# Patient Record
Sex: FEMALE | Race: AMERICAN INDIAN OR ALASKA NATIVE | Employment: OTHER | URBAN - METROPOLITAN AREA
[De-identification: names, ages, dates, MRNs, and addresses within clinical notes are randomized per-mention and may not be internally consistent; named-entity substitution may affect disease eponyms.]

---

## 2020-01-06 ENCOUNTER — INITIAL CONSULT (OUTPATIENT)
Dept: CARDIOLOGY CLINIC | Age: 85
End: 2020-01-06
Payer: MEDICARE

## 2020-01-06 VITALS
HEART RATE: 46 BPM | BODY MASS INDEX: 24.55 KG/M2 | WEIGHT: 162 LBS | DIASTOLIC BLOOD PRESSURE: 60 MMHG | SYSTOLIC BLOOD PRESSURE: 116 MMHG | HEIGHT: 68 IN

## 2020-01-06 PROCEDURE — G8484 FLU IMMUNIZE NO ADMIN: HCPCS | Performed by: INTERNAL MEDICINE

## 2020-01-06 PROCEDURE — 99203 OFFICE O/P NEW LOW 30 MIN: CPT | Performed by: INTERNAL MEDICINE

## 2020-01-06 PROCEDURE — 93000 ELECTROCARDIOGRAM COMPLETE: CPT | Performed by: INTERNAL MEDICINE

## 2020-01-06 PROCEDURE — 1090F PRES/ABSN URINE INCON ASSESS: CPT | Performed by: INTERNAL MEDICINE

## 2020-01-06 PROCEDURE — G8420 CALC BMI NORM PARAMETERS: HCPCS | Performed by: INTERNAL MEDICINE

## 2020-01-06 PROCEDURE — G8427 DOCREV CUR MEDS BY ELIG CLIN: HCPCS | Performed by: INTERNAL MEDICINE

## 2020-01-06 RX ORDER — PREGABALIN 150 MG/1
150 CAPSULE ORAL 2 TIMES DAILY
COMMUNITY
End: 2021-01-18 | Stop reason: SDUPTHER

## 2020-01-06 RX ORDER — OMEPRAZOLE 20 MG/1
20 CAPSULE, DELAYED RELEASE ORAL DAILY
COMMUNITY
End: 2021-04-06 | Stop reason: SDUPTHER

## 2020-01-06 RX ORDER — BENAZEPRIL HYDROCHLORIDE 40 MG/1
40 TABLET, FILM COATED ORAL DAILY
COMMUNITY
End: 2020-11-19 | Stop reason: SDUPTHER

## 2020-01-06 RX ORDER — MELOXICAM 7.5 MG/1
7.5 TABLET ORAL DAILY
COMMUNITY
End: 2021-03-31 | Stop reason: SDUPTHER

## 2020-01-06 RX ORDER — MONTELUKAST SODIUM 10 MG/1
10 TABLET ORAL NIGHTLY
COMMUNITY
End: 2020-07-24

## 2020-01-06 SDOH — HEALTH STABILITY: MENTAL HEALTH: HOW OFTEN DO YOU HAVE A DRINK CONTAINING ALCOHOL?: NEVER

## 2020-01-06 NOTE — PROGRESS NOTES
CARDIOLOGY CONSULT NOTE   Reason for consultation:  Abnormal ekg    Referring physician:  Dr. Nicholas Mensah    Primary care physician: Bell Cooper MD      Dear Dr. Nicholas Mensah  Thanks for the consult. History of present illness:[place] is a 80 y. o.year old who  presents with abnormal EKG from PMD office, she denied any chest pain or shortness of breath, no dizziness. Blood pressure, cholesterol, blood glucose and weight are well controlled. Past medical history:    has a past medical history of H/O echocardiogram.  Past surgical history:   has no past surgical history on file. Social History:   reports that she has never smoked. She has never used smokeless tobacco. She reports that she does not drink alcohol. Family history:   no family history of CAD, STROKE of DM    Allergies   Allergen Reactions    Penicillins        No current facility-administered medications for this visit. Current Outpatient Medications   Medication Sig Dispense Refill    omeprazole (PRILOSEC) 20 MG delayed release capsule Take 20 mg by mouth daily      meloxicam (MOBIC) 7.5 MG tablet Take 7.5 mg by mouth daily      benazepril (LOTENSIN) 40 MG tablet Take 40 mg by mouth daily      montelukast (SINGULAIR) 10 MG tablet Take 10 mg by mouth nightly      pregabalin (LYRICA) 150 MG capsule Take 150 mg by mouth 2 times daily.  amLODIPine (NORVASC) 10 MG tablet Take 10 mg by mouth daily      atorvastatin (LIPITOR) 20 MG tablet Take 20 mg by mouth daily      triamterene-hydrochlorothiazide (MAXZIDE-25) 37.5-25 MG per tablet Take 1 tablet by mouth daily      B Complex Vitamins (VITAMIN B COMPLEX PO) Take by mouth       No current facility-administered medications for this visit.       Review of Systems:   · Constitutional: No Fever or Weight Loss   · Eyes: No Decreased Vision  · ENT: + Headaches, Hearing Loss or Vertigo  · Cardiovascular: No chest pain, dyspnea on exertion, palpitations or loss of consciousness  · Respiratory: No motion in all major joints. No tenderness to palpation or major deformities noted. Back- No tenderness. Integument:  Warm, Dry, No erythema, No rash. Skin: no rash, no ulcers  Lymphatic:  No lymphadenopathy noted. Neurologic:  Alert & oriented x 3, Normal motor function, Normal sensory function, No focal deficits noted. Psychiatric:  Affect normal, Judgment normal, Mood normal.   Lab Review   No results for input(s): WBC, HGB, HCT, PLT in the last 72 hours. No results for input(s): NA, K, CL, CO2, PHOS, BUN, CREATININE in the last 72 hours. Invalid input(s): CA  No results for input(s): AST, ALT, ALB, BILIDIR, BILITOT, ALKPHOS in the last 72 hours. No results for input(s): TROPONINI in the last 72 hours. No results found for: BNP  No results found for: INR, PROTIME      EKG:pending    Chest Xray:    ECHO:pending  Labs, echo, meds reviewed  Assessment: 80 y. o.year old with PMH of  has a past medical history of H/O echocardiogram.      Recommendations:    1. Abnormal Ekg: not available, will get ekg today and will decide for further management  2. Spinal sx: stable, on wheel chair  3. Murmur: echo ordered  4. Headche: from sinus congestion, recommend to use OTC  All labs, medications and tests reviewed, continue all other medications of all above medical condition listed as is.          Bettina Mello MD, 1/6/2020 12:06 PM

## 2020-01-06 NOTE — LETTER
CLINICAL STAFF DOCUMENTATION    [place]  2/20/1933  [id number]    Have you had any Chest Pain - No    Have you had any Shortness of Breath - No      Have you had any dizziness - No      Have you had any palpitations - No      Do you have any edema - Yes, pt had spinal surgery    Check Venous \"LEG PROBLEM Questionnaire\"    Do you have a surgery or procedure scheduled in the near future - No      Ask patient if they want to sign up for MyChart if they are not already signed up    Check to see if we have an E-MAIL on file for the patient    Check medication list thoroughly!!!  BE SURE TO ASK PATIENT IF THEY NEED MEDICATION REFILLS

## 2020-02-04 ENCOUNTER — PROCEDURE VISIT (OUTPATIENT)
Dept: CARDIOLOGY CLINIC | Age: 85
End: 2020-02-04
Payer: MEDICARE

## 2020-02-04 LAB
LV EF: 58 %
LVEF MODALITY: NORMAL

## 2020-02-04 PROCEDURE — 93306 TTE W/DOPPLER COMPLETE: CPT | Performed by: INTERNAL MEDICINE

## 2020-02-05 ENCOUNTER — TELEPHONE (OUTPATIENT)
Dept: CARDIOLOGY CLINIC | Age: 85
End: 2020-02-05

## 2020-07-24 ENCOUNTER — VIRTUAL VISIT (OUTPATIENT)
Dept: CARDIOLOGY CLINIC | Age: 85
End: 2020-07-24
Payer: MEDICARE

## 2020-07-24 PROCEDURE — 99212 OFFICE O/P EST SF 10 MIN: CPT | Performed by: INTERNAL MEDICINE

## 2020-07-24 RX ORDER — OMEGA-3/DHA/EPA/FISH OIL 35-113.5MG
1000 TABLET,CHEWABLE ORAL DAILY
COMMUNITY
Start: 2020-06-06 | End: 2020-11-03

## 2020-07-24 RX ORDER — LORATADINE 10 MG/1
10 TABLET ORAL DAILY
COMMUNITY
End: 2021-01-18 | Stop reason: SDUPTHER

## 2020-07-24 NOTE — PROGRESS NOTES
Starla Pickard is a 80 y.o. female evaluated via telephone on 7/24/2020. Consent:  She and/or health care decision maker is aware that that she may receive a bill for this telephone service, depending on her insurance coverage, and has provided verbal consent to proceed: Yes      Documentation:  I communicated with the patient and/or health care decision maker about  Details of this discussion including any medical advice provided:       I affirm this is a Patient Initiated Episode with a Patient who has not had a related appointment within my department in the past 7 days or scheduled within the next 24 hours. Patient identification was verified at the start of the visit: Yes    Total Time: minutes: 5-10 minutes    Note: not billable if this call serves to triage the patient into an appointment for the relevant concern      [address] [place] [address], MD    TELEHEALTH EVALUATION -- Audio/Visual (During OACES-08 public health emergency)      Chief complaints: patient is here for management of mild AS, HTN, dyslipidemia    Subjective: patient feels better, no chest pain, no shortness of breath, no dizziness, no palpitations    [facility] is a 80 y. o.year old who  has a past medical history of GERD (gastroesophageal reflux disease), H/O echocardiogram, H/O echocardiogram, Hyperlipidemia, Hypertension, and Neuropathy.  and presents for management of mild AS, HTN, dyslipidemiawhich are well controlled      Current Outpatient Medications   Medication Sig Dispense Refill    [facility]S VITAMIN B12 1000 MCG tablet Take 1,000 mcg by mouth daily      loratadine (CLARITIN) 10 MG tablet Take 10 mg by mouth daily      omeprazole (PRILOSEC) 20 MG delayed release capsule Take 20 mg by mouth daily      meloxicam (MOBIC) 7.5 MG tablet Take 7.5 mg by mouth daily      benazepril (LOTENSIN) 40 MG tablet Take 40 mg by mouth daily      pregabalin (LYRICA) 150 MG capsule Take 150 mg by mouth 2

## 2020-10-13 ENCOUNTER — OFFICE VISIT (OUTPATIENT)
Dept: INTERNAL MEDICINE CLINIC | Age: 85
End: 2020-10-13
Payer: MEDICARE

## 2020-10-13 VITALS
DIASTOLIC BLOOD PRESSURE: 62 MMHG | TEMPERATURE: 97.2 F | WEIGHT: 164.2 LBS | HEIGHT: 69 IN | OXYGEN SATURATION: 97 % | HEART RATE: 53 BPM | BODY MASS INDEX: 24.32 KG/M2 | SYSTOLIC BLOOD PRESSURE: 128 MMHG

## 2020-10-13 PROBLEM — I10 BENIGN ESSENTIAL HYPERTENSION: Status: ACTIVE | Noted: 2020-10-13

## 2020-10-13 PROBLEM — J30.9 ALLERGIC RHINITIS: Status: ACTIVE | Noted: 2020-10-13

## 2020-10-13 PROBLEM — G62.9 NEUROPATHY: Status: ACTIVE | Noted: 2020-10-13

## 2020-10-13 PROBLEM — M54.9 DORSALGIA: Status: ACTIVE | Noted: 2020-10-13

## 2020-10-13 PROBLEM — K21.9 GASTROESOPHAGEAL REFLUX DISEASE WITHOUT ESOPHAGITIS: Status: ACTIVE | Noted: 2020-10-13

## 2020-10-13 PROBLEM — E78.2 MIXED HYPERLIPIDEMIA: Status: ACTIVE | Noted: 2020-10-13

## 2020-10-13 LAB
A/G RATIO: 1.8 (ref 1.1–2.2)
ALBUMIN SERPL-MCNC: 4.2 G/DL (ref 3.4–5)
ALP BLD-CCNC: 137 U/L (ref 40–129)
ALT SERPL-CCNC: 10 U/L (ref 10–40)
ANION GAP SERPL CALCULATED.3IONS-SCNC: 15 MMOL/L (ref 3–16)
AST SERPL-CCNC: 16 U/L (ref 15–37)
BASOPHILS ABSOLUTE: 0 K/UL (ref 0–0.2)
BASOPHILS RELATIVE PERCENT: 0.5 %
BILIRUB SERPL-MCNC: 0.5 MG/DL (ref 0–1)
BUN BLDV-MCNC: 14 MG/DL (ref 7–20)
CALCIUM SERPL-MCNC: 9.4 MG/DL (ref 8.3–10.6)
CHLORIDE BLD-SCNC: 107 MMOL/L (ref 99–110)
CHOLESTEROL, FASTING: 131 MG/DL (ref 0–199)
CO2: 23 MMOL/L (ref 21–32)
CREAT SERPL-MCNC: 1 MG/DL (ref 0.6–1.2)
EOSINOPHILS ABSOLUTE: 0.1 K/UL (ref 0–0.6)
EOSINOPHILS RELATIVE PERCENT: 2.9 %
GFR AFRICAN AMERICAN: >60
GFR NON-AFRICAN AMERICAN: 52
GLOBULIN: 2.4 G/DL
GLUCOSE BLD-MCNC: 87 MG/DL (ref 70–99)
HCT VFR BLD CALC: 35 % (ref 36–48)
HDLC SERPL-MCNC: 47 MG/DL (ref 40–60)
HEMOGLOBIN: 11.3 G/DL (ref 12–16)
LDL CHOLESTEROL CALCULATED: 51 MG/DL
LYMPHOCYTES ABSOLUTE: 1.6 K/UL (ref 1–5.1)
LYMPHOCYTES RELATIVE PERCENT: 32.6 %
MCH RBC QN AUTO: 28.3 PG (ref 26–34)
MCHC RBC AUTO-ENTMCNC: 32.3 G/DL (ref 31–36)
MCV RBC AUTO: 87.7 FL (ref 80–100)
MONOCYTES ABSOLUTE: 0.4 K/UL (ref 0–1.3)
MONOCYTES RELATIVE PERCENT: 7.9 %
NEUTROPHILS ABSOLUTE: 2.8 K/UL (ref 1.7–7.7)
NEUTROPHILS RELATIVE PERCENT: 56.1 %
PDW BLD-RTO: 13.9 % (ref 12.4–15.4)
PLATELET # BLD: 198 K/UL (ref 135–450)
PMV BLD AUTO: 12.1 FL (ref 5–10.5)
POTASSIUM SERPL-SCNC: 4.4 MMOL/L (ref 3.5–5.1)
RBC # BLD: 3.99 M/UL (ref 4–5.2)
SODIUM BLD-SCNC: 145 MMOL/L (ref 136–145)
TOTAL PROTEIN: 6.6 G/DL (ref 6.4–8.2)
TRIGLYCERIDE, FASTING: 164 MG/DL (ref 0–150)
VLDLC SERPL CALC-MCNC: 33 MG/DL
WBC # BLD: 5 K/UL (ref 4–11)

## 2020-10-13 PROCEDURE — 36415 COLL VENOUS BLD VENIPUNCTURE: CPT | Performed by: INTERNAL MEDICINE

## 2020-10-13 PROCEDURE — G8484 FLU IMMUNIZE NO ADMIN: HCPCS | Performed by: INTERNAL MEDICINE

## 2020-10-13 PROCEDURE — G8420 CALC BMI NORM PARAMETERS: HCPCS | Performed by: INTERNAL MEDICINE

## 2020-10-13 PROCEDURE — G0008 ADMIN INFLUENZA VIRUS VAC: HCPCS | Performed by: INTERNAL MEDICINE

## 2020-10-13 PROCEDURE — G8427 DOCREV CUR MEDS BY ELIG CLIN: HCPCS | Performed by: INTERNAL MEDICINE

## 2020-10-13 PROCEDURE — 90694 VACC AIIV4 NO PRSRV 0.5ML IM: CPT | Performed by: INTERNAL MEDICINE

## 2020-10-13 PROCEDURE — 1123F ACP DISCUSS/DSCN MKR DOCD: CPT | Performed by: INTERNAL MEDICINE

## 2020-10-13 PROCEDURE — 1090F PRES/ABSN URINE INCON ASSESS: CPT | Performed by: INTERNAL MEDICINE

## 2020-10-13 PROCEDURE — 1036F TOBACCO NON-USER: CPT | Performed by: INTERNAL MEDICINE

## 2020-10-13 PROCEDURE — 4040F PNEUMOC VAC/ADMIN/RCVD: CPT | Performed by: INTERNAL MEDICINE

## 2020-10-13 PROCEDURE — 99214 OFFICE O/P EST MOD 30 MIN: CPT | Performed by: INTERNAL MEDICINE

## 2020-10-13 ASSESSMENT — PATIENT HEALTH QUESTIONNAIRE - PHQ9
2. FEELING DOWN, DEPRESSED OR HOPELESS: 0
SUM OF ALL RESPONSES TO PHQ9 QUESTIONS 1 & 2: 0
SUM OF ALL RESPONSES TO PHQ QUESTIONS 1-9: 0
1. LITTLE INTEREST OR PLEASURE IN DOING THINGS: 0
SUM OF ALL RESPONSES TO PHQ QUESTIONS 1-9: 0

## 2020-10-13 NOTE — PROGRESS NOTES
Have you ever had a reaction to a flu vaccine? NO  Are you able to eat eggs without adverse effects? YES  Do you have any current illness? NO  Have you ever had Guillian Humboldt Syndrome? NO    Flu vaccine given per order. Please see immunization tab.

## 2020-10-13 NOTE — PROGRESS NOTES
Name: Anh Whalen  [id number]  Age: 80 y.o. YOB: 1933  Sex: female    CHIEF COMPLAINT:    Chief Complaint   Patient presents with    Hypertension    Hyperlipidemia       HISTORY OF PRESENT ILLNESS:     This is a pleasant  80 y.o. female  is seen today for management of chronic medical problems and medications refills. Doing OK . Denies CP or SOB. No fever , sore throat or cough or congestion. Allergies are better. Denies any abdominal pain. Appetite OK. Bowels moving [id number] No urinary symptoms. C/O pain in lower back pain in legs. Pain radiates from her back. No other complaints. Previous records reviewed . Past Medical History:    Patient Active Problem List   Diagnosis    Benign essential hypertension    Mixed hyperlipidemia    Gastroesophageal reflux disease without esophagitis    Dorsalgia    Neuropathy    Allergic rhinitis        Past Surgical History:        Procedure Laterality Date    APPENDECTOMY      COLON SURGERY      removed polops    HYSTERECTOMY, TOTAL ABDOMINAL      SPINE SURGERY  2013    scrape calcium off spine, pressing on spine/nerves       Social History:   Social History     Tobacco Use    Smoking status: Never Smoker    Smokeless tobacco: Never Used   Substance Use Topics    Alcohol Use     Frequency: Never       Family History:       Problem Relation Age of Onset    Hypertension Mother     Hypertension Father        Allergies:  Penicillins    Current Medications :      Prior to Admission medications    Medication Sig Start Date End Date Taking?  Authorizing Provider   [facility]S VITAMIN B12 1000 MCG tablet Take 1,000 mcg by mouth daily 6/6/20  Yes Historical Provider, MD   loratadine (CLARITIN) 10 MG tablet Take 10 mg by mouth daily   Yes Historical Provider, MD   omeprazole (PRILOSEC) 20 MG delayed release capsule Take 20 mg by mouth daily   Yes Historical Provider, MD   meloxicam (MOBIC) 7.5 MG tablet Take 7.5 mg by mouth daily   Yes Historical Provider, MD   benazepril (LOTENSIN) 40 MG tablet Take 40 mg by mouth daily   Yes Historical Provider, MD   pregabalin (LYRICA) 150 MG capsule Take 150 mg by mouth 2 times daily. Yes Historical Provider, MD   amLODIPine (NORVASC) 10 MG tablet Take 10 mg by mouth daily   Yes Historical Provider, MD   atorvastatin (LIPITOR) 20 MG tablet Take 20 mg by mouth daily   Yes Historical Provider, MD   triamterene-hydrochlorothiazide (MAXZIDE) 75-50 MG per tablet Take 1 tablet by mouth daily    Yes Historical Provider, MD       LAB DATA: Reviewed. REVIEW OF SYSTEMS:   see HPI/ Comprehensive review of systems negative except for the ones mentioned in HPI. PHYSICAL EXAMINATION:   /62   Pulse 53   Temp 97.2 °F (36.2 °C)   Ht 5' 9\" (1.753 m)   Wt 164 lb 3.2 oz (74.5 kg)   SpO2 97%   BMI 24.25 kg/m²      GENERAL APPEARANCE:    Alert, oriented x 3, well developed, cooperative, not in any distress, appears stated age. HEAD:   Normocephalic, atraumatic   EYES:   PERRLA, EOMI, lids normal, conjuctivea clear, sclera anicteric. NECK:    Supple, symmetrical,  trachea midline, no thyromegaly, no JVD, no lymphadenopathy. LUNGS:    Clear to auscultation bilaterally, respirations unlabored, accessory muscles are not used. HEART:     Regular rate and rhythm, S1 and S2 normal, no murmur, rub or gallop. PMI in MCL. ABDOMEN:    Soft, non-tender, bowel sounds are normoactive, no masses, no hepatospleenomegaly. EXTREMITY:   no bipedal edema  NEURO:  Alert, oriented to person, place and time. Grossly intact. Musculoskeletal:         Tenderness lower back and in her legs. PSYCH:  Mood euthymic, insight and judgement good. ASSESSMENT/PLAN:    Benign essential hypertension. Continue current medications, denies side effect with medicationss. Low salt diet and exercise advised.   Patient on Norvasc, Lotensin and Maxide.  -     CBC Auto Differential;   -     Comprehensive Metabolic Panel;    Mixed hyperlipidemia. Patient is taking cholesterol medications regularly. Denies any side effects. Diet and exercise advised. Patient on Lipitor  -     Lipid, Fasting    Gastroesophageal reflux disease without esophagitis. Continue omeprazole. Dorsalgia. On Tylenol, Mobic and Lyrica     Neuropathy. Continue Lyrica. Allergic rhinitis, unspecified seasonality, unspecified trigger/\. On Zyrtec as needed    Need for influenza vaccination  -     INFLUENZA, QUADV, ADJUVANTED, 65 YRS =, IM, PF, PREFILL SYR, 0.5ML (FLUAD)    Flu shot was given. I have recommended that the patient follow CDC guidelines for prevention of COVID-19 infection. Care discussed with patient. Questions answered and patient verbalizes understanding and agrees with plan. Medications reviewed and reconciled. Continue current medications. Appropriate prescriptions are ordered. Risks and benefits of meds are discussed. After visit summary provided. Advised to call for any problems, questions, or concerns. If symptoms worsen or don't improve as expected, to call us or go to ER. Follow up as directed, sooner if needed. Return in about 3 months (around 1/13/2021). This dictation was performed with a verbal recognition program and it was checked for errors. It is possible that there are still dictated errors within this office note. Any errors should be brought immediately to my attention for correction. All efforts were made to ensure that this office note is accurate.      Marilin Tam MD

## 2020-11-19 RX ORDER — AMLODIPINE BESYLATE 10 MG/1
10 TABLET ORAL DAILY
Qty: 90 TABLET | Refills: 0 | Status: SHIPPED | OUTPATIENT
Start: 2020-11-19 | End: 2021-01-18 | Stop reason: SDUPTHER

## 2020-11-19 RX ORDER — BENAZEPRIL HYDROCHLORIDE 40 MG/1
40 TABLET, FILM COATED ORAL DAILY
Qty: 90 TABLET | Refills: 0 | Status: SHIPPED | OUTPATIENT
Start: 2020-11-19 | End: 2021-01-18 | Stop reason: SDUPTHER

## 2020-11-19 RX ORDER — ATORVASTATIN CALCIUM 20 MG/1
20 TABLET, FILM COATED ORAL DAILY
Qty: 90 TABLET | Refills: 0 | Status: SHIPPED | OUTPATIENT
Start: 2020-11-19 | End: 2021-01-18 | Stop reason: SDUPTHER

## 2021-01-18 ENCOUNTER — OFFICE VISIT (OUTPATIENT)
Dept: INTERNAL MEDICINE CLINIC | Age: 86
End: 2021-01-18
Payer: MEDICARE

## 2021-01-18 VITALS
BODY MASS INDEX: 24.63 KG/M2 | SYSTOLIC BLOOD PRESSURE: 150 MMHG | DIASTOLIC BLOOD PRESSURE: 60 MMHG | WEIGHT: 166.8 LBS | OXYGEN SATURATION: 93 % | HEART RATE: 64 BPM | TEMPERATURE: 96.9 F

## 2021-01-18 DIAGNOSIS — J30.9 ALLERGIC RHINITIS, UNSPECIFIED SEASONALITY, UNSPECIFIED TRIGGER: ICD-10-CM

## 2021-01-18 DIAGNOSIS — M54.9 DORSALGIA: ICD-10-CM

## 2021-01-18 DIAGNOSIS — G62.9 NEUROPATHY: ICD-10-CM

## 2021-01-18 DIAGNOSIS — D64.9 ANEMIA, UNSPECIFIED TYPE: ICD-10-CM

## 2021-01-18 DIAGNOSIS — I10 ESSENTIAL HYPERTENSION: Primary | ICD-10-CM

## 2021-01-18 DIAGNOSIS — E78.2 MIXED HYPERLIPIDEMIA: ICD-10-CM

## 2021-01-18 PROCEDURE — 99214 OFFICE O/P EST MOD 30 MIN: CPT | Performed by: INTERNAL MEDICINE

## 2021-01-18 PROCEDURE — G8427 DOCREV CUR MEDS BY ELIG CLIN: HCPCS | Performed by: INTERNAL MEDICINE

## 2021-01-18 PROCEDURE — 4040F PNEUMOC VAC/ADMIN/RCVD: CPT | Performed by: INTERNAL MEDICINE

## 2021-01-18 PROCEDURE — G8420 CALC BMI NORM PARAMETERS: HCPCS | Performed by: INTERNAL MEDICINE

## 2021-01-18 PROCEDURE — 1090F PRES/ABSN URINE INCON ASSESS: CPT | Performed by: INTERNAL MEDICINE

## 2021-01-18 PROCEDURE — 1123F ACP DISCUSS/DSCN MKR DOCD: CPT | Performed by: INTERNAL MEDICINE

## 2021-01-18 PROCEDURE — G8484 FLU IMMUNIZE NO ADMIN: HCPCS | Performed by: INTERNAL MEDICINE

## 2021-01-18 PROCEDURE — 1036F TOBACCO NON-USER: CPT | Performed by: INTERNAL MEDICINE

## 2021-01-18 RX ORDER — AMLODIPINE BESYLATE 10 MG/1
10 TABLET ORAL DAILY
Qty: 90 TABLET | Refills: 1 | Status: SHIPPED | OUTPATIENT
Start: 2021-01-18 | End: 2021-05-19 | Stop reason: SDUPTHER

## 2021-01-18 RX ORDER — ATORVASTATIN CALCIUM 20 MG/1
20 TABLET, FILM COATED ORAL DAILY
Qty: 90 TABLET | Refills: 1 | Status: SHIPPED | OUTPATIENT
Start: 2021-01-18 | End: 2021-05-19 | Stop reason: SDUPTHER

## 2021-01-18 RX ORDER — LORATADINE 10 MG/1
10 TABLET ORAL DAILY
Qty: 90 TABLET | Refills: 1 | Status: SHIPPED | OUTPATIENT
Start: 2021-01-18 | End: 2021-03-30 | Stop reason: SDUPTHER

## 2021-01-18 RX ORDER — PREGABALIN 150 MG/1
150 CAPSULE ORAL 2 TIMES DAILY
Qty: 60 CAPSULE | Refills: 3 | Status: SHIPPED | OUTPATIENT
Start: 2021-01-18 | End: 2021-05-19 | Stop reason: SDUPTHER

## 2021-01-18 RX ORDER — BENAZEPRIL HYDROCHLORIDE 40 MG/1
40 TABLET, FILM COATED ORAL DAILY
Qty: 90 TABLET | Refills: 1 | Status: SHIPPED | OUTPATIENT
Start: 2021-01-18 | End: 2021-03-30 | Stop reason: SDUPTHER

## 2021-01-18 SDOH — ECONOMIC STABILITY: TRANSPORTATION INSECURITY
IN THE PAST 12 MONTHS, HAS THE LACK OF TRANSPORTATION KEPT YOU FROM MEDICAL APPOINTMENTS OR FROM GETTING MEDICATIONS?: NOT ASKED

## 2021-01-18 SDOH — ECONOMIC STABILITY: TRANSPORTATION INSECURITY
IN THE PAST 12 MONTHS, HAS LACK OF TRANSPORTATION KEPT YOU FROM MEETINGS, WORK, OR FROM GETTING THINGS NEEDED FOR DAILY LIVING?: NOT ASKED

## 2021-01-18 ASSESSMENT — PATIENT HEALTH QUESTIONNAIRE - PHQ9
SUM OF ALL RESPONSES TO PHQ QUESTIONS 1-9: 0
SUM OF ALL RESPONSES TO PHQ QUESTIONS 1-9: 0
1. LITTLE INTEREST OR PLEASURE IN DOING THINGS: 0
2. FEELING DOWN, DEPRESSED OR HOPELESS: 0
SUM OF ALL RESPONSES TO PHQ9 QUESTIONS 1 & 2: 0

## 2021-01-18 NOTE — PROGRESS NOTES
Name: Genie Flores  [id number]  Age: 80 y.o. YOB: 1933  Sex: female    CHIEF COMPLAINT:    Chief Complaint   Patient presents with    Hypertension    Other     other chronic conditions       HISTORY OF PRESENT ILLNESS:     This is a pleasant  80 y.o. female  is seen today for management of chronic medical problems and medications refills. Previous records reviewed . C/O balance problems. . uses wheel chair for ADLs and also uses cane to walk short distances. Does-not want any walker or PT/OT at this time. Has no falls. Doing OK otherwise. .  Denies CP or SOB. No fever , sore throat or cough or congestion. Denies any abdominal pain. Appetite OK. Bowels moving [id number] No urinary symptoms. Has mild chronic low back pain and pain in her legs. Lyrica helps. PDMP reviewed , no signs of drug abuse. Hearing is ok. Vision Ok with glasses. Denies  any significant skin lesions. Denies any significant depression or anxiety. No other complaints. Labs discussed and explained to her from last visit.         Past Medical History:    Patient Active Problem List   Diagnosis    Essential hypertension    Mixed hyperlipidemia    Gastroesophageal reflux disease without esophagitis    Dorsalgia    Neuropathy    Allergic rhinitis    Anemia        Past Surgical History:        Procedure Laterality Date    APPENDECTOMY      COLON SURGERY      removed polops    HYSTERECTOMY, TOTAL ABDOMINAL      SPINE SURGERY  2013    scrape calcium off spine, pressing on spine/nerves       Social History:   Social History     Tobacco Use    Smoking status: Never Smoker    Smokeless tobacco: Never Used   Substance Use Topics    Alcohol Use     Frequency: Never       Family History:       Problem Relation Age of Onset    Hypertension Mother     Hypertension Father        Allergies:  Penicillins    Current Medications :      Prior to Admission medications Medication Sig Start Date End Date Taking? Authorizing Provider   amLODIPine (NORVASC) 10 MG tablet Take 1 tablet by mouth daily 1/18/21  Yes Ezra Macias MD   atorvastatin (LIPITOR) 20 MG tablet Take 1 tablet by mouth daily 1/18/21  Yes Ezra Macias MD   benazepril (LOTENSIN) 40 MG tablet Take 1 tablet by mouth daily 1/18/21  Yes Ezra Macias MD   loratadine (CLARITIN) 10 MG tablet Take 1 tablet by mouth daily 1/18/21  Yes Ezra Macias MD   pregabalin (LYRICA) 150 MG capsule Take 1 capsule by mouth 2 times daily for 30 days. 1/18/21 2/17/21 Yes Ezra Macias MD   cyanocobalamin 1000 MCG tablet Take 1 tablet by mouth daily 11/3/20  Yes Ezra Macias MD   omeprazole (PRILOSEC) 20 MG delayed release capsule Take 20 mg by mouth daily   Yes Historical Provider, MD   meloxicam (MOBIC) 7.5 MG tablet Take 7.5 mg by mouth daily   Yes Historical Provider, MD   triamterene-hydrochlorothiazide (MAXZIDE) 75-50 MG per tablet Take 1 tablet by mouth daily    Yes Historical Provider, MD       LAB DATA: Reviewed. REVIEW OF SYSTEMS:   see HPI/ Comprehensive review of systems negative except for the ones mentioned in HPI. PHYSICAL EXAMINATION:   BP (!) 150/60   Pulse 64   Temp 96.9 °F (36.1 °C)   Wt 166 lb 12.8 oz (75.7 kg)   SpO2 93%   BMI 24.63 kg/m²      GENERAL APPEARANCE:    Alert, oriented x 3, well developed, cooperative, not in any distress, appears stated age. HEAD:   Normocephalic, atraumatic   EYES:   PERRLA, EOMI, lids normal, conjuctivea clear, sclera anicteric. NECK:    Supple, symmetrical,  trachea midline, no thyromegaly, no JVD, no lymphadenopathy. LUNGS:    Clear to auscultation bilaterally, respirations unlabored, accessory muscles are not used. HEART:     Regular rate and rhythm, S1 and S2 normal, no murmur, rub or gallop. PMI in MCL. ABDOMEN:    Soft, non-tender, bowel sounds are normoactive, no masses, no hepatospleenomegaly.   EXTREMITY:   no bipedal edema NEURO:  Alert, oriented to person, place and time. Grossly intact. Musculoskeletal:         No kyphosis or scoliosis, mild tenderness in her lower back and in her legs   skin:                            Warm and dry. No rash or obvious suspicious lesions. PSYCH:  Mood euthymic, insight and judgement good. ASSESSMENT/PLAN:    Essential hypertension. Continue current medications, denies side effect with medicationss. Low salt diet and exercise advised. -     amLODIPine (NORVASC) 10 MG tablet; Take 1 tablet by mouth daily  -     benazepril (LOTENSIN) 40 MG tablet; Take 1 tablet by mouth daily    Mixed hyperlipidemia. Patient is taking cholesterol medications regularly. Denies any side effects. Diet and exercise advised. -     atorvastatin (LIPITOR) 20 MG tablet; Take 1 tablet by mouth daily    Allergic rhinitis, unspecified seasonality, unspecified trigger  -     loratadine (CLARITIN) 10 MG tablet; Take 1 tablet by mouth daily    Dorsalgia. Advised to take Mobic and Tylenol as needed  -     pregabalin (LYRICA) 150 MG capsule; Take 1 capsule by mouth 2 times daily for 30 days. Neuropathy  -     pregabalin (LYRICA) 150 MG capsule; Take 1 capsule by mouth 2 times daily for 30 days. Anemia, unspecified type. Monitor for now. I have recommended that the patient follow CDC guidelines for prevention of COVID-19 infection. I also discussed Coronavirus precaution including wearing face mask, handwashing practice, wiping items touched in public such as gas pumps, door handles, shopping carts, etc. Also Self monitoring for infection - fever, chills, cough, SOB. Should he/she develop symptoms he/she should call office or go to ER for further instructions. Care discussed with patient. Questions answered and patient verbalizes understanding and agrees with plan. Medications reviewed and reconciled. Continue current medications. Appropriate prescriptions are ordered. Risks and benefits of meds are discussed. After visit summary provided. Advised to call for any problems, questions, or concerns. If symptoms worsen or don't improve as expected, to call us or go to ER. Follow up as directed, sooner if needed. Return in about 3 months (around 4/18/2021). This dictation was performed with a verbal recognition program and it was checked for errors. It is possible that there are still dictated errors within this office note. Any errors should be brought immediately to my attention for correction. All efforts were made to ensure that this office note is accurate.      Danilo Matta MD

## 2021-03-30 DIAGNOSIS — I10 ESSENTIAL HYPERTENSION: ICD-10-CM

## 2021-03-30 DIAGNOSIS — J30.9 ALLERGIC RHINITIS, UNSPECIFIED SEASONALITY, UNSPECIFIED TRIGGER: ICD-10-CM

## 2021-03-30 RX ORDER — BENAZEPRIL HYDROCHLORIDE 40 MG/1
40 TABLET, FILM COATED ORAL DAILY
Qty: 90 TABLET | Refills: 1 | Status: SHIPPED | OUTPATIENT
Start: 2021-03-30 | End: 2021-05-19 | Stop reason: SDUPTHER

## 2021-03-30 RX ORDER — LORATADINE 10 MG/1
10 TABLET ORAL DAILY
Qty: 90 TABLET | Refills: 1 | Status: SHIPPED | OUTPATIENT
Start: 2021-03-30 | End: 2021-05-19 | Stop reason: SDUPTHER

## 2021-03-31 RX ORDER — MELOXICAM 7.5 MG/1
7.5 TABLET ORAL DAILY
Qty: 90 TABLET | Refills: 1 | Status: SHIPPED | OUTPATIENT
Start: 2021-03-31 | End: 2021-05-19 | Stop reason: SDUPTHER

## 2021-04-06 RX ORDER — OMEPRAZOLE 20 MG/1
20 CAPSULE, DELAYED RELEASE ORAL DAILY
Qty: 90 CAPSULE | Refills: 1 | Status: SHIPPED | OUTPATIENT
Start: 2021-04-06 | End: 2021-05-19 | Stop reason: SDUPTHER

## 2021-05-13 RX ORDER — TRIAMTERENE AND HYDROCHLOROTHIAZIDE 75; 50 MG/1; MG/1
1 TABLET ORAL DAILY
Qty: 90 TABLET | Refills: 1 | Status: SHIPPED | OUTPATIENT
Start: 2021-05-13 | End: 2021-05-19 | Stop reason: SDUPTHER

## 2021-05-19 ENCOUNTER — OFFICE VISIT (OUTPATIENT)
Dept: INTERNAL MEDICINE CLINIC | Age: 86
End: 2021-05-19
Payer: MEDICARE

## 2021-05-19 VITALS
BODY MASS INDEX: 25.33 KG/M2 | WEIGHT: 171 LBS | SYSTOLIC BLOOD PRESSURE: 130 MMHG | DIASTOLIC BLOOD PRESSURE: 70 MMHG | TEMPERATURE: 97.3 F | HEART RATE: 79 BPM | OXYGEN SATURATION: 95 % | HEIGHT: 69 IN

## 2021-05-19 DIAGNOSIS — D64.9 ANEMIA, UNSPECIFIED TYPE: ICD-10-CM

## 2021-05-19 DIAGNOSIS — I10 ESSENTIAL HYPERTENSION: Primary | ICD-10-CM

## 2021-05-19 DIAGNOSIS — G62.9 NEUROPATHY: ICD-10-CM

## 2021-05-19 DIAGNOSIS — M54.9 DORSALGIA: ICD-10-CM

## 2021-05-19 DIAGNOSIS — J30.9 ALLERGIC RHINITIS, UNSPECIFIED SEASONALITY, UNSPECIFIED TRIGGER: ICD-10-CM

## 2021-05-19 DIAGNOSIS — E78.2 MIXED HYPERLIPIDEMIA: ICD-10-CM

## 2021-05-19 PROCEDURE — G8417 CALC BMI ABV UP PARAM F/U: HCPCS | Performed by: INTERNAL MEDICINE

## 2021-05-19 PROCEDURE — 99214 OFFICE O/P EST MOD 30 MIN: CPT | Performed by: INTERNAL MEDICINE

## 2021-05-19 PROCEDURE — 1036F TOBACCO NON-USER: CPT | Performed by: INTERNAL MEDICINE

## 2021-05-19 PROCEDURE — 4040F PNEUMOC VAC/ADMIN/RCVD: CPT | Performed by: INTERNAL MEDICINE

## 2021-05-19 PROCEDURE — 1123F ACP DISCUSS/DSCN MKR DOCD: CPT | Performed by: INTERNAL MEDICINE

## 2021-05-19 PROCEDURE — G8427 DOCREV CUR MEDS BY ELIG CLIN: HCPCS | Performed by: INTERNAL MEDICINE

## 2021-05-19 PROCEDURE — 1090F PRES/ABSN URINE INCON ASSESS: CPT | Performed by: INTERNAL MEDICINE

## 2021-05-19 RX ORDER — OMEPRAZOLE 20 MG/1
20 CAPSULE, DELAYED RELEASE ORAL DAILY
Qty: 90 CAPSULE | Refills: 1 | Status: SHIPPED | OUTPATIENT
Start: 2021-05-19 | End: 2021-11-18 | Stop reason: SDUPTHER

## 2021-05-19 RX ORDER — MELOXICAM 7.5 MG/1
7.5 TABLET ORAL DAILY
Qty: 90 TABLET | Refills: 1 | Status: SHIPPED | OUTPATIENT
Start: 2021-05-19 | End: 2021-08-19 | Stop reason: ALTCHOICE

## 2021-05-19 RX ORDER — PREGABALIN 150 MG/1
150 CAPSULE ORAL 2 TIMES DAILY
Qty: 60 CAPSULE | Refills: 2 | Status: SHIPPED | OUTPATIENT
Start: 2021-05-19 | End: 2021-09-28

## 2021-05-19 RX ORDER — LORATADINE 10 MG/1
10 TABLET ORAL DAILY
Qty: 90 TABLET | Refills: 1 | Status: SHIPPED | OUTPATIENT
Start: 2021-05-19 | End: 2021-10-27

## 2021-05-19 RX ORDER — BENAZEPRIL HYDROCHLORIDE 40 MG/1
40 TABLET, FILM COATED ORAL DAILY
Qty: 90 TABLET | Refills: 1 | Status: SHIPPED | OUTPATIENT
Start: 2021-05-19 | End: 2021-11-11

## 2021-05-19 RX ORDER — TRIAMTERENE AND HYDROCHLOROTHIAZIDE 75; 50 MG/1; MG/1
1 TABLET ORAL DAILY
Qty: 90 TABLET | Refills: 1 | Status: SHIPPED | OUTPATIENT
Start: 2021-05-19 | End: 2021-11-18 | Stop reason: SDUPTHER

## 2021-05-19 RX ORDER — AMLODIPINE BESYLATE 10 MG/1
10 TABLET ORAL DAILY
Qty: 90 TABLET | Refills: 1 | Status: SHIPPED | OUTPATIENT
Start: 2021-05-19 | End: 2021-08-23

## 2021-05-19 RX ORDER — ATORVASTATIN CALCIUM 20 MG/1
20 TABLET, FILM COATED ORAL DAILY
Qty: 90 TABLET | Refills: 1 | Status: SHIPPED | OUTPATIENT
Start: 2021-05-19 | End: 2021-08-23

## 2021-05-19 NOTE — PROGRESS NOTES
Name: Rikki Nelson  [id number]  Age: 80 y.o. YOB: 1933  Sex: female    CHIEF COMPLAINT:    Chief Complaint   Patient presents with    Hypertension       HISTORY OF PRESENT ILLNESS:     This is a pleasant  80 y.o. female  is seen today for management of chronic medical problems and medications refills. Previous records reviewed . She complains of problem with her balance. But refuses physical therapy and also refuses to use walker or cane. Doing OK otherwise. Denies CP or SOB. No fever , sore throat or cough or congestion. Denies any abdominal pain. Appetite OK. Bowels moving [id number] No urinary symptoms. Complains of mild chronic low back pain and pain in her legs but medications help. She wants Lyrica refilled. PDMP reviewed, no signs of drug abuse. Hearing is ok. Vision Ok with glasses. Denies  any significant skin lesions. Denies any significant depression or anxiety. No other complaints. Did not get a Covid vaccine yet. Will get it soon. Past Medical History:    Patient Active Problem List   Diagnosis    Essential hypertension    Mixed hyperlipidemia    Gastroesophageal reflux disease without esophagitis    Dorsalgia    Neuropathy    Allergic rhinitis    Anemia        Past Surgical History:        Procedure Laterality Date    APPENDECTOMY      COLON SURGERY      removed polops    HYSTERECTOMY, TOTAL ABDOMINAL      SPINE SURGERY  2013    scrape calcium off spine, pressing on spine/nerves       Social History:   Social History     Tobacco Use    Smoking status: Never Smoker    Smokeless tobacco: Never Used   Substance Use Topics    Alcohol use: Not on file       Family History:       Problem Relation Age of Onset    Hypertension Mother     Hypertension Father        Allergies:  Penicillins    Current Medications :      Prior to Admission medications    Medication Sig Start Date End Date Taking?  Authorizing Provider   amLODIPine (NORVASC) 10 MG tablet Take 1 tablet by mouth daily 5/19/21  Yes Angela Montenegro MD   atorvastatin (LIPITOR) 20 MG tablet Take 1 tablet by mouth daily 5/19/21  Yes Angela Montenegro MD   benazepril (LOTENSIN) 40 MG tablet Take 1 tablet by mouth daily 5/19/21  Yes Angela Montenegro MD   loratadine (CLARITIN) 10 MG tablet Take 1 tablet by mouth daily 5/19/21  Yes Angela Montenegro MD   meloxicam (MOBIC) 7.5 MG tablet Take 1 tablet by mouth daily 5/19/21  Yes Angela Montenegro MD   omeprazole (PRILOSEC) 20 MG delayed release capsule Take 1 capsule by mouth daily 5/19/21  Yes Angela Montenegro MD   triamterene-hydroCHLOROthiazide (MAXZIDE) 75-50 MG per tablet Take 1 tablet by mouth daily 5/19/21  Yes Angela Montenegro MD   pregabalin (LYRICA) 150 MG capsule Take 1 capsule by mouth 2 times daily for 30 days. 5/19/21 6/18/21 Yes Angela Montenegro MD   cyanocobalamin 1000 MCG tablet Take 1 tablet by mouth daily 3/30/21  Yes Angela Montenegro MD       LAB DATA: Reviewed. REVIEW OF SYSTEMS:   see HPI/ Comprehensive review of systems negative except for the ones mentioned in HPI. PHYSICAL EXAMINATION:   /70   Pulse 79   Temp 97.3 °F (36.3 °C)   Ht 5' 9\" (1.753 m)   Wt 171 lb (77.6 kg)   SpO2 95%   BMI 25.25 kg/m²      GENERAL APPEARANCE:    Alert, oriented x 3, well developed, cooperative, not in any distress, appears stated age. HEAD:   Normocephalic, atraumatic   EYES:   PERRLA, EOMI, lids normal, conjuctivea clear, sclera anicteric. NECK:    Supple, symmetrical,  trachea midline, no thyromegaly, no JVD, no lymphadenopathy. LUNGS:    Clear to auscultation bilaterally, respirations unlabored, accessory muscles are not used. HEART:     Regular rate and rhythm, S1 and S2 normal, no murmur, rub or gallop. PMI in MCL. ABDOMEN:    Soft, non-tender, bowel sounds are normoactive, no masses, no hepatospleenomegaly. EXTREMITY:   no bipedal edema  NEURO:  Alert, oriented to person, place and time. Grossly intact.   Musculoskeletal:         No kyphosis or scoliosis, instructions. Care discussed with patient. Questions answered and patient verbalizes understanding and agrees with plan. Medications reviewed and reconciled. Continue current medications. Appropriate prescriptions are ordered. Risks and benefits of meds are discussed. After visit summary provided. Advised to call for any problems, questions, or concerns. If symptoms worsen or don't improve as expected, to call us or go to ER. Follow up as directed, sooner if needed. Return in about 3 months (around 8/19/2021). This dictation was performed with a verbal recognition program and it was checked for errors. It is possible that there are still dictated errors within this office note. Any errors should be brought immediately to my attention for correction. All efforts were made to ensure that this office note is accurate.      Mayank Oakes MD MD

## 2021-08-18 ENCOUNTER — OFFICE VISIT (OUTPATIENT)
Dept: INTERNAL MEDICINE CLINIC | Age: 86
End: 2021-08-18
Payer: MEDICARE

## 2021-08-18 VITALS
HEART RATE: 62 BPM | TEMPERATURE: 97.9 F | SYSTOLIC BLOOD PRESSURE: 120 MMHG | DIASTOLIC BLOOD PRESSURE: 64 MMHG | BODY MASS INDEX: 25.18 KG/M2 | OXYGEN SATURATION: 99 % | HEIGHT: 69 IN | WEIGHT: 170 LBS

## 2021-08-18 DIAGNOSIS — E78.2 MIXED HYPERLIPIDEMIA: Primary | ICD-10-CM

## 2021-08-18 DIAGNOSIS — D64.9 ANEMIA, UNSPECIFIED TYPE: ICD-10-CM

## 2021-08-18 DIAGNOSIS — K21.9 GASTROESOPHAGEAL REFLUX DISEASE WITHOUT ESOPHAGITIS: ICD-10-CM

## 2021-08-18 DIAGNOSIS — I10 ESSENTIAL HYPERTENSION: ICD-10-CM

## 2021-08-18 DIAGNOSIS — J30.9 ALLERGIC RHINITIS, UNSPECIFIED SEASONALITY, UNSPECIFIED TRIGGER: ICD-10-CM

## 2021-08-18 DIAGNOSIS — M54.9 DORSALGIA: ICD-10-CM

## 2021-08-18 DIAGNOSIS — G62.9 NEUROPATHY: ICD-10-CM

## 2021-08-18 LAB
A/G RATIO: 1.6 (ref 1.1–2.2)
ALBUMIN SERPL-MCNC: 3.8 G/DL (ref 3.4–5)
ALP BLD-CCNC: 125 U/L (ref 40–129)
ALT SERPL-CCNC: 11 U/L (ref 10–40)
ANION GAP SERPL CALCULATED.3IONS-SCNC: 13 MMOL/L (ref 3–16)
AST SERPL-CCNC: 20 U/L (ref 15–37)
BASOPHILS ABSOLUTE: 0 K/UL (ref 0–0.2)
BASOPHILS RELATIVE PERCENT: 0.5 %
BILIRUB SERPL-MCNC: 0.3 MG/DL (ref 0–1)
BUN BLDV-MCNC: 15 MG/DL (ref 7–20)
CALCIUM SERPL-MCNC: 9.1 MG/DL (ref 8.3–10.6)
CHLORIDE BLD-SCNC: 103 MMOL/L (ref 99–110)
CO2: 24 MMOL/L (ref 21–32)
CREAT SERPL-MCNC: 1.4 MG/DL (ref 0.6–1.2)
EOSINOPHILS ABSOLUTE: 0.3 K/UL (ref 0–0.6)
EOSINOPHILS RELATIVE PERCENT: 7.3 %
GFR AFRICAN AMERICAN: 43
GFR NON-AFRICAN AMERICAN: 35
GLOBULIN: 2.4 G/DL
GLUCOSE BLD-MCNC: 105 MG/DL (ref 70–99)
HCT VFR BLD CALC: 31.6 % (ref 36–48)
HEMOGLOBIN: 10.3 G/DL (ref 12–16)
LYMPHOCYTES ABSOLUTE: 1.8 K/UL (ref 1–5.1)
LYMPHOCYTES RELATIVE PERCENT: 39.6 %
MCH RBC QN AUTO: 28.2 PG (ref 26–34)
MCHC RBC AUTO-ENTMCNC: 32.5 G/DL (ref 31–36)
MCV RBC AUTO: 86.7 FL (ref 80–100)
MONOCYTES ABSOLUTE: 0.6 K/UL (ref 0–1.3)
MONOCYTES RELATIVE PERCENT: 12.9 %
NEUTROPHILS ABSOLUTE: 1.8 K/UL (ref 1.7–7.7)
NEUTROPHILS RELATIVE PERCENT: 39.7 %
PDW BLD-RTO: 13.9 % (ref 12.4–15.4)
PLATELET # BLD: 175 K/UL (ref 135–450)
PLATELET SLIDE REVIEW: ADEQUATE
PMV BLD AUTO: 12.1 FL (ref 5–10.5)
POTASSIUM SERPL-SCNC: 4.1 MMOL/L (ref 3.5–5.1)
RBC # BLD: 3.65 M/UL (ref 4–5.2)
SLIDE REVIEW: ABNORMAL
SODIUM BLD-SCNC: 140 MMOL/L (ref 136–145)
TOTAL PROTEIN: 6.2 G/DL (ref 6.4–8.2)
WBC # BLD: 4.5 K/UL (ref 4–11)

## 2021-08-18 PROCEDURE — 36415 COLL VENOUS BLD VENIPUNCTURE: CPT | Performed by: INTERNAL MEDICINE

## 2021-08-18 PROCEDURE — 1090F PRES/ABSN URINE INCON ASSESS: CPT | Performed by: INTERNAL MEDICINE

## 2021-08-18 PROCEDURE — 99214 OFFICE O/P EST MOD 30 MIN: CPT | Performed by: INTERNAL MEDICINE

## 2021-08-18 PROCEDURE — 4040F PNEUMOC VAC/ADMIN/RCVD: CPT | Performed by: INTERNAL MEDICINE

## 2021-08-18 PROCEDURE — 1123F ACP DISCUSS/DSCN MKR DOCD: CPT | Performed by: INTERNAL MEDICINE

## 2021-08-18 PROCEDURE — G8427 DOCREV CUR MEDS BY ELIG CLIN: HCPCS | Performed by: INTERNAL MEDICINE

## 2021-08-18 PROCEDURE — 1036F TOBACCO NON-USER: CPT | Performed by: INTERNAL MEDICINE

## 2021-08-18 PROCEDURE — G8417 CALC BMI ABV UP PARAM F/U: HCPCS | Performed by: INTERNAL MEDICINE

## 2021-08-18 NOTE — PROGRESS NOTES
Authorizing Provider   amLODIPine (NORVASC) 10 MG tablet Take 1 tablet by mouth daily 5/19/21  Yes Kevin Rodrigues MD   atorvastatin (LIPITOR) 20 MG tablet Take 1 tablet by mouth daily 5/19/21  Yes Kevin Rodrigues MD   benazepril (LOTENSIN) 40 MG tablet Take 1 tablet by mouth daily 5/19/21  Yes Kevin Rodrigues MD   loratadine (CLARITIN) 10 MG tablet Take 1 tablet by mouth daily 5/19/21  Yes Kevin Rodrigues MD   meloxicam (MOBIC) 7.5 MG tablet Take 1 tablet by mouth daily 5/19/21  Yes Kevin Rodrigues MD   omeprazole (PRILOSEC) 20 MG delayed release capsule Take 1 capsule by mouth daily 5/19/21  Yes Kevin Rodrigues MD   triamterene-hydroCHLOROthiazide [facility]) 75-50 MG per tablet Take 1 tablet by mouth daily 5/19/21  Yes Kevin Rodrigues MD   cyanocobalamin 1000 MCG tablet Take 1 tablet by mouth daily 3/30/21  Yes Kevin Rodrigues MD   pregabalin (LYRICA) 150 MG capsule Take 1 capsule by mouth 2 times daily for 30 days. 5/19/21 6/18/21  Kevin Rodrigues MD       LAB DATA: Reviewed. REVIEW OF SYSTEMS:   see HPI/ Comprehensive review of systems negative except for the ones mentioned in HPI. PHYSICAL EXAMINATION:   /64 (Site: Left Upper Arm, Position: Sitting, Cuff Size: Medium Adult)   Pulse 62   Temp 97.9 °F (36.6 °C)   Ht 5' 9\" (1.753 m)   Wt 170 lb (77.1 kg)   SpO2 99%   BMI 25.10 kg/m²      GENERAL APPEARANCE:    Alert, oriented x 3, well developed, cooperative, not in any distress, appears stated age. HEAD:   Normocephalic, atraumatic   EYES:   PERRLA, EOMI, lids normal, conjuctivea clear, sclera anicteric. NECK:    Supple, symmetrical,  trachea midline, no thyromegaly, no JVD, no lymphadenopathy. LUNGS:    Clear to auscultation bilaterally, respirations unlabored, accessory muscles are not used. HEART:     Regular rate and rhythm, S1 and S2 normal, no murmur, rub or gallop. PMI in MCL. ABDOMEN:    Soft, non-tender, bowel sounds are normoactive, no masses, no hepatospleenomegaly.   EXTREMITY:   no bipedal edema  NEURO:  Alert, oriented to person, place and time. Grossly intact. Musculoskeletal:         No kyphosis or scoliosis, mild tenderness in her lower back and in her knees. Skin:                            Warm and dry. No rash or obvious suspicious lesions. PSYCH:  Mood euthymic, insight and judgement good. ASSESSMENT/PLAN:    1. Mixed hyperlipidemia  Patient is taking cholesterol medications regularly. Denies any side effects. Diet and exercise advised. Continue Lipitor  - Lipid Panel; Future    2. Essential hypertension  Continue current medications, denies side effect with medicationss. Low salt diet and exercise advised. Continue Norvasc and Lotensin  - CBC Auto Differential  - Comprehensive Metabolic Panel    3. Gastroesophageal reflux disease without esophagitis  Continue Prilosec    4. Neuropathy  Continue Lyrica. 5. Dorsalgia  Continue Lyrica and Tylenol and Mobic    6. Allergic rhinitis, unspecified seasonality, unspecified trigger  On Claritin as needed    7. Anemia, unspecified type  Mild anemia. Will recheck CBC.  - CBC Auto Differential    Advised to follow COVID-19 precautions    Care discussed with patient. Questions answered and patient verbalizes understanding and agrees with plan. Medications reviewed and reconciled. Continue current medications. Appropriate prescriptions are ordered. Risks and benefits of meds are discussed. After visit summary provided. Advised to call for any problems, questions, or concerns. If symptoms worsen or don't improve as expected, to call us or go to ER. Follow up as directed, sooner if needed. Return in about 3 months (around 11/18/2021). This dictation was performed with a verbal recognition program and it was checked for errors. It is possible that there are still dictated errors within this office note. Any errors should be brought immediately to my attention for correction.  All efforts were made to ensure that this office note is accurate.      Ricky Arredondo MD MD

## 2021-08-19 DIAGNOSIS — D64.9 ANEMIA, UNSPECIFIED TYPE: Primary | ICD-10-CM

## 2021-08-19 RX ORDER — FERROUS SULFATE 325(65) MG
325 TABLET ORAL
Qty: 90 TABLET | Refills: 1 | Status: SHIPPED | OUTPATIENT
Start: 2021-08-19 | End: 2021-09-08 | Stop reason: SDUPTHER

## 2021-08-22 DIAGNOSIS — E78.2 MIXED HYPERLIPIDEMIA: ICD-10-CM

## 2021-08-22 DIAGNOSIS — I10 ESSENTIAL HYPERTENSION: ICD-10-CM

## 2021-08-23 RX ORDER — ATORVASTATIN CALCIUM 20 MG/1
TABLET, FILM COATED ORAL
Qty: 90 TABLET | Refills: 1 | Status: SHIPPED | OUTPATIENT
Start: 2021-08-23 | End: 2021-11-18 | Stop reason: SDUPTHER

## 2021-08-23 RX ORDER — AMLODIPINE BESYLATE 10 MG/1
TABLET ORAL
Qty: 90 TABLET | Refills: 1 | Status: SHIPPED | OUTPATIENT
Start: 2021-08-23 | End: 2021-11-18 | Stop reason: SDUPTHER

## 2021-09-08 ENCOUNTER — TELEPHONE (OUTPATIENT)
Dept: INTERNAL MEDICINE CLINIC | Age: 86
End: 2021-09-08

## 2021-09-08 DIAGNOSIS — D64.9 ANEMIA, UNSPECIFIED TYPE: ICD-10-CM

## 2021-09-08 RX ORDER — FERROUS SULFATE 325(65) MG
325 TABLET ORAL
Qty: 90 TABLET | Refills: 1 | Status: SHIPPED | OUTPATIENT
Start: 2021-09-08 | End: 2022-06-13

## 2021-09-08 NOTE — TELEPHONE ENCOUNTER
Tried to call patient. No answer on the home number and no VM setup on mobile number. Medication was sent on 08/19/21 to [facility]. I will pend a new request for the medication to be sent to [facility] to Dr. Nicole Haywood.

## 2021-09-09 ENCOUNTER — TELEPHONE (OUTPATIENT)
Dept: INTERNAL MEDICINE CLINIC | Age: 86
End: 2021-09-09

## 2021-09-28 DIAGNOSIS — G62.9 NEUROPATHY: ICD-10-CM

## 2021-09-28 DIAGNOSIS — M54.9 DORSALGIA: ICD-10-CM

## 2021-09-28 RX ORDER — PREGABALIN 150 MG/1
150 CAPSULE ORAL 2 TIMES DAILY
Qty: 180 CAPSULE | Refills: 0 | Status: SHIPPED | OUTPATIENT
Start: 2021-09-28 | End: 2021-11-18 | Stop reason: SDUPTHER

## 2021-10-27 DIAGNOSIS — J30.9 ALLERGIC RHINITIS, UNSPECIFIED SEASONALITY, UNSPECIFIED TRIGGER: ICD-10-CM

## 2021-10-27 RX ORDER — LORATADINE 10 MG/1
TABLET ORAL
Qty: 90 TABLET | Refills: 1 | Status: SHIPPED | OUTPATIENT
Start: 2021-10-27 | End: 2022-03-17

## 2021-11-10 DIAGNOSIS — I10 ESSENTIAL HYPERTENSION: ICD-10-CM

## 2021-11-11 RX ORDER — BENAZEPRIL HYDROCHLORIDE 40 MG/1
TABLET, FILM COATED ORAL
Qty: 90 TABLET | Refills: 1 | Status: SHIPPED | OUTPATIENT
Start: 2021-11-11 | End: 2022-04-12

## 2021-11-18 ENCOUNTER — OFFICE VISIT (OUTPATIENT)
Dept: INTERNAL MEDICINE CLINIC | Age: 86
End: 2021-11-18
Payer: MEDICARE

## 2021-11-18 VITALS
SYSTOLIC BLOOD PRESSURE: 118 MMHG | BODY MASS INDEX: 24.17 KG/M2 | TEMPERATURE: 98 F | RESPIRATION RATE: 20 BRPM | WEIGHT: 163.2 LBS | OXYGEN SATURATION: 98 % | HEART RATE: 57 BPM | DIASTOLIC BLOOD PRESSURE: 66 MMHG | HEIGHT: 69 IN

## 2021-11-18 DIAGNOSIS — Z23 NEED FOR PROPHYLACTIC VACCINATION AGAINST STREPTOCOCCUS PNEUMONIAE (PNEUMOCOCCUS): ICD-10-CM

## 2021-11-18 DIAGNOSIS — K21.9 GASTROESOPHAGEAL REFLUX DISEASE WITHOUT ESOPHAGITIS: Primary | ICD-10-CM

## 2021-11-18 DIAGNOSIS — M54.9 DORSALGIA: ICD-10-CM

## 2021-11-18 DIAGNOSIS — Z23 NEED FOR PROPHYLACTIC VACCINATION AGAINST DIPHTHERIA-TETANUS-PERTUSSIS (DTP): ICD-10-CM

## 2021-11-18 DIAGNOSIS — Z00.00 ROUTINE GENERAL MEDICAL EXAMINATION AT A HEALTH CARE FACILITY: ICD-10-CM

## 2021-11-18 DIAGNOSIS — G62.9 NEUROPATHY: ICD-10-CM

## 2021-11-18 DIAGNOSIS — E78.2 MIXED HYPERLIPIDEMIA: ICD-10-CM

## 2021-11-18 DIAGNOSIS — I10 ESSENTIAL HYPERTENSION: ICD-10-CM

## 2021-11-18 DIAGNOSIS — Z23 NEED FOR INFLUENZA VACCINATION: ICD-10-CM

## 2021-11-18 DIAGNOSIS — Z23 NEED FOR PROPHYLACTIC VACCINATION AND INOCULATION AGAINST VARICELLA: ICD-10-CM

## 2021-11-18 PROCEDURE — G0439 PPPS, SUBSEQ VISIT: HCPCS | Performed by: INTERNAL MEDICINE

## 2021-11-18 PROCEDURE — G0008 ADMIN INFLUENZA VIRUS VAC: HCPCS | Performed by: INTERNAL MEDICINE

## 2021-11-18 PROCEDURE — 1123F ACP DISCUSS/DSCN MKR DOCD: CPT | Performed by: INTERNAL MEDICINE

## 2021-11-18 PROCEDURE — 4040F PNEUMOC VAC/ADMIN/RCVD: CPT | Performed by: INTERNAL MEDICINE

## 2021-11-18 PROCEDURE — G8484 FLU IMMUNIZE NO ADMIN: HCPCS | Performed by: INTERNAL MEDICINE

## 2021-11-18 PROCEDURE — 90694 VACC AIIV4 NO PRSRV 0.5ML IM: CPT | Performed by: INTERNAL MEDICINE

## 2021-11-18 PROCEDURE — 90732 PPSV23 VACC 2 YRS+ SUBQ/IM: CPT | Performed by: INTERNAL MEDICINE

## 2021-11-18 PROCEDURE — G0009 ADMIN PNEUMOCOCCAL VACCINE: HCPCS | Performed by: INTERNAL MEDICINE

## 2021-11-18 RX ORDER — PREGABALIN 150 MG/1
150 CAPSULE ORAL 2 TIMES DAILY
Qty: 180 CAPSULE | Refills: 0 | Status: SHIPPED | OUTPATIENT
Start: 2021-11-18 | End: 2022-03-01

## 2021-11-18 RX ORDER — TRIAMTERENE AND HYDROCHLOROTHIAZIDE 75; 50 MG/1; MG/1
1 TABLET ORAL DAILY
Qty: 90 TABLET | Refills: 1 | Status: SHIPPED | OUTPATIENT
Start: 2021-11-18 | End: 2022-09-13

## 2021-11-18 RX ORDER — AMLODIPINE BESYLATE 10 MG/1
TABLET ORAL
Qty: 90 TABLET | Refills: 1 | Status: SHIPPED | OUTPATIENT
Start: 2021-11-18 | End: 2022-05-17

## 2021-11-18 RX ORDER — ATORVASTATIN CALCIUM 20 MG/1
TABLET, FILM COATED ORAL
Qty: 90 TABLET | Refills: 1 | Status: SHIPPED | OUTPATIENT
Start: 2021-11-18 | End: 2022-05-17

## 2021-11-18 RX ORDER — OMEPRAZOLE 20 MG/1
20 CAPSULE, DELAYED RELEASE ORAL DAILY
Qty: 90 CAPSULE | Refills: 1 | Status: SHIPPED | OUTPATIENT
Start: 2021-11-18 | End: 2022-04-07

## 2021-11-18 ASSESSMENT — PATIENT HEALTH QUESTIONNAIRE - PHQ9
SUM OF ALL RESPONSES TO PHQ QUESTIONS 1-9: 0
1. LITTLE INTEREST OR PLEASURE IN DOING THINGS: 0
SUM OF ALL RESPONSES TO PHQ9 QUESTIONS 1 & 2: 0
SUM OF ALL RESPONSES TO PHQ QUESTIONS 1-9: 0
2. FEELING DOWN, DEPRESSED OR HOPELESS: 0
SUM OF ALL RESPONSES TO PHQ QUESTIONS 1-9: 0

## 2021-11-18 ASSESSMENT — LIFESTYLE VARIABLES: HOW OFTEN DO YOU HAVE A DRINK CONTAINING ALCOHOL: 0

## 2021-11-18 NOTE — PROGRESS NOTES
Medicare Annual Wellness Visit  Name: Reinaldo Thompson Date: 2021   MRN: [id number] Sex: Female   Age: 80 y.o. Ethnicity: Non- / Non    : 1933 Race: Caitlin Mascorro / Mendy Corporal is here for [facility] [facility] (Pt presents for [employer] Visit.)    Screenings for behavioral, psychosocial and functional/safety risks, and cognitive dysfunction are all negative except as indicated below. These results, as well as other patient data from the [address] form, are documented in Flowsheets linked to this Encounter. Allergies   Allergen Reactions    Penicillins        Prior to Visit Medications    Medication Sig Taking? Authorizing Provider   benazepril (LOTENSIN) 40 MG tablet TAKE 1 TABLET EVERY DAY Yes Marzella Castleman, MD   loratadine (CLARITIN) 10 MG tablet TAKE 1 TABLET EVERY DAY Yes Marzella Castleman, MD   pregabalin (LYRICA) 150 MG capsule Take 1 capsule by mouth 2 times daily for 90 days. Yes Marzella Castleman, MD   ferrous sulfate (IRON 325) 325 (65 Fe) MG tablet Take 1 tablet by mouth daily (with breakfast) Yes Marzella Castleman, MD   atorvastatin (LIPITOR) 20 MG tablet take 1 tablet by mouth once daily Yes Marzella Castleman, MD   amLODIPine (NORVASC) 10 MG tablet take 1 tablet by mouth once daily Yes Marzella Castleman, MD   omeprazole (PRILOSEC) 20 MG delayed release capsule Take 1 capsule by mouth daily Yes Marzella Castleman, MD   triamterene-hydroCHLOROthiazide (MAXZIDE) 75-50 MG per tablet Take 1 tablet by mouth daily Yes Marzella Castleman, MD   cyanocobalamin 1000 MCG tablet Take 1 tablet by mouth daily Yes Marzella Castleman, MD       Past Medical History:   Diagnosis Date    GERD (gastroesophageal reflux disease)     H/O echocardiogram 2017    EF 55% Left atrial enlargement. Normal LV systolic. Minimal aoritc stenosis.  H/O echocardiogram 2020    EF 55-60%, Grade I DD, Mild AS. Mildly dilated left atrium.     Hyperlipidemia     Hypertension     Neuropathy        Past Surgical History:   Procedure Laterality Date    APPENDECTOMY      COLON SURGERY      removed polops    HYSTERECTOMY, TOTAL ABDOMINAL      SPINE SURGERY  2013    scrape calcium off spine, pressing on spine/nerves       Family History   Problem Relation Age of Onset    Hypertension Mother     Hypertension Father        CareTeam (Including outside providers/suppliers regularly involved in providing care):   Patient Care Team:  Percy Boateng MD as PCP - General  Percy Boateng MD as PCP - [facility] Empaneled Provider    Wt Readings from Last 3 Encounters:   11/18/21 163 lb 3.2 oz (74 kg)   08/18/21 170 lb (77.1 kg)   05/19/21 171 lb (77.6 kg)     Vitals:    11/18/21 1411   BP: 118/66   Pulse: 57   Resp: 20   Temp: 98 °F (36.7 °C)   SpO2: 98%   Weight: 163 lb 3.2 oz (74 kg)   Height: 5' 9\" (1.753 m)     Body mass index is 24.1 kg/m². Based upon direct observation of the patient, evaluation of cognition reveals remote memory intact, recent memory impaired. Patient does not not want any treatment for it at this time. Patient's complete Health Risk Assessment and screening values have been reviewed and are found in Flowsheets. The following problems were reviewed today and where indicated follow up appointments were made and/or referrals ordered. Positive Risk Factor Screenings with Interventions:     Fall Risk:  Timed Up and Go Test > 12 seconds? (Complete if either Fall Risk answers are Yes): no  2 or more falls in past year?: (!) yes  Fall with injury in past year?: no  Fall Risk Interventions:    · Home safety tips provided        General Health and ACP:  General  In general, how would you say your health is?: Very Good  In the past 7 days, have you experienced any of the following?  New or Increased Pain, New or Increased Fatigue, Loneliness, Social Isolation, Stress or Anger?: None of These  Do you get the social and emotional support that you need?: Yes  Do you have a Living Will?: (!) No  Advance Directives     Power of  Living Will ACP-Advance Directive ACP-Power of     Not on File Not on File Not on File Not on File      General Health Risk Interventions:  · No Living Will: Advance Care Planning addressed with patient today    Health Habits/Nutrition:  Health Habits/Nutrition  Do you exercise for at least 20 minutes 2-3 times per week?: (!) No  Have you lost any weight without trying in the past 3 months?: No  Do you eat only one meal per day?: No  Have you seen the dentist within the past year?: N/A - wear dentures  Body mass index: 24.1  Health Habits/Nutrition Interventions:  · Inadequate physical activity:  patient agrees to exercise for at least 150 minutes/week    Hearing/Vision:  No exam data present  Hearing/Vision  Do you or your family notice any trouble with your hearing that hasn't been managed with hearing aids?: (!) Yes  Do you have difficulty driving, watching TV, or doing any of your daily activities because of your eyesight?: No  Have you had an eye exam within the past year?: (!) No  Hearing/Vision Interventions:  · Hearing concerns:  patient declines any further evaluation/treatment for hearing issues. · Advised to see eye doctor soon . Safety:  Safety  Do you have working smoke detectors?: Yes  Have all throw rugs been removed or fastened?: (!) No  Do you have non-slip mats or surfaces in all bathtubs/showers?: Yes  Do all of your stairways have a railing or banister?: (!) No  Are your doorways, halls and stairs free of clutter?: Yes  Do you always fasten your seatbelt when you are in a car?: (!) No  Safety Interventions:  · Home safety tips provided   · Advised to use seat belt on. ADL:  ADLs  In the past 7 days, did you need help from others to perform any of the following everyday activities?  Eating, dressing, grooming, bathing, toileting, or walking/balance?: None  In the past 7 days, did you need help from others to take care of any of

## 2021-11-18 NOTE — PATIENT INSTRUCTIONS
We are committed to providing you the best care possible. If you receive a survey after visiting one of our offices, please take time to share your experience concerning your physician office visit. These surveys are confidential and no health information about you is shared. We are eager to improve for you and we are counting on your feedback to help make that happen. Advance Directives: Care Instructions  Overview  An advance directive is a legal way to state your wishes at the end of your life. It tells your family and your doctor what to do if you can't say what you want. There are two main types of advance directives. You can change them any time your wishes change. Living will. This form tells your family and your doctor your wishes about life support and other treatment. The form is also called a declaration. Medical power of . This form lets you name a person to make treatment decisions for you when you can't speak for yourself. This person is called a health care agent (health care proxy, health care surrogate). The form is also called a durable power of  for health care. If you do not have an advance directive, decisions about your medical care may be made by a family member, or by a doctor or a  who doesn't know you. It may help to think of an advance directive as a gift to the people who care for you. If you have one, they won't have to make tough decisions by themselves. Follow-up care is a key part of your treatment and safety. Be sure to make and go to all appointments, and call your doctor if you are having problems. It's also a good idea to know your test results and keep a list of the medicines you take. What should you include in an advance directive? Many states have a unique advance directive form. (It may ask you to address specific issues.) Or you might use a universal form that's approved by many states.   If your form doesn't tell you what to address, it may be hard to know what to include in your advance directive. Use the questions below to help you get started. · Who do you want to make decisions about your medical care if you are not able to? · What life-support measures do you want if you have a serious illness that gets worse over time or can't be cured? · What are you most afraid of that might happen? (Maybe you're afraid of having pain, losing your independence, or being kept alive by machines.)  · Where would you prefer to die? (Your home? A hospital? A nursing home?)  · Do you want to donate your organs when you die? · Do you want certain Scientologist practices performed before you die? When should you call for help? Be sure to contact your doctor if you have any questions. Where can you learn more? Go to https://[email or web address] org and sign in to your [employer] account. Enter R264 in the [employer] box to learn more about \"Advance Directives: Care Instructions. \"     If you do not have an account, please click on the \"Sign Up Now\" link. Current as of: March 17, 2021               Content Version: 13.0  © 5461-5112 Healthwise, Incorporated. Care instructions adapted under license by [facility] ([facility]). If you have questions about a medical condition or this instruction, always ask your healthcare professional. [place] [address] any warranty or liability for your use of this information. Learning About Living Cyrus Bill  What is a living will? A living will, also called a declaration, is a legal form. It tells your family and your doctor your wishes when you can't speak for yourself. It's used by the health professionals who will treat you as you near the end of your life or if you get seriously hurt or ill. If you put your wishes in writing, your loved ones and others will know what kind of care you want. They won't need to guess. This can ease your mind and be helpful to others.  And you can change or cancel your living will at any time. A living will is not the same as an estate or property will. An estate will explains what you want to happen with your money and property after you die. How do you use it? A living will is used to describe the kinds of treatment or life support you want as you near the end of your life or if you get seriously hurt or ill. Keep these facts in mind about living osorio. · Your living will is used only if you can't speak or make decisions for yourself. Most often, one or more doctors must certify that you can't speak or decide for yourself before your living will takes effect. · If you get better and can speak for yourself again, you can accept or refuse any treatment. It doesn't matter what you said in your living will. · Some states may limit your right to refuse treatment in certain cases. For example, you may need to clearly state in your living will that you don't want artificial hydration and nutrition, such as being fed through a tube. Is a living will a legal document? A living will is a legal document. Each state has its own laws about living osorio. And a living will may be called something else in your state. Here are some things to know about living osorio. · You don't need an  to complete a living will. But legal advice can be helpful if your state's laws are unclear. It can also help if your health history is complicated or your family can't agree on what should be in your living will. · You can change your living will at any time. Some people find that their wishes about end-of-life care change as their health changes. If you make big changes to your living will, complete a new form. · If you move to another state, make sure that your living will is legal in the state where you now live. In most cases, doctors will respect your wishes even if you have a form from a different state.   · You might use a universal form that has been using a digital copy, be sure your doctor, family members, and health care agent know how to find and access it. Where can you learn more? Go to https://chpepiceweb.[email or web address]. org and sign in to your [employer] account. Enter O538 in the [facility] box to learn more about \"Learning About Living [place]. \"     If you do not have an account, please click on the \"Sign Up Now\" link. Current as of: March 17, 2021               Content Version: 13.0  © 2930-0040 Healthwise, [employer]. Care instructions adapted under license by [facility] ([facility]). If you have questions about a medical condition or this instruction, always ask your healthcare professional. [address] any warranty or liability for your use of this information. Personalized Preventive Plan for [place] - 11/18/2021  Medicare offers a range of preventive health benefits. Some of the tests and screenings are paid in full while other may be subject to a deductible, co-insurance, and/or copay. Some of these benefits include a comprehensive review of your medical history including lifestyle, illnesses that may run in your family, and various assessments and screenings as appropriate. After reviewing your medical record and screening and assessments performed today your provider may have ordered immunizations, labs, imaging, and/or referrals for you. A list of these orders (if applicable) as well as your Preventive Care list are included within your After Visit Summary for your review. Other Preventive Recommendations:    · A preventive eye exam performed by an eye specialist is recommended every 1-2 years to screen for glaucoma; cataracts, macular degeneration, and other eye disorders. · A preventive dental visit is recommended every 6 months. · Try to get at least 150 minutes of exercise per week or 10,000 steps per day on a pedometer .   · Order or download the FREE \"Exercise & Physical Activity:

## 2021-11-18 NOTE — PROGRESS NOTES
Vaccine Information Sheet, \"Influenza - Inactivated\"  given to [place], or parent/legal guardian of  [place] and verbalized understanding. Patient responses:    Have you ever had a reaction to a flu vaccine? No  Are you able to eat eggs without adverse effects? Yes  Do you have any current illness? No  Have you ever had [place] Syndrome? No    Flu vaccine given per order. Please see immunization tab.

## 2022-01-17 ENCOUNTER — APPOINTMENT (OUTPATIENT)
Dept: CT IMAGING | Age: 87
DRG: 067 | End: 2022-01-17
Payer: MEDICARE

## 2022-01-17 ENCOUNTER — HOSPITAL ENCOUNTER (INPATIENT)
Age: 87
LOS: 4 days | Discharge: HOME | DRG: 067 | End: 2022-01-24
Attending: EMERGENCY MEDICINE | Admitting: STUDENT IN AN ORGANIZED HEALTH CARE EDUCATION/TRAINING PROGRAM
Payer: MEDICARE

## 2022-01-17 ENCOUNTER — APPOINTMENT (OUTPATIENT)
Dept: GENERAL RADIOLOGY | Age: 87
DRG: 067 | End: 2022-01-17
Payer: MEDICARE

## 2022-01-17 DIAGNOSIS — R41.82 ALTERED MENTAL STATUS, UNSPECIFIED ALTERED MENTAL STATUS TYPE: Primary | ICD-10-CM

## 2022-01-17 DIAGNOSIS — R77.8 ELEVATED TROPONIN: ICD-10-CM

## 2022-01-17 PROBLEM — G45.9 TIA (TRANSIENT ISCHEMIC ATTACK): Status: ACTIVE | Noted: 2022-01-17

## 2022-01-17 LAB
ALBUMIN SERPL-MCNC: 3.6 GM/DL (ref 3.4–5)
ALP BLD-CCNC: 107 IU/L (ref 40–129)
ALT SERPL-CCNC: 10 U/L (ref 10–40)
ANION GAP SERPL CALCULATED.3IONS-SCNC: 12 MMOL/L (ref 4–16)
AST SERPL-CCNC: 16 IU/L (ref 15–37)
BACTERIA: NEGATIVE /HPF
BASE EXCESS MIXED: 5 (ref 0–2.3)
BASOPHILS ABSOLUTE: 0 K/CU MM
BASOPHILS RELATIVE PERCENT: 0.3 % (ref 0–1)
BILIRUB SERPL-MCNC: 0.6 MG/DL (ref 0–1)
BILIRUBIN URINE: NEGATIVE MG/DL
BLOOD, URINE: NEGATIVE
BUN BLDV-MCNC: 12 MG/DL (ref 6–23)
CALCIUM SERPL-MCNC: 8.9 MG/DL (ref 8.3–10.6)
CHLORIDE BLD-SCNC: 105 MMOL/L (ref 99–110)
CLARITY: CLEAR
CO2: 25 MMOL/L (ref 21–32)
COLOR: YELLOW
COMMENT: ABNORMAL
CREAT SERPL-MCNC: 0.7 MG/DL (ref 0.6–1.1)
DIFFERENTIAL TYPE: ABNORMAL
EOSINOPHILS ABSOLUTE: 0 K/CU MM
EOSINOPHILS RELATIVE PERCENT: 0 % (ref 0–3)
GFR AFRICAN AMERICAN: >60 ML/MIN/1.73M2
GFR NON-AFRICAN AMERICAN: >60 ML/MIN/1.73M2
GLUCOSE BLD-MCNC: 115 MG/DL (ref 70–99)
GLUCOSE, URINE: NEGATIVE MG/DL
HCO3 VENOUS: 29.1 MMOL/L (ref 19–25)
HCT VFR BLD CALC: 38.3 % (ref 37–47)
HEMOGLOBIN: 12.2 GM/DL (ref 12.5–16)
IMMATURE NEUTROPHIL %: 0.5 % (ref 0–0.43)
KETONES, URINE: ABNORMAL MG/DL
LEUKOCYTE ESTERASE, URINE: NEGATIVE
LYMPHOCYTES ABSOLUTE: 1.1 K/CU MM
LYMPHOCYTES RELATIVE PERCENT: 18.7 % (ref 24–44)
MCH RBC QN AUTO: 29 PG (ref 27–31)
MCHC RBC AUTO-ENTMCNC: 31.9 % (ref 32–36)
MCV RBC AUTO: 91 FL (ref 78–100)
MONOCYTES ABSOLUTE: 0.2 K/CU MM
MONOCYTES RELATIVE PERCENT: 4 % (ref 0–4)
NITRITE URINE, QUANTITATIVE: NEGATIVE
NUCLEATED RBC %: 0 %
O2 SAT, VEN: 71.8 % (ref 50–70)
PCO2, VEN: 40 MMHG (ref 38–52)
PDW BLD-RTO: 12.5 % (ref 11.7–14.9)
PH VENOUS: 7.47 (ref 7.32–7.42)
PH, URINE: 7 (ref 5–8)
PLATELET # BLD: 193 K/CU MM (ref 140–440)
PMV BLD AUTO: 12.5 FL (ref 7.5–11.1)
PO2, VEN: 36 MMHG (ref 28–48)
POTASSIUM SERPL-SCNC: 3.9 MMOL/L (ref 3.5–5.1)
PROTEIN UA: 100 MG/DL
RBC # BLD: 4.21 M/CU MM (ref 4.2–5.4)
RBC URINE: 1 /HPF (ref 0–6)
SEGMENTED NEUTROPHILS ABSOLUTE COUNT: 4.5 K/CU MM
SEGMENTED NEUTROPHILS RELATIVE PERCENT: 76.5 % (ref 36–66)
SODIUM BLD-SCNC: 142 MMOL/L (ref 135–145)
SPECIFIC GRAVITY UA: 1.01 (ref 1–1.03)
SQUAMOUS EPITHELIAL: <1 /HPF
TOTAL IMMATURE NEUTOROPHIL: 0.03 K/CU MM
TOTAL NUCLEATED RBC: 0 K/CU MM
TOTAL PROTEIN: 6.2 GM/DL (ref 6.4–8.2)
TRICHOMONAS: ABNORMAL /HPF
TROPONIN T: 0.03 NG/ML
UROBILINOGEN, URINE: 2 MG/DL (ref 0.2–1)
WBC # BLD: 5.9 K/CU MM (ref 4–10.5)
WBC UA: 1 /HPF (ref 0–5)

## 2022-01-17 PROCEDURE — 80053 COMPREHEN METABOLIC PANEL: CPT

## 2022-01-17 PROCEDURE — 99285 EMERGENCY DEPT VISIT HI MDM: CPT

## 2022-01-17 PROCEDURE — 87040 BLOOD CULTURE FOR BACTERIA: CPT

## 2022-01-17 PROCEDURE — 87150 DNA/RNA AMPLIFIED PROBE: CPT

## 2022-01-17 PROCEDURE — 85025 COMPLETE CBC W/AUTO DIFF WBC: CPT

## 2022-01-17 PROCEDURE — 81001 URINALYSIS AUTO W/SCOPE: CPT

## 2022-01-17 PROCEDURE — 71045 X-RAY EXAM CHEST 1 VIEW: CPT

## 2022-01-17 PROCEDURE — 87086 URINE CULTURE/COLONY COUNT: CPT

## 2022-01-17 PROCEDURE — 6370000000 HC RX 637 (ALT 250 FOR IP): Performed by: EMERGENCY MEDICINE

## 2022-01-17 PROCEDURE — 82805 BLOOD GASES W/O2 SATURATION: CPT

## 2022-01-17 PROCEDURE — 87186 SC STD MICRODIL/AGAR DIL: CPT

## 2022-01-17 PROCEDURE — 84484 ASSAY OF TROPONIN QUANT: CPT

## 2022-01-17 PROCEDURE — 70450 CT HEAD/BRAIN W/O DYE: CPT

## 2022-01-17 PROCEDURE — 93005 ELECTROCARDIOGRAM TRACING: CPT | Performed by: EMERGENCY MEDICINE

## 2022-01-17 RX ORDER — ACETAMINOPHEN 325 MG/1
650 TABLET ORAL ONCE
Status: COMPLETED | OUTPATIENT
Start: 2022-01-17 | End: 2022-01-17

## 2022-01-17 RX ADMIN — ACETAMINOPHEN 650 MG: 325 TABLET ORAL at 22:23

## 2022-01-18 ENCOUNTER — APPOINTMENT (OUTPATIENT)
Dept: CT IMAGING | Age: 87
DRG: 067 | End: 2022-01-18
Payer: MEDICARE

## 2022-01-18 ENCOUNTER — APPOINTMENT (OUTPATIENT)
Dept: MRI IMAGING | Age: 87
DRG: 067 | End: 2022-01-18
Payer: MEDICARE

## 2022-01-18 LAB
AMMONIA: 24 UMOL/L (ref 11–51)
ANION GAP SERPL CALCULATED.3IONS-SCNC: 10 MMOL/L (ref 4–16)
BASE EXCESS MIXED: 4 (ref 0–2.3)
BUN BLDV-MCNC: 14 MG/DL (ref 6–23)
CALCIUM SERPL-MCNC: 8.5 MG/DL (ref 8.3–10.6)
CHLORIDE BLD-SCNC: 105 MMOL/L (ref 99–110)
CHOLESTEROL: 144 MG/DL
CO2: 25 MMOL/L (ref 21–32)
COMMENT: ABNORMAL
CREAT SERPL-MCNC: 0.7 MG/DL (ref 0.6–1.1)
CULTURE: NORMAL
EKG ATRIAL RATE: 80 BPM
EKG DIAGNOSIS: NORMAL
EKG P AXIS: 59 DEGREES
EKG P-R INTERVAL: 164 MS
EKG Q-T INTERVAL: 372 MS
EKG QRS DURATION: 92 MS
EKG QTC CALCULATION (BAZETT): 429 MS
EKG R AXIS: 45 DEGREES
EKG T AXIS: 25 DEGREES
EKG VENTRICULAR RATE: 80 BPM
ESTIMATED AVERAGE GLUCOSE: 88 MG/DL
FOLATE: 12.8 NG/ML (ref 3.1–17.5)
GFR AFRICAN AMERICAN: >60 ML/MIN/1.73M2
GFR NON-AFRICAN AMERICAN: >60 ML/MIN/1.73M2
GLUCOSE BLD-MCNC: 98 MG/DL (ref 70–99)
HBA1C MFR BLD: 4.7 % (ref 4.2–6.3)
HCO3 VENOUS: 27.6 MMOL/L (ref 19–25)
HCT VFR BLD CALC: 32.3 % (ref 37–47)
HDLC SERPL-MCNC: 51 MG/DL
HEMOGLOBIN: 10.5 GM/DL (ref 12.5–16)
LDL CHOLESTEROL DIRECT: 76 MG/DL
LV EF: 58 %
LVEF MODALITY: NORMAL
Lab: NORMAL
MAGNESIUM: 1.7 MG/DL (ref 1.8–2.4)
MCH RBC QN AUTO: 29.5 PG (ref 27–31)
MCHC RBC AUTO-ENTMCNC: 32.5 % (ref 32–36)
MCV RBC AUTO: 90.7 FL (ref 78–100)
O2 SAT, VEN: 95 % (ref 50–70)
PCO2, VEN: 37 MMHG (ref 38–52)
PDW BLD-RTO: 12.5 % (ref 11.7–14.9)
PH VENOUS: 7.48 (ref 7.32–7.42)
PLATELET # BLD: 180 K/CU MM (ref 140–440)
PMV BLD AUTO: 13 FL (ref 7.5–11.1)
PO2, VEN: 141 MMHG (ref 28–48)
POTASSIUM SERPL-SCNC: 3.4 MMOL/L (ref 3.5–5.1)
RBC # BLD: 3.56 M/CU MM (ref 4.2–5.4)
SODIUM BLD-SCNC: 140 MMOL/L (ref 135–145)
SPECIMEN: NORMAL
T4 FREE: 0.83 NG/DL (ref 0.9–1.8)
TRIGL SERPL-MCNC: 102 MG/DL
TROPONIN T: 0.06 NG/ML
TROPONIN T: 0.06 NG/ML
TSH HIGH SENSITIVITY: 1.01 UIU/ML (ref 0.27–4.2)
VITAMIN B-12: 321.8 PG/ML (ref 211–911)
WBC # BLD: 6 K/CU MM (ref 4–10.5)

## 2022-01-18 PROCEDURE — 82805 BLOOD GASES W/O2 SATURATION: CPT

## 2022-01-18 PROCEDURE — 85027 COMPLETE CBC AUTOMATED: CPT

## 2022-01-18 PROCEDURE — G0378 HOSPITAL OBSERVATION PER HR: HCPCS

## 2022-01-18 PROCEDURE — 70551 MRI BRAIN STEM W/O DYE: CPT

## 2022-01-18 PROCEDURE — 6360000002 HC RX W HCPCS: Performed by: STUDENT IN AN ORGANIZED HEALTH CARE EDUCATION/TRAINING PROGRAM

## 2022-01-18 PROCEDURE — 96365 THER/PROPH/DIAG IV INF INIT: CPT

## 2022-01-18 PROCEDURE — 93010 ELECTROCARDIOGRAM REPORT: CPT | Performed by: INTERNAL MEDICINE

## 2022-01-18 PROCEDURE — 96372 THER/PROPH/DIAG INJ SC/IM: CPT

## 2022-01-18 PROCEDURE — 82140 ASSAY OF AMMONIA: CPT

## 2022-01-18 PROCEDURE — 83036 HEMOGLOBIN GLYCOSYLATED A1C: CPT

## 2022-01-18 PROCEDURE — 92610 EVALUATE SWALLOWING FUNCTION: CPT

## 2022-01-18 PROCEDURE — 93306 TTE W/DOPPLER COMPLETE: CPT

## 2022-01-18 PROCEDURE — 6360000002 HC RX W HCPCS: Performed by: FAMILY MEDICINE

## 2022-01-18 PROCEDURE — 97166 OT EVAL MOD COMPLEX 45 MIN: CPT

## 2022-01-18 PROCEDURE — 2580000003 HC RX 258: Performed by: STUDENT IN AN ORGANIZED HEALTH CARE EDUCATION/TRAINING PROGRAM

## 2022-01-18 PROCEDURE — 84484 ASSAY OF TROPONIN QUANT: CPT

## 2022-01-18 PROCEDURE — 97530 THERAPEUTIC ACTIVITIES: CPT

## 2022-01-18 PROCEDURE — 6370000000 HC RX 637 (ALT 250 FOR IP): Performed by: FAMILY MEDICINE

## 2022-01-18 PROCEDURE — 80048 BASIC METABOLIC PNL TOTAL CA: CPT

## 2022-01-18 PROCEDURE — 6360000004 HC RX CONTRAST MEDICATION: Performed by: STUDENT IN AN ORGANIZED HEALTH CARE EDUCATION/TRAINING PROGRAM

## 2022-01-18 PROCEDURE — 84439 ASSAY OF FREE THYROXINE: CPT

## 2022-01-18 PROCEDURE — 70496 CT ANGIOGRAPHY HEAD: CPT

## 2022-01-18 PROCEDURE — 82746 ASSAY OF FOLIC ACID SERUM: CPT

## 2022-01-18 PROCEDURE — 83735 ASSAY OF MAGNESIUM: CPT

## 2022-01-18 PROCEDURE — 84443 ASSAY THYROID STIM HORMONE: CPT

## 2022-01-18 PROCEDURE — 80061 LIPID PANEL: CPT

## 2022-01-18 PROCEDURE — 82607 VITAMIN B-12: CPT

## 2022-01-18 PROCEDURE — 83721 ASSAY OF BLOOD LIPOPROTEIN: CPT

## 2022-01-18 PROCEDURE — 6370000000 HC RX 637 (ALT 250 FOR IP): Performed by: STUDENT IN AN ORGANIZED HEALTH CARE EDUCATION/TRAINING PROGRAM

## 2022-01-18 PROCEDURE — 99223 1ST HOSP IP/OBS HIGH 75: CPT | Performed by: PSYCHIATRY & NEUROLOGY

## 2022-01-18 RX ORDER — LABETALOL HYDROCHLORIDE 5 MG/ML
10 INJECTION, SOLUTION INTRAVENOUS EVERY 10 MIN PRN
Status: DISCONTINUED | OUTPATIENT
Start: 2022-01-18 | End: 2022-01-23

## 2022-01-18 RX ORDER — ATORVASTATIN CALCIUM 40 MG/1
40 TABLET, FILM COATED ORAL DAILY
Status: DISCONTINUED | OUTPATIENT
Start: 2022-01-18 | End: 2022-01-24 | Stop reason: HOSPADM

## 2022-01-18 RX ORDER — PREGABALIN 75 MG/1
150 CAPSULE ORAL 2 TIMES DAILY
Status: DISCONTINUED | OUTPATIENT
Start: 2022-01-18 | End: 2022-01-24 | Stop reason: HOSPADM

## 2022-01-18 RX ORDER — ASPIRIN 300 MG/1
300 SUPPOSITORY RECTAL DAILY
Status: DISCONTINUED | OUTPATIENT
Start: 2022-01-18 | End: 2022-01-24 | Stop reason: HOSPADM

## 2022-01-18 RX ORDER — ONDANSETRON 4 MG/1
4 TABLET, ORALLY DISINTEGRATING ORAL EVERY 8 HOURS PRN
Status: DISCONTINUED | OUTPATIENT
Start: 2022-01-18 | End: 2022-01-24 | Stop reason: HOSPADM

## 2022-01-18 RX ORDER — MAGNESIUM SULFATE IN WATER 40 MG/ML
2000 INJECTION, SOLUTION INTRAVENOUS ONCE
Status: COMPLETED | OUTPATIENT
Start: 2022-01-18 | End: 2022-01-18

## 2022-01-18 RX ORDER — PANTOPRAZOLE SODIUM 40 MG/1
40 TABLET, DELAYED RELEASE ORAL
Status: DISCONTINUED | OUTPATIENT
Start: 2022-01-18 | End: 2022-01-24 | Stop reason: HOSPADM

## 2022-01-18 RX ORDER — POLYETHYLENE GLYCOL 3350 17 G/17G
17 POWDER, FOR SOLUTION ORAL DAILY PRN
Status: DISCONTINUED | OUTPATIENT
Start: 2022-01-18 | End: 2022-01-24 | Stop reason: HOSPADM

## 2022-01-18 RX ORDER — TRIAMTERENE AND HYDROCHLOROTHIAZIDE 37.5; 25 MG/1; MG/1
2 TABLET ORAL DAILY
Status: DISCONTINUED | OUTPATIENT
Start: 2022-01-18 | End: 2022-01-24 | Stop reason: HOSPADM

## 2022-01-18 RX ORDER — ONDANSETRON 2 MG/ML
4 INJECTION INTRAMUSCULAR; INTRAVENOUS EVERY 6 HOURS PRN
Status: DISCONTINUED | OUTPATIENT
Start: 2022-01-18 | End: 2022-01-24 | Stop reason: HOSPADM

## 2022-01-18 RX ORDER — SODIUM CHLORIDE 0.9 % (FLUSH) 0.9 %
5-40 SYRINGE (ML) INJECTION 2 TIMES DAILY
Status: DISCONTINUED | OUTPATIENT
Start: 2022-01-18 | End: 2022-01-24 | Stop reason: HOSPADM

## 2022-01-18 RX ORDER — LANOLIN ALCOHOL/MO/W.PET/CERES
1000 CREAM (GRAM) TOPICAL DAILY
Status: DISCONTINUED | OUTPATIENT
Start: 2022-01-18 | End: 2022-01-24 | Stop reason: HOSPADM

## 2022-01-18 RX ORDER — POTASSIUM CHLORIDE 20 MEQ/1
40 TABLET, EXTENDED RELEASE ORAL ONCE
Status: COMPLETED | OUTPATIENT
Start: 2022-01-18 | End: 2022-01-18

## 2022-01-18 RX ORDER — LISINOPRIL 40 MG/1
40 TABLET ORAL DAILY
Status: DISCONTINUED | OUTPATIENT
Start: 2022-01-18 | End: 2022-01-24 | Stop reason: HOSPADM

## 2022-01-18 RX ORDER — FERROUS SULFATE 325(65) MG
325 TABLET ORAL
Status: DISCONTINUED | OUTPATIENT
Start: 2022-01-18 | End: 2022-01-24 | Stop reason: HOSPADM

## 2022-01-18 RX ORDER — ASPIRIN 81 MG/1
81 TABLET ORAL DAILY
Status: DISCONTINUED | OUTPATIENT
Start: 2022-01-18 | End: 2022-01-24 | Stop reason: HOSPADM

## 2022-01-18 RX ORDER — CETIRIZINE HYDROCHLORIDE 10 MG/1
10 TABLET ORAL DAILY
Status: DISCONTINUED | OUTPATIENT
Start: 2022-01-18 | End: 2022-01-24 | Stop reason: HOSPADM

## 2022-01-18 RX ORDER — AMLODIPINE BESYLATE 10 MG/1
10 TABLET ORAL DAILY
Status: DISCONTINUED | OUTPATIENT
Start: 2022-01-18 | End: 2022-01-24 | Stop reason: HOSPADM

## 2022-01-18 RX ADMIN — SODIUM CHLORIDE, PRESERVATIVE FREE 10 ML: 5 INJECTION INTRAVENOUS at 11:34

## 2022-01-18 RX ADMIN — MAGNESIUM SULFATE 2000 MG: 2 INJECTION INTRAVENOUS at 11:32

## 2022-01-18 RX ADMIN — PREGABALIN 150 MG: 75 CAPSULE ORAL at 21:37

## 2022-01-18 RX ADMIN — PREGABALIN 150 MG: 75 CAPSULE ORAL at 11:10

## 2022-01-18 RX ADMIN — FERROUS SULFATE TAB 325 MG (65 MG ELEMENTAL FE) 325 MG: 325 (65 FE) TAB at 11:12

## 2022-01-18 RX ADMIN — CETIRIZINE HYDROCHLORIDE 10 MG: 10 TABLET, FILM COATED ORAL at 11:12

## 2022-01-18 RX ADMIN — LISINOPRIL 40 MG: 40 TABLET ORAL at 11:10

## 2022-01-18 RX ADMIN — AMLODIPINE BESYLATE 10 MG: 10 TABLET ORAL at 11:12

## 2022-01-18 RX ADMIN — ASPIRIN 81 MG: 81 TABLET, COATED ORAL at 11:10

## 2022-01-18 RX ADMIN — POTASSIUM CHLORIDE 40 MEQ: 20 TABLET, EXTENDED RELEASE ORAL at 11:11

## 2022-01-18 RX ADMIN — ENOXAPARIN SODIUM 40 MG: 100 INJECTION SUBCUTANEOUS at 11:12

## 2022-01-18 RX ADMIN — PANTOPRAZOLE SODIUM 40 MG: 40 TABLET, DELAYED RELEASE ORAL at 07:06

## 2022-01-18 RX ADMIN — ATORVASTATIN CALCIUM 40 MG: 40 TABLET, FILM COATED ORAL at 11:11

## 2022-01-18 RX ADMIN — PREGABALIN 150 MG: 75 CAPSULE ORAL at 00:36

## 2022-01-18 RX ADMIN — PANTOPRAZOLE SODIUM 40 MG: 40 TABLET, DELAYED RELEASE ORAL at 11:10

## 2022-01-18 RX ADMIN — CYANOCOBALAMIN TAB 1000 MCG 1000 MCG: 1000 TAB at 11:12

## 2022-01-18 RX ADMIN — IOPAMIDOL 75 ML: 755 INJECTION, SOLUTION INTRAVENOUS at 03:50

## 2022-01-18 NOTE — CONSULTS
[address], 2/20/1933, H02/H-02, 1/18/2022    Discharge Recommendation: Home with [place] OT services, Initial 24 hour SUP/assist, PRN assist.       History:  Ak Chin:  The primary encounter diagnosis was Altered mental status, unspecified altered mental status type. A diagnosis of Elevated troponin was also pertinent to this visit. Subjective:  Patient states: Agreeable to therapy. Pain: Pt denied pain this date (0/10). Communication with other providers: PT, RN, LSW  Restrictions: General Precautions, Fall Risk    Home Setup/Prior level of function:  Social/Functional History  Lives With: Family (, great grand daughter, great great grandson)  Type of Home: House  Home Layout: One level  Home Access: Stairs to enter without rails  Entrance Stairs - Number of Steps: 1 step  Bathroom Shower/Tub: Tub/Shower unit,Shower chair with back  Home Equipment: Rolling walker (Pt reports she typically ambulates within the home without AD. Pt reports she utilizes RW to the bathroom and within the community.)  ADL Assistance: Independent (Pt reports she sponge bathes in seated next to the tub because she has been unable to complete tub transfers.)  Homemaking Assistance: [facility] Responsibilities: Yes  Ambulation Assistance: Independent  Transfer Assistance: Independent  Active : Yes  Mode of Transportation: Car    Examination:  · Observation: Supine in bed upon arrival  · [place]: [facility] [place]  · Hearing: [facility] [place]  · Vitals: Stable vitals throughout session    Body Systems and functions:  · ROM: WFL   · Strength: WFL  · Sensation: WFL  · Tone: Normal  · Coordination: WFL  · Perception: WNL    Activities of Daily Living (ADLs):  · Feeding: Jennifer  setup and increased time.    · Grooming: SBA - CGA in standing with setup, increased time, VC.   · UB bathing: SUP recommend pt complete in seated with setup, increased time, VC.   · LB bathing: CGA recommend pt complete in seated with setup, increased time, VC.   · UB dressing: SUP recommend pt complete in seated with setup, increased time, VC.   · LB dressing: CGA in seated with setup, increased time, VC. · Toileting: CGA during commode transfers and while balancing in standing to complete aida care and LB clothing management. Cognitive and Psychosocial Functioning:  · Overall cognitive status: Pt was oriented to person, place, and time, however pt required increased VC for time and continued to state birth date. · Affect: Normal      Balance:   · Sitting: SUP (pt sat EOB demo good dynamic sitting balance). · Standing:   · CGA - Loulou (pt stood with unilateral HHA. Demo fair dynamic standing balance). · CGA - SBA (pt stood with RW and balance improved). Functional Mobility:   · Bed Mobility: SUP (pt performed supine to seated bed mobility with increased time and VC throughout for sequencing). · Transfers:  ·  Loulou (pt performed STS from EOB with unilateral HHA. Required VC throughout for sequencing and increased time). · CGA (STS from EOB with RW, required increased time, VC throughout for sequencing, and assist to stabilize RW). · Ambulation: CGA (pt ambulated approx 300ft with RW. Demo fair pace throughout and forward flexed posture). AM-PAC 6 click short form for inpatient daily activity:   How much help from another person does the patient currently need. .. Unable  Dep A Lot  Max A A Lot   Mod A A Little  Min A A Little   CGA  SBA None   Mod I  Indep  Sup   1. Putting on and taking off regular lower body clothing? [] 1    [] 2   [] 2   [] 3   [x] 3   [] 4      2. Bathing (including washing, rinsing, drying)? [] 1   [] 2   [] 2 [] 3 [x] 3 [] 4   3. Toileting, which includes using toilet, bedpan, or urinal? [] 1    [] 2   [] 2   [] 3   [x] 3   [] 4     4. Putting on and taking off regular upper body clothing? [] 1   [] 2   [] 2   [] 3   [] 3    [x] 4      5.  Taking care of personal grooming such as brushing teeth? [] 1   [] 2    [] 2 [] 3    [x] 3   [] 4      6. Eating meals? [] 1   [] 2   [] 2   [] 3   [] 3   [x] 4      Raw Score:  20     [24=0% impaired(CH), 23=1-19%(CI), 20-22=20-39%(CJ), 15-19=40-59%(CK), 10-14=60-79%(CL), 7-9=80-99%(CM), 6=100%(CN)]    Treatment:  Therapeutic Activity Training:   Therapeutic activity training was instructed today. Cues were given for safety, sequence, UE/LE placement, awareness, and balance. Activities performed today included bed mobility training, sup-sit, sit-stand, ambulation. Safety Measures: Gait belt used, Left in bed, Alarm in place    Assessment:  Assessment  Performance deficits / Impairments: Decreased functional mobility ,Decreased strength,Decreased balance,Decreased ADL status,Decreased high-level IADLs,Decreased posture  Treatment Diagnosis: TIA  Prognosis: Good  Decision Making: Medium Complexity  REQUIRES OT FOLLOW UP: No  Discharge Recommendations: Home with Home health OT    Pt is an 80year old F admitted for TIA. Pt reports that prior to admission she was Jennifer in ADLs, IND in IADLs, and intermittently Jennifer for functional mobility. This date, pt demo decreased balance, strength, and overall functional mobility impacting ADL status. Pt is currently functioning below baseline and would benefit from skilled OT services through [facility] to assist in home setup up adaptations to increase IND. Pt reports within the home she utilizes \"furniture walking\" to navigate her home and must complete sponge bathing next to the tub d/t decreased ability to complete tub transfers. Recommend TTB to increase IND in bathing. Recommend use of RW to increase safety while ambulating. Time:   Time in: 725  Time out: 745  Timed treatment minutes: 10  Total time: 20      Electronically signed by:       KOBY Robledo/L, [address] [place] [id number]

## 2022-01-18 NOTE — PROGRESS NOTES
Speech Language Pathology  Facility/Department: [address] [place] [address] EMERGENCY DEPARTMENT   CLINICAL BEDSIDE SWALLOW EVALUATION    NAME: South Michele  : 1933  MRN: [id number]    IMPRESSIONS: South Michele was referred for a bedside swallow evaluation following admission to [facility] with concern for TIA/CVA (confusion, reported aphasia), acute metabolic encephalopathy. Per radiologist, head CT reveals no acute abnormality. MRI pending. Medical hx includes HTN, HLD, GERD. No known history of dysphagia prior to admission. Pt seen for evaluation seated upright in bed, alert, cooperative. Oral mechanism examination WFL/no asymmetry. Pt presented with PO trials of thin liquids via cup/straw, puree, and regular solids. Oral stage WFL with intact mastication, AP transit, oral clearance. Pharyngeal stage WFL with intact swallow initiation/laryngeal elevation. No s/s aspiration. Pt is a poor historian but indicates that she prefers to eat softer foods d/t edentulism. She was noted to have some difficulty responding to direct questions. Speech clear, language fluent. Recommend initiation of easy to chew diet/thin liquids. May advance to regular diet at pt request. No further acute SLP needs identified. May consider cognitive evaluation prior to discharge if mentation is not back to baseline. ADMISSION DATE: 2022  ADMITTING DIAGNOSIS: has Essential hypertension; Mixed hyperlipidemia; Gastroesophageal reflux disease without esophagitis; Dorsalgia; Neuropathy; Allergic rhinitis;  Anemia; and TIA (transient ischemic attack) on their problem list.  ONSET DATE: this admission    Recent Chest Xray/CT of Chest: see chart    Date of Eval: 2022  Evaluating Therapist: [place]ina Rico, SLP    Current Diet level:  Current Diet : NPO  Current Liquid Diet : NPO      Primary Complaint  Patient Complaint: does not state    Pain:  0/denies    Reason for Referral  Aguilar TARANGO Tyrese was referred for a bedside swallow evaluation to assess the efficiency of her swallow function, identify signs and symptoms of aspiration and make recommendations regarding safe dietary consistencies, effective compensatory strategies, and safe eating environment. Impression  Dysphagia Diagnosis: Swallow function appears grossly intact  Dysphagia Outcome Severity Scale: Level 6: Within functional limits/Modified independence     Treatment Plan  Requires SLP Intervention: No  Duration/Frequency of Treatment: N/A  D/C Recommendations: To be determined       Recommended Diet and Intervention  Diet Solids Recommendation: Easy to Chew  Liquid Consistency Recommendation: Thin  Recommended Form of Meds: PO          Compensatory Swallowing Strategies  Compensatory Swallowing Strategies: Upright as possible for all oral intake    Treatment/Goals  Short-term Goals  Timeframe for Short-term Goals: N/A    General  Chart Reviewed: Yes  Behavior/Cognition: Alert; Cooperative  Respiratory Status: Room air  O2 Device: None (Room air)  Communication Observation:  (cognitive communication deficits)  Follows Directions: Simple  Dentition: Edentulous; Dentures top (reports she has lower dentures at home)  Patient Positioning: Upright in bed  Baseline Vocal Quality: Normal  Prior Dysphagia History: none known prior to admission  Consistencies Administered: Reg solid; Dysphagia Pureed (Dysphagia I); Thin - cup; Thin - straw           Vision/Hearing  Hearing  Hearing: Within functional limits    Oral Motor Deficits  Oral/Motor  Oral Motor:  Within functional limits    Oral Phase Dysfunction  Oral Phase  Oral Phase: WFL     Indicators of Pharyngeal Phase Dysfunction   Pharyngeal Phase  Pharyngeal Phase: WFL    Prognosis  Prognosis  Barriers/Prognosis Comment: N/A  Individuals consulted  Consulted and agree with results and recommendations: Patient;RN    Education  Patient Education: results, recommendations  Patient Education Response: Demonstrated understanding  Safety Devices in place: Yes  Type of devices:  All fall risk precautions in place       Therapy Time  SLP Individual Minutes  Time In: 0900  Time Out: 0915  Minutes: [place], [place] CCC-SLP  1/18/2022 9:33 AM

## 2022-01-18 NOTE — ED NOTES
Hospital med perfect served for troponin was . 030 now it is  . [address] [facility], Albina Jacob  01/18/22 9947

## 2022-01-18 NOTE — ED NOTES
Finished drawing blood from pts IV when she says \"look at me I can talk. \"  With big smile on her face. Pt speaking in complete sentences.       Rafaela Smoker  01/18/22 0152

## 2022-01-18 NOTE — PROGRESS NOTES
Physical Therapy  Per the physical therapy triage process, order will be discontinued. Per OT, pt is ambulating 300' with RW CGA. Pt seems to be functioning at baseline. Encourage pt to continue to utilize RW in the home and community for safety. Please re-consult if functional mobility declines.

## 2022-01-18 NOTE — CONSULTS
Neurology Service Consult Note  [facility]  Patient Name: Irena Woodruff  : 1933        Subjective:   Reason for consult: Aphasia and word searching  Patient seen and examined. Chart reviewed in detail. 80 y.o. -female with PMH GERD, HTN, HLD, and neuropathy presenting to [facility] aphasia initially, which was improving upon presentation, however was noted searching for her words. On exam patient is alert and oriented, found staggering her numbers of her checkbook in the hallway bed. She states that yesterday, patient felt dizzy and described it as a room spinning sensation, and accompanied with the inability to express herself. Patient reported that she was unable to talk and then eventually was able to speak but was unable to gather her words. The symptoms lasted approximately 24 hours and were intermittent patient recalls. On exam today, she is alert and oriented able to express herself completely, and denies dizziness. She further denies numbness/ tingling, vision/speech changes,or unilateral weakness. Patient denies any history of stroke. Patient does not have a facial droop, and exam is nonfocal/nonlateralizing. Prior work-up includes but not limited to CBC, CMP, CT of head, CTA head and neck, and MRI brain. Patient did present as mildly hypertensive early this morning, and heart rate noticeably trending down to the 40s. Past Medical History:   Diagnosis Date    GERD (gastroesophageal reflux disease)     H/O echocardiogram 2017    EF 55% Left atrial enlargement. Normal LV systolic. Minimal aoritc stenosis.  H/O echocardiogram 2020    EF 55-60%, Grade I DD, Mild AS. Mildly dilated left atrium.     Hyperlipidemia     Hypertension     Neuropathy     :   Past Surgical History:   Procedure Laterality Date    APPENDECTOMY      COLON SURGERY      removed polops    HYSTERECTOMY, TOTAL ABDOMINAL      Feeling of Stress : Not on file   Social Connections:     Frequency of Communication with Friends and Family: Not on file    Frequency of Social Gatherings with Friends and Family: Not on file    Attends [employer] Services: Not on file    Active Member of Clubs or Organizations: Not on file    Attends Club or Organization Meetings: Not on file    Marital Status: Not on file   Intimate Partner Violence:     Fear of Current or Ex-Partner: Not on file    Emotionally Abused: Not on file    Physically Abused: Not on file    Sexually Abused: Not on file   Housing Stability:     Unable to Pay for Housing in the Last Year: Not on file    Number of [place] in the Last Year: Not on file    Unstable Housing in the Last Year: Not on file      Family History   Problem Relation Age of Onset    Hypertension Mother     Hypertension Father          ROS (10 systems)  In addition to that documented in the HPI above, the additional ROS was obtained:  Constitutional: Denies fevers or chills  Eyes: Denies vision changes  ENMT: Denies sore throat  CV: Denies chest pain  Resp: Denies SOB  GI: Denies vomiting or diarrhea  : Denies painful urination  MSK: Denies recent trauma  Skin: Denies new rashes  Neuro: Denies new numbness or tingling or weakness  Endocrine: Denies unexpected weight loss  Heme: Denies bleeding disorders    Physical Exam:       Vitals:    01/18/22 0030 01/18/22 0500 01/18/22 0630 01/18/22 0809   BP: (!) 164/72 (!) 158/50 (!) 147/47 (!) 168/57   Pulse: 77 69 (!) 44 (!) 47   Resp: 17 17 15 14   Temp:    98 °F (36.7 °C)   TempSrc:    Oral   SpO2: 99% 98% 96% 99%   Weight:       Height:           Wt Readings from Last 3 Encounters:   01/17/22 163 lb (73.9 kg)   11/18/21 163 lb 3.2 oz (74 kg)   08/18/21 170 lb (77.1 kg)     Temp Readings from Last 3 Encounters:   01/18/22 98 °F (36.7 °C) (Oral)   11/18/21 98 °F (36.7 °C)   08/18/21 97.9 °F (36.6 °C)     BP Readings from Last 3 Encounters:   01/18/22 (!) 168/57   11/18/21 118/66   08/18/21 120/64     Pulse Readings from Last 3 Encounters:   01/18/22 (!) 47   11/18/21 57   08/18/21 62        Gen: A&O x 4, NAD, cooperative  HEENT: NC/AT, EOMI, PERRL, mmm, neck supple, no meningeal signs; Heart: Regular  Lungs: Respirations Unlabored  Ext: no edema, no calf tenderness b/l  Psych: normal mood and affect  Skin: no rashes or lesions    NEUROLOGIC EXAM:    Mental Status: A&O to self, location, month and year, NAD, speech clear, language fluent, repetition and naming intact, follows commands appropriately    Cranial Nerve Exam:   CN II-XII: PERRL, VFF, no nystagmus, no gaze paresis, sensation V1-V3 intact b/l, muscles of facial expression symmetric; hard of hearing to conversational tone, palate elevates symmetrically, shoulder elevation symmetric and tongue protrudes midline with movement side to side. Motor Exam:       Strength 4/5 UE's/LE's b/l  Tone and bulk normal   No pronator drift    Deep Tendon Reflexes: 2/4 biceps, triceps, brachioradialis, patellar, and achilles b/l; flexor plantar responses b/l    Sensation: Intact light touch/ UE's/LE's b/l    Coordination/Cerebellum:       Tremors--none      Rapidly alternating movements: Mild dysdiadochokinesia b/l                Finger-to-Nose: Mild dysmetria to right upper extremity    Gait and stance:      Gait: deferred      LABS:        CBC:   Recent Labs     01/18/22  0500   WBC 6.0   RBC 3.56*   HGB 10.5*   HCT 32.3*      MCV 90.7     BMP:    Recent Labs     01/18/22  0500      K 3.4*      CO2 25   BUN 14   CREATININE 0.7   GLUCOSE 98   CALCIUM 8.5       IMAGING:    CTH  Impression   No acute intracranial abnormality. Mild cerebral atrophy. CTA head/neck     Impression   Severe atherosclerotic calcified plaque at the level of the right carotid   bulb and extending into the origin of the right cervical ICA with severe   high-grade stenosis greater than 80%.            MRI brain ordered    Above imaging reviewed in detail. ASSESSMENT/PLAN:   80 y.o. -female with PMH GERD, HTN, HLD, and neuropathy presenting to [facility] aphasia initially, which was improving upon presentation, however was noted searching for her words. On exam patient is alert and oriented,and denies, numbness/ tingling, vision/speech changes, dizziness, or unilateral weakness. Dizziness and dysarthria possibly due to TIA vs acute intracranial ischemia secondary to hypokalemia, hypomagnesia.  --CTH as above  --CTA head/neck  --MRI Brain ordered    Medications;  - Aspirin, statin for secondary stroke prevention    -PT/OT per their recommendations    Further recommendations pending neurodiagnostics    Patient discussed with attending physician Dr. Glenard Aschoff, in which is in agreement with plan of care. Thank you for allowing us to participate in the care of your patient. If there are any questions regarding evaluation please feel free to contact us. Moe Marinelli, ARABELLA - CNP, 1/18/2022    (Please note that portions of this note were completed with a voice recognition program.  Efforts were made to edit the dictations but occasionally words are mistranscribed.)    Attending Note:  I have rounded on this patient with Dora Onyedumekwu- AGACNP on 1/18/21. I have reviewed the chart and we have discussed this case in detail. The patient was seen and examined by myself. Pertinent labs and imaging have been personally reviewed. Our findings and impressions were discussed with the patient. I concur with the NP's assessment and plan. Spoke with patient about her carotid stenosis will consult vascular surgery.      Mirta Mcdonough,

## 2022-01-18 NOTE — ED TRIAGE NOTES
Patient presents to ED by EMS with complaint of ongoing altered mental status and aphasia. Last known well was last night sometime. Patient oriented to birthdate.

## 2022-01-18 NOTE — ED NOTES
Pts daughter called and given an update. Pt spoke with daughter for a couple minutes on phone.       Lokesh Emanuel  01/18/22 0007

## 2022-01-18 NOTE — ED NOTES
Dr. Marcelina Baker made aware of pts elevated b/p and rubbing forehead.       Shelbi Araiza  01/17/22 2119

## 2022-01-18 NOTE — ED NOTES
Medication History  [facility]    Patient Name: Ciera Jacinto 2/20/1933     Medication history has been completed by: Tana Quinones CPhT    Source(s) of information: medications supplied by patient, insurance claims     Primary Care Physician: Daniel Mckenzie MD     Pharmacy: [address] [place] [address]    Allergies as of 01/17/2022 - Fully Reviewed 01/17/2022   Allergen Reaction Noted    Penicillins  01/06/2020        Prior to Admission medications    Medication Sig Start Date End Date Taking? Authorizing Provider   amLODIPine (NORVASC) 10 MG tablet take 1 tablet by mouth once daily 11/18/21  Yes Sonido Troy MD   atorvastatin (LIPITOR) 20 MG tablet take 1 tablet by mouth once daily 11/18/21  Yes Sonido Troy MD   omeprazole (PRILOSEC) 20 MG delayed release capsule Take 1 capsule by mouth daily 11/18/21  Yes Sonido Troy MD   triamterene-hydroCHLOROthiazide [facility]) 75-50 MG per tablet Take 1 tablet by mouth daily 11/18/21  Yes Sonido Troy MD   cyanocobalamin 1000 MCG tablet Take 1 tablet by mouth daily 11/18/21  Yes Sonido Troy MD   pregabalin (LYRICA) 150 MG capsule Take 1 capsule by mouth 2 times daily for 90 days. 11/18/21 2/16/22 Yes Sonido Troy MD   benazepril (LOTENSIN) 40 MG tablet TAKE 1 TABLET EVERY DAY 11/11/21  Yes Sonido Troy MD   loratadine (CLARITIN) 10 MG tablet TAKE 1 TABLET EVERY DAY 10/27/21  Yes Sonido Troy MD   ferrous sulfate (IRON 325) 325 (65 Fe) MG tablet Take 1 tablet by mouth daily (with breakfast) 9/8/21   Sonido Troy MD     Comments:  Medications supplied by patient from home.     To my knowledge the above medication history is accurate as of 1/18/2022 12:12 PM.   Tana Quinones CPhT   1/18/2022 12:12 PM

## 2022-01-18 NOTE — ED PROVIDER NOTES
EMERGENCY DEPARTMENT ENCOUNTER    Patient: Monik Martinez  MRN: [id number]  : 1933  Date of Evaluation: 2022  ED Provider:  Oni Weinstein MD    CHIEF COMPLAINT  Chief Complaint   Patient presents with    Aphasia     last known well of last night       HPI  Monik Martinez is a 80 y.o. female via paramedics with apparent confusion and difficulty with talking. Patient was found by family members in this condition. Last known normal was last night sometime. Here in the emergency department, the patient is not able to tell me her name or where she is. When I  Denies any other associated symptoms or complaints or concerns. REVIEW OF SYSTEMS    Constitutional: negative for fever, chills  Neurological: negative for HA, focal weakness, loss of sensation  Ophthalmic: negative for vision change  ENT: negative for congestion, rhinorrhea  Cardiovascular: negative for chest pain  Respiratory: negative for SOB, cough  GI: negative for abdominal pain, nausea, vomiting, diarrhea, constipation  : negative for dysuria, hematuria  Musculoskeletal: negative for myalgias, decreased ROM, joint swelling  Dermatological: negative for rash, wounds  Heme: Negative for bleeding, bruising      PAST MEDICAL HISTORY  Past Medical History:   Diagnosis Date    GERD (gastroesophageal reflux disease)     H/O echocardiogram 2017    EF 55% Left atrial enlargement. Normal LV systolic. Minimal aoritc stenosis.  H/O echocardiogram 2020    EF 55-60%, Grade I DD, Mild AS. Mildly dilated left atrium.     Hyperlipidemia     Hypertension     Neuropathy        CURRENT MEDICATIONS  [unfilled]    ALLERGIES  Allergies   Allergen Reactions    Penicillins        SURGICAL HISTORY  Past Surgical History:   Procedure Laterality Date    APPENDECTOMY      COLON SURGERY      removed polops    HYSTERECTOMY, TOTAL ABDOMINAL      SPINE SURGERY      scrape calcium off spine, pressing on spine/nerves FAMILY HISTORY  Family History   Problem Relation Age of Onset    Hypertension Mother     Hypertension Father        SOCIAL HISTORY  Social History     Socioeconomic History    Marital status:      Spouse name: None    Number of children: None    Years of education: None    Highest education level: None   Occupational History    None   Tobacco Use    Smoking status: Never Smoker    Smokeless tobacco: Never Used   Substance and Sexual Activity    Alcohol use: None    Drug use: None    Sexual activity: None   Other Topics Concern    None   Social History Narrative    None     Social Determinants of Health     Financial Resource Strain: Medium Risk    Difficulty of Paying Living Expenses: Somewhat hard   Food Insecurity: No Food Insecurity    Worried About Running Out of Food in the Last Year: Never true    Gayle of Food in the Last Year: Never true   Transportation Needs:     Lack of Transportation (Medical): Not on file    Lack of Transportation (Non-Medical):  Not on file   Physical Activity:     Days of Exercise per Week: Not on file    Minutes of Exercise per Session: Not on file   Stress:     Feeling of Stress : Not on file   Social Connections:     Frequency of Communication with Friends and Family: Not on file    Frequency of Social Gatherings with Friends and Family: Not on file    Attends [employer] Services: Not on file    Active Member of [address] [place] [address] or Organizations: Not on file    Attends Club or Organization Meetings: Not on file    Marital Status: Not on file   Intimate Partner Violence:     Fear of Current or Ex-Partner: Not on file    Emotionally Abused: Not on file    Physically Abused: Not on file    Sexually Abused: Not on file   Housing Stability:     Unable to Pay for Housing in the Last Year: Not on file    Number of [place] in the Last Year: Not on file    Unstable Housing in the Last Year: Not on file         **Past medical, family and social histories, and nursing notes reviewed and verified by me**      PHYSICAL EXAM  VITAL SIGNS:   ED Triage Vitals   Enc Vitals Group      BP 01/17/22 1908 138/89      Pulse 01/17/22 1908 88      Resp 01/17/22 1908 17      Temp --       Temp src --       SpO2 01/17/22 1908 98 %      Weight 01/17/22 1857 163 lb (73.9 kg)      Height 01/17/22 1857 5' 9\" (1.753 m)      Head Circumference --       Peak Flow --       Pain Score --       Pain Loc --       Pain Edu? --       Excl. in [address] during ED course were reviewed and are as charted. Constitutional: Minimal distress, Non-toxic appearance  Eyes: Conjunctiva normal, No discharge, PERRL, EOMI  HENT: Normocephalic, Atraumatic, bilateral external ears normal, posterior oropharynx is nonerythematous and without exudate, uvula is midline,  oropharynx moist  Neck: Supple, no stridor, no grossly visible or palpable masses  Cardiovascular: Regular rate and rhythm, No murmurs, No rubs, No gallops  Pulmonary/Chest: Normal breath sounds, No respiratory distress or accessory muscle use, No wheezing, crackles or rhonchi. Abdomen: Soft, nondistended and nonrigid, No tenderness or peritoneal signs, No masses, normal bowel sounds  Back: No midline point tenderness, No paraspinous muscle tenderness.  No CVA tenderness  Extremities: No gross deformities, no edema, no tenderness  Neurologic: Cranial nerves II through XII grossly intact as tested, normal motor function, Normal sensory function, No focal deficits  Skin: Warm, Dry, No erythema, No rash, No cyanosis, No mottling  Lymphatic: No lymphadenopathy in the following location(s): cervical  Psychiatric: Alert and oriented x3, Affect normal          EKG Interpretation    Interpreted by emergency department physician    Rhythm: normal sinus   Rate: normal  Axis: normal  Ectopy: none  Conduction: normal  ST Segments: normal  T Waves: normal  Q Waves: none    Clinical Impression: Sinus rhythm        RADIOLOGY/PROCEDURES/LABS/MEDICATIONS ADMINISTERED:    I have reviewed and interpreted all of the currently available lab results from this visit (if applicable):  Results for orders placed or performed during the hospital encounter of 01/17/22   CBC Auto Differential   Result Value Ref Range    WBC 5.9 4.0 - 10.5 K/CU MM    RBC 4.21 4.2 - 5.4 M/CU MM    Hemoglobin 12.2 (L) 12.5 - 16.0 GM/DL    Hematocrit 38.3 37 - 47 %    MCV 91.0 78 - 100 FL    MCH 29.0 27 - 31 PG    MCHC 31.9 (L) 32.0 - 36.0 %    RDW 12.5 11.7 - 14.9 %    Platelets 536 289 - 097 K/CU MM    MPV 12.5 (H) 7.5 - 11.1 FL    Differential Type AUTOMATED DIFFERENTIAL     Segs Relative 76.5 (H) 36 - 66 %    Lymphocytes % 18.7 (L) 24 - 44 %    Monocytes % 4.0 0 - 4 %    Eosinophils % 0.0 0 - 3 %    Basophils % 0.3 0 - 1 %    Segs Absolute 4.5 K/CU MM    Lymphocytes Absolute 1.1 K/CU MM    Monocytes Absolute 0.2 K/CU MM    Eosinophils Absolute 0.0 K/CU MM    Basophils Absolute 0.0 K/CU MM    Nucleated RBC % 0.0 %    Total Nucleated RBC 0.0 K/CU MM    Total Immature Neutrophil 0.03 K/CU MM    Immature Neutrophil % 0.5 (H) 0 - 0.43 %   Comprehensive Metabolic Panel w/ Reflex to MG   Result Value Ref Range    Sodium 142 135 - 145 MMOL/L    Potassium 3.9 3.5 - 5.1 MMOL/L    Chloride 105 99 - 110 mMol/L    CO2 25 21 - 32 MMOL/L    BUN 12 6 - 23 MG/DL    CREATININE 0.7 0.6 - 1.1 MG/DL    Glucose 115 (H) 70 - 99 MG/DL    Calcium 8.9 8.3 - 10.6 MG/DL    Albumin 3.6 3.4 - 5.0 GM/DL    Total Protein 6.2 (L) 6.4 - 8.2 GM/DL    Total Bilirubin 0.6 0.0 - 1.0 MG/DL    ALT 10 10 - 40 U/L    AST 16 15 - 37 IU/L    Alkaline Phosphatase 107 40 - 129 IU/L    GFR Non-African American >60 >60 mL/min/1.73m2    GFR African American >60 >60 mL/min/1.73m2    Anion Gap 12 4 - 16   Troponin   Result Value Ref Range    Troponin T 0.030 (H) <0.01 NG/ML   Urinalysis, reflex to microscopic   Result Value Ref Range    Color, UA YELLOW YELLOW    Clarity, UA CLEAR CLEAR Glucose, Urine NEGATIVE NEGATIVE MG/DL    Bilirubin Urine NEGATIVE NEGATIVE MG/DL    Ketones, Urine SMALL (A) NEGATIVE MG/DL    Specific Gravity, UA 1.013 1.001 - 1.035    Blood, Urine NEGATIVE NEGATIVE    pH, Urine 7.0 5.0 - 8.0    Protein,  (A) NEGATIVE MG/DL    Urobilinogen, Urine 2.0 (H) 0.2 - 1.0 MG/DL    Nitrite Urine, Quantitative NEGATIVE NEGATIVE    Leukocyte Esterase, Urine NEGATIVE NEGATIVE    RBC, UA 1 0 - 6 /HPF    WBC, UA 1 0 - 5 /HPF    Bacteria, UA NEGATIVE NEGATIVE /HPF    Squam Epithel, UA <1 /HPF    Trichomonas, UA NONE SEEN NONE SEEN /HPF   Blood Gas, Venous   Result Value Ref Range    pH, [place] 7.47 (H) 7.32 - 7.42    pCO2, [place] 40 38 - 52 mmHG    pO2, [place] 36 28 - 48 mmHG    Base Exc, Mixed 5 (H) 0 - 2.3    HCO3, Venous 29.1 (H) 19 - 25 MMOL/L    O2 Sat, [place] 71.8 (H) 50 - 70 %    Comment VBG    EKG 12 Lead   Result Value Ref Range    Ventricular Rate 80 BPM    Atrial Rate 80 BPM    P-R Interval 164 ms    QRS Duration 92 ms    Q-T Interval 372 ms    QTc Calculation (Bazett) 429 ms    P Axis 59 degrees    R Axis 45 degrees    T Axis 25 degrees    Diagnosis       Normal sinus rhythm  Nonspecific ST abnormality  Abnormal ECG  No previous ECGs available            ABNORMAL LABS:  Labs Reviewed   CBC WITH AUTO DIFFERENTIAL - Abnormal; Notable for the following components:       Result Value    Hemoglobin 12.2 (*)     MCHC 31.9 (*)     MPV 12.5 (*)     Segs Relative 76.5 (*)     Lymphocytes % 18.7 (*)     Immature Neutrophil % 0.5 (*)     All other components within normal limits   COMPREHENSIVE METABOLIC PANEL W/ REFLEX TO MG FOR LOW K - Abnormal; Notable for the following components:    Glucose 115 (*)     Total Protein 6.2 (*)     All other components within normal limits   TROPONIN - Abnormal; Notable for the following components:    Troponin T 0.030 (*)     All other components within normal limits   URINALYSIS - Abnormal; Notable for the following components:    Ketones, Urine SMALL (*) Protein,  (*)     Urobilinogen, Urine 2.0 (*)     All other components within normal limits   BLOOD GAS, VENOUS - Abnormal; Notable for the following components:    pH, [place] 7.47 (*)     Base Exc, Mixed 5 (*)     HCO3, Venous 29.1 (*)     O2 Sat, [place] 71.8 (*)     All other components within normal limits   CULTURE, URINE   CULTURE, BLOOD 1   CULTURE, BLOOD 2   CBC   HEMOGLOBIN A1C   LIPID PANEL   TROPONIN   TROPONIN   BASIC METABOLIC PANEL W/ REFLEX TO MG FOR LOW K   VITAMIN B12 & FOLATE   TSH WITHOUT REFLEX   T4, FREE   AMMONIA   BLOOD GAS, VENOUS   POCT LACTIC ACID (LACTATE)   POCT LACTIC ACID (LACTATE)         IMAGING STUDIES ORDERED:  CT HEAD WO CONTRAST  XR CHEST PORTABLE  MRI BRAIN WO CONTRAST  CTA HEAD NECK W CONTRAST  CHECK TEMPERATURE  TELEMETRY MONITORING  VITAL SIGNS  ADVANCE DIET AS TOLERATED (NURSING COMMUNICATION)  NIHSS  TOBACCO CESSATION EDUCATION  NURSING SWALLOW ASSESSMENT  PROVIDE PATIENT EDUCATION MATERIALS  TELEMETRY MONITORING  VITAL SIGNS - NOTIFY MD  ELEVATE HOB  IP CONSULT TO HOSPITALIST  IP CONSULT TO NEUROLOGY  PATIENT STATUS (FROM ED OR OR/PROCEDURAL)  DIET NPO  OT EVAL AND TREAT  PT EVAL AND TREAT  REASON FOR NO MECHANICAL VTE PROPHYLAXIS  REASON FOR NOT SELECTING ANTILIPEMIC  FULL CODE  FALL PRECAUTIONS  UP WITH ASSISTANCE    I have personally viewed the imaging studies. The radiologist interpretation is:   CT Head WO Contrast   Preliminary Result   No acute intracranial abnormality. Mild cerebral atrophy. XR CHEST PORTABLE   Final Result   1. No acute cardiopulmonary process identified.          MRI brain without contrast    (Results Pending)   CTA HEAD NECK W CONTRAST    (Results Pending)         MEDICATIONS ADMINISTERED:  Medications   ferrous sulfate (IRON 325) tablet 325 mg (has no administration in time range)   cetirizine (ZYRTEC) tablet 10 mg (has no administration in time range)   lisinopril (PRINIVIL;ZESTRIL) tablet 40 mg (has no administration in time range) amLODIPine (NORVASC) tablet 10 mg (has no administration in time range)   pantoprazole (PROTONIX) tablet 40 mg (has no administration in time range)   triamterene-hydroCHLOROthiazide (MAXZIDE-25) 37.5-25 MG per tablet 2 tablet (has no administration in time range)   vitamin B-12 (CYANOCOBALAMIN) tablet 1,000 mcg (has no administration in time range)   pregabalin (LYRICA) capsule 150 mg (150 mg Oral Given 1/18/22 0036)   atorvastatin (LIPITOR) tablet 40 mg (has no administration in time range)   ondansetron (ZOFRAN-ODT) disintegrating tablet 4 mg (has no administration in time range)     Or   ondansetron (ZOFRAN) injection 4 mg (has no administration in time range)   polyethylene glycol (GLYCOLAX) packet 17 g (has no administration in time range)   enoxaparin (LOVENOX) injection 40 mg (has no administration in time range)   aspirin EC tablet 81 mg (has no administration in time range)     Or   aspirin suppository 300 mg (has no administration in time range)   labetalol (NORMODYNE;TRANDATE) injection 10 mg (has no administration in time range)   acetaminophen (TYLENOL) tablet 650 mg (650 mg Oral Given 1/17/22 2223)         COURSE & MEDICAL DECISION MAKING  Last vitals: BP (!) 164/72   Pulse 77   Temp 98 °F (36.7 °C) (Oral)   Resp 17   Ht 5' 9\" (1.753 m)   Wt 163 lb (73.9 kg)   SpO2 99%   BMI 24.07 kg/m²     80year-old female altered mental status. Unclear etiology. No clinical evidence for any significant electrolyte or metabolic abnormalities or acute infectious process. No focal neurological deficits on exam otherwise. Vital signs reassuring stable. She is noted to have some mild elevation in troponin. Unclear cause for this. No overt ischemic changes on EKG. Additional workup and treatment in the ED as documented above. Pt will be admitted for further evaluation and treatment. I discussed the case with the hospitalist, who agreed to admit the patient.  Plan discussed with pt and/or family who expressed understanding and agreement with plan. Admitted in stable condition. Clinical Impression:  1. Altered mental status, unspecified altered mental status type    2. Elevated troponin        Disposition referral (if applicable):  No follow-up provider specified. Disposition medications (if applicable):  New Prescriptions    No medications on file       ED Provider Disposition Time  DISPOSITION            Electronically signed by: Kedar Mcpherson M.D., 1/18/2022 1:46 AM      This dictation was created with voice recognition software. While attempts have been made to review the dictation as it is transcribed, on occasion the spoken word can be misinterpreted by the technology leading to omissions or inappropriate words, phrases or sentences.         Jevon Talavera MD  01/18/22 5424

## 2022-01-18 NOTE — PROGRESS NOTES
Hospitalist Progress Note      Name:  [place] Michele /Age/Sex: 1933  (80 y.o. female)   MRN & CSN:  [id number] & [id number] Admission Date/Time: 2022  6:54 PM   Location:  [place] [address] PCP: Arpita Young MD         Hospital Day: 2    Assessment and Plan:   [place]hele is a 80 y.o.  female  who presents with TIA (transient ischemic attack)    TIA vs CVA  Ct head () WNL   CTA head/neck () severe atherosclerotic calcified plaque at the level of the right cervical ICA with severe high grade stenosis greater than 80%   MR Brain () no acute abnormality, no acute infarct. Minimal global parenchymal volume loss with mild chronic microvascular ischemic changes. Echo () pending   Consult Pt/Ot & speech~rec's appreciated   Consult Neurology~rec's appreciated     2. Metabolic encephalopathy~resolved       1. Ct as above       2. MRI as above       3. Neurology following        4. Labs WNL     3. Elevated Troponin        1. Troponin () 0.030, 0.063 & 0.060        2. Consult cardiology~rec's appreciated     4. Essential Hypertension       1. Continue norvasc and lisinopril        2. Monitor     5. Hyperlipidemia        1. Lipid WNL        2. Continue statin therapy     6. GERD       1. Continue protonix     Diet ADULT DIET; Easy to Chew   DVT Prophylaxis [x] Lovenox, []  Heparin, [] SCDs, [] Ambulation   GI Prophylaxis [] PPI,  [] H2 Blocker,  [] Carafate,  [x] Diet/Tube Feeds   Code Status Full Code   Disposition Patient requires continued admission due to ongoing work up          History of Present Illness:     Chief Complaint: TIA (transient ischemic attack)  [place] is a 80 y.o.  female  who presents with complaints of dizziness and not feeling well. Pt states that this has happened to her previously and her grand daughter called ems due to her having dizzy spells and going back to bed.  Pt states that she doesn't remember her grand daughter calling EMS to come get her. Pt denies any weakness at this time or dizziness. Pt denies any nausea or vomiting. Pt denies any other concerns. Ten point ROS reviewed negative, unless as noted above    Objective: Intake/Output Summary (Last 24 hours) at 1/18/2022 1447  Last data filed at 1/18/2022 1134  Gross per 24 hour   Intake 10 ml   Output --   Net 10 ml      Vitals:   Vitals:    01/18/22 0809   BP: (!) 168/57   Pulse: (!) 47   Resp: 14   Temp: 98 °F (36.7 °C)   SpO2: 99%     Physical Exam:   GEN Awake female, sitting upright in bed in no apparent distress. Appears given age. EYES Pupils are equally round. No scleral erythema, discharge, or conjunctivitis. HENT Mucous membranes are moist. Oral pharynx without exudates, no evidence of thrush. NECK Supple, no apparent thyromegaly or masses. RESP Clear to auscultation, no wheezes, rales or rhonchi. Symmetric chest movement while on room air. CARDIO/VASC S1/S2 auscultated. Regular rate without appreciable murmurs, rubs, or gallops. No JVD or carotid bruits. Peripheral pulses equal bilaterally and palpable. No peripheral edema. GI Abdomen is soft without significant tenderness, masses, or guarding. Bowel sounds are normoactive. Rectal exam deferred.  No costovertebral angle tenderness. Swift catheter is not present. HEME/LYMPH No palpable cervical lymphadenopathy and no hepatosplenomegaly. No petechiae or ecchymoses. MSK No gross joint deformities. SKIN Normal coloration, warm, dry. NEURO Cranial nerves appear grossly intact, normal speech, no lateralizing weakness. PSYCH Awake, alert, oriented x 4. Affect appropriate.     Medications:   Medications:    ferrous sulfate  325 mg Oral Daily with breakfast    cetirizine  10 mg Oral Daily    lisinopril  40 mg Oral Daily    amLODIPine  10 mg Oral Daily    pantoprazole  40 mg Oral QAM AC    triamterene-hydroCHLOROthiazide  2 tablet Oral Daily    cyanocobalamin  1,000 mcg Oral Daily    pregabalin 150 mg Oral BID    atorvastatin  40 mg Oral Daily    enoxaparin  40 mg SubCUTAneous Daily    aspirin  81 mg Oral Daily    Or    aspirin  300 mg Rectal Daily    sodium chloride flush  5-40 mL IntraVENous BID      Infusions:   PRN Meds: ondansetron, 4 mg, Q8H PRN   Or  ondansetron, 4 mg, Q6H PRN  polyethylene glycol, 17 g, Daily PRN  labetalol, 10 mg, Q10 Min PRN          Patient is still admitted because ongoing neuro work up. The anticipated discharge is in greater than 24 hours.      Electronically signed by ARABELLA Espinoza CNP on 1/18/2022 at 2:47 PM

## 2022-01-18 NOTE — ED NOTES
Pt to bathroom via wheelchair, urine sample collected. Pts speech getting better. Pt able to express needs.       Flakita Eduardo  01/17/22 4064

## 2022-01-18 NOTE — PROGRESS NOTES
BRIEF NEUROLOGY NOTE                   Patient seen and examined. Chart reviewed in detail. Full consult note to follow.      Felisha L[facility] NP    Neurology Services

## 2022-01-18 NOTE — ED NOTES
Pt asking to go to bed and sleep. Explained to pt that she will be in the [place] in current bed until morning, Warm blankets given for comfort.       Lokesh Emanuel  01/17/22 3917

## 2022-01-18 NOTE — H&P
History and Physical      Name:  South Michele /Age/Sex: 1933  (80 y.o. female)   MRN & CSN:  [id number] & [id number] Admission Date/Time: 2022  6:54 PM   Location:  [place] [address] PCP: Emily Fernandes MD       Hospital Day: 2    Assessment and Plan:   South Michele is a 80 y.o.  female  who presents with TIA (transient ischemic attack)    TIA versus CVA with NIHSS of 5 on admission  -Admit to observation services with telemetry  -CT head without contrast: No acute intracranial abnormality  -MRI brain and CTA head and neck with contrast in a.m.  -Increase home statin and start aspirin  -2D echo  -Check lipid panel and hemoglobin A1c  -Neuro checks q4 hours, bedside swallow evaluation, SLP consult, PT/OT consult, and fall precautions  -Consulted neurology, appreciate recommendations    Acute metabolic encephalopathy, secondary to above?  -No history of dementia noted in chart  -CT head w/out contrast as above  -MRI of brain as above to r/o anatomic abnormality  -Can consider consider EEG to r/o seizures/post-ictal state  -VBG without elevated CO2, without leukocytosis, no significant electrolyte derangement, and UA without UTI  -We will finish the metabolic work-up with ammonia level, TSH, free T4, B12 and folate    Elevated troponin  -Initial troponin 0.030, cycle troponins x2  -Without chest pain  -Aspirin and statin as above  -If patient significant elevation in troponins or develops chest pain would consider consult to cardiology    Other chronic medical conditions:   Continue all home meds except stated above or contraindicated.   HTN  HLD  GERD  Vitamin B12 deficiency  Neuropathy  ALEM  Seasonal allergies    Diet Diet NPO   DVT Prophylaxis [x] Lovenox, []  Heparin, [] SCDs, [] Ambulation   GI Prophylaxis [x] PPI,  [] H2 Blocker,  [] Carafate,  [] Diet/Tube Feeds   Code Status Full Code   Disposition Patient requires continued admission due to TIA (transient ischemic attack)   MDM [] Low, ear normal.      Nose: Nose normal. No rhinorrhea. Mouth/Throat:      Mouth: Mucous membranes are moist.      Tongue: Tongue does not deviate from midline. Pharynx: Uvula midline. Eyes:      General: No scleral icterus. Extraocular Movements: Extraocular movements intact. Conjunctiva/sclera: Conjunctivae normal.      Pupils: Pupils are equal, round, and reactive to light. Cardiovascular:      Rate and Rhythm: Normal rate and regular rhythm. Pulses: Normal pulses. Heart sounds: Normal heart sounds. No murmur heard. No gallop. Pulmonary:      Effort: Pulmonary effort is normal. No tachypnea or respiratory distress. She is not intubated. Breath sounds: Normal breath sounds. No wheezing, rhonchi or rales. Abdominal:      General: Abdomen is flat. Bowel sounds are normal. There is no distension. Palpations: Abdomen is soft. Tenderness: There is no abdominal tenderness. There is no guarding or rebound. Musculoskeletal:         General: Normal range of motion. Cervical back: Neck supple. Right lower leg: No edema. Left lower leg: No edema. Skin:     General: Skin is warm and dry. Capillary Refill: Capillary refill takes less than 2 seconds. Neurological:      Mental Status: She is disoriented and confused. Cranial Nerves: Cranial nerve deficit present. No dysarthria or facial asymmetry. Sensory: No sensory deficit. Motor: No weakness, tremor, seizure activity or pronator drift. Comments: Patient with almost intermittent paraphasic word salad speech, which according to ED provider is improving from her aphasia on arrival.  Remainder of neurologic exam is nonfocal or lateralizing. Psychiatric:         Attention and Perception: She is attentive. Mood and Affect: Mood is not anxious. Speech: She is communicative. Speech is not slurred. Behavior: Behavior is cooperative.          Cognition and Memory: Active Member of Clubs or Organizations: Not on file    Attends Club or Organization Meetings: Not on file    Marital Status: Not on file   Intimate Partner Violence:     Fear of Current or Ex-Partner: Not on file    Emotionally Abused: Not on file    Physically Abused: Not on file    Sexually Abused: Not on file   Housing Stability:     Unable to Pay for Housing in the Last Year: Not on file    Number of Places Lived in the Last Year: Not on file    Unstable Housing in the Last Year: Not on file       Data:   CBC with Differential:    Lab Results   Component Value Date    WBC 5.9 01/17/2022    RBC 4.21 01/17/2022    HGB 12.2 01/17/2022    HCT 38.3 01/17/2022     01/17/2022    MCV 91.0 01/17/2022    MCH 29.0 01/17/2022    MCHC 31.9 01/17/2022    RDW 12.5 01/17/2022    SEGSPCT 76.5 01/17/2022    LYMPHOPCT 18.7 01/17/2022    MONOPCT 4.0 01/17/2022    EOSPCT 3.4 01/27/2012    BASOPCT 0.3 01/17/2022    MONOSABS 0.2 01/17/2022    LYMPHSABS 1.1 01/17/2022    EOSABS 0.0 01/17/2022    BASOSABS 0.0 01/17/2022    DIFFTYPE AUTOMATED DIFFERENTIAL 01/17/2022       CMP:     Lab Results   Component Value Date     01/17/2022    K 3.9 01/17/2022     01/17/2022    CO2 25 01/17/2022    BUN 12 01/17/2022    CREATININE 0.7 01/17/2022    GFRAA >60 01/17/2022    AGRATIO 1.6 08/18/2021    LABGLOM >60 01/17/2022    GLUCOSE 115 01/17/2022    PROT 6.2 01/17/2022    PROT 6.7 02/23/2013    LABALBU 3.6 01/17/2022    CALCIUM 8.9 01/17/2022    BILITOT 0.6 01/17/2022    ALKPHOS 107 01/17/2022    AST 16 01/17/2022    ALT 10 01/17/2022       Troponin:  Lab Results   Component Value Date    TROPONINT 0.030 01/17/2022       U/A:    Lab Results   Component Value Date    COLORU YELLOW 01/17/2022    PROTEINU 100 01/17/2022    WBCUA 1 01/17/2022    RBCUA 1 01/17/2022    TRICHOMONAS NONE SEEN 01/17/2022    BACTERIA NEGATIVE 01/17/2022    CLARITYU CLEAR 01/17/2022    SPECGRAV 1.013 01/17/2022    LEUKOCYTESUR NEGATIVE 01/17/2022 UROBILINOGEN 2.0 01/17/2022    BILIRUBINUR NEGATIVE 01/17/2022    BLOODU NEGATIVE 01/17/2022     Radiology results:  CT Head WO Contrast   Preliminary Result   No acute intracranial abnormality. Mild cerebral atrophy. XR CHEST PORTABLE   Final Result   1. No acute cardiopulmonary process identified. MRI brain without contrast    (Results Pending)       Medications:   Home Medications:   Prior to Admission medications    Medication Sig Start Date End Date Taking? Authorizing Provider   amLODIPine (NORVASC) 10 MG tablet take 1 tablet by mouth once daily 11/18/21   Montana Fan MD   atorvastatin (LIPITOR) 20 MG tablet take 1 tablet by mouth once daily 11/18/21   Montana Fan MD   omeprazole (PRILOSEC) 20 MG delayed release capsule Take 1 capsule by mouth daily 11/18/21   Montana Fan MD   triamterene-hydroCHLOROthiazide [facility]) 75-50 MG per tablet Take 1 tablet by mouth daily 11/18/21   Montana Fan MD   cyanocobalamin 1000 MCG tablet Take 1 tablet by mouth daily 11/18/21   Montana Fan MD   pregabalin (LYRICA) 150 MG capsule Take 1 capsule by mouth 2 times daily for 90 days. 11/18/21 2/16/22  Montana Fan MD   benazepril (LOTENSIN) 40 MG tablet TAKE 1 TABLET EVERY DAY 11/11/21   Montana Fan MD   loratadine (CLARITIN) 10 MG tablet TAKE 1 TABLET EVERY DAY 10/27/21   Montana Fan MD   ferrous sulfate (IRON 325) 325 (65 Fe) MG tablet Take 1 tablet by mouth daily (with breakfast) 9/8/21   Montana Fan MD     Medications:    Infusions:   PRN Meds:     Electronically signed by Bindu Deshpande DO on 1/18/2022 at 1:28 AM      This dictation was created with voice recognition software. While attempts have been made to review the dictation as it is transcribed, on occasion the spoken word can be misinterpreted by the technology leading to omissions or inappropriate words, phrases or sentences.

## 2022-01-18 NOTE — ED NOTES
Assisted pt to bathroom via wheelchair then back to bed. Breakfast order placed.       Viktoriya Alvarez  01/18/22 [id number]

## 2022-01-19 LAB
ALBUMIN SERPL-MCNC: 3.5 GM/DL (ref 3.4–5)
ALP BLD-CCNC: 99 IU/L (ref 40–129)
ALT SERPL-CCNC: 8 U/L (ref 10–40)
ANION GAP SERPL CALCULATED.3IONS-SCNC: 12 MMOL/L (ref 4–16)
AST SERPL-CCNC: 15 IU/L (ref 15–37)
BASOPHILS ABSOLUTE: 0 K/CU MM
BASOPHILS RELATIVE PERCENT: 0.5 % (ref 0–1)
BILIRUB SERPL-MCNC: 0.5 MG/DL (ref 0–1)
BUN BLDV-MCNC: 15 MG/DL (ref 6–23)
CALCIUM SERPL-MCNC: 8.8 MG/DL (ref 8.3–10.6)
CHLORIDE BLD-SCNC: 104 MMOL/L (ref 99–110)
CO2: 24 MMOL/L (ref 21–32)
CREAT SERPL-MCNC: 0.9 MG/DL (ref 0.6–1.1)
DIFFERENTIAL TYPE: ABNORMAL
EOSINOPHILS ABSOLUTE: 0.3 K/CU MM
EOSINOPHILS RELATIVE PERCENT: 5.6 % (ref 0–3)
GFR AFRICAN AMERICAN: >60 ML/MIN/1.73M2
GFR NON-AFRICAN AMERICAN: 59 ML/MIN/1.73M2
GLUCOSE BLD-MCNC: 122 MG/DL (ref 70–99)
HCT VFR BLD CALC: 36.8 % (ref 37–47)
HEMOGLOBIN: 11.7 GM/DL (ref 12.5–16)
IMMATURE NEUTROPHIL %: 0.4 % (ref 0–0.43)
LYMPHOCYTES ABSOLUTE: 2.3 K/CU MM
LYMPHOCYTES RELATIVE PERCENT: 40.8 % (ref 24–44)
MAGNESIUM: 2 MG/DL (ref 1.8–2.4)
MCH RBC QN AUTO: 29.3 PG (ref 27–31)
MCHC RBC AUTO-ENTMCNC: 31.8 % (ref 32–36)
MCV RBC AUTO: 92 FL (ref 78–100)
MONOCYTES ABSOLUTE: 0.4 K/CU MM
MONOCYTES RELATIVE PERCENT: 7.4 % (ref 0–4)
NUCLEATED RBC %: 0 %
PDW BLD-RTO: 12.6 % (ref 11.7–14.9)
PHOSPHORUS: 2.4 MG/DL (ref 2.5–4.9)
PLATELET # BLD: 217 K/CU MM (ref 140–440)
PMV BLD AUTO: 12.7 FL (ref 7.5–11.1)
POTASSIUM SERPL-SCNC: 3.5 MMOL/L (ref 3.5–5.1)
RBC # BLD: 4 M/CU MM (ref 4.2–5.4)
SEGMENTED NEUTROPHILS ABSOLUTE COUNT: 2.5 K/CU MM
SEGMENTED NEUTROPHILS RELATIVE PERCENT: 45.3 % (ref 36–66)
SODIUM BLD-SCNC: 140 MMOL/L (ref 135–145)
TOTAL IMMATURE NEUTOROPHIL: 0.02 K/CU MM
TOTAL NUCLEATED RBC: 0 K/CU MM
TOTAL PROTEIN: 5.8 GM/DL (ref 6.4–8.2)
WBC # BLD: 5.5 K/CU MM (ref 4–10.5)

## 2022-01-19 PROCEDURE — 6370000000 HC RX 637 (ALT 250 FOR IP): Performed by: STUDENT IN AN ORGANIZED HEALTH CARE EDUCATION/TRAINING PROGRAM

## 2022-01-19 PROCEDURE — 99223 1ST HOSP IP/OBS HIGH 75: CPT | Performed by: THORACIC SURGERY (CARDIOTHORACIC VASCULAR SURGERY)

## 2022-01-19 PROCEDURE — 87040 BLOOD CULTURE FOR BACTERIA: CPT

## 2022-01-19 PROCEDURE — 96365 THER/PROPH/DIAG IV INF INIT: CPT

## 2022-01-19 PROCEDURE — G0378 HOSPITAL OBSERVATION PER HR: HCPCS

## 2022-01-19 PROCEDURE — 84100 ASSAY OF PHOSPHORUS: CPT

## 2022-01-19 PROCEDURE — 6360000002 HC RX W HCPCS: Performed by: STUDENT IN AN ORGANIZED HEALTH CARE EDUCATION/TRAINING PROGRAM

## 2022-01-19 PROCEDURE — 2580000003 HC RX 258: Performed by: STUDENT IN AN ORGANIZED HEALTH CARE EDUCATION/TRAINING PROGRAM

## 2022-01-19 PROCEDURE — 6360000002 HC RX W HCPCS: Performed by: FAMILY MEDICINE

## 2022-01-19 PROCEDURE — 80053 COMPREHEN METABOLIC PANEL: CPT

## 2022-01-19 PROCEDURE — 83735 ASSAY OF MAGNESIUM: CPT

## 2022-01-19 PROCEDURE — 99232 SBSQ HOSP IP/OBS MODERATE 35: CPT

## 2022-01-19 PROCEDURE — 6370000000 HC RX 637 (ALT 250 FOR IP): Performed by: FAMILY MEDICINE

## 2022-01-19 PROCEDURE — 2580000003 HC RX 258: Performed by: FAMILY MEDICINE

## 2022-01-19 PROCEDURE — 85025 COMPLETE CBC W/AUTO DIFF WBC: CPT

## 2022-01-19 PROCEDURE — 96372 THER/PROPH/DIAG INJ SC/IM: CPT

## 2022-01-19 RX ADMIN — AMLODIPINE BESYLATE 10 MG: 10 TABLET ORAL at 09:43

## 2022-01-19 RX ADMIN — ASPIRIN 81 MG: 81 TABLET, COATED ORAL at 09:43

## 2022-01-19 RX ADMIN — ATORVASTATIN CALCIUM 40 MG: 40 TABLET, FILM COATED ORAL at 09:42

## 2022-01-19 RX ADMIN — DIBASIC SODIUM PHOSPHATE, MONOBASIC POTASSIUM PHOSPHATE AND MONOBASIC SODIUM PHOSPHATE 2 TABLET: 852; 155; 130 TABLET ORAL at 14:50

## 2022-01-19 RX ADMIN — CYANOCOBALAMIN TAB 1000 MCG 1000 MCG: 1000 TAB at 09:43

## 2022-01-19 RX ADMIN — PREGABALIN 150 MG: 75 CAPSULE ORAL at 23:44

## 2022-01-19 RX ADMIN — LISINOPRIL 40 MG: 40 TABLET ORAL at 09:42

## 2022-01-19 RX ADMIN — FERROUS SULFATE TAB 325 MG (65 MG ELEMENTAL FE) 325 MG: 325 (65 FE) TAB at 09:42

## 2022-01-19 RX ADMIN — VANCOMYCIN HYDROCHLORIDE 1000 MG: 1 INJECTION, POWDER, LYOPHILIZED, FOR SOLUTION INTRAVENOUS at 16:57

## 2022-01-19 RX ADMIN — SODIUM CHLORIDE, PRESERVATIVE FREE 10 ML: 5 INJECTION INTRAVENOUS at 23:44

## 2022-01-19 RX ADMIN — ENOXAPARIN SODIUM 40 MG: 100 INJECTION SUBCUTANEOUS at 09:41

## 2022-01-19 RX ADMIN — TRIAMTERENE AND HYDROCHLOROTHIAZIDE 2 TABLET: 37.5; 25 TABLET ORAL at 09:43

## 2022-01-19 RX ADMIN — SODIUM CHLORIDE, PRESERVATIVE FREE 10 ML: 5 INJECTION INTRAVENOUS at 09:49

## 2022-01-19 RX ADMIN — CETIRIZINE HYDROCHLORIDE 10 MG: 10 TABLET, FILM COATED ORAL at 09:43

## 2022-01-19 RX ADMIN — PANTOPRAZOLE SODIUM 40 MG: 40 TABLET, DELAYED RELEASE ORAL at 06:09

## 2022-01-19 RX ADMIN — PREGABALIN 150 MG: 75 CAPSULE ORAL at 09:42

## 2022-01-19 ASSESSMENT — PAIN SCALES - GENERAL: PAINLEVEL_OUTOF10: 0

## 2022-01-19 NOTE — PROGRESS NOTES
[address] [facility]  consulted by Esmer Dacosta CNP for monitoring and adjustment. Indication for treatment: Bloodstream infection  Goal trough: 15 mcg/mL      Pertinent Laboratory Values:   Temp Readings from Last 3 Encounters:   01/19/22 98.1 °F (36.7 °C) (Oral)   11/18/21 98 °F (36.7 °C)   08/18/21 97.9 °F (36.6 °C)     Recent Labs     01/17/22 2010 01/18/22  0500 01/19/22  1140   WBC 5.9 6.0 5.5     Recent Labs     01/17/22 2010 01/18/22  0500 01/19/22  1140   BUN 12 14 15   CREATININE 0.7 0.7 0.9     Estimated Creatinine Clearance: 45 mL/min (based on SCr of 0.9 mg/dL). No intake or output data in the 24 hours ending 01/19/22 1605    Pertinent Cultures:  Date    Source    Results  1/19               Blood    Staph CoN (3/4)             Vancomycin level:   TROUGH:  No results for input(s): VANCOTROUGH in the last 72 hours. RANDOM:  No results for input(s): VANCORANDOM in the last 72 hours. Assessment:  WBC and temperature: WNL  SCr, BUN, and urine output: WNL, no UOP data  Day(s) of therapy: # 1  Vancomycin concentration: to be collected    Plan:  Started on Vancomycin 1000mg IVPB every 24 hours  Predicted AUC = 417  Predicted trough = 12.7  Random level in 2 days  Pharmacy will continue to monitor patient and adjust therapy as indicated    VANCOMYCIN CONCENTRATION SCHEDULED FOR 1/21 @ 0600    Thank you for the consult.   Rashad Lowery [facility]  1/19/2022 4:05 PM

## 2022-01-19 NOTE — PROGRESS NOTES
Neurology Service Progress Note  [facility]  Patient Name: Heath Salter  : 1933        Subjective:   Reason for consult: Aphasia and word searching  Patient seen and examined. Chart reviewed in detail. Patient remains in ED swanson bed. Exam is unchanged. Plan of care reiterated to patient, verbalized understanding. Patient has been seen by vascular team regarding right ICA stenosis. Past Medical History:   Diagnosis Date    GERD (gastroesophageal reflux disease)     H/O echocardiogram 2017    EF 55% Left atrial enlargement. Normal LV systolic. Minimal aoritc stenosis.  H/O echocardiogram 2020    EF 55-60%, Grade I DD, Mild AS. Mildly dilated left atrium.     Hyperlipidemia     Hypertension     Neuropathy     :   Past Surgical History:   Procedure Laterality Date    APPENDECTOMY      COLON SURGERY      removed polops    HYSTERECTOMY, TOTAL ABDOMINAL      SPINE SURGERY      scrape calcium off spine, pressing on spine/nerves     Medications:  Scheduled Meds:   ferrous sulfate  325 mg Oral Daily with breakfast    cetirizine  10 mg Oral Daily    lisinopril  40 mg Oral Daily    amLODIPine  10 mg Oral Daily    pantoprazole  40 mg Oral QAM AC    triamterene-hydroCHLOROthiazide  2 tablet Oral Daily    cyanocobalamin  1,000 mcg Oral Daily    pregabalin  150 mg Oral BID    atorvastatin  40 mg Oral Daily    enoxaparin  40 mg SubCUTAneous Daily    aspirin  81 mg Oral Daily    Or    aspirin  300 mg Rectal Daily    sodium chloride flush  5-40 mL IntraVENous BID     Continuous Infusions:  PRN Meds:.ondansetron **OR** ondansetron, polyethylene glycol, labetalol    Allergies   Allergen Reactions    Penicillins      Social History     Socioeconomic History    Marital status:      Spouse name: Not on file    Number of children: Not on file    Years of education: Not on file    Highest education level: Not on file   Occupational History  Not on file   Tobacco Use    Smoking status: Never Smoker    Smokeless tobacco: Never Used   Substance and Sexual Activity    Alcohol use: Not on file    Drug use: Not on file    Sexual activity: Not on file   Other Topics Concern    Not on file   Social History Narrative    Not on file     Social Determinants of Health     Financial Resource Strain:     Difficulty of Paying Living Expenses: Not on file   Food Insecurity:     Worried About Running Out of Food in the Last Year: Not on file    Gayle of Food in the Last Year: Not on file   Transportation Needs:     Lack of Transportation (Medical): Not on file    Lack of Transportation (Non-Medical):  Not on file   Physical Activity:     Days of Exercise per Week: Not on file    Minutes of Exercise per Session: Not on file   Stress:     Feeling of Stress : Not on file   Social Connections:     Frequency of Communication with Friends and Family: Not on file    Frequency of Social Gatherings with Friends and Family: Not on file    Attends Religion Services: Not on file    Active Member of [address] [place] [address] or Organizations: Not on file    Attends Club or Organization Meetings: Not on file    Marital Status: Not on file   Intimate Partner Violence:     Fear of Current or Ex-Partner: Not on file    Emotionally Abused: Not on file    Physically Abused: Not on file    Sexually Abused: Not on file   Housing Stability:     Unable to Pay for Housing in the Last Year: Not on file    Number of [place] in the Last Year: Not on file    Unstable Housing in the Last Year: Not on file      Family History   Problem Relation Age of Onset    Hypertension Mother     Hypertension Father            Physical Exam:       Vitals:    01/19/22 0855 01/19/22 0937 01/19/22 1000 01/19/22 1050   BP: (!) 140/72 (!) 153/71  (!) 143/56   Pulse: 86 (!) 47 (!) 48 56   Resp: 18 17 22   Temp: 98.7 °F (37.1 °C) 98 °F (36.7 °C)  98 °F (36.7 °C)   TempSrc: Oral Oral  Oral   SpO2: 94% 100%  100%   Weight:       Height:           Wt Readings from Last 3 Encounters:   01/17/22 163 lb (73.9 kg)   11/18/21 163 lb 3.2 oz (74 kg)   08/18/21 170 lb (77.1 kg)     Temp Readings from Last 3 Encounters:   01/19/22 98 °F (36.7 °C) (Oral)   11/18/21 98 °F (36.7 °C)   08/18/21 97.9 °F (36.6 °C)     BP Readings from Last 3 Encounters:   01/19/22 (!) 143/56   11/18/21 118/66   08/18/21 120/64     Pulse Readings from Last 3 Encounters:   01/19/22 56   11/18/21 57   08/18/21 62        Gen: A&O x 4, NAD, cooperative  HEENT: NC/AT, EOMI, PERRL, mmm, neck supple, no meningeal signs; Heart: Regular  Lungs: Respirations Unlabored  Ext: no edema, no calf tenderness b/l  Psych: normal mood and affect  Skin: no rashes or lesions    NEUROLOGIC EXAM:    Mental Status: A&O to self, location, month and year, NAD, speech clear, language fluent, repetition and naming intact, follows commands appropriately    Cranial Nerve Exam:   CN II-XII: PERRL, VFF, no nystagmus, no gaze paresis, sensation V1-V3 intact b/l, muscles of facial expression symmetric; hard of hearing to conversational tone, palate elevates symmetrically, shoulder elevation symmetric and tongue protrudes midline with movement side to side.     Motor Exam:       Strength 4/5 UE's/LE's b/l  Tone and bulk normal   No pronator drift    Deep Tendon Reflexes: 2/4 biceps, triceps, brachioradialis, patellar, and achilles b/l; flexor plantar responses b/l    Sensation: Intact light touch/ UE's/LE's b/l    Coordination/Cerebellum:       Tremors--none      Rapidly alternating movements: Mild dysdiadochokinesia b/l                Finger-to-Nose: Mild dysmetria to right upper extremity    Gait and stance:      Gait: deferred      LABS:        CBC:   Recent Labs     01/18/22  0500   WBC 6.0   RBC 3.56*   HGB 10.5*   HCT 32.3*      MCV 90.7     BMP:    Recent Labs     01/18/22  0500      K 3.4*      CO2 25   BUN 14   CREATININE 0.7   GLUCOSE 98   CALCIUM 8. 5       IMAGING:    CTH  Impression   No acute intracranial abnormality. Mild cerebral atrophy. CTA head/neck     Impression   Severe atherosclerotic calcified plaque at the level of the right carotid   bulb and extending into the origin of the right cervical ICA with severe   high-grade stenosis greater than 80%. MRI brain   MRI brain without contrast   Performed: 01/18/22 1039  Final          Impression: 1. No acute intracranial abnormality. No acute infarct. 2. Minimal global parenchymal volume loss with mild chronic microvascular ischemic changes. Above imaging reviewed in detail. ASSESSMENT/PLAN:   80 y.o. -female with PMH GERD, HTN, HLD, and neuropathy presenting to [facility] aphasia initially, which was improving upon presentation, however was noted searching for her words. On exam patient is alert and oriented,and denies, numbness/ tingling, vision/speech changes, dizziness, or unilateral weakness. Exam is unchanged. Dizziness and dysarthria possibly due to TIA vs acute intracranial ischemia secondary to hypokalemia, hypomagnesia likely superimposed by #2    2. Right cervical ICA severe stenosis  --Vascular following, refer to their plan     --Medications;  - Aspirin, statin for secondary stroke prevention    -PT/OT per their recommendations    Neurodiagnostics  --CTH as above  --CTA head/neck  --MRI Brain as above    Follow-up with neurology as outpatient. Nothing further from neurology standpoint, we will sign off. If you have any further questions please do not hesitate to contact us. Thank you for your consultation        Patient discussed with attending physician Dr. Zen Carbone, in which is in agreement with plan of care. Thank you for allowing us to participate in the care of your patient. If there are any questions regarding evaluation please feel free to contact us.      ARABELLA Felix - CNP, 1/19/2022    (Please note that portions of this note were completed with a voice recognition program.  Efforts were made to edit the dictations but occasionally words are mistranscribed.)

## 2022-01-19 NOTE — PROGRESS NOTES
Hospitalist Progress Note      Name:  South Michele /Age/Sex: 1933  (80 y.o. female)   MRN & CSN:  [id number] & [id number] Admission Date/Time: 2022  6:54 PM   Location:  [place] [address] PCP: Angela Tracey MD         Hospital Day: 3    Assessment and Plan:   [place] Michele is a 80 y.o.  female  who presents with TIA (transient ischemic attack)    TIA vs CVA  Ct head () WNL   CTA head/neck () severe atherosclerotic calcified plaque at the level of the right cervical ICA with severe high grade stenosis greater than 80%   MR Brain () no acute abnormality, no acute infarct. Minimal global parenchymal volume loss with mild chronic microvascular ischemic changes. Echo () pending   Consult Pt/Ot & speech~rec's appreciated   Consult Neurology~rec's appreciated     2. Metabolic encephalopathy~resolved       1. Ct as above       2. MRI as above       3. Neurology following        4. Labs WNL~ no elevated WBC        5. Blood culture 1 of 4 positive~ repeat cultures       6. Urine CX neg       3. Elevated Troponin        1. Troponin () 0.030, 0.063 & 0.060        2. Consult cardiology~rec's appreciated     4. Essential Hypertension       1. Continue norvasc and lisinopril        2. Monitor     5. Hyperlipidemia        1. Lipid WNL        2. Continue statin therapy     6. GERD       1. Continue protonix     Diet ADULT DIET; Easy to Chew   DVT Prophylaxis [x] Lovenox, []  Heparin, [] SCDs, [] Ambulation   GI Prophylaxis [] PPI,  [] H2 Blocker,  [] Carafate,  [x] Diet/Tube Feeds   Code Status Full Code   Disposition Patient requires continued admission due to ongoing work up          History of Present Illness:     Chief Complaint: TIA (transient ischemic attack)  [place] Michele is a 80 y.o.  female  who presents with complaints of dizziness and not feeling well.  Pt states that this has happened to her previously and her grand daughter called ems due to her having dizzy spells and going back to bed. Pt states that she doesn't remember her grand daughter calling EMS to come get her. Pt denies any weakness at this time or dizziness. Pt denies any nausea or vomiting. Pt denies any other concerns. Ten point ROS reviewed negative, unless as noted above    Objective:     No intake or output data in the 24 hours ending 01/19/22 1346   Vitals:   Vitals:    01/19/22 1244   BP: (!) 124/49   Pulse: 67   Resp: 20   Temp:    SpO2: 98%     Physical Exam:   GEN Awake female, sitting upright in bed in no apparent distress. Appears given age. EYES Pupils are equally round. No scleral erythema, discharge, or conjunctivitis. HENT Mucous membranes are moist. Oral pharynx without exudates, no evidence of thrush. NECK Supple, no apparent thyromegaly or masses. RESP Clear to auscultation, no wheezes, rales or rhonchi. Symmetric chest movement while on room air. CARDIO/VASC S1/S2 auscultated. Regular rate without appreciable murmurs, rubs, or gallops. No JVD or carotid bruits. Peripheral pulses equal bilaterally and palpable. No peripheral edema. GI Abdomen is soft without significant tenderness, masses, or guarding. Bowel sounds are normoactive. Rectal exam deferred.  No costovertebral angle tenderness. Swift catheter is not present. HEME/LYMPH No palpable cervical lymphadenopathy and no hepatosplenomegaly. No petechiae or ecchymoses. MSK No gross joint deformities. SKIN Normal coloration, warm, dry. NEURO Cranial nerves appear grossly intact, normal speech, no lateralizing weakness. PSYCH Awake, alert, oriented x 4. Affect appropriate.     Medications:   Medications:    ferrous sulfate  325 mg Oral Daily with breakfast    cetirizine  10 mg Oral Daily    lisinopril  40 mg Oral Daily    amLODIPine  10 mg Oral Daily    pantoprazole  40 mg Oral QAM AC    triamterene-hydroCHLOROthiazide  2 tablet Oral Daily    cyanocobalamin  1,000 mcg Oral Daily    pregabalin  150 mg Oral BID  atorvastatin  40 mg Oral Daily    enoxaparin  40 mg SubCUTAneous Daily    aspirin  81 mg Oral Daily    Or    aspirin  300 mg Rectal Daily    sodium chloride flush  5-40 mL IntraVENous BID      Infusions:   PRN Meds: ondansetron, 4 mg, Q8H PRN   Or  ondansetron, 4 mg, Q6H PRN  polyethylene glycol, 17 g, Daily PRN  labetalol, 10 mg, Q10 Min PRN          Patient is still admitted because ongoing neuro work up. The anticipated discharge is in greater than 24 hours.      Electronically signed by ARABELLA Machuca CNP on 1/19/2022 at 1:46 PM

## 2022-01-19 NOTE — CONSULTS
Department of Cardiovascular & Thoracic Surgery   Consult Note    Reason for Consult:  ISHAN stenosis    Requesting Physician: Dr. Tre Castellanos    Date of Consult: 1/19/22      History Obtained From:  patient     HISTORY OF PRESENT ILLNESS:    The patient is a 80 y.o. female who presents with dizziness and headache which was localized to the back of her neck. She also had confusion and difficulty speaking. Her symptoms began 2 days ago while at home. He granddaughter who lives with her was concerned and brought her to the ED. She states she is feeling back to normal now. She did not have any focal weakness or vision changes. She denies previous hx of stroke. She is a nonsmoker. She does have HTN. Past Medical History:        Diagnosis Date    GERD (gastroesophageal reflux disease)     H/O echocardiogram 04/06/2017    EF 55% Left atrial enlargement. Normal LV systolic. Minimal aoritc stenosis.  H/O echocardiogram 02/04/2020    EF 55-60%, Grade I DD, Mild AS. Mildly dilated left atrium.     Hyperlipidemia     Hypertension     Neuropathy      Past Surgical History:        Procedure Laterality Date    APPENDECTOMY      COLON SURGERY      removed polops    HYSTERECTOMY, TOTAL ABDOMINAL      SPINE SURGERY  2013    scrape calcium off spine, pressing on spine/nerves     Current Medications:   Current Facility-Administered Medications: phosphorus (K PHOS NEUTRAL) tablet 2 tablet, 500 mg, Oral, Once  ferrous sulfate (IRON 325) tablet 325 mg, 325 mg, Oral, Daily with breakfast  cetirizine (ZYRTEC) tablet 10 mg, 10 mg, Oral, Daily  lisinopril (PRINIVIL;ZESTRIL) tablet 40 mg, 40 mg, Oral, Daily  amLODIPine (NORVASC) tablet 10 mg, 10 mg, Oral, Daily  pantoprazole (PROTONIX) tablet 40 mg, 40 mg, Oral, QAM AC  triamterene-hydroCHLOROthiazide (MAXZIDE-25) 37.5-25 MG per tablet 2 tablet, 2 tablet, Oral, Daily  vitamin B-12 (CYANOCOBALAMIN) tablet 1,000 mcg, 1,000 mcg, Oral, Daily  pregabalin (LYRICA) capsule 150 mg, 150 mg, Oral, BID  atorvastatin (LIPITOR) tablet 40 mg, 40 mg, Oral, Daily  ondansetron (ZOFRAN-ODT) disintegrating tablet 4 mg, 4 mg, Oral, Q8H PRN **OR** ondansetron (ZOFRAN) injection 4 mg, 4 mg, IntraVENous, Q6H PRN  polyethylene glycol (GLYCOLAX) packet 17 g, 17 g, Oral, Daily PRN  enoxaparin (LOVENOX) injection 40 mg, 40 mg, SubCUTAneous, Daily  aspirin EC tablet 81 mg, 81 mg, Oral, Daily **OR** aspirin suppository 300 mg, 300 mg, Rectal, Daily  labetalol (NORMODYNE;TRANDATE) injection 10 mg, 10 mg, IntraVENous, Q10 Min PRN  sodium chloride flush 0.9 % injection 5-40 mL, 5-40 mL, IntraVENous, BID  Allergies: Allergies   Allergen Reactions    Penicillins        Social History:   Social History     Socioeconomic History    Marital status:      Spouse name: Not on file    Number of children: Not on file    Years of education: Not on file    Highest education level: Not on file   Occupational History    Not on file   Tobacco Use    Smoking status: Never Smoker    Smokeless tobacco: Never Used   Substance and Sexual Activity    Alcohol use: Not on file    Drug use: Not on file    Sexual activity: Not on file   Other Topics Concern    Not on file   Social History Narrative    Not on file     Social Determinants of Health     Financial Resource Strain:     Difficulty of Paying Living Expenses: Not on file   Food Insecurity:     Worried About Running Out of Food in the Last Year: Not on file    Gayle of Food in the Last Year: Not on file   Transportation Needs:     Lack of Transportation (Medical): Not on file    Lack of Transportation (Non-Medical):  Not on file   Physical Activity:     Days of Exercise per Week: Not on file    Minutes of Exercise per Session: Not on file   Stress:     Feeling of Stress : Not on file   Social Connections:     Frequency of Communication with Friends and Family: Not on file    Frequency of Social Gatherings with Friends and Family: Not on file    Attends [employer] Services: Not on file    Active Member of Clubs or Organizations: Not on file    Attends Club or Organization Meetings: Not on file    Marital Status: Not on file   Intimate Partner Violence:     Fear of Current or Ex-Partner: Not on file    Emotionally Abused: Not on file    Physically Abused: Not on file    Sexually Abused: Not on file   Housing Stability:     Unable to Pay for Housing in the Last Year: Not on file    Number of [place] in the Last Year: Not on file    Unstable Housing in the Last Year: Not on file       Family History:        Problem Relation Age of Onset    Hypertension Mother     Hypertension Father        REVIEW OF SYSTEMS:  Constitutional: - fatigue, - fever, - chills, - night sweats  Eyes: - vision loss  Cardiovascular: -  chest pain, - palpitations, - leg swelling, - leg pain   Respiratory: - cough, - shortness of breath, - wheezing   GI: - nausea, - vomiting, - abdominal pain, - constipation, - diarrhea   : - dysuria   MSK: - joint pain, - muscle pain  Integument: - rash, - skin color change   Heme: - easy bruising or bleeding  Neurologic: + headache, - weakness, + dizziness, - paresthesias       EXAM:  Constitutional: Blood pressure (!) 150/95, pulse 60, temperature 98.1 °F (36.7 °C), temperature source Oral, resp. rate 20, height 5' 9\" (1.753 m), weight 163 lb (73.9 kg), SpO2 99 %. No apparent distress, appears stated age and cooperative. Neurologic: follows commands, no focal weakness noted   Lungs: Good respiratory effort.  Clear to auscultation,   CV: Regular rate/ rhythm , no peripheral edema, feet warm and well perfused  GI: Soft, non-tender in all four quadrants, non-distended, + bowel sounds, liver and spleen no palpable masses  : bladder nondistended   MSK: no obvious deformity   Skin: warm, pink and dry       DATA:  CTA  Impression   Severe atherosclerotic calcified plaque at the level of the right carotid   bulb and extending into the origin of the right cervical ICA with severe   high-grade stenosis greater than 80%. MRI  Impression   1. No acute intracranial abnormality.  No acute infarct. 2. Minimal global parenchymal volume loss with mild chronic microvascular   ischemic changes. IMPRESSION  Patient Active Problem List   Diagnosis    Essential hypertension    Mixed hyperlipidemia    Gastroesophageal reflux disease without esophagitis    Dorsalgia    Neuropathy    Allergic rhinitis    Anemia    TIA (transient ischemic attack)       Severe ISHAN stenosis      RECOMMENDATIONS:  MRI negative for acute infarct. She will need R carotid endarterectomy soon. Recommended OP follow up with Dr. Placido Julian to schedule surgery. Pt seen with Dr. Placido Julian.      Shani Downs PA-C

## 2022-01-20 LAB
CULTURE: ABNORMAL
Lab: ABNORMAL
Lab: ABNORMAL
SPECIMEN: ABNORMAL
SPECIMEN: ABNORMAL

## 2022-01-20 PROCEDURE — 1200000000 HC SEMI PRIVATE

## 2022-01-20 PROCEDURE — 6370000000 HC RX 637 (ALT 250 FOR IP): Performed by: STUDENT IN AN ORGANIZED HEALTH CARE EDUCATION/TRAINING PROGRAM

## 2022-01-20 PROCEDURE — 96372 THER/PROPH/DIAG INJ SC/IM: CPT

## 2022-01-20 PROCEDURE — 6360000002 HC RX W HCPCS: Performed by: FAMILY MEDICINE

## 2022-01-20 PROCEDURE — 2580000003 HC RX 258: Performed by: STUDENT IN AN ORGANIZED HEALTH CARE EDUCATION/TRAINING PROGRAM

## 2022-01-20 PROCEDURE — 76937 US GUIDE VASCULAR ACCESS: CPT

## 2022-01-20 PROCEDURE — 6370000000 HC RX 637 (ALT 250 FOR IP): Performed by: NURSE PRACTITIONER

## 2022-01-20 PROCEDURE — 6360000002 HC RX W HCPCS: Performed by: STUDENT IN AN ORGANIZED HEALTH CARE EDUCATION/TRAINING PROGRAM

## 2022-01-20 PROCEDURE — 2580000003 HC RX 258: Performed by: FAMILY MEDICINE

## 2022-01-20 RX ORDER — ACETAMINOPHEN 325 MG/1
650 TABLET ORAL EVERY 6 HOURS PRN
Status: DISCONTINUED | OUTPATIENT
Start: 2022-01-20 | End: 2022-01-24 | Stop reason: HOSPADM

## 2022-01-20 RX ADMIN — TRIAMTERENE AND HYDROCHLOROTHIAZIDE 2 TABLET: 37.5; 25 TABLET ORAL at 10:09

## 2022-01-20 RX ADMIN — ASPIRIN 81 MG: 81 TABLET, COATED ORAL at 10:09

## 2022-01-20 RX ADMIN — ATORVASTATIN CALCIUM 40 MG: 40 TABLET, FILM COATED ORAL at 10:09

## 2022-01-20 RX ADMIN — AMLODIPINE BESYLATE 10 MG: 10 TABLET ORAL at 10:09

## 2022-01-20 RX ADMIN — SODIUM CHLORIDE, PRESERVATIVE FREE 10 ML: 5 INJECTION INTRAVENOUS at 20:55

## 2022-01-20 RX ADMIN — PREGABALIN 150 MG: 75 CAPSULE ORAL at 10:09

## 2022-01-20 RX ADMIN — PREGABALIN 150 MG: 75 CAPSULE ORAL at 20:55

## 2022-01-20 RX ADMIN — CETIRIZINE HYDROCHLORIDE 10 MG: 10 TABLET, FILM COATED ORAL at 10:09

## 2022-01-20 RX ADMIN — ACETAMINOPHEN 650 MG: 325 TABLET ORAL at 21:44

## 2022-01-20 RX ADMIN — ENOXAPARIN SODIUM 40 MG: 100 INJECTION SUBCUTANEOUS at 10:09

## 2022-01-20 RX ADMIN — SODIUM CHLORIDE, PRESERVATIVE FREE 10 ML: 5 INJECTION INTRAVENOUS at 10:08

## 2022-01-20 RX ADMIN — PANTOPRAZOLE SODIUM 40 MG: 40 TABLET, DELAYED RELEASE ORAL at 13:04

## 2022-01-20 RX ADMIN — CYANOCOBALAMIN TAB 1000 MCG 1000 MCG: 1000 TAB at 10:09

## 2022-01-20 RX ADMIN — VANCOMYCIN HYDROCHLORIDE 1000 MG: 1 INJECTION, POWDER, LYOPHILIZED, FOR SOLUTION INTRAVENOUS at 17:19

## 2022-01-20 RX ADMIN — LISINOPRIL 40 MG: 40 TABLET ORAL at 10:09

## 2022-01-20 RX ADMIN — FERROUS SULFATE TAB 325 MG (65 MG ELEMENTAL FE) 325 MG: 325 (65 FE) TAB at 13:04

## 2022-01-20 ASSESSMENT — PAIN SCALES - GENERAL
PAINLEVEL_OUTOF10: 3
PAINLEVEL_OUTOF10: 0

## 2022-01-20 NOTE — PROGRESS NOTES
Hospitalist Progress Note      Name:  South Michele /Age/Sex: 1933  (80 y.o. female)   MRN & CSN:  [id number] & [id number] Admission Date/Time: 2022  6:54 PM   Location:  [address] [place] [address] PCP: Odilon Larson MD         Hospital Day: 4    Assessment and Plan:   South Michele is a 80 y.o.  female  who presents with TIA (transient ischemic attack)    TIA vs CVA  Ct head () WNL   CTA head/neck () severe atherosclerotic calcified plaque at the level of the right cervical ICA with severe high grade stenosis greater than 80%   Cardiothoracic surgery consulted~rec's for outpatient right endarterectomy   MR Brain () no acute abnormality, no acute infarct. Minimal global parenchymal volume loss with mild chronic microvascular ischemic changes. Echo () shows an EF of 55-60% with mild aortic stenosis, mitral annular calcification, mild to moderate tricuspid regurgitation and a negative bubble study. Consult Pt/Ot & speech~rec's for [address] [facility]~signed off ~follow outpatient     2. Metabolic encephalopathy~resolved        Bacteremia~staphylococcus hominis        1. Ct as above       2. MRI as above       3. Neurology~follow up outpatient~signed off         4. Labs WNL~ no elevated WBC        5. Blood culture pos 2 of 2 sets       6. Urine CX neg         7. Vancomycin started on ~pharmacy to dose      3. Elevated Troponin        1. Troponin () 0.030, 0.063 & 0.060        2. Consult cardiology~rec's appreciated     4. Essential Hypertension       1. Continue norvasc and lisinopril        2. Monitor     5. Hyperlipidemia        1. Lipid WNL        2. Continue statin therapy     6. GERD       1.  Continue protonix     Diet ADULT DIET; Easy to Chew   DVT Prophylaxis [x] Lovenox, []  Heparin, [] SCDs, [] Ambulation   GI Prophylaxis [] PPI,  [] H2 Blocker,  [] Carafate,  [x] Diet/Tube Feeds   Code Status Full Code   Disposition Patient requires continued admission due to ongoing work up          History of Present Illness:     Chief Complaint: TIA (transient ischemic attack)  Kallie Odonnell is a 80 y.o.  female  who presents with complaints of dizziness and not feeling well. Pt states that this has happened to her previously and her grand daughter called ems due to her having dizzy spells and going back to bed. Pt states that she doesn't remember her grand daughter calling EMS to come get her. Pt denies any weakness at this time or dizziness. Pt denies any nausea or vomiting. Pt denies any other concerns. Pt denies any concerns today and asks to have the endarterectomy completed while admitted. Pt educated that she has an infection in her blood that needs to be cleared up prior to having the surgery done. Pt verbalized an understanding and denies any needs for today. Ten point ROS reviewed negative, unless as noted above    Objective:     No intake or output data in the 24 hours ending 01/20/22 1205   Vitals:   Vitals:    01/20/22 1146   BP: (!) 143/46   Pulse: 57   Resp: 15   Temp: 97.5 °F (36.4 °C)   SpO2:      Physical Exam:   GEN Awake female, sitting upright in bed in no apparent distress. Appears given age. EYES Pupils are equally round. No scleral erythema, discharge, or conjunctivitis. HENT Mucous membranes are moist. Oral pharynx without exudates, no evidence of thrush. NECK Supple, no apparent thyromegaly or masses. RESP Clear to auscultation, no wheezes, rales or rhonchi. Symmetric chest movement while on room air. CARDIO/VASC S1/S2 auscultated. Regular rate without appreciable murmurs, rubs, or gallops. No JVD or carotid bruits. Peripheral pulses equal bilaterally and palpable. No peripheral edema. GI Abdomen is soft without significant tenderness, masses, or guarding. Bowel sounds are normoactive. Rectal exam deferred.  No costovertebral angle tenderness. Swift catheter is not present.   HEME/LYMPH No palpable cervical lymphadenopathy and no hepatosplenomegaly. No petechiae or ecchymoses. MSK No gross joint deformities. SKIN Normal coloration, warm, dry. NEURO Cranial nerves appear grossly intact, normal speech, no lateralizing weakness. PSYCH Awake, alert, oriented x 4. Affect appropriate. Medications:   Medications:    vancomycin  1,000 mg IntraVENous Q24H    ferrous sulfate  325 mg Oral Daily with breakfast    cetirizine  10 mg Oral Daily    lisinopril  40 mg Oral Daily    amLODIPine  10 mg Oral Daily    pantoprazole  40 mg Oral QAM AC    triamterene-hydroCHLOROthiazide  2 tablet Oral Daily    cyanocobalamin  1,000 mcg Oral Daily    pregabalin  150 mg Oral BID    atorvastatin  40 mg Oral Daily    enoxaparin  40 mg SubCUTAneous Daily    aspirin  81 mg Oral Daily    Or    aspirin  300 mg Rectal Daily    sodium chloride flush  5-40 mL IntraVENous BID      Infusions:   PRN Meds: ondansetron, 4 mg, Q8H PRN   Or  ondansetron, 4 mg, Q6H PRN  polyethylene glycol, 17 g, Daily PRN  labetalol, 10 mg, Q10 Min PRN          Patient is still admitted because ongoing neuro work up. The anticipated discharge is in greater than 24 hours.      Electronically signed by ARABELLA Rolle CNP on 1/20/2022 at 12:05 PM

## 2022-01-20 NOTE — PROGRESS NOTES
4 Eyes Skin Assessment     NAME:  Carol Xiong  YOB: 1933  MEDICAL RECORD NUMBER:  [id number]    The patient is being assess for  Admission    I agree that 2 RN's have performed a thorough Head to Toe Skin Assessment on the patient. ALL assessment sites listed below have been assessed. Areas assessed by both nurses:    Head, Face, Ears, Shoulders, Back, Chest, Arms, Elbows, Hands, Sacrum. Buttock, Coccyx, Ischium and Legs. Feet and Heels        Does the Patient have a Wound?  No noted wound(s)       Daron Prevention initiated:  No   Wound Care Orders initiated:  No    Pressure Injury (Stage 3,4, Unstageable, DTI, NWPT, and Complex wounds) if present place consult order under [de-identified] NA    New and Established Ostomies if present place consult order under : NA      Nurse 1 eSignature: Electronically signed by Bridger Burns RN on 1/20/22 at 1:53 AM EST    **SHARE this note so that the co-signing nurse is able to place an eSignature**    Nurse 2 eSignature: Electronically signed by Rama Anguiano RN on 1/20/22 at 1:58 AM EST

## 2022-01-21 PROBLEM — R00.1 BRADYCARDIA: Status: ACTIVE | Noted: 2022-01-21

## 2022-01-21 LAB
ALBUMIN SERPL-MCNC: 3.2 GM/DL (ref 3.4–5)
ALP BLD-CCNC: 82 IU/L (ref 40–128)
ALT SERPL-CCNC: 8 U/L (ref 10–40)
ANION GAP SERPL CALCULATED.3IONS-SCNC: 9 MMOL/L (ref 4–16)
AST SERPL-CCNC: 13 IU/L (ref 15–37)
BASOPHILS ABSOLUTE: 0 K/CU MM
BASOPHILS RELATIVE PERCENT: 0.5 % (ref 0–1)
BILIRUB SERPL-MCNC: 0.3 MG/DL (ref 0–1)
BUN BLDV-MCNC: 11 MG/DL (ref 6–23)
CALCIUM SERPL-MCNC: 8.5 MG/DL (ref 8.3–10.6)
CHLORIDE BLD-SCNC: 104 MMOL/L (ref 99–110)
CO2: 25 MMOL/L (ref 21–32)
CREAT SERPL-MCNC: 0.9 MG/DL (ref 0.6–1.1)
DIFFERENTIAL TYPE: ABNORMAL
DOSE AMOUNT: NORMAL
DOSE TIME: NORMAL
EOSINOPHILS ABSOLUTE: 0.4 K/CU MM
EOSINOPHILS RELATIVE PERCENT: 6.4 % (ref 0–3)
GFR AFRICAN AMERICAN: >60 ML/MIN/1.73M2
GFR NON-AFRICAN AMERICAN: 59 ML/MIN/1.73M2
GLUCOSE BLD-MCNC: 72 MG/DL (ref 70–99)
HCT VFR BLD CALC: 32.1 % (ref 37–47)
HEMOGLOBIN: 10.2 GM/DL (ref 12.5–16)
IMMATURE NEUTROPHIL %: 0.3 % (ref 0–0.43)
LYMPHOCYTES ABSOLUTE: 1.9 K/CU MM
LYMPHOCYTES RELATIVE PERCENT: 32.8 % (ref 24–44)
MAGNESIUM: 1.5 MG/DL (ref 1.8–2.4)
MCH RBC QN AUTO: 29.6 PG (ref 27–31)
MCHC RBC AUTO-ENTMCNC: 31.8 % (ref 32–36)
MCV RBC AUTO: 93 FL (ref 78–100)
MONOCYTES ABSOLUTE: 0.5 K/CU MM
MONOCYTES RELATIVE PERCENT: 9.1 % (ref 0–4)
NUCLEATED RBC %: 0 %
PDW BLD-RTO: 12.6 % (ref 11.7–14.9)
PHOSPHORUS: 3 MG/DL (ref 2.5–4.9)
PLATELET # BLD: 181 K/CU MM (ref 140–440)
PMV BLD AUTO: 12.7 FL (ref 7.5–11.1)
POTASSIUM SERPL-SCNC: 4.1 MMOL/L (ref 3.5–5.1)
RBC # BLD: 3.45 M/CU MM (ref 4.2–5.4)
SEGMENTED NEUTROPHILS ABSOLUTE COUNT: 3 K/CU MM
SEGMENTED NEUTROPHILS RELATIVE PERCENT: 50.9 % (ref 36–66)
SODIUM BLD-SCNC: 138 MMOL/L (ref 135–145)
TOTAL IMMATURE NEUTOROPHIL: 0.02 K/CU MM
TOTAL NUCLEATED RBC: 0 K/CU MM
TOTAL PROTEIN: 5.1 GM/DL (ref 6.4–8.2)
VANCOMYCIN RANDOM: 10.8 UG/ML
WBC # BLD: 5.8 K/CU MM (ref 4–10.5)

## 2022-01-21 PROCEDURE — 2580000003 HC RX 258: Performed by: FAMILY MEDICINE

## 2022-01-21 PROCEDURE — 1200000000 HC SEMI PRIVATE

## 2022-01-21 PROCEDURE — 94761 N-INVAS EAR/PLS OXIMETRY MLT: CPT

## 2022-01-21 PROCEDURE — 2580000003 HC RX 258: Performed by: STUDENT IN AN ORGANIZED HEALTH CARE EDUCATION/TRAINING PROGRAM

## 2022-01-21 PROCEDURE — 83735 ASSAY OF MAGNESIUM: CPT

## 2022-01-21 PROCEDURE — 80053 COMPREHEN METABOLIC PANEL: CPT

## 2022-01-21 PROCEDURE — 36415 COLL VENOUS BLD VENIPUNCTURE: CPT

## 2022-01-21 PROCEDURE — 6360000002 HC RX W HCPCS: Performed by: FAMILY MEDICINE

## 2022-01-21 PROCEDURE — 99223 1ST HOSP IP/OBS HIGH 75: CPT | Performed by: INTERNAL MEDICINE

## 2022-01-21 PROCEDURE — 99222 1ST HOSP IP/OBS MODERATE 55: CPT | Performed by: INTERNAL MEDICINE

## 2022-01-21 PROCEDURE — 6370000000 HC RX 637 (ALT 250 FOR IP): Performed by: STUDENT IN AN ORGANIZED HEALTH CARE EDUCATION/TRAINING PROGRAM

## 2022-01-21 PROCEDURE — APPSS60 APP SPLIT SHARED TIME 46-60 MINUTES: Performed by: NURSE PRACTITIONER

## 2022-01-21 PROCEDURE — 6360000002 HC RX W HCPCS: Performed by: STUDENT IN AN ORGANIZED HEALTH CARE EDUCATION/TRAINING PROGRAM

## 2022-01-21 PROCEDURE — 84100 ASSAY OF PHOSPHORUS: CPT

## 2022-01-21 PROCEDURE — 87040 BLOOD CULTURE FOR BACTERIA: CPT

## 2022-01-21 PROCEDURE — 80202 ASSAY OF VANCOMYCIN: CPT

## 2022-01-21 PROCEDURE — 85025 COMPLETE CBC W/AUTO DIFF WBC: CPT

## 2022-01-21 RX ADMIN — PREGABALIN 150 MG: 75 CAPSULE ORAL at 23:16

## 2022-01-21 RX ADMIN — PREGABALIN 150 MG: 75 CAPSULE ORAL at 09:35

## 2022-01-21 RX ADMIN — VANCOMYCIN HYDROCHLORIDE 1000 MG: 1 INJECTION, POWDER, LYOPHILIZED, FOR SOLUTION INTRAVENOUS at 18:53

## 2022-01-21 RX ADMIN — ASPIRIN 81 MG: 81 TABLET, COATED ORAL at 09:36

## 2022-01-21 RX ADMIN — PANTOPRAZOLE SODIUM 40 MG: 40 TABLET, DELAYED RELEASE ORAL at 09:36

## 2022-01-21 RX ADMIN — FERROUS SULFATE TAB 325 MG (65 MG ELEMENTAL FE) 325 MG: 325 (65 FE) TAB at 09:37

## 2022-01-21 RX ADMIN — ENOXAPARIN SODIUM 40 MG: 100 INJECTION SUBCUTANEOUS at 09:36

## 2022-01-21 RX ADMIN — AMLODIPINE BESYLATE 10 MG: 10 TABLET ORAL at 09:37

## 2022-01-21 RX ADMIN — CETIRIZINE HYDROCHLORIDE 10 MG: 10 TABLET, FILM COATED ORAL at 09:36

## 2022-01-21 RX ADMIN — ATORVASTATIN CALCIUM 40 MG: 40 TABLET, FILM COATED ORAL at 09:36

## 2022-01-21 RX ADMIN — SODIUM CHLORIDE, PRESERVATIVE FREE 10 ML: 5 INJECTION INTRAVENOUS at 23:16

## 2022-01-21 RX ADMIN — CYANOCOBALAMIN TAB 1000 MCG 1000 MCG: 1000 TAB at 09:36

## 2022-01-21 ASSESSMENT — ENCOUNTER SYMPTOMS
CHEST TIGHTNESS: 0
VOMITING: 0
ABDOMINAL PAIN: 0
DIARRHEA: 0
SHORTNESS OF BREATH: 0

## 2022-01-21 ASSESSMENT — PAIN SCALES - GENERAL
PAINLEVEL_OUTOF10: 0

## 2022-01-21 NOTE — PROGRESS NOTES
Hospitalist Progress Note      Name:  South Michele /Age/Sex: 1933  (80 y.o. female)   MRN & CSN:  [id number] & [id number] Admission Date/Time: 2022  6:54 PM   Location:  [facility] PCP: Mayela Maharaj MD         Hospital Day: 5    Assessment and Plan:   South Michele is a 80 y.o.  female  who presents with TIA (transient ischemic attack)    TIA vs CVA  Ct head () WNL   CTA head/neck () severe atherosclerotic calcified plaque at the level of the right cervical ICA with severe high grade stenosis greater than 80%   Cardiothoracic surgery consulted~rec's for outpatient right endarterectomy   MR Brain () no acute abnormality, no acute infarct. Minimal global parenchymal volume loss with mild chronic microvascular ischemic changes. Echo () shows an EF of 55-60% with mild aortic stenosis, mitral annular calcification, mild to moderate tricuspid regurgitation and a negative bubble study. Consult Pt/Ot & speech~rec's for [address] [facility]~signed off ~follow outpatient     2. Metabolic encephalopathy~resolved        Bacteremia~staphylococcus hominis        1. Ct as above       2. MRI as above       3. Neurology~follow up outpatient~signed off         4. Labs WNL~ no elevated WBC        5. Blood culture pos 2 of 2 sets       6. Urine CX neg         7. Vancomycin started on ~pharmacy to dose       8. Consult ID~rec's appreciated       3. Elevated Troponin        Bradycardia         1. Troponin () 0.030, 0.063 & 0.060        2. Has been bradycardic~43-70~usually in the 40's and 50's         2. Consult cardiology~rec's appreciated     4. Essential Hypertension       1. Continue norvasc and lisinopril        2. Monitor     5. Hyperlipidemia        1. Lipid WNL        2. Continue statin therapy     6. GERD       1.  Continue protonix     Diet ADULT DIET; Easy to Chew   DVT Prophylaxis [x] Lovenox, []  Heparin, [] SCDs, [] Ambulation   GI Prophylaxis [] PPI,  [] H2 Blocker,  [] Carafate,  [x] Diet/Tube Feeds   Code Status Full Code   Disposition Patient requires continued admission due to ongoing work up          History of Present Illness:     Chief Complaint: TIA (transient ischemic attack)  Haja Arguello is a 80 y.o.  female  who presents with complaints of dizziness and not feeling well. Pt states that this has happened to her previously and her grand daughter called ems due to her having dizzy spells and going back to bed. Pt states that she doesn't remember her grand daughter calling EMS to come get her. Pt denies any weakness at this time or dizziness. Pt denies any nausea or vomiting. Pt denies any other concerns. Pt denies any concerns today and asks to have the endarterectomy completed while admitted. Pt educated that she has an infection in her blood that needs to be cleared up prior to having the surgery done. Pt verbalized an understanding and denies any needs for today. Ten point ROS reviewed negative, unless as noted above    Objective: Intake/Output Summary (Last 24 hours) at 1/21/2022 1015  Last data filed at 1/20/2022 2055  Gross per 24 hour   Intake 10 ml   Output --   Net 10 ml      Vitals:   Vitals:    01/21/22 0930   BP:    Pulse: (!) 43   Resp:    Temp: 97.6 °F (36.4 °C)   SpO2: 98%     Physical Exam:   GEN Awake female, sitting upright in bed in no apparent distress. Appears given age. EYES Pupils are equally round. No scleral erythema, discharge, or conjunctivitis. HENT Mucous membranes are moist. Oral pharynx without exudates, no evidence of thrush. NECK Supple, no apparent thyromegaly or masses. RESP Clear to auscultation, no wheezes, rales or rhonchi. Symmetric chest movement while on room air. CARDIO/VASC S1/S2 auscultated. Regular rate without appreciable murmurs, rubs, or gallops. No JVD or carotid bruits. Peripheral pulses equal bilaterally and palpable. No peripheral edema.   GI Abdomen is soft without significant tenderness, masses, or guarding. Bowel sounds are normoactive. Rectal exam deferred.  No costovertebral angle tenderness. Swift catheter is not present. HEME/LYMPH No palpable cervical lymphadenopathy and no hepatosplenomegaly. No petechiae or ecchymoses. MSK No gross joint deformities. SKIN Normal coloration, warm, dry. NEURO Cranial nerves appear grossly intact, normal speech, no lateralizing weakness. PSYCH Awake, alert, oriented x 4. Affect appropriate. Medications:   Medications:    vancomycin  1,000 mg IntraVENous Q24H    ferrous sulfate  325 mg Oral Daily with breakfast    cetirizine  10 mg Oral Daily    lisinopril  40 mg Oral Daily    amLODIPine  10 mg Oral Daily    pantoprazole  40 mg Oral QAM AC    triamterene-hydroCHLOROthiazide  2 tablet Oral Daily    cyanocobalamin  1,000 mcg Oral Daily    pregabalin  150 mg Oral BID    atorvastatin  40 mg Oral Daily    enoxaparin  40 mg SubCUTAneous Daily    aspirin  81 mg Oral Daily    Or    aspirin  300 mg Rectal Daily    sodium chloride flush  5-40 mL IntraVENous BID      Infusions:   PRN Meds: acetaminophen, 650 mg, Q6H PRN  ondansetron, 4 mg, Q8H PRN   Or  ondansetron, 4 mg, Q6H PRN  polyethylene glycol, 17 g, Daily PRN  labetalol, 10 mg, Q10 Min PRN          Patient is still admitted because ongoing neuro work up. The anticipated discharge is in greater than 24 hours.      Electronically signed by ARABELLA Salcedo CNP on 1/21/2022 at 10:15 AM

## 2022-01-21 NOTE — CONSULTS
Consult completed. Nexiva 22g 1.75 inch catheter inserted via ultrasound in patient's RFA. Brisk blood return noted and catheter flushes with ease. Patient tolerated well. Consult IV/PICC team if patient's needs change.

## 2022-01-21 NOTE — CONSULTS
Infectious Disease Consult Note  2022   Patient Name: Brennon Barnhart : 1933   Impression  Staphylococcus hominis in Blood Cultures, Possible Contamination:  Afebrile, no leukocytosis  -Blood cultures  Staphylococcus hominis   -Blood cultures pending    Acute Encephalopathy: Resolved    HTN    Allergy to PCN    Bradycardia    GERD    Multi-morbidity: per PMHx:  GERD, HLD, HTN, neuropathy, allergy to PCN, hyster, appey    Plan:  Continue IV vancomycin per pharmacy dosing  Await repeat blood cultures from     Thank you for allowing me to consult in the care of this patient.  ------------------------  REASON FOR CONSULT: Infective syndrome     Requested by: ARABELLA Ballard    HPI:Patient is a 80 y.o.  female who was admitted 2022 for further evaluation and management of sudden onset of aphasia and confusion. She presented to the ED with severe encephalopathy then was able to answer questions but remained in a state of confusion. The confusion has resolved, she was able to discuss her history, but did not have much memory of this present illness. She has no implants, joint replacements, or pacemaker placements. Infectious diseases service was consulted to evaluate the pt, and recommend further investigative and therapeutic measures. ROS: Other systems reviewed Including eyes, ENT, respiratory, cardiovascular, GI, , dermatologic, neurologic, psych, hem/lymphatic, musculoskeletal and endocrine were negative other than what is mentioned above.      Patient Active Problem List    Diagnosis Date Noted    Altered mental status     TIA (transient ischemic attack) 2022    Anemia 2021    Essential hypertension 10/13/2020    Mixed hyperlipidemia 10/13/2020    Gastroesophageal reflux disease without esophagitis 10/13/2020    Dorsalgia 10/13/2020    Neuropathy 10/13/2020    Allergic rhinitis 10/13/2020     Past Medical History:   Diagnosis Date    GERD (gastroesophageal reflux disease)     H/O echocardiogram 04/06/2017    EF 55% Left atrial enlargement. Normal LV systolic. Minimal aoritc stenosis.  H/O echocardiogram 02/04/2020    EF 55-60%, Grade I DD, Mild AS. Mildly dilated left atrium.  Hyperlipidemia     Hypertension     Neuropathy       Past Surgical History:   Procedure Laterality Date    APPENDECTOMY      COLON SURGERY      removed polops    HYSTERECTOMY, TOTAL ABDOMINAL      SPINE SURGERY  2013    scrape calcium off spine, pressing on spine/nerves      Family History   Problem Relation Age of Onset    Hypertension Mother     Hypertension Father       Infectious disease related family history - not contibutory. SOCIAL HISTORY  Social History     Tobacco Use    Smoking status: Never Smoker    Smokeless tobacco: Never Used   Substance Use Topics    Alcohol use: Not on file      Born:  Lived  Occupation:  No recent travel of significance. No recent unusual exposures. NO pets     ALLERGIES  Allergies   Allergen Reactions    Penicillins       MEDICATIONS  Reviewed and are per the chart/EMR. Antibiotics:  Present:  Vancomycin 1/19-    Past:     -------------------------------------------------------------------------------------------------------------------    Vital Signs:  Vitals:    01/21/22 0930   BP:    Pulse: (!) 43   Resp:    Temp: 97.6 °F (36.4 °C)   SpO2: 98%         Exam:    VS: noted; wt 158 lb (71.7 kg) Height 5'9\"  Gen: alert and oriented X3, no distress  Skin: no stigmata of endocarditis  HEMT: AT/NC Oropharynx pink, moist, and without lesions or exudates; dentition in good state of repair  Eyes: PERRLA, EOMI, conjunctiva pink, sclera anicteric. Neck: Supple. Trachea midline. No LAD. Chest: no distress and CTA. Good air movement. Room air. Heart: Sinus Bradycardia and no MRG. Abd: soft, non-distended, no tenderness, no hepatomegaly. Normoactive bowel sounds.   Ext: no clubbing, cyanosis, or edema  Neuro: Mental status intact. CN 2-12 intact and no focal sensory or motor deficits     Diagnostic Studies: reviewed  1/17/22 XR Chest Portable:  Impression   1. No acute cardiopulmonary process identified. 1/17/22 CT Head WO Contrast:  Impression   No acute intracranial abnormality.       Mild cerebral atrophy. 1/18/22 CTA Head Neck W Contrast:  Impression   Severe atherosclerotic calcified plaque at the level of the right carotid   bulb and extending into the origin of the right cervical ICA with severe   high-grade stenosis greater than 80%. 1/18/22 MRI Brain WO Contrast:  Impression   1. No acute intracranial abnormality.  No acute infarct. 2. Minimal global parenchymal volume loss with mild chronic microvascular   ischemic changes. I have examined this patient and available medical records on this date and have made the above observations, conclusions and recommendations. Electronically signed by: Electronically signed by ARABELLA Barnett CNP on 1/21/2022 at 11:44 AM

## 2022-01-21 NOTE — CONSULTS
[address], [address] [facility]  Phone: [phone]    Fax [phone]                  Gina Riggs MD, Haydee Brennan MD, Marya Horne MD, MD Ezequiel Carrizales MD Kathyrn Eke, MD Hilarie Brackett, MD Iris Clan, APRN      Reymundo Roach, APRN  Flora Sherman, ARABELLA Mendoza, ARABELLA Montez PA-C    CARDIOLOGY CONSULT NOTE     Reason for consultation: Bradycardia    Referring physician:  Bella Victoria DO     Primary care physician: Tierra Willingham MD      Thank you for the consult. Chief Complaints :  Chief Complaint   Patient presents with    Aphasia     last known well of last night        History of present illness:[place] is a 80 y. o.year old female with a past medical history of hypertension and hyperlipidemia. Admitted to hospital for concerns of TIA/stroke. Patient is currently feeling well overall. Cardiology was referred because of bradycardia. Patient has been noted to have a heart rate in the 40s to 50s during her entire hospitalization. Patient does not know what her heart rate has usually been while at home. She does endorse feeling fatigued, lightheaded, lower extremity edema and shortness of breath for some time now. She is laying comfortably in bed, but does have dyspnea on exertion. She is currently not taking any medications that would lower her heart rate. Past medical history:    has a past medical history of GERD (gastroesophageal reflux disease), H/O echocardiogram, H/O echocardiogram, Hyperlipidemia, Hypertension, and Neuropathy. Past surgical history:   has a past surgical history that includes Spine surgery (2013); Colon surgery; Hysterectomy, total abdominal; and Appendectomy. Social History:   reports that she has never smoked.  She has never used smokeless tobacco.  Family history:   no family history of CAD, STROKE of DM at early age    Allergies Allergen Reactions    Penicillins        acetaminophen (TYLENOL) tablet 650 mg, Q6H PRN  vancomycin 1000 mg IVPB in 250 mL NS addavial, Q24H  ferrous sulfate (IRON 325) tablet 325 mg, Daily with breakfast  cetirizine (ZYRTEC) tablet 10 mg, Daily  lisinopril (PRINIVIL;ZESTRIL) tablet 40 mg, Daily  amLODIPine (NORVASC) tablet 10 mg, Daily  pantoprazole (PROTONIX) tablet 40 mg, QAM AC  triamterene-hydroCHLOROthiazide (MAXZIDE-25) 37.5-25 MG per tablet 2 tablet, Daily  vitamin B-12 (CYANOCOBALAMIN) tablet 1,000 mcg, Daily  pregabalin (LYRICA) capsule 150 mg, BID  atorvastatin (LIPITOR) tablet 40 mg, Daily  ondansetron (ZOFRAN-ODT) disintegrating tablet 4 mg, Q8H PRN   Or  ondansetron (ZOFRAN) injection 4 mg, Q6H PRN  polyethylene glycol (GLYCOLAX) packet 17 g, Daily PRN  enoxaparin (LOVENOX) injection 40 mg, Daily  aspirin EC tablet 81 mg, Daily   Or  aspirin suppository 300 mg, Daily  labetalol (NORMODYNE;TRANDATE) injection 10 mg, Q10 Min PRN  sodium chloride flush 0.9 % injection 5-40 mL, BID      Current Facility-Administered Medications   Medication Dose Route Frequency Provider Last Rate Last Admin    acetaminophen (TYLENOL) tablet 650 mg  650 mg Oral Q6H PRN Radha Walter, APRN - NP   650 mg at 01/20/22 2144    vancomycin 1000 mg IVPB in 250 mL NS addavial  1,000 mg IntraVENous Q24H France Alberts, APRN - CNP   Paused at 01/20/22 1847    ferrous sulfate (IRON 325) tablet 325 mg  325 mg Oral Daily with breakfast [employer], DO   325 mg at 01/21/22 5996    cetirizine (ZYRTEC) tablet 10 mg  10 mg Oral Daily [employer], DO   10 mg at 01/21/22 0936    lisinopril (PRINIVIL;ZESTRIL) tablet 40 mg  40 mg Oral Daily [employer], DO   40 mg at 01/20/22 1009    amLODIPine (NORVASC) tablet 10 mg  10 mg Oral Daily [employer], DO   10 mg at 01/21/22 3758    pantoprazole (PROTONIX) tablet 40 mg  40 mg Oral QAM REGINALDO Patel,    40 mg at 01/21/22 0936    triamterene-hydroCHLOROthiazide (MAXZIDE-25) 37.5-25 MG per tablet 2 tablet  2 tablet Oral Daily Corita Fiddler, DO   2 tablet at 01/20/22 1009    vitamin B-12 (CYANOCOBALAMIN) tablet 1,000 mcg  1,000 mcg Oral Daily Corita Fiddler, DO   1,000 mcg at 01/21/22 0936    pregabalin (LYRICA) capsule 150 mg  150 mg Oral BID Corita Fiddler, DO   150 mg at 01/21/22 0935    atorvastatin (LIPITOR) tablet 40 mg  40 mg Oral Daily Corita Fiddler, DO   40 mg at 01/21/22 0936    ondansetron (ZOFRAN-ODT) disintegrating tablet 4 mg  4 mg Oral Q8H PRN Corita Fiddler, DO        Or    ondansetron (ZOFRAN) injection 4 mg  4 mg IntraVENous Q6H PRN Corita Fiddler, DO        polyethylene glycol (GLYCOLAX) packet 17 g  17 g Oral Daily PRN Corita Fiddler, DO        enoxaparin (LOVENOX) injection 40 mg  40 mg SubCUTAneous Daily Corita Fiddler, DO   40 mg at 01/21/22 0936    aspirin EC tablet 81 mg  81 mg Oral Daily Corita Fiddler, DO   81 mg at 01/21/22 3013    Or    aspirin suppository 300 mg  300 mg Rectal Daily Corita Fiddler, DO        labetalol (NORMODYNE;TRANDATE) injection 10 mg  10 mg IntraVENous Q10 Min PRN Corita Fiddler, DO        sodium chloride flush 0.9 % injection 5-40 mL  5-40 mL IntraVENous BID Corita Fiddler, DO   10 mL at 01/20/22 2055       Review of Systems   Constitutional: Negative for diaphoresis, fatigue and fever. Eyes: Negative for visual disturbance. Respiratory: Negative for chest tightness and shortness of breath. Cardiovascular: Negative for chest pain, palpitations and leg swelling. Gastrointestinal: Negative for abdominal pain, diarrhea and vomiting. Endocrine: Negative for cold intolerance and heat intolerance. Musculoskeletal: Negative for arthralgias. Skin: Negative for pallor and rash. Neurological: Negative for dizziness, syncope, light-headedness and headaches. Psychiatric/Behavioral: Negative for dysphoric mood. The patient is not nervous/anxious.             Physical Examination:    Vitals:    01/20/22 2000 01/21/22 0407 01/21/22 0930 01/21/22 1158   BP: (!) 134/50 (!) 156/55  (!) 136/44   Pulse: 69 54 (!) 43 (!) 49   Resp: 16 18  15   Temp: 97.7 °F (36.5 °C) 98.1 °F (36.7 °C) 97.6 °F (36.4 °C)    TempSrc: Oral Oral Oral    SpO2: 98% 97% 98%    Weight:       Height:           General Appearance:  No distress, conversant  Constitutional:  Well developed, Well nourished, No acute distress, Non-toxic appearance. HENT:  Normocephalic, Atraumatic, Bilateral external ears normal, Oropharynx moist  Neck- trachea is midline    Eyes:  Pupils equal and round, Conjunctiva normal, No discharge. Respiratory:  Normal breathing sounds, No respiratory distress, No wheezing, No chest tenderness, no use of accessory muscles  Cardiovascular: (PMI): Sinus arrhythmia with bradycardia, S1 and S2 audible, no murmur appreciated, JVD not noted,  apex non displaced, no lifts, no thrills,   Abdomen /GI: Soft, No tenderness, No masses, No gross visceromegaly    Musculoskeletal: No edema, no tenderness, no deformities. Integument: Likely stasis dermatitis noted on bilateral lower extremities. Well hydrated. Capillary refill <2 seconds. Skin turgor brisk  Lymphatic:  No lymphadenopathy noted   Neurologic:  Alert & oriented x 3, can move all four appendages separately  Psych: Mood pleasant, Judgement intact, Decision making intact    Medical decision making and Data review:    Lab Review   Recent Labs     01/21/22  1034   WBC 5.8   HGB 10.2*   HCT 32.1*         Recent Labs     01/21/22  1034      K 4.1      CO2 25   PHOS 3.0   BUN 11   CREATININE 0.9     Recent Labs     01/21/22  1034   AST 13*   ALT 8*   BILITOT 0.3   ALKPHOS 82     No results for input(s): TROPONINT in the last 72 hours. No results for input(s): PROBNP in the last 72 hours. No results found for: INR, PROTIME    EKG: Reviewed by me    ECHO: 1/18/2022   Left ventricular systolic function is normal.   Ejection fraction is visually estimated at 55-60%.    Mild aortic stenosis is present; Mean PG 9 mmHg. Mitral annular calcification is present. Mild to moderate tricuspid regurgitation; RVSP: 39 mmHg. No evidence of any pericardial effusion. Bubble study was negative. Chest Xray: On 1/17/2022  No acute abnormalities    CT Head WO Contrast    Result Date: 1/19/2022  EXAMINATION: CT OF THE HEAD WITHOUT CONTRAST  1/17/2022 7:59 pm TECHNIQUE: CT of the head was performed without the administration of intravenous contrast. Dose modulation, iterative reconstruction, and/or weight based adjustment of the mA/kV was utilized to reduce the radiation dose to as low as reasonably achievable. COMPARISON: None. HISTORY: ORDERING SYSTEM PROVIDED HISTORY: altered mental status TECHNOLOGIST PROVIDED HISTORY: Reason for exam:->altered mental status Has a \"code stroke\" or \"stroke alert\" been called? ->No Decision Support Exception - unselect if not a suspected or confirmed emergency medical condition->Emergency Medical Condition (MA) Reason for Exam: altered mental status Initial evaluation FINDINGS: BRAIN/VENTRICLES:  The ventricles and cisternal spaces are prominent consistent with cerebral atrophy. There is atherosclerotic calcification of the cavernous carotids. No areas of abnormal attenuation are identified in the brain parenchyma. There is no midline shift or mass effect. ORBITS: The visualized portion of the orbits demonstrate no acute abnormality. SINUSES:  The visualized paranasal sinuses and mastoid air cells are for the most part clear. SOFT TISSUES/SKULL:  No acute abnormality of the visualized skull or soft tissues. No acute intracranial abnormality. Mild cerebral atrophy. CTA HEAD NECK W CONTRAST    Result Date: 1/18/2022  EXAMINATION: CTA OF THE HEAD AND NECK WITH CONTRAST 1/18/2022 3:29 am: TECHNIQUE: CTA of the head and neck was performed with the administration of intravenous contrast. Multiplanar reformatted images are provided for review.   MIP images are provided for review. Stenosis of the internal carotid arteries measured using NASCET criteria. Dose modulation, iterative reconstruction, and/or weight based adjustment of the mA/kV was utilized to reduce the radiation dose to as low as reasonably achievable. COMPARISON: None. HISTORY: ORDERING SYSTEM PROVIDED HISTORY: tia vs cva TECHNOLOGIST PROVIDED HISTORY: Reason for exam:->tia vs cva Reason for Exam: tia vs cva FINDINGS: CTA NECK: AORTIC ARCH/ARCH VESSELS: No dissection or arterial injury. No significant stenosis of the brachiocephalic or subclavian arteries. CAROTID ARTERIES: Severe atherosclerotic calcified plaque is noted at the level of the right carotid bulb and extending into the origin of the right cervical ICA with severe high-grade stenosis greater than 80%. The left carotid artery demonstrates no dissection, arterial injury, or hemodynamically significant stenosis by NASCET criteria. VERTEBRAL ARTERIES: No dissection, arterial injury, or significant stenosis. SOFT TISSUES: The lung apices are clear. No cervical or superior mediastinal lymphadenopathy. The larynx and pharynx are unremarkable. No acute abnormality of the salivary glands. 8 mm hypodense nodule in the right lobe of the thyroid gland, no further follow-up recommended. BONES: No acute osseous abnormality. CTA HEAD: ANTERIOR CIRCULATION: No significant stenosis of the intracranial internal carotid, anterior cerebral, or middle cerebral arteries. No aneurysm. POSTERIOR CIRCULATION: No significant stenosis of the vertebral, basilar, or posterior cerebral arteries. No aneurysm. OTHER: No dural venous sinus thrombosis on this non-dedicated study. BRAIN: No mass effect or midline shift. No extra-axial fluid collection. The gray-white differentiation is maintained.      Severe atherosclerotic calcified plaque at the level of the right carotid bulb and extending into the origin of the right cervical ICA with severe high-grade stenosis greater than 80%. MRI brain without contrast    Result Date: 1/18/2022  EXAMINATION: MRI OF THE BRAIN WITHOUT CONTRAST  1/18/2022 10:24 am TECHNIQUE: Multiplanar multisequence MRI of the brain was performed without the administration of intravenous contrast. COMPARISON: None. HISTORY: ORDERING SYSTEM PROVIDED HISTORY: tia TECHNOLOGIST PROVIDED HISTORY: Reason for exam:->tia Reason for Exam: aphasia tia Initial evaluation. FINDINGS: INTRACRANIAL STRUCTURES/VENTRICLES: There is no acute infarct. No mass effect or midline shift. No evidence of an acute intracranial hemorrhage. A few punctate foci of susceptibility are seen within the left occipital lobe, which may represent sequelae a prior microhemorrhage versus tiny cavernomas. Scattered foci of T2 FLAIR hyperintensity are seen in the periventricular and subcortical white matter, which are nonspecific, but may represent chronic microvascular ischemic change. There is minimal global parenchymal volume loss. Otherwise, the ventricles and sulci are normal in size and configuration. The sellar/suprasellar regions appear unremarkable. The normal signal voids within the major intracranial vessels appear maintained. ORBITS: The visualized portion of the orbits demonstrate no acute abnormality. SINUSES: The visualized paranasal sinuses and mastoid air cells demonstrate no acute abnormality. BONES/SOFT TISSUES: The bone marrow signal intensity appears normal. The soft tissues demonstrate no acute abnormality. 1. No acute intracranial abnormality. No acute infarct. 2. Minimal global parenchymal volume loss with mild chronic microvascular ischemic changes. All labs, medications and tests reviewed by myself including data  from outside source , patient and available family . Continue all other medications of all above medical condition listed as is.      Impression:  Principal Problem:    TIA (transient ischemic attack)  Resolved Problems:    * No resolved hospital problems. *      Assessment: 80 y. o.year old with   1. Bradycardia  2. Elevated troponin  3. TIA versus CVA  4. Hypertension  5. Hyperlipidemia    Plan and Recommendations:    1. Bradycardia  -Patient's heart rate has consistently been between 40s and 50s. -Patient does endorse occasional lightheadedness and dyspnea on exertion, unsure if related to bradycardia or deconditioning.  -Noted occasional junctional beats on telemetry.  -Not currently taking any medications to lower her heart rate  -Recommend exercise stress test  2. Elevated troponin   -Initial troponin of 0.03. Subsequent troponin of 0.063 followed by troponin of 0.060   -Patient is not endorsing any feelings of chest pain   -EKG reviewed. No signs of acute ischemic changes  3. TIA versus CVA   -Bubble study negative on echo 1/18/2022   -CTA head/neck noted severe atherosclerotic changes of right cervical ICA with stenosis greater than 80%   -MRI brain did not reveal acute ischemic changes.   -Cardiothoracic surgery will perform outpatient right endarterectomy  4. Hypertension  -stable. Last BP: 136/44  -Continue Norvasc and lisinopril  -Hold parameters SBP<90  5. Hyperlipidemia   -Continue statin      Thank you  much for consult and giving us the opportunity in contributing in the care of this patient. Please feel free to call me for any questions.        Jomar Penaloza PA-C, 1/21/2022 1:01 PM

## 2022-01-21 NOTE — ADT AUTH CERT
Utilization Reviews         Neurology [address] Day 4 (1/20/2022) by Loree Calixto RN       Review Status Review Entered   Completed 1/21/2022 15:07      Criteria Review      Care Day: 4 Care Date: 1/20/2022 Level of Care: Inpatient Floor    Guideline Day 3    Level Of Care    (X) * Activity level acceptable    ( ) * Complete discharge planning    Clinical Status    ( ) * No infection, or status acceptable    1/21/2022 3:07 PM EST by Kal Gibbons      blood cx x2 +    (X) * Isolation not needed, or status acceptable    (X) * Respiratory status acceptable    (X) * Pain and nausea absent or adequately managed    (X) * Ventilatory status acceptable    (X) * Neurologic problems absent or stabilized    (X) * Muscle or nerve damage absent or stable    ( ) * General Discharge Criteria met    Interventions    (X) * Intake acceptable    ( ) * No inpatient interventions needed    1/21/2022 3:07 PM EST by Matthias Ortiz iv    * Milestone   Additional Notes   DATE:    1/20      Pertinent Updates:   Blood cx x2 +         Vitals:    97.6 (36.4) 17 54 131/48 - Sitting - - 97 None (Room air)          Abnl/Pertinent Labs/Radiology/Diagnostic Studies:   Culture, Blood 2 [[id number]] (Abnormal) Collected: 01/17/22 2010      Specimen: Blood Updated: 01/20/22 0718     Specimen BLOOD     Special Requests 3 OF 4 CX POS     Culture Final Report     STAPHYLOCOCCUS HOMINIS POSITIVE for Refer to culture [id number] for sensitivity results Isolated two of two sets     Narrative:                 Physical Exam:   NEURO           Cranial nerves appear grossly intact, normal speech, no lateralizing weakness. MD Consults/Assessments & Plans:   IM note   Assessment and Plan:   [place] is a 80 y.o. [facility] TIA (transient ischemic attack)       1. TIA vs CVA   1. Ct head (01/17) WNL    2.  CTA head/neck (01/17) severe atherosclerotic calcified plaque at the level of the right cervical ICA with severe high grade stenosis greater than 80%    3. Cardiothoracic surgery consulted~rec's for outpatient right endarterectomy    4. MR Brain (01/18) no acute abnormality, no acute infarct. Minimal global parenchymal volume loss with mild chronic microvascular ischemic changes. 5. Echo (01/18) shows an EF of 55-60% with mild aortic stenosis, mitral annular calcification, mild to moderate tricuspid regurgitation and a negative bubble study. 6. Consult Pt/Ot & speech~rec's for HHOT     7. Consult Neurology~signed off 01/19~follow outpatient        2.   Metabolic encephalopathy~resolved         Bacteremia~staphylococcus hominis         1. Ct as above        2. MRI as above        3. Neurology~follow up outpatient~signed off 01/19         4. Labs WNL~ no elevated WBC         5. Blood culture pos 2 of 2 sets        6. Urine CX neg          7. Vancomycin started on 01/19~pharmacy to dose         3.   Elevated Troponin         1. Troponin (01/17) 0.030, 0.063 & 0.060         2. Consult cardiology~rec's appreciated        4.   Essential Hypertension        1. Continue norvasc and lisinopril         2. Monitor        5.  Hyperlipidemia         1. Lipid WNL         2. Continue statin therapy        6.  GERD        1.  Continue protonix             Medications:   Scheduled Meds:vancomycin, 1,000 mg, IntraVENous, Q24H   ferrous sulfate, 325 mg, Oral, Daily with breakfast   cetirizine, 10 mg, Oral, Daily   lisinopril, 40 mg, Oral, Daily   amLODIPine, 10 mg, Oral, Daily   pantoprazole, 40 mg, Oral, QAM AC   triamterene-hydroCHLOROthiazide, 2 tablet, Oral, Daily   cyanocobalamin, 1,000 mcg, Oral, Daily   pregabalin, 150 mg, Oral, BID   atorvastatin, 40 mg, Oral, Daily   enoxaparin, 40 mg, SubCUTAneous, Daily   aspirin, 81 mg, Oral, Daily    Or   aspirin, 300 mg, Rectal, Daily   sodium chloride flush, 5-40 mL, IntraVENous, BID      PRN Meds given: tylenol 650mg po x1         Orders: tele, up with assist, easy to chew diet      Neurology [address] Day 3 (1/19/2022) by Rodolfo Campos RN       Review Status Review Entered   Completed 1/21/2022 14:56      Criteria Review      Care Day: 3 Care Date: 1/19/2022 Level of Care: Inpatient Floor    Guideline Day 3    Level Of Care    (X) * Activity level acceptable    ( ) * Complete discharge planning    Clinical Status    ( ) * No infection, or status acceptable    1/21/2022 2:56 PM EST by Valorie Pelaez      blood cx + STAPHYLOCOCCUS HOMINIS POSITIVE for Refer to culture [id number] for sensitivity results Isolated two of two sets Abnormal    (X) * Isolation not needed, or status acceptable    (X) * Respiratory status acceptable    (X) * Pain and nausea absent or adequately managed    (X) * Ventilatory status acceptable    (X) * Neurologic problems absent or stabilized    (X) * Muscle or nerve damage absent or stable    ( ) * General Discharge Criteria met    Interventions    (X) * Intake acceptable    ( ) * No inpatient interventions needed    * Milestone   Additional Notes   DATE:    1/19      Vitals:   99 (37.2) 18 46 146/38 - Sitting - - 99       Abnl/Pertinent Labs/Radiology/Diagnostic Studies:   Rbc 4.00, hgb 11.7, hct 36.8, glucose 122, total protein 5.8, phos 2.4         Physical Exam:   NEUROLOGIC EXAM:       Mental Status: A&O to self, location, month and year, NAD, speech clear, language fluent, repetition and naming intact, follows commands appropriately       Cranial Nerve Exam:    CN II-XII: PERRL, VFF, no nystagmus, no gaze paresis, sensation V1-V3 intact b/l, muscles of facial expression symmetric; hard of hearing to conversational tone, palate elevates symmetrically, shoulder elevation symmetric and tongue protrudes midline with movement side to side.       Motor Exam:        Strength 4/5 UE's/LE's b/l   Tone and bulk normal    No pronator drift       Deep Tendon Reflexes: 2/4 biceps, triceps, brachioradialis, patellar, and achilles b/l; flexor plantar responses b/l       Sensation: Intact light touch/ UE's/LE's b/l       Coordination/Cerebellum:        Tremors--none       Rapidly alternating movements: Mild dysdiadochokinesia b/l                 Finger-to-Nose: Mild dysmetria to right upper extremity       Gait and stance:       Gait: deferred         MD Consults/Assessments & Plans:   Neuro note   ASSESSMENT/PLAN:   80 y.o. -female with PMH GERD, HTN, HLD, and neuropathy presenting to [facility] aphasia initially, which was improving upon presentation, however was noted searching for her words.  On exam patient is alert and oriented,and denies, numbness/ tingling, vision/speech changes, dizziness, or unilateral weakness.  Exam is unchanged.       1. Dizziness and dysarthria possibly due to TIA vs acute intracranial ischemia secondary to hypokalemia, hypomagnesia likely superimposed by #2       2.  Right cervical ICA severe stenosis   --Vascular following, refer to their plan        --Medications;   - Aspirin, statin for secondary stroke prevention       -PT/OT per their recommendations       Neurodiagnostics   --CTH as above   --CTA head/neck   --MRI Brain as above      IM note   Assessment and Plan:   [place] is a 80 y.o. Deana No presents with TIA (transient ischemic attack)       1. TIA vs CVA   1. Ct head (01/17) WNL    2. CTA head/neck (01/17) severe atherosclerotic calcified plaque at the level of the right cervical ICA with severe high grade stenosis greater than 80%    3. MR Brain (01/18) no acute abnormality, no acute infarct. Minimal global parenchymal volume loss with mild chronic microvascular ischemic changes. 4. Echo (01/18) pending    5. Consult Pt/Ot & speech~rec's appreciated    6. Consult Neurology~rec's appreciated        2.   Metabolic encephalopathy~resolved        1. Ct as above        2. MRI as above        3. Neurology following         4. Labs WNL~ no elevated WBC         5.  Blood culture 1 of 4 positive~ repeat cultures        6. Urine CX neg          3.   Elevated Troponin         1. Troponin (01/17) 0.030, 0.063 & 0.060         2. Consult cardiology~rec's appreciated        4.   Essential Hypertension        1. Continue norvasc and lisinopril         2. Monitor        5.  Hyperlipidemia         1. Lipid WNL         2. Continue statin therapy        6.  GERD        1. Continue protonix        CT note   IMPRESSION   Patient Active Problem List   Diagnosis   · Essential hypertension   · Mixed hyperlipidemia   · Gastroesophageal reflux disease without esophagitis   · Dorsalgia   · Neuropathy   · Allergic rhinitis   · Anemia   · TIA (transient ischemic attack)           Severe ISHAN stenosis           RECOMMENDATIONS:   MRI negative for acute infarct.        She will need R carotid endarterectomy soon.  Recommended OP follow up with Dr. Mayda Azar to schedule surgery.           Medications:   Scheduled Meds:vancomycin, 1,000 mg, IntraVENous, Q24H   ferrous sulfate, 325 mg, Oral, Daily with breakfast   cetirizine, 10 mg, Oral, Daily   lisinopril, 40 mg, Oral, Daily   amLODIPine, 10 mg, Oral, Daily   pantoprazole, 40 mg, Oral, QAM AC   triamterene-hydroCHLOROthiazide, 2 tablet, Oral, Daily   cyanocobalamin, 1,000 mcg, Oral, Daily   pregabalin, 150 mg, Oral, BID   atorvastatin, 40 mg, Oral, Daily   enoxaparin, 40 mg, SubCUTAneous, Daily   aspirin, 81 mg, Oral, Daily    Or   aspirin, 300 mg, Rectal, Daily   sodium chloride flush, 5-40 mL, IntraVENous, BID   K phos 500mg po x1         Orders: tele, up with assist, easy to chew diet

## 2022-01-21 NOTE — CONSULTS
[facility] [place]  [address],Third [address]  Phone: [phone]    Fax [phone]                   Lily Andino MD, Dipesh Thompson MD, [address] Shahram Garcia MD, MD Alexander Bacon MD Brooke Budds, MD Edison Morales, MD Dawne Muff, APRN      Malena Goodpasture, ARABELLA Sewell Puls, APRN    Christiano Side, APRN  DANY SawantC     CARDIOLOGY CONSULT NOTE      Reason for consultation: Bradycardia     Referring physician:  Bindu Deshpande DO      Primary care physician: Brandon Davison MD        Thank you for the consult.     Chief Complaints :  Chief Complaint   Patient presents with    Aphasia       last known well of last night         History of present illness:[place] is a 80 y. o.year old female with a past medical history of hypertension and hyperlipidemia. Admitted to hospital for concerns of TIA/stroke. Patient is currently feeling well overall. Cardiology was referred because of bradycardia. Patient has been noted to have a heart rate in the 40s to 50s during her entire hospitalization. Patient does not know what her heart rate has usually been while at home. She does endorse feeling fatigued, lightheaded, lower extremity edema and shortness of breath for some time now. She is laying comfortably in bed, but does have dyspnea on exertion. She is currently not taking any medications that would lower her heart rate.        Past medical history:    has a past medical history of GERD (gastroesophageal reflux disease), H/O echocardiogram, H/O echocardiogram, Hyperlipidemia, Hypertension, and Neuropathy. Past surgical history:   has a past surgical history that includes Spine surgery (2013); Colon surgery; Hysterectomy, total abdominal; and Appendectomy. Social History:   reports that she has never smoked.  She has never used smokeless tobacco.  Family history:   no family history of CAD, STROKE of DM at early age    Allergies   Allergen Reactions    Penicillins             Current Meds Link used for Sign Out Report   acetaminophen (TYLENOL) tablet 650 mg, Q6H PRN  vancomycin 1000 mg IVPB in 250 mL NS addavial, Q24H  ferrous sulfate (IRON 325) tablet 325 mg, Daily with breakfast  cetirizine (ZYRTEC) tablet 10 mg, Daily  lisinopril (PRINIVIL;ZESTRIL) tablet 40 mg, Daily  amLODIPine (NORVASC) tablet 10 mg, Daily  pantoprazole (PROTONIX) tablet 40 mg, QAM AC  triamterene-hydroCHLOROthiazide (MAXZIDE-25) 37.5-25 MG per tablet 2 tablet, Daily  vitamin B-12 (CYANOCOBALAMIN) tablet 1,000 mcg, Daily  pregabalin (LYRICA) capsule 150 mg, BID  atorvastatin (LIPITOR) tablet 40 mg, Daily  ondansetron (ZOFRAN-ODT) disintegrating tablet 4 mg, Q8H PRN   Or  ondansetron (ZOFRAN) injection 4 mg, Q6H PRN  polyethylene glycol (GLYCOLAX) packet 17 g, Daily PRN  enoxaparin (LOVENOX) injection 40 mg, Daily  aspirin EC tablet 81 mg, Daily   Or  aspirin suppository 300 mg, Daily  labetalol (NORMODYNE;TRANDATE) injection 10 mg, Q10 Min PRN  sodium chloride flush 0.9 % injection 5-40 mL, BID         Current Facility-Administered Medications             Current Facility-Administered Medications   Medication Dose Route Frequency Provider Last Rate Last Admin    acetaminophen (TYLENOL) tablet 650 mg  650 mg Oral Q6H PRN Nani Alvarez APRN - NP   650 mg at 01/20/22 2144    vancomycin 1000 mg IVPB in 250 mL NS addavial  1,000 mg IntraVENous Q24H ARABELLA Baxter - CNP   Paused at 01/20/22 1847    ferrous sulfate (IRON 325) tablet 325 mg  325 mg Oral Daily with breakfast [facility], DO   325 mg at 01/21/22 8486    cetirizine (ZYRTEC) tablet 10 mg  10 mg Oral Daily [facility], DO   10 mg at 01/21/22 0936    lisinopril (PRINIVIL;ZESTRIL) tablet 40 mg  40 mg Oral Daily [facility], DO   40 mg at 01/20/22 1009    amLODIPine (NORVASC) tablet 10 mg  10 mg Oral Daily [facility], DO   10 mg at 01/21/22 9056    pantoprazole (PROTONIX) tablet 40 mg  40 mg Oral QAM AC Jourdan Davis Gain, DO   40 mg at 01/21/22 0936    triamterene-hydroCHLOROthiazide (MAXZIDE-25) 37.5-25 MG per tablet 2 tablet  2 tablet Oral Daily Jennifer Broach, DO   2 tablet at 01/20/22 1009    vitamin B-12 (CYANOCOBALAMIN) tablet 1,000 mcg  1,000 mcg Oral Daily Jennifer Broach, DO   1,000 mcg at 01/21/22 0936    pregabalin (LYRICA) capsule 150 mg  150 mg Oral BID Jennifer Broach, DO   150 mg at 01/21/22 0935    atorvastatin (LIPITOR) tablet 40 mg  40 mg Oral Daily Jennifer Broach, DO   40 mg at 01/21/22 0936    ondansetron (ZOFRAN-ODT) disintegrating tablet 4 mg  4 mg Oral Q8H PRN Jennifer Broach, DO         Or    ondansetron (ZOFRAN) injection 4 mg  4 mg IntraVENous Q6H PRN Jennifer Broach, DO        polyethylene glycol (GLYCOLAX) packet 17 g  17 g Oral Daily PRN Jennifer Broach, DO        enoxaparin (LOVENOX) injection 40 mg  40 mg SubCUTAneous Daily Jennifer Broach, DO   40 mg at 01/21/22 0936    aspirin EC tablet 81 mg  81 mg Oral Daily Jennifer Broach, DO   81 mg at 01/21/22 0971     Or    aspirin suppository 300 mg  300 mg Rectal Daily Jennifer Broach, DO        labetalol (NORMODYNE;TRANDATE) injection 10 mg  10 mg IntraVENous Q10 Min PRN Jennifer Broach, DO        sodium chloride flush 0.9 % injection 5-40 mL  5-40 mL IntraVENous BID Jennifer Broach, DO   10 mL at 01/20/22 2055            Review of Systems   Constitutional: Negative for diaphoresis, fatigue and fever. Eyes: Negative for visual disturbance. Respiratory: Negative for chest tightness and shortness of breath. Cardiovascular: Negative for chest pain, palpitations and leg swelling. Gastrointestinal: Negative for abdominal pain, diarrhea and vomiting. Endocrine: Negative for cold intolerance and heat intolerance. Musculoskeletal: Negative for arthralgias. Skin: Negative for pallor and rash. Neurological: Negative for dizziness, syncope, light-headedness and headaches.    Psychiatric/Behavioral: Negative for dysphoric mood. The patient is not nervous/anxious.             Physical Examination:    Vitals          Vitals:     01/20/22 2000 01/21/22 0407 01/21/22 0930 01/21/22 1158   BP: (!) 134/50 (!) 156/55   (!) 136/44   Pulse: 69 54 (!) 43 (!) 49   Resp: 16 18   15   Temp: 97.7 °F (36.5 °C) 98.1 °F (36.7 °C) 97.6 °F (36.4 °C)     TempSrc: Oral Oral Oral     SpO2: 98% 97% 98%     Weight:           Height:                    General Appearance:  No distress, conversant  Constitutional:  Well developed, Well nourished, No acute distress, Non-toxic appearance. HENT:  Normocephalic, Atraumatic, Bilateral external ears normal, Oropharynx moist  Neck- trachea is midline    Eyes:  Pupils equal and round, Conjunctiva normal, No discharge. Respiratory:  Normal breathing sounds, No respiratory distress, No wheezing, No chest tenderness, no use of accessory muscles  Cardiovascular: (PMI): Sinus arrhythmia with bradycardia, S1 and S2 audible, no murmur appreciated, JVD not noted,  apex non displaced, no lifts, no thrills,   Abdomen /GI: Soft, No tenderness, No masses, No gross visceromegaly    Musculoskeletal: No edema, no tenderness, no deformities. Integument: Likely stasis dermatitis noted on bilateral lower extremities. Well hydrated. Capillary refill <2 seconds. Skin turgor brisk  Lymphatic:  No lymphadenopathy noted   Neurologic:  Alert & oriented x 3, can move all four appendages separately  Psych: Mood pleasant, Judgement intact, Decision making intact     Medical decision making and Data review:     Lab Review       Recent Labs     01/21/22  1034   WBC 5.8   HGB 10.2*   HCT 32.1*             Recent Labs     01/21/22  1034      K 4.1      CO2 25   PHOS 3.0   BUN 11   CREATININE 0.9          Recent Labs     01/21/22  1034   AST 13*   ALT 8*   BILITOT 0.3   ALKPHOS 82      No results for input(s): TROPONINT in the last 72 hours. No results for input(s): PROBNP in the last 72 hours.   No results found for: INR, PROTIME     EKG: Reviewed by me     ECHO: 1/18/2022   Left ventricular systolic function is normal.   Ejection fraction is visually estimated at 55-60%.  Mild aortic stenosis is present; Mean PG 9 mmHg.   Mitral annular calcification is present.   Mild to moderate tricuspid regurgitation; RVSP: 39 mmHg.   No evidence of any pericardial effusion.   Bubble study was negative.     Chest Xray: On 1/17/2022  No acute abnormalities     CT Head WO Contrast     Result Date: 1/19/2022  EXAMINATION: CT OF THE HEAD WITHOUT CONTRAST  1/17/2022 7:59 pm TECHNIQUE: CT of the head was performed without the administration of intravenous contrast. Dose modulation, iterative reconstruction, and/or weight based adjustment of the mA/kV was utilized to reduce the radiation dose to as low as reasonably achievable. COMPARISON: None. HISTORY: ORDERING SYSTEM PROVIDED HISTORY: altered mental status TECHNOLOGIST PROVIDED HISTORY: Reason for exam:->altered mental status Has a \"code stroke\" or \"stroke alert\" been called? ->No Decision Support Exception - unselect if not a suspected or confirmed emergency medical condition->Emergency Medical Condition (MA) Reason for Exam: altered mental status Initial evaluation FINDINGS: BRAIN/VENTRICLES:  The ventricles and cisternal spaces are prominent consistent with cerebral atrophy. There is atherosclerotic calcification of the cavernous carotids. No areas of abnormal attenuation are identified in the brain parenchyma. There is no midline shift or mass effect. ORBITS: The visualized portion of the orbits demonstrate no acute abnormality. SINUSES:  The visualized paranasal sinuses and mastoid air cells are for the most part clear. SOFT TISSUES/SKULL:  No acute abnormality of the visualized skull or soft tissues.      No acute intracranial abnormality.  Mild cerebral atrophy.      CTA HEAD NECK W CONTRAST     Result Date: 1/18/2022  EXAMINATION: CTA OF THE HEAD AND NECK WITH CONTRAST 1/18/2022 3:29 am: TECHNIQUE: CTA of the head and neck was performed with the administration of intravenous contrast. Multiplanar reformatted images are provided for review. MIP images are provided for review. Stenosis of the internal carotid arteries measured using NASCET criteria. Dose modulation, iterative reconstruction, and/or weight based adjustment of the mA/kV was utilized to reduce the radiation dose to as low as reasonably achievable. COMPARISON: None. HISTORY: ORDERING SYSTEM PROVIDED HISTORY: tia vs cva TECHNOLOGIST PROVIDED HISTORY: Reason for exam:->tia vs cva Reason for Exam: tia vs cva FINDINGS: CTA NECK: AORTIC ARCH/ARCH VESSELS: No dissection or arterial injury. No significant stenosis of the brachiocephalic or subclavian arteries. CAROTID ARTERIES: Severe atherosclerotic calcified plaque is noted at the level of the right carotid bulb and extending into the origin of the right cervical ICA with severe high-grade stenosis greater than 80%. The left carotid artery demonstrates no dissection, arterial injury, or hemodynamically significant stenosis by NASCET criteria. VERTEBRAL ARTERIES: No dissection, arterial injury, or significant stenosis. SOFT TISSUES: The lung apices are clear. No cervical or superior mediastinal lymphadenopathy. The larynx and pharynx are unremarkable. No acute abnormality of the salivary glands. 8 mm hypodense nodule in the right lobe of the thyroid gland, no further follow-up recommended. BONES: No acute osseous abnormality. CTA HEAD: ANTERIOR CIRCULATION: No significant stenosis of the intracranial internal carotid, anterior cerebral, or middle cerebral arteries. No aneurysm. POSTERIOR CIRCULATION: No significant stenosis of the vertebral, basilar, or posterior cerebral arteries. No aneurysm. OTHER: No dural venous sinus thrombosis on this non-dedicated study. BRAIN: No mass effect or midline shift. No extra-axial fluid collection.  The gray-white differentiation is maintained.      Severe atherosclerotic calcified plaque at the level of the right carotid bulb and extending into the origin of the right cervical ICA with severe high-grade stenosis greater than 80%.      MRI brain without contrast     Result Date: 1/18/2022  EXAMINATION: MRI OF THE BRAIN WITHOUT CONTRAST  1/18/2022 10:24 am TECHNIQUE: Multiplanar multisequence MRI of the brain was performed without the administration of intravenous contrast. COMPARISON: None. HISTORY: ORDERING SYSTEM PROVIDED HISTORY: tia TECHNOLOGIST PROVIDED HISTORY: Reason for exam:->tia Reason for Exam: aphasia tia Initial evaluation. FINDINGS: INTRACRANIAL STRUCTURES/VENTRICLES: There is no acute infarct. No mass effect or midline shift. No evidence of an acute intracranial hemorrhage. A few punctate foci of susceptibility are seen within the left occipital lobe, which may represent sequelae a prior microhemorrhage versus tiny cavernomas. Scattered foci of T2 FLAIR hyperintensity are seen in the periventricular and subcortical white matter, which are nonspecific, but may represent chronic microvascular ischemic change. There is minimal global parenchymal volume loss. Otherwise, the ventricles and sulci are normal in size and configuration. The sellar/suprasellar regions appear unremarkable. The normal signal voids within the major intracranial vessels appear maintained. ORBITS: The visualized portion of the orbits demonstrate no acute abnormality. SINUSES: The visualized paranasal sinuses and mastoid air cells demonstrate no acute abnormality. BONES/SOFT TISSUES: The bone marrow signal intensity appears normal. The soft tissues demonstrate no acute abnormality.      1. No acute intracranial abnormality. No acute infarct.  2. Minimal global parenchymal volume loss with mild chronic microvascular ischemic changes.         All labs, medications and tests reviewed by myself including data  from outside source , patient and available family . Continue all other medications of all above medical condition listed as is.      Impression:  Principal Problem:    TIA (transient ischemic attack)  Resolved Problems:    * No resolved hospital problems. *       Assessment and Plan:    1. Bradycardia: has junctional beats, but BP is stable, Over HR lowering meds      No pauses noted, Will have  EP see if needed  ETT next week to check for chrono incompetence    2. Elev trop: not positive    3. ? TIA : Rt CCA has 80% stenosis, CEA    4. HTN  Better on Norvasc and lisinopril  CPM    5.  Hyperlipidimea  CPM with statin

## 2022-01-21 NOTE — PROGRESS NOTES
[address] [facility]  consulted by Liv Colon CNP for monitoring and adjustment. Indication for treatment: Bloodstream infection  Goal trough: 15 mcg/mL      Pertinent Laboratory Values:   Temp Readings from Last 3 Encounters:   01/21/22 97.6 °F (36.4 °C) (Oral)   11/18/21 98 °F (36.7 °C)   08/18/21 97.9 °F (36.6 °C)     Recent Labs     01/19/22  1140 01/21/22  1034   WBC 5.5 5.8     Recent Labs     01/19/22  1140 01/21/22  1034   BUN 15 11   CREATININE 0.9 0.9     Estimated Creatinine Clearance: 45 mL/min (based on SCr of 0.9 mg/dL). Intake/Output Summary (Last 24 hours) at 1/21/2022 1319  Last data filed at 1/20/2022 2055  Gross per 24 hour   Intake 10 ml   Output --   Net 10 ml       Pertinent Cultures:  Date    Source    Results  1/19               Blood    Staph CoN (3/4)             Vancomycin level:   TROUGH:  No results for input(s): VANCOTROUGH in the last 72 hours. RANDOM:    Recent Labs     01/21/22  0717   VANCORANDOM 10.8       Assessment:  · SCr, BUN, and urine output:  SCR steady @0.9, slightly above baseline  · Day(s) of therapy: # 3  · Vancomycin concentration:   · 1/21 - 10.8, 14 hour level on 1000mg q24h,     Plan:  · Renal trends stable  · Continue vancomycin 1000mg ivpb q24h for a predicted therapeutic AUC at steady state. · Recheck the vanco level in 3 days, unless renal status changes (SCR ordered for tomorrow)  · Pharmacy will continue to monitor patient and adjust therapy as indicated    Vianey 3 1/24 @ 06:00    Thank you for the consult. Disha Portillo, [facility]  1/21/2022 1:19 PM

## 2022-01-22 LAB
CREAT SERPL-MCNC: 0.8 MG/DL (ref 0.6–1.1)
GFR AFRICAN AMERICAN: >60 ML/MIN/1.73M2
GFR NON-AFRICAN AMERICAN: >60 ML/MIN/1.73M2

## 2022-01-22 PROCEDURE — 36415 COLL VENOUS BLD VENIPUNCTURE: CPT

## 2022-01-22 PROCEDURE — 94761 N-INVAS EAR/PLS OXIMETRY MLT: CPT

## 2022-01-22 PROCEDURE — APPSS60 APP SPLIT SHARED TIME 46-60 MINUTES: Performed by: NURSE PRACTITIONER

## 2022-01-22 PROCEDURE — 93005 ELECTROCARDIOGRAM TRACING: CPT | Performed by: NURSE PRACTITIONER

## 2022-01-22 PROCEDURE — 6360000002 HC RX W HCPCS: Performed by: FAMILY MEDICINE

## 2022-01-22 PROCEDURE — 99233 SBSQ HOSP IP/OBS HIGH 50: CPT | Performed by: INTERNAL MEDICINE

## 2022-01-22 PROCEDURE — 1200000000 HC SEMI PRIVATE

## 2022-01-22 PROCEDURE — 82565 ASSAY OF CREATININE: CPT

## 2022-01-22 PROCEDURE — 6360000002 HC RX W HCPCS: Performed by: INTERNAL MEDICINE

## 2022-01-22 PROCEDURE — 6360000002 HC RX W HCPCS: Performed by: STUDENT IN AN ORGANIZED HEALTH CARE EDUCATION/TRAINING PROGRAM

## 2022-01-22 PROCEDURE — 6370000000 HC RX 637 (ALT 250 FOR IP): Performed by: STUDENT IN AN ORGANIZED HEALTH CARE EDUCATION/TRAINING PROGRAM

## 2022-01-22 PROCEDURE — 6370000000 HC RX 637 (ALT 250 FOR IP): Performed by: NURSE PRACTITIONER

## 2022-01-22 PROCEDURE — 2580000003 HC RX 258: Performed by: STUDENT IN AN ORGANIZED HEALTH CARE EDUCATION/TRAINING PROGRAM

## 2022-01-22 RX ORDER — CEFAZOLIN SODIUM 2 G/50ML
2000 SOLUTION INTRAVENOUS EVERY 8 HOURS
Status: DISCONTINUED | OUTPATIENT
Start: 2022-01-22 | End: 2022-01-22 | Stop reason: CLARIF

## 2022-01-22 RX ORDER — MAGNESIUM SULFATE IN WATER 40 MG/ML
2000 INJECTION, SOLUTION INTRAVENOUS ONCE
Status: COMPLETED | OUTPATIENT
Start: 2022-01-22 | End: 2022-01-22

## 2022-01-22 RX ORDER — CEFAZOLIN SODIUM 2 G/100ML
2000 INJECTION, SOLUTION INTRAVENOUS EVERY 8 HOURS
Status: DISCONTINUED | OUTPATIENT
Start: 2022-01-22 | End: 2022-01-24

## 2022-01-22 RX ADMIN — MAGNESIUM SULFATE HEPTAHYDRATE 2000 MG: 2 INJECTION, SOLUTION INTRAVENOUS at 09:34

## 2022-01-22 RX ADMIN — CEFAZOLIN SODIUM 2000 MG: 2 INJECTION, SOLUTION INTRAVENOUS at 21:03

## 2022-01-22 RX ADMIN — ACETAMINOPHEN 650 MG: 325 TABLET ORAL at 23:13

## 2022-01-22 RX ADMIN — LISINOPRIL 40 MG: 40 TABLET ORAL at 09:21

## 2022-01-22 RX ADMIN — ASPIRIN 81 MG: 81 TABLET, COATED ORAL at 09:20

## 2022-01-22 RX ADMIN — ENOXAPARIN SODIUM 40 MG: 100 INJECTION SUBCUTANEOUS at 09:20

## 2022-01-22 RX ADMIN — PANTOPRAZOLE SODIUM 40 MG: 40 TABLET, DELAYED RELEASE ORAL at 09:21

## 2022-01-22 RX ADMIN — CEFAZOLIN SODIUM 2000 MG: 2 INJECTION, SOLUTION INTRAVENOUS at 14:40

## 2022-01-22 RX ADMIN — PREGABALIN 150 MG: 75 CAPSULE ORAL at 09:21

## 2022-01-22 RX ADMIN — TRIAMTERENE AND HYDROCHLOROTHIAZIDE 2 TABLET: 37.5; 25 TABLET ORAL at 09:20

## 2022-01-22 RX ADMIN — AMLODIPINE BESYLATE 10 MG: 10 TABLET ORAL at 09:20

## 2022-01-22 RX ADMIN — SODIUM CHLORIDE, PRESERVATIVE FREE 10 ML: 5 INJECTION INTRAVENOUS at 21:04

## 2022-01-22 RX ADMIN — SODIUM CHLORIDE, PRESERVATIVE FREE 10 ML: 5 INJECTION INTRAVENOUS at 09:24

## 2022-01-22 RX ADMIN — FERROUS SULFATE TAB 325 MG (65 MG ELEMENTAL FE) 325 MG: 325 (65 FE) TAB at 09:20

## 2022-01-22 RX ADMIN — ATORVASTATIN CALCIUM 40 MG: 40 TABLET, FILM COATED ORAL at 09:20

## 2022-01-22 RX ADMIN — CETIRIZINE HYDROCHLORIDE 10 MG: 10 TABLET, FILM COATED ORAL at 09:20

## 2022-01-22 RX ADMIN — CYANOCOBALAMIN TAB 1000 MCG 1000 MCG: 1000 TAB at 09:20

## 2022-01-22 ASSESSMENT — PAIN SCALES - GENERAL
PAINLEVEL_OUTOF10: 0
PAINLEVEL_OUTOF10: 5

## 2022-01-22 ASSESSMENT — PAIN SCALES - WONG BAKER: WONGBAKER_NUMERICALRESPONSE: 0

## 2022-01-22 ASSESSMENT — ENCOUNTER SYMPTOMS: SHORTNESS OF BREATH: 0

## 2022-01-22 NOTE — PROGRESS NOTES
Hospitalist Progress Note      Name:  South Michele /Age/Sex: 1933  (80 y.o. female)   MRN & CSN:  [id number] & [id number] Admission Date/Time: 2022  6:54 PM   Location:  [facility] PCP: Janae Wheeler MD         Hospital Day: 6    Assessment and Plan:   South Michele is a 80 y.o.  female  who presents with TIA (transient ischemic attack)    TIA vs CVA  Ct head () WNL   CTA head/neck () severe atherosclerotic calcified plaque at the level of the right cervical ICA with severe high grade stenosis greater than 80%   Cardiothoracic surgery consulted~rec's for outpatient right endarterectomy   MR Brain () no acute abnormality, no acute infarct. Minimal global parenchymal volume loss with mild chronic microvascular ischemic changes. Echo () shows an EF of 55-60% with mild aortic stenosis, mitral annular calcification, mild to moderate tricuspid regurgitation and a negative bubble study. Consult Pt/Ot & speech~rec's for [address] [facility]~signed off ~follow outpatient     2. Metabolic encephalopathy~resolved        Bacteremia~staphylococcus hominis        1. Ct as above       2. MRI as above       3. Neurology~follow up outpatient~signed off         4. Labs WNL~ no elevated WBC        5. Blood culture pos 2 of 2 sets       6. Urine CX neg         7. Vancomycin started on ~pharmacy to dose       8. Consult ID~continue vancomycin and await repeat culture results       3. Elevated Troponin        Bradycardia         1. Troponin () 0.030, 0.063 & 0.060        2. Has been bradycardic~43-70~usually in the 40's and 50's         2. Consult cardiology~stress test ordered, has junctional beats      4. Essential Hypertension       1. Continue norvasc and lisinopril        2. Monitor     5. Hyperlipidemia        1. Lipid WNL        2. Continue statin therapy     6. GERD       1.  Continue protonix     Diet ADULT DIET; Easy to Chew   DVT Prophylaxis [x] Lovenox, []  Heparin, [] SCDs, [] Ambulation   GI Prophylaxis [] PPI,  [] H2 Blocker,  [] Carafate,  [x] Diet/Tube Feeds   Code Status Full Code   Disposition Patient requires continued admission due to ongoing work up          History of Present Illness:     Chief Complaint: TIA (transient ischemic attack)  Haja Arguello is a 80 y.o.  female  who presents with complaints of dizziness and not feeling well. Pt states that this has happened to her previously and her grand daughter called ems due to her having dizzy spells and going back to bed. Pt states that she doesn't remember her grand daughter calling EMS to come get her. Pt denies any weakness at this time or dizziness. Pt denies any nausea or vomiting. Pt denies any other concerns. Pt denies any concerns today and asks to have the endarterectomy completed while admitted. Pt educated that she has an infection in her blood that needs to be cleared up prior to having the surgery done. Pt verbalized an understanding and denies any needs for today. Ten point ROS reviewed negative, unless as noted above    Objective: Intake/Output Summary (Last 24 hours) at 1/22/2022 1038  Last data filed at 1/22/2022 0425  Gross per 24 hour   Intake 120 ml   Output --   Net 120 ml      Vitals:   Vitals:    01/22/22 0920   BP: 120/85   Pulse:    Resp:    Temp:    SpO2:      Physical Exam:   GEN Awake female, sitting upright in bed in no apparent distress. Appears given age. EYES Pupils are equally round. No scleral erythema, discharge, or conjunctivitis. HENT Mucous membranes are moist. Oral pharynx without exudates, no evidence of thrush. NECK Supple, no apparent thyromegaly or masses. RESP Clear to auscultation, no wheezes, rales or rhonchi. Symmetric chest movement while on room air. CARDIO/VASC S1/S2 auscultated. Regular rate without appreciable murmurs, rubs, or gallops. No JVD or carotid bruits.  Peripheral pulses equal bilaterally and palpable. No peripheral edema. GI Abdomen is soft without significant tenderness, masses, or guarding. Bowel sounds are normoactive. Rectal exam deferred.  No costovertebral angle tenderness. Swift catheter is not present. HEME/LYMPH No palpable cervical lymphadenopathy and no hepatosplenomegaly. No petechiae or ecchymoses. MSK No gross joint deformities. SKIN Normal coloration, warm, dry. NEURO Cranial nerves appear grossly intact, normal speech, no lateralizing weakness. PSYCH Awake, alert, oriented x 4. Affect appropriate. Medications:   Medications:    magnesium sulfate  2,000 mg IntraVENous Once    vancomycin  1,000 mg IntraVENous Q24H    ferrous sulfate  325 mg Oral Daily with breakfast    cetirizine  10 mg Oral Daily    lisinopril  40 mg Oral Daily    amLODIPine  10 mg Oral Daily    pantoprazole  40 mg Oral QAM AC    triamterene-hydroCHLOROthiazide  2 tablet Oral Daily    cyanocobalamin  1,000 mcg Oral Daily    pregabalin  150 mg Oral BID    atorvastatin  40 mg Oral Daily    enoxaparin  40 mg SubCUTAneous Daily    aspirin  81 mg Oral Daily    Or    aspirin  300 mg Rectal Daily    sodium chloride flush  5-40 mL IntraVENous BID      Infusions:   PRN Meds: acetaminophen, 650 mg, Q6H PRN  ondansetron, 4 mg, Q8H PRN   Or  ondansetron, 4 mg, Q6H PRN  polyethylene glycol, 17 g, Daily PRN  labetalol, 10 mg, Q10 Min PRN          Patient is still admitted because ongoing neuro work up. The anticipated discharge is in greater than 24 hours.      Electronically signed by ARABELLA Hill CNP on 1/22/2022 at 10:38 AM

## 2022-01-22 NOTE — PROGRESS NOTES
[address] [facility]  consulted by Calli Sierra CNP for monitoring and adjustment. Indication for treatment: Bloodstream infection  Goal trough: 15 mcg/mL      Pertinent Laboratory Values:   Temp Readings from Last 3 Encounters:   01/22/22 97.8 °F (36.6 °C) (Oral)   11/18/21 98 °F (36.7 °C)   08/18/21 97.9 °F (36.6 °C)     Recent Labs     01/21/22  1034   WBC 5.8     Recent Labs     01/21/22  1034 01/22/22  0724   BUN 11  --    CREATININE 0.9 0.8     Estimated Creatinine Clearance: 51 mL/min (based on SCr of 0.8 mg/dL). Intake/Output Summary (Last 24 hours) at 1/22/2022 1550  Last data filed at 1/22/2022 0425  Gross per 24 hour   Intake 120 ml   Output --   Net 120 ml       Pertinent Cultures:  Date    Source    Results  1/19   Blood    Staph CoN (3/4)             Vancomycin level:   TROUGH:  No results for input(s): VANCOTROUGH in the last 72 hours. RANDOM:    Recent Labs     01/21/22  0717   VANCORANDOM 10.8       Assessment:  · SCr, BUN, and urine output:  SCR @ 0.8, slightly above baseline  · Day(s) of therapy: # 4  · Vancomycin concentration:   · 1/21 - 10.8, 14 hour level on 1000mg q24h,     Plan:  · Renal trends stable  · Continue vancomycin 1000mg ivpb q24h for a predicted therapeutic AUC at steady state. · Repeat Vancomycin level in 2 days  · Pharmacy will continue to monitor patient and adjust therapy as indicated    Vianey 3 1/24 @ 06:00    Thank you for the consult.   LEIGHANN Reyes [facility]  1/22/2022 3:50 PM

## 2022-01-22 NOTE — PROGRESS NOTES
Cardiology Progress Note     Today's Plan: treadmill stress test on Monday. Admit Date:  1/17/2022    Consult reason/ Seen today for: Bradycardia    Subjective and  Overnight Events: Patient reports no dizziness at rest mild on ambulation. Assessment / Plan / Recommendation:     1. Bradycardia with junctional beats? Get EKG. Rate 40-50's. Please avoid rate lowing medications. Treadmill stress test next week to assess for chronotropic incompetence. 2. Elevated Troponin :not positive. Most probably demand ischemia related leak. No further work up at this time. 3. TIA vs CVA: no acute abnormalities on MRI. 4. Carotid stenosis: ISHAN 80 % stenosis. CTS recommends OP CEA. 5. HTN:controlled, continue Norvasc 10 mg and lisinopril 40 mg , can titrate accordingly   6. Dyslipidemia: continue statins  7. DVT prophylaxis if not contraindicated. History of Presenting Illness:    Chief complain on admission : 80 y. o.year old who is admitted for  Chief Complaint   Patient presents with    Aphasia     last known well of last night        Past medical history:    has a past medical history of GERD (gastroesophageal reflux disease), H/O echocardiogram, H/O echocardiogram, Hyperlipidemia, Hypertension, and Neuropathy. Past surgical history:   has a past surgical history that includes Spine surgery (2013); Colon surgery; Hysterectomy, total abdominal; and Appendectomy. Social History:   reports that she has never smoked. She has never used smokeless tobacco.  Family history:  family history includes Hypertension in her father and mother. Allergies   Allergen Reactions    Penicillins        Review of Systems:  Review of Systems   Respiratory: Negative for shortness of breath. Cardiovascular: Negative for chest pain, palpitations and leg swelling. Musculoskeletal: Negative. Skin: Negative.     Neurological: Negative for dizziness and weakness. All other systems reviewed and are negative. /85   Pulse (!) 40   Temp 98 °F (36.7 °C) (Oral)   Resp 16   Ht 5' 9\" (1.753 m)   Wt 158 lb (71.7 kg)   SpO2 96%   BMI 23.33 kg/m²       Intake/Output Summary (Last 24 hours) at 1/22/2022 1003  Last data filed at 1/22/2022 0425  Gross per 24 hour   Intake 120 ml   Output --   Net 120 ml       Physical Exam:  Physical Exam  Constitutional:       Appearance: She is well-developed. Cardiovascular:      Rate and Rhythm: Regular rhythm. Bradycardia present. Pulses: Intact distal pulses. Dorsalis pedis pulses are 2+ on the right side and 2+ on the left side. Posterior tibial pulses are 2+ on the right side and 2+ on the left side. Heart sounds: Normal heart sounds, S1 normal and S2 normal.   Pulmonary:      Effort: Pulmonary effort is normal.      Breath sounds: Normal breath sounds. Musculoskeletal:         General: Normal range of motion. Skin:     General: Skin is warm and dry. Neurological:      Mental Status: She is alert and oriented to person, place, and time.           Medications:    magnesium sulfate  2,000 mg IntraVENous Once    vancomycin  1,000 mg IntraVENous Q24H    ferrous sulfate  325 mg Oral Daily with breakfast    cetirizine  10 mg Oral Daily    lisinopril  40 mg Oral Daily    amLODIPine  10 mg Oral Daily    pantoprazole  40 mg Oral QAM AC    triamterene-hydroCHLOROthiazide  2 tablet Oral Daily    cyanocobalamin  1,000 mcg Oral Daily    pregabalin  150 mg Oral BID    atorvastatin  40 mg Oral Daily    enoxaparin  40 mg SubCUTAneous Daily    aspirin  81 mg Oral Daily    Or    aspirin  300 mg Rectal Daily    sodium chloride flush  5-40 mL IntraVENous BID       acetaminophen, ondansetron **OR** ondansetron, polyethylene glycol, labetalol    Lab Data:  CBC:   Recent Labs     01/19/22  1140 01/21/22  1034   WBC 5.5 5.8   HGB 11.7* 10.2*   HCT 36.8* 32.1*   MCV 92.0 93.0  181     BMP:   Recent Labs     01/19/22  1140 01/21/22  1034 01/22/22  0724    138  --    K 3.5 4.1  --     104  --    CO2 24 25  --    PHOS 2.4* 3.0  --    BUN 15 11  --    CREATININE 0.9 0.9 0.8     PT/INR: No results for input(s): PROTIME, INR in the last 72 hours. BNP:  No results for input(s): PROBNP in the last 72 hours. TROPONIN: No results for input(s): TROPONINT in the last 72 hours. Impression:  Principal Problem:    TIA (transient ischemic attack)  Active Problems:    Bradycardia  Resolved Problems:    * No resolved hospital problems. *       All labs, medications and tests reviewed by myself, continue all other medications of all above medical condition listed as is except for changes mentioned above. Thank you   Please call with questions. Electronically signed by Isidra Remy.  ARABELLA Fontana - CNP on 1/22/2022 at 10:03 AM

## 2022-01-23 PROBLEM — R78.81 COAGULASE NEGATIVE STAPHYLOCOCCUS BACTEREMIA: Status: ACTIVE | Noted: 2022-01-23

## 2022-01-23 PROBLEM — B95.7 COAGULASE NEGATIVE STAPHYLOCOCCUS BACTEREMIA: Status: ACTIVE | Noted: 2022-01-23

## 2022-01-23 LAB
ALBUMIN SERPL-MCNC: 3.1 GM/DL (ref 3.4–5)
ALP BLD-CCNC: 83 IU/L (ref 40–129)
ALT SERPL-CCNC: 13 U/L (ref 10–40)
ANION GAP SERPL CALCULATED.3IONS-SCNC: 6 MMOL/L (ref 4–16)
AST SERPL-CCNC: 19 IU/L (ref 15–37)
BASOPHILS ABSOLUTE: 0 K/CU MM
BASOPHILS RELATIVE PERCENT: 0.7 % (ref 0–1)
BILIRUB SERPL-MCNC: 0.4 MG/DL (ref 0–1)
BUN BLDV-MCNC: 12 MG/DL (ref 6–23)
CALCIUM SERPL-MCNC: 8.7 MG/DL (ref 8.3–10.6)
CHLORIDE BLD-SCNC: 101 MMOL/L (ref 99–110)
CO2: 27 MMOL/L (ref 21–32)
CREAT SERPL-MCNC: 1 MG/DL (ref 0.6–1.1)
DIFFERENTIAL TYPE: ABNORMAL
EOSINOPHILS ABSOLUTE: 0.2 K/CU MM
EOSINOPHILS RELATIVE PERCENT: 4 % (ref 0–3)
GFR AFRICAN AMERICAN: >60 ML/MIN/1.73M2
GFR NON-AFRICAN AMERICAN: 52 ML/MIN/1.73M2
GLUCOSE BLD-MCNC: 86 MG/DL (ref 70–99)
HCT VFR BLD CALC: 33.4 % (ref 37–47)
HEMOGLOBIN: 10.7 GM/DL (ref 12.5–16)
IMMATURE NEUTROPHIL %: 1 % (ref 0–0.43)
LYMPHOCYTES ABSOLUTE: 0.7 K/CU MM
LYMPHOCYTES RELATIVE PERCENT: 15.9 % (ref 24–44)
MAGNESIUM: 1.9 MG/DL (ref 1.8–2.4)
MCH RBC QN AUTO: 29.5 PG (ref 27–31)
MCHC RBC AUTO-ENTMCNC: 32 % (ref 32–36)
MCV RBC AUTO: 92 FL (ref 78–100)
MONOCYTES ABSOLUTE: 0.5 K/CU MM
MONOCYTES RELATIVE PERCENT: 11.9 % (ref 0–4)
NUCLEATED RBC %: 0 %
PDW BLD-RTO: 12.9 % (ref 11.7–14.9)
PHOSPHORUS: 3.2 MG/DL (ref 2.5–4.9)
PLATELET # BLD: 172 K/CU MM (ref 140–440)
PMV BLD AUTO: 13 FL (ref 7.5–11.1)
POTASSIUM SERPL-SCNC: 4.2 MMOL/L (ref 3.5–5.1)
RBC # BLD: 3.63 M/CU MM (ref 4.2–5.4)
SEGMENTED NEUTROPHILS ABSOLUTE COUNT: 2.8 K/CU MM
SEGMENTED NEUTROPHILS RELATIVE PERCENT: 66.5 % (ref 36–66)
SODIUM BLD-SCNC: 134 MMOL/L (ref 135–145)
TOTAL IMMATURE NEUTOROPHIL: 0.04 K/CU MM
TOTAL NUCLEATED RBC: 0 K/CU MM
TOTAL PROTEIN: 5.1 GM/DL (ref 6.4–8.2)
WBC # BLD: 4.2 K/CU MM (ref 4–10.5)

## 2022-01-23 PROCEDURE — 6370000000 HC RX 637 (ALT 250 FOR IP): Performed by: STUDENT IN AN ORGANIZED HEALTH CARE EDUCATION/TRAINING PROGRAM

## 2022-01-23 PROCEDURE — 1200000000 HC SEMI PRIVATE

## 2022-01-23 PROCEDURE — 6360000002 HC RX W HCPCS: Performed by: STUDENT IN AN ORGANIZED HEALTH CARE EDUCATION/TRAINING PROGRAM

## 2022-01-23 PROCEDURE — 83735 ASSAY OF MAGNESIUM: CPT

## 2022-01-23 PROCEDURE — 84145 PROCALCITONIN (PCT): CPT

## 2022-01-23 PROCEDURE — 80053 COMPREHEN METABOLIC PANEL: CPT

## 2022-01-23 PROCEDURE — 2580000003 HC RX 258: Performed by: STUDENT IN AN ORGANIZED HEALTH CARE EDUCATION/TRAINING PROGRAM

## 2022-01-23 PROCEDURE — 36415 COLL VENOUS BLD VENIPUNCTURE: CPT

## 2022-01-23 PROCEDURE — 94761 N-INVAS EAR/PLS OXIMETRY MLT: CPT

## 2022-01-23 PROCEDURE — 99233 SBSQ HOSP IP/OBS HIGH 50: CPT | Performed by: INTERNAL MEDICINE

## 2022-01-23 PROCEDURE — 86141 C-REACTIVE PROTEIN HS: CPT

## 2022-01-23 PROCEDURE — 84100 ASSAY OF PHOSPHORUS: CPT

## 2022-01-23 PROCEDURE — 6360000002 HC RX W HCPCS: Performed by: INTERNAL MEDICINE

## 2022-01-23 PROCEDURE — 85025 COMPLETE CBC W/AUTO DIFF WBC: CPT

## 2022-01-23 RX ADMIN — SODIUM CHLORIDE, PRESERVATIVE FREE 10 ML: 5 INJECTION INTRAVENOUS at 21:45

## 2022-01-23 RX ADMIN — SODIUM CHLORIDE, PRESERVATIVE FREE 10 ML: 5 INJECTION INTRAVENOUS at 10:36

## 2022-01-23 RX ADMIN — CYANOCOBALAMIN TAB 1000 MCG 1000 MCG: 1000 TAB at 10:34

## 2022-01-23 RX ADMIN — CEFAZOLIN SODIUM 2000 MG: 2 INJECTION, SOLUTION INTRAVENOUS at 05:28

## 2022-01-23 RX ADMIN — CEFAZOLIN SODIUM 2000 MG: 2 INJECTION, SOLUTION INTRAVENOUS at 15:46

## 2022-01-23 RX ADMIN — TRIAMTERENE AND HYDROCHLOROTHIAZIDE 2 TABLET: 37.5; 25 TABLET ORAL at 10:35

## 2022-01-23 RX ADMIN — PREGABALIN 150 MG: 75 CAPSULE ORAL at 10:34

## 2022-01-23 RX ADMIN — LISINOPRIL 40 MG: 40 TABLET ORAL at 10:35

## 2022-01-23 RX ADMIN — ATORVASTATIN CALCIUM 40 MG: 40 TABLET, FILM COATED ORAL at 10:35

## 2022-01-23 RX ADMIN — AMLODIPINE BESYLATE 10 MG: 10 TABLET ORAL at 10:35

## 2022-01-23 RX ADMIN — PANTOPRAZOLE SODIUM 40 MG: 40 TABLET, DELAYED RELEASE ORAL at 05:28

## 2022-01-23 RX ADMIN — ASPIRIN 81 MG: 81 TABLET, COATED ORAL at 10:35

## 2022-01-23 RX ADMIN — ENOXAPARIN SODIUM 40 MG: 100 INJECTION SUBCUTANEOUS at 10:33

## 2022-01-23 RX ADMIN — CETIRIZINE HYDROCHLORIDE 10 MG: 10 TABLET, FILM COATED ORAL at 10:35

## 2022-01-23 RX ADMIN — FERROUS SULFATE TAB 325 MG (65 MG ELEMENTAL FE) 325 MG: 325 (65 FE) TAB at 10:35

## 2022-01-23 ASSESSMENT — PAIN DESCRIPTION - ORIENTATION: ORIENTATION: RIGHT

## 2022-01-23 ASSESSMENT — PAIN - FUNCTIONAL ASSESSMENT: PAIN_FUNCTIONAL_ASSESSMENT: ACTIVITIES ARE NOT PREVENTED

## 2022-01-23 ASSESSMENT — PAIN SCALES - GENERAL
PAINLEVEL_OUTOF10: 0
PAINLEVEL_OUTOF10: 3
PAINLEVEL_OUTOF10: 0

## 2022-01-23 ASSESSMENT — PAIN DESCRIPTION - LOCATION: LOCATION: HEAD

## 2022-01-23 ASSESSMENT — PAIN DESCRIPTION - ONSET: ONSET: UNABLE TO TELL

## 2022-01-23 ASSESSMENT — PAIN DESCRIPTION - PAIN TYPE: TYPE: ACUTE PAIN

## 2022-01-23 ASSESSMENT — PAIN DESCRIPTION - FREQUENCY: FREQUENCY: INTERMITTENT

## 2022-01-23 ASSESSMENT — PAIN DESCRIPTION - PROGRESSION: CLINICAL_PROGRESSION: NOT CHANGED

## 2022-01-23 NOTE — PROGRESS NOTES
Hospitalist Progress Note      Name:  South Michele /Age/Sex: 1933  (80 y.o. female)   MRN & CSN:  [id number] & [id number] Admission Date/Time: 2022  6:54 PM   Location:  [facility] PCP: Domi Singh MD         Hospital Day: 7    Assessment and Plan:   South Michele is a 80 y.o.  female  who presents with TIA (transient ischemic attack)    TIA vs CVA  Ct head () WNL   CTA head/neck () severe atherosclerotic calcified plaque at the level of the right cervical ICA with severe high grade stenosis greater than 80%   Cardiothoracic surgery consulted~rec's for outpatient right endarterectomy   MR Brain () no acute abnormality, no acute infarct. Minimal global parenchymal volume loss with mild chronic microvascular ischemic changes. Echo () shows an EF of 55-60% with mild aortic stenosis, mitral annular calcification, mild to moderate tricuspid regurgitation and a negative bubble study. Consult Pt/Ot & speech~rec's for [address] [facility]~signed off ~follow outpatient     2. Metabolic encephalopathy~resolved        Bacteremia~staphylococcus hominis        1. Ct as above       2. MRI as above       3. Neurology~follow up outpatient~signed off         4. Labs WNL~ no elevated WBC        5. Blood culture pos 2 of 2 sets       6. Urine CX neg         7. Vancomycin started on ~pharmacy to dose       8. Consult ID~continue vancomycin and await repeat culture results       3. Elevated Troponin        Bradycardia         1. Troponin () 0.030, 0.063 & 0.060        2. Has been bradycardic~43-70~usually in the 40's and 50's         2. Consult cardiology~stress test ordered, has junctional beats      4. Essential Hypertension       1. Continue norvasc and lisinopril        2. Monitor     5. Hyperlipidemia        1. Lipid WNL        2. Continue statin therapy     6. GERD       1.  Continue protonix     Diet ADULT DIET; Easy to Chew   DVT Prophylaxis [x] Lovenox, []  Heparin, [] SCDs, [] Ambulation   GI Prophylaxis [] PPI,  [] H2 Blocker,  [] Carafate,  [x] Diet/Tube Feeds   Code Status Full Code   Disposition Patient requires continued admission due to ongoing work up          History of Present Illness:     Chief Complaint: TIA (transient ischemic attack)  Severa Hash is a 80 y.o.  female  who presents with complaints of dizziness and not feeling well. Pt states that this has happened to her previously and her grand daughter called ems due to her having dizzy spells and going back to bed. Pt states that she doesn't remember her grand daughter calling EMS to come get her. Pt denies any weakness at this time or dizziness. Pt denies any nausea or vomiting. Pt denies any other concerns. Pt states that she is feeling better today and has been getting up moving around. Pt denies any needs today. Ten point ROS reviewed negative, unless as noted above    Objective: Intake/Output Summary (Last 24 hours) at 1/23/2022 1123  Last data filed at 1/22/2022 2104  Gross per 24 hour   Intake 10 ml   Output --   Net 10 ml      Vitals:   Vitals:    01/23/22 1055   BP: (!) 166/40   Pulse: (!) 49   Resp: 20   Temp: 98 °F (36.7 °C)   SpO2: 100%     Physical Exam:   GEN Awake female, sitting upright in bed in no apparent distress. Appears given age. EYES Pupils are equally round. No scleral erythema, discharge, or conjunctivitis. HENT Mucous membranes are moist. Oral pharynx without exudates, no evidence of thrush. NECK Supple, no apparent thyromegaly or masses. RESP Clear to auscultation, no wheezes, rales or rhonchi. Symmetric chest movement while on room air. CARDIO/VASC S1/S2 auscultated. Regular rate without appreciable murmurs, rubs, or gallops. No JVD or carotid bruits. Peripheral pulses equal bilaterally and palpable. No peripheral edema. GI Abdomen is soft without significant tenderness, masses, or guarding.  Bowel sounds are normoactive. Rectal exam deferred.  No costovertebral angle tenderness. Swift catheter is not present. HEME/LYMPH No palpable cervical lymphadenopathy and no hepatosplenomegaly. No petechiae or ecchymoses. MSK No gross joint deformities. SKIN Normal coloration, warm, dry. NEURO Cranial nerves appear grossly intact, normal speech, no lateralizing weakness. PSYCH Awake, alert, oriented x 4. Affect appropriate. Medications:   Medications:    ceFAZolin  2,000 mg IntraVENous Q8H    ferrous sulfate  325 mg Oral Daily with breakfast    cetirizine  10 mg Oral Daily    lisinopril  40 mg Oral Daily    amLODIPine  10 mg Oral Daily    pantoprazole  40 mg Oral QAM AC    triamterene-hydroCHLOROthiazide  2 tablet Oral Daily    cyanocobalamin  1,000 mcg Oral Daily    pregabalin  150 mg Oral BID    atorvastatin  40 mg Oral Daily    enoxaparin  40 mg SubCUTAneous Daily    aspirin  81 mg Oral Daily    Or    aspirin  300 mg Rectal Daily    sodium chloride flush  5-40 mL IntraVENous BID      Infusions:   PRN Meds: acetaminophen, 650 mg, Q6H PRN  ondansetron, 4 mg, Q8H PRN   Or  ondansetron, 4 mg, Q6H PRN  polyethylene glycol, 17 g, Daily PRN  labetalol, 10 mg, Q10 Min PRN          Patient is still admitted because ongoing neuro work up. The anticipated discharge is in greater than 24 hours.      Electronically signed by ARABELLA Conde CNP on 1/23/2022 at 11:23 AM

## 2022-01-23 NOTE — PROGRESS NOTES
Cardiology Progress Note     Admit Date:  1/17/2022    Consult reason/ Seen today for :   bradycardia  dizziness    Subjective and  Overnight Events : She has been in sinus bradycardia with heart rate in 40s occasionally goes into junctional rhythm  Not on any rate lowering medication  Denies any chest pain or dizzy spells    Chief complain on admission : 80 y. o.year old who is admitted for  Chief Complaint   Patient presents with    Aphasia     last known well of last night      Assessment / Plan:  Bradycardia: Intermittent junctional rhythm she says she walks with a walker and is able to walk on a treadmill but I am not sure if it is safe for her to walk on a treadmill stress test, ideally would like to see heart rate response to exercise  Continues to have dizziness but doubt it is due to carotid disease,No evidence of orthostasis at least when checked in the hospital blood pressure is stable on standing  Elevated Troponin : We will get Cardiolite  Avoid beta-blockers plan outpatient 30-day monitor and EPS evaluation  Carotid stenosis follow-up. TEM CT surgeryPWETREAD  HTN: stable, continue To titrate up medication as needed  DVT Prophylaxis if no contraindication    Past medical history:    has a past medical history of GERD (gastroesophageal reflux disease), H/O echocardiogram, H/O echocardiogram, Hyperlipidemia, Hypertension, and Neuropathy. Past surgical history:   has a past surgical history that includes Spine surgery (2013); Colon surgery; Hysterectomy, total abdominal; and Appendectomy. Social History:   reports that she has never smoked. She has never used smokeless tobacco.  Family history:  family history includes Hypertension in her father and mother.     Allergies   Allergen Reactions    Penicillins        Review of Systems:    All 14 systems were reviewed and are negative  Except for the positive findings  which as documented     BP (!) 108/45   Pulse (!) 48   Temp 98.4 °F (36.9 °C) (Oral)   Resp 19   Ht 5' 9\" (1.753 m)   Wt 158 lb (71.7 kg)   SpO2 96%   BMI 23.33 kg/m²       Intake/Output Summary (Last 24 hours) at 1/23/2022 1625  Last data filed at 1/22/2022 2104  Gross per 24 hour   Intake 10 ml   Output --   Net 10 ml     Physical Exam:  Constitutional:  Well developed, Well nourished, No acute distress, Non-toxic appearance. HENT:  Normocephalic, Atraumatic, Bilateral external ears normal, Oropharynx moist, No oral exudates, Nose normal. Neck- Normal range of motion, No tenderness, Supple, No stridor. Eyes:  PERRL, EOMI, Conjunctiva normal, No discharge. Respiratory:  Normal breath sounds, No respiratory distress, No wheezing, No chest tenderness. Cardiovascular:  Normal heart rate, Normal rhythm, No murmurs, No rubs, No gallops, JVP not elevated  Abdomen/GI:  Bowel sounds normal, Soft, No tenderness, No masses, No pulsatile masses. Musculoskeletal:  Intact distal pulses, No edema, No tenderness, No cyanosis, No clubbing. Good range of motion in all major joints. No tenderness to palpation or major deformities noted. Back- No tenderness. Integument:  Warm, Dry, No erythema, No rash. Lymphatic:  No lymphadenopathy noted. Neurologic:  Alert & oriented x 3, Normal motor function, Normal sensory function, No focal deficits noted.    Psychiatric:  Affect  and  Mood :no change    Medications:    ceFAZolin  2,000 mg IntraVENous Q8H    ferrous sulfate  325 mg Oral Daily with breakfast    cetirizine  10 mg Oral Daily    lisinopril  40 mg Oral Daily    amLODIPine  10 mg Oral Daily    pantoprazole  40 mg Oral QAM AC    triamterene-hydroCHLOROthiazide  2 tablet Oral Daily    cyanocobalamin  1,000 mcg Oral Daily    pregabalin  150 mg Oral BID    atorvastatin  40 mg Oral Daily    enoxaparin  40 mg SubCUTAneous Daily    aspirin  81 mg Oral Daily    Or    aspirin  300 mg Rectal Daily    sodium chloride flush  5-40 mL IntraVENous BID       acetaminophen, ondansetron **OR** ondansetron, polyethylene glycol, labetalol    Lab Data:  CBC:   Recent Labs     01/21/22  1034 01/23/22  0948   WBC 5.8 4.2   HGB 10.2* 10.7*   HCT 32.1* 33.4*   MCV 93.0 92.0    172     BMP:   Recent Labs     01/21/22  1034 01/22/22  0724 01/23/22  0948     --  134*   K 4.1  --  4.2     --  101   CO2 25  --  27   PHOS 3.0  --  3.2   BUN 11  --  12   CREATININE 0.9 0.8 1.0     PT/INR: No results for input(s): PROTIME, INR in the last 72 hours. BNP:  No results for input(s): PROBNP in the last 72 hours. TROPONIN: No results for input(s): TROPONINT in the last 72 hours. ECHO :   echocardiogram    Assessment:  80 y. o.year old who is admitted for  Chief Complaint   Patient presents with    Aphasia     last known well of last night    , active issues as noted below:  Impression:  Principal Problem:    TIA (transient ischemic attack)  Active Problems:    Bradycardia    Coagulase negative Staphylococcus bacteremia  Resolved Problems:    * No resolved hospital problems. *            All labs, medications and tests reviewed by myself , continue all other medications of all above medical condition listed as is except for changes mentioned above. Thank you very much for consult , please call with questions.     Misty Grande MD, MD 1/23/2022 4:25 PM

## 2022-01-24 ENCOUNTER — APPOINTMENT (OUTPATIENT)
Dept: NUCLEAR MEDICINE | Age: 87
DRG: 067 | End: 2022-01-24
Payer: MEDICARE

## 2022-01-24 VITALS
WEIGHT: 158 LBS | TEMPERATURE: 97.5 F | BODY MASS INDEX: 23.4 KG/M2 | DIASTOLIC BLOOD PRESSURE: 44 MMHG | HEIGHT: 69 IN | HEART RATE: 52 BPM | RESPIRATION RATE: 20 BRPM | SYSTOLIC BLOOD PRESSURE: 136 MMHG | OXYGEN SATURATION: 97 %

## 2022-01-24 LAB
CULTURE: NORMAL
HIGH SENSITIVE C-REACTIVE PROTEIN: 3.3 MG/L
HIGH SENSITIVE C-REACTIVE PROTEIN: 7.7 MG/L
LV EF: 74 %
LVEF MODALITY: NORMAL
Lab: NORMAL
PROCALCITONIN: 0.06
SPECIMEN: NORMAL

## 2022-01-24 PROCEDURE — 86141 C-REACTIVE PROTEIN HS: CPT

## 2022-01-24 PROCEDURE — 78452 HT MUSCLE IMAGE SPECT MULT: CPT

## 2022-01-24 PROCEDURE — 6360000002 HC RX W HCPCS: Performed by: INTERNAL MEDICINE

## 2022-01-24 PROCEDURE — A9500 TC99M SESTAMIBI: HCPCS | Performed by: INTERNAL MEDICINE

## 2022-01-24 PROCEDURE — 99233 SBSQ HOSP IP/OBS HIGH 50: CPT | Performed by: INTERNAL MEDICINE

## 2022-01-24 PROCEDURE — 93017 CV STRESS TEST TRACING ONLY: CPT

## 2022-01-24 PROCEDURE — 36415 COLL VENOUS BLD VENIPUNCTURE: CPT

## 2022-01-24 PROCEDURE — 6370000000 HC RX 637 (ALT 250 FOR IP): Performed by: STUDENT IN AN ORGANIZED HEALTH CARE EDUCATION/TRAINING PROGRAM

## 2022-01-24 PROCEDURE — 99232 SBSQ HOSP IP/OBS MODERATE 35: CPT | Performed by: NURSE PRACTITIONER

## 2022-01-24 PROCEDURE — 6360000002 HC RX W HCPCS: Performed by: STUDENT IN AN ORGANIZED HEALTH CARE EDUCATION/TRAINING PROGRAM

## 2022-01-24 PROCEDURE — 3430000000 HC RX DIAGNOSTIC RADIOPHARMACEUTICAL: Performed by: INTERNAL MEDICINE

## 2022-01-24 PROCEDURE — APPSS60 APP SPLIT SHARED TIME 46-60 MINUTES: Performed by: NURSE PRACTITIONER

## 2022-01-24 PROCEDURE — 94761 N-INVAS EAR/PLS OXIMETRY MLT: CPT

## 2022-01-24 RX ORDER — ASPIRIN 81 MG/1
81 TABLET ORAL DAILY
Qty: 30 TABLET | Refills: 3 | Status: SHIPPED | OUTPATIENT
Start: 2022-01-25

## 2022-01-24 RX ADMIN — ENOXAPARIN SODIUM 40 MG: 100 INJECTION SUBCUTANEOUS at 11:03

## 2022-01-24 RX ADMIN — FERROUS SULFATE TAB 325 MG (65 MG ELEMENTAL FE) 325 MG: 325 (65 FE) TAB at 11:03

## 2022-01-24 RX ADMIN — CYANOCOBALAMIN TAB 1000 MCG 1000 MCG: 1000 TAB at 11:03

## 2022-01-24 RX ADMIN — ASPIRIN 81 MG: 81 TABLET, COATED ORAL at 11:03

## 2022-01-24 RX ADMIN — CETIRIZINE HYDROCHLORIDE 10 MG: 10 TABLET, FILM COATED ORAL at 11:03

## 2022-01-24 RX ADMIN — KIT FOR THE PREPARATION OF TECHNETIUM TC99M SESTAMIBI 10 MILLICURIE: 1 INJECTION, POWDER, LYOPHILIZED, FOR SOLUTION PARENTERAL at 10:42

## 2022-01-24 RX ADMIN — PREGABALIN 150 MG: 75 CAPSULE ORAL at 11:03

## 2022-01-24 RX ADMIN — KIT FOR THE PREPARATION OF TECHNETIUM TC99M SESTAMIBI 30 MILLICURIE: 1 INJECTION, POWDER, LYOPHILIZED, FOR SOLUTION PARENTERAL at 10:42

## 2022-01-24 RX ADMIN — CEFAZOLIN SODIUM 2000 MG: 2 INJECTION, SOLUTION INTRAVENOUS at 05:45

## 2022-01-24 RX ADMIN — REGADENOSON 0.4 MG: 0.08 INJECTION, SOLUTION INTRAVENOUS at 08:55

## 2022-01-24 RX ADMIN — ATORVASTATIN CALCIUM 40 MG: 40 TABLET, FILM COATED ORAL at 11:03

## 2022-01-24 ASSESSMENT — ENCOUNTER SYMPTOMS: SHORTNESS OF BREATH: 0

## 2022-01-24 ASSESSMENT — PAIN SCALES - GENERAL: PAINLEVEL_OUTOF10: 0

## 2022-01-24 NOTE — PROGRESS NOTES
Cardiology Progress Note     Today's Plan: treadmill stress test    Admit Date:  1/17/2022    Consult reason/ Seen today for: bradycardia     Subjective and  Overnight Events:  Patient still having some dizziness on ambulation. History of Presenting Illness:    Chief complain on admission : 80 y. o.year old who is admitted for  Chief Complaint   Patient presents with    Aphasia     last known well of last night        Past medical history:    has a past medical history of GERD (gastroesophageal reflux disease), H/O echocardiogram, H/O echocardiogram, Hyperlipidemia, Hypertension, and Neuropathy. Past surgical history:   has a past surgical history that includes Spine surgery (2013); Colon surgery; Hysterectomy, total abdominal; and Appendectomy. Social History:   reports that she has never smoked. She has never used smokeless tobacco.  Family history:  family history includes Hypertension in her father and mother. Allergies   Allergen Reactions    Penicillins        Review of Systems:  Review of Systems   Respiratory: Negative for shortness of breath. Cardiovascular: Negative for chest pain, palpitations and leg swelling. Musculoskeletal: Negative. Skin: Negative. Neurological: Negative for dizziness and weakness. All other systems reviewed and are negative. BP (!) 133/46   Pulse 50   Temp 97.9 °F (36.6 °C) (Oral)   Resp 19   Ht 5' 9\" (1.753 m)   Wt 158 lb (71.7 kg)   SpO2 97%   BMI 23.33 kg/m²       Intake/Output Summary (Last 24 hours) at 1/24/2022 1045  Last data filed at 1/23/2022 2145  Gross per 24 hour   Intake 10 ml   Output --   Net 10 ml       Physical Exam:  Physical Exam  Constitutional:       Appearance: She is well-developed. Cardiovascular:      Rate and Rhythm: Regular rhythm. Bradycardia present. Pulses: Intact distal pulses.            Dorsalis pedis pulses are 2+ on the right side and 2+ on the left side. Posterior tibial pulses are 2+ on the right side and 2+ on the left side. Heart sounds: Normal heart sounds, S1 normal and S2 normal.   Pulmonary:      Effort: Pulmonary effort is normal.      Breath sounds: Normal breath sounds. Musculoskeletal:         General: Normal range of motion. Skin:     General: Skin is warm and dry. Neurological:      Mental Status: She is alert and oriented to person, place, and time. Medications:    ceFAZolin  2,000 mg IntraVENous Q8H    ferrous sulfate  325 mg Oral Daily with breakfast    cetirizine  10 mg Oral Daily    lisinopril  40 mg Oral Daily    amLODIPine  10 mg Oral Daily    pantoprazole  40 mg Oral QAM AC    triamterene-hydroCHLOROthiazide  2 tablet Oral Daily    cyanocobalamin  1,000 mcg Oral Daily    pregabalin  150 mg Oral BID    atorvastatin  40 mg Oral Daily    enoxaparin  40 mg SubCUTAneous Daily    aspirin  81 mg Oral Daily    Or    aspirin  300 mg Rectal Daily    sodium chloride flush  5-40 mL IntraVENous BID       acetaminophen, ondansetron **OR** ondansetron, polyethylene glycol    Lab Data:  CBC:   Recent Labs     01/23/22  0948   WBC 4.2   HGB 10.7*   HCT 33.4*   MCV 92.0        BMP:   Recent Labs     01/22/22  0724 01/23/22  0948   NA  --  134*   K  --  4.2   CL  --  101   CO2  --  27   PHOS  --  3.2   BUN  --  12   CREATININE 0.8 1.0     PT/INR: No results for input(s): PROTIME, INR in the last 72 hours. BNP:  No results for input(s): PROBNP in the last 72 hours. TROPONIN: No results for input(s): TROPONINT in the last 72 hours. Impression:  Principal Problem:    TIA (transient ischemic attack)  Active Problems:    Bradycardia    Coagulase negative Staphylococcus bacteremia  Resolved Problems:    * No resolved hospital problems.  *       All labs, medications and tests reviewed by myself, continue all other medications of all above medical condition listed as is except for changes mentioned above. Thank you   Please call with questions. Electronically signed by [address] [place] [address]. RomaARABELLA Zabala - CNP on 1/24/2022 at 10:45 AM     Assessment / Plan / Recommendation:           CARDIOLOGY ATTENDING ADDENDUM    I have seen, spoken to and examined this patient personally, independently of the NP/PAC. I have reviewed the hospital care given to date and reviewed all pertinent labs and imaging. The plan was developed mutually at the time of the visit with the patient,  NP/PAC and myself. I have spoken with patient, nursing staff and provided written and verbal instructions . The above note has been reviewed. I spent substantive amount of time in formulating patient care. No acute events overnight  No CP   No dyspnea       Physical Exam:    General:   Awake, alert  Head:normal  Eye:normal  Chest:   Clear to auscultation  Basilar crackles   Cardiovascular:  S1S2   Abdomen: soft   Extremities:   edema  Pulses; palpable      MEDICAL DECISION MAKING :      1. Bradycardia with junctional beats? Get EKG. Rate 40-50's. Please avoid rate lowing medications. Plan was to get exercise stress test to assess for chronotropic competence however patient could not exercise and underwent Lexiscan instead. Stress test was negative for ischemia. 30-day event monitor upon discharge      2. TIA vs CVA: no acute abnormalities on MRI. .    3. Carotid stenosis: ISHAN 80 % stenosis. CTS recommends OP CEA. 4. HTN:controlled, continue Norvasc 10 mg and lisinopril 40 mg , can titrate accordingly     5. Dyslipidemia: continue statins    6.  DVT prophylaxis if not contraindicated

## 2022-01-24 NOTE — PROGRESS NOTES
Infectious Disease Progress Note  2022   Patient Name: Donzetta Severance : 1933   Impression  · Staphylococcus hominis Bacteremia:  § Afebrile, no leukocytosis  § -Blood cultures 2/2 Staphylococcus hominis   § -Blood cultures 0/2-NGTD     · Acute Encephalopathy: Resolved     ? HTN     ? Allergy to PCN     ? Bradycardia:  ? Dr. Hernan Aleman onboard  ? -treadmill stress test pending     ? GERD     · Multi-morbidity: per PMHx:  GERD, HLD, HTN, neuropathy, allergy to PCN, hyster, appey     Plan:  · DC IV vancomycin and cefepime, she has had 6 days of ABX therapy  · Reviewed repeat BC, 0/2-NGTD  · Will sign off and not follow the patient actively, please reconsult as needed. Ongoing Antimicrobial Therapy  DCd   Completed Antimicrobial Therapy  Vancomycin -  Cefazolin   ? History:? Interval history noted. Chief complaint: Staphylococcus hominis bacteremia. Denies n/v/d/f or untoward effects of antibiotics. Resting quietly. Physical Exam:  Vital Signs: BP (!) 133/46   Pulse 50   Temp 97.9 °F (36.6 °C) (Oral)   Resp 19   Ht 5' 9\" (1.753 m)   Wt 158 lb (71.7 kg)   SpO2 97%   BMI 23.33 kg/m²     Gen: Resting quietly, alert, no distress  HEMT: AT/NC Oropharynx pink, moist, and without lesions or exudates; dentition in good state of repair  Eyes: PERRLA, EOMI, conjunctiva pink, sclera anicteric. Neck: Supple. Trachea midline. No LAD. Chest: no distress and CTA. Good air movement. Room air. Heart: Sinus Bradycardia and no MRG. Abd: soft, non-distended, no tenderness, no hepatomegaly. Normoactive bowel sounds. Ext: no clubbing, cyanosis, or edema  Neuro: Mental status intact. CN 2-12 intact and no focal sensory or motor deficits     Radiologic / Imaging / TESTING  22 XR Chest Portable:  Impression   1. No acute cardiopulmonary process identified.      22 CT Head WO Contrast:  Impression   No acute intracranial abnormality.       Mild cerebral atrophy.

## 2022-01-24 NOTE — PROGRESS NOTES
Comprehensive Nutrition Assessment    Type and Reason for Visit:  Initial (los 7 d)    Nutrition Recommendations/Plan:   · Continue current diet    Nutrition Assessment:  Pt was admitted with confusion and aphasia from home, TIA. H/O HTN, GERD. Is feeding self and eating well during my room visit, consuming greater than half to all of recent meals. Denies need for oral supplement. Some mild recent wt loss noted. Will continue to follow as moderate nutrition risk. Malnutrition Assessment:  Malnutrition Status: At risk for malnutrition   Context:  Acute Illness       Estimated Daily Nutrient Needs:  Energy (kcal):  7580-0866 (25-30 kcal/kg IBW); Weight Used for Energy Requirements:  Ideal     Protein (g):  66-79 (1-1.2 g/kg IBW);  Weight Used for Protein Requirements:  Ideal        Fluid (ml/day):  2518-5791; Method Used for Fluid Requirements:  1 ml/kcal      Wounds:  None       Current Nutrition Therapies:    ADULT DIET; Easy to Chew    Anthropometric Measures:  · Height: 5' 9\" (175.3 cm)  · Current Body Weight: 158 lb (71.7 kg)   · Admission Body Weight: 158 lb 4.8 oz (71.8 kg)    · Usual Body Weight: 163 lb 3.2 oz (74 kg) (11/18/21)     · Ideal Body Weight: 145 lbs; % Ideal Body Weight 109 %   · BMI: 23.3  · BMI Categories: Normal Weight (BMI 22.0 to 24.9) age over 72       Nutrition Diagnosis:   · Predicted inadequate energy intake related to cognitive or neurological impairment as evidenced by weight loss    Nutrition Interventions:   Food and/or Nutrient Delivery:  Continue Current Diet  Nutrition Education/Counseling:  Education initiated   Coordination of Nutrition Care:  Continue to monitor while inpatient    Goals:  Pt will continue to consume at least half of meals and supplements       Nutrition Monitoring and Evaluation:   Behavioral-Environmental Outcomes:  None Identified   Food/Nutrient Intake Outcomes:  Food and Nutrient Intake  Physical Signs/Symptoms Outcomes:  Biochemical Data,Chewing or Swallowing,GI Status,Fluid Status or Edema,Meal Time Behavior,Nutrition Focused Physical Findings,Weight     Discharge Planning:    No discharge needs at this time     Electronically signed by Linsey Sheikh RD, LD on 1/24/22 at 1:18 PM EST    Contact: [phone]

## 2022-01-24 NOTE — CONSULTS
Consult completed. Nexiva 20g 1.00 inch catheter inserted in patient's RH. Brisk blood return noted and catheter flushes with ease. Patient tolerated well. Consult IV/PICC team if patient's needs change.

## 2022-01-24 NOTE — PLAN OF CARE
Nutrition Problem #1: Predicted inadequate energy intake  Intervention: Food and/or Nutrient Delivery: Continue Current Diet  Nutritional Goals: Pt will continue to consume at least half of meals and supplements

## 2022-01-24 NOTE — DISCHARGE SUMMARY
Discharge Summary    Name:  Stefan Herrera /Age/Sex: 1933  (80 y.o. female)   MRN & CSN:  [id number] & [id number] Admission Date/Time: 2022  6:54 PM   Attending:  Raymond Aguilar MD Discharging Physician: ARABELLA Pitts - [facility] Course:   Stefan Herrera is a 80 y.o.  female  who presents with TIA (transient ischemic attack)     TIA vs CVA  Ct head () WNL   CTA head/neck () severe atherosclerotic calcified plaque at the level of the right cervical ICA with severe high grade stenosis greater than 80%   Cardiothoracic surgery consulted~rec's for outpatient right endarterectomy   MR Brain () no acute abnormality, no acute infarct. Minimal global parenchymal volume loss with mild chronic microvascular ischemic changes. Echo () shows an EF of 55-60% with mild aortic stenosis, mitral annular calcification, mild to moderate tricuspid regurgitation and a negative bubble study. Stress test ( negative for ischemia~ok to DC home per Tiffany Bhatt, follow up in office, out patient holter monitor   Consult Pt/Ot & speech~rec's for [address] [facility]~signed off ~follow outpatient      2. Metabolic encephalopathy~resolved        Bacteremia~staphylococcus hominis        1. Ct as above       2. MRI as above       3. Neurology~follow up outpatient~signed off         4. Labs WNL~ no elevated WBC        5. Blood culture pos 2 of 2 sets       6. Urine CX neg         7. Vancomycin started on ~DC        8. Consult ID~repeat CX neg, can DC vancomycin and cefepime ()        3.   Elevated Troponin        Bradycardia         1. Troponin () 0.030, 0.063 & 0.060        2. Has been bradycardic~43-70~usually in the 40's and 50's         2. Consult cardiology~stress test ordered, has junctional beats       4.   Essential Hypertension       1. Continue norvasc and lisinopril        2. Monitor      5. Hyperlipidemia        1. Lipid WNL        2.  Continue statin therapy      6. GERD       1. Continue protonix       The patient expressed appropriate understanding of and agreement with the discharge recommendations, medications, and plan. Consults this admission:  IP CONSULT TO HOSPITALIST  IP CONSULT TO NEUROLOGY  IP CONSULT TO VASCULAR SURGERY  PHARMACY TO DOSE VANCOMYCIN  IP CONSULT TO IV TEAM  IP CONSULT TO CARDIOLOGY  IP CONSULT TO INFECTIOUS DISEASES  IP CONSULT TO IV TEAM    Discharge Instruction:   Follow up appointments: cardiology and neurology  Primary care physician:  within 2 weeks    Diet:  cardiac diet   Activity: activity as tolerated  Disposition: Discharged to:   [x]Home, [][facility], []SNF, []Acute Rehab, []Hospice   Condition on discharge: Stable    Discharge Medications:        Medication List      START taking these medications    aspirin 81 MG EC tablet  Take 1 tablet by mouth daily  Start taking on: January 25, 2022        CONTINUE taking these medications    amLODIPine 10 MG tablet  Commonly known as: NORVASC  take 1 tablet by mouth once daily     atorvastatin 20 MG tablet  Commonly known as: LIPITOR  take 1 tablet by mouth once daily     benazepril 40 MG tablet  Commonly known as: LOTENSIN  TAKE 1 TABLET EVERY DAY     cyanocobalamin 1000 MCG tablet  Take 1 tablet by mouth daily     ferrous sulfate 325 (65 Fe) MG tablet  Commonly known as: IRON 325  Take 1 tablet by mouth daily (with breakfast)     loratadine 10 MG tablet  Commonly known as: CLARITIN  TAKE 1 TABLET EVERY DAY     omeprazole 20 MG delayed release capsule  Commonly known as: PRILOSEC  Take 1 capsule by mouth daily     pregabalin 150 MG capsule  Commonly known as: LYRICA  Take 1 capsule by mouth 2 times daily for 90 days.      triamterene-hydroCHLOROthiazide 75-50 MG per tablet  Commonly known as: MAXZIDE  Take 1 tablet by mouth daily           Where to Get Your Medications      These medications were sent to [address] [place] [address] [address]    Phone: [phone]   aspirin 81 MG EC tablet         Objective Findings at Discharge:   BP (!) 136/44   Pulse 52   Temp 97.5 °F (36.4 °C) (Oral)   Resp 20   Ht 5' 9\" (1.753 m)   Wt 158 lb (71.7 kg)   SpO2 97%   BMI 23.33 kg/m²            PHYSICAL EXAM   GEN Awake female, sitting upright in bed in no apparent distress. Appears given age. EYES Pupils are equally round. No scleral erythema, discharge, or conjunctivitis. HENT Mucous membranes are moist. Oral pharynx without exudates, no evidence of thrush. NECK Supple, no apparent thyromegaly or masses. RESP Clear to auscultation, no wheezes, rales or rhonchi. Symmetric chest movement while on room air. CARDIO/VASC S1/S2 auscultated. Regular rate without appreciable murmurs, rubs, or gallops. No JVD or carotid bruits. Peripheral pulses equal bilaterally and palpable. No peripheral edema. GI Abdomen is soft without significant tenderness, masses, or guarding. Bowel sounds are normoactive. Rectal exam deferred.  No costovertebral angle tenderness. Swift catheter is not present. HEME/LYMPH No palpable cervical lymphadenopathy and no hepatosplenomegaly. No petechiae or ecchymoses. MSK No gross joint deformities. SKIN Normal coloration, warm, dry. NEURO Cranial nerves appear grossly intact, normal speech, no lateralizing weakness. PSYCH Awake, alert, oriented x 4. Affect appropriate.     BMP/CBC  Recent Labs     01/22/22  0724 01/23/22  0948   NA  --  134*   K  --  4.2   CL  --  101   CO2  --  27   BUN  --  12   CREATININE 0.8 1.0   WBC  --  4.2   HCT  --  33.4*   PLT  --  172       IMAGING:  Results reviewed      Discharge Time of 35> minutes    Electronically signed by ARABELLA Ballard CNP on 1/24/2022 at 2:16 PM

## 2022-01-25 ENCOUNTER — TELEPHONE (OUTPATIENT)
Dept: INTERNAL MEDICINE CLINIC | Age: 87
End: 2022-01-25

## 2022-01-25 ENCOUNTER — CARE COORDINATION (OUTPATIENT)
Dept: CASE MANAGEMENT | Age: 87
End: 2022-01-25

## 2022-01-25 DIAGNOSIS — G45.9 TIA (TRANSIENT ISCHEMIC ATTACK): Primary | ICD-10-CM

## 2022-01-25 LAB
EKG ATRIAL RATE: 49 BPM
EKG DIAGNOSIS: NORMAL
EKG P AXIS: 55 DEGREES
EKG P-R INTERVAL: 174 MS
EKG Q-T INTERVAL: 438 MS
EKG QRS DURATION: 100 MS
EKG QTC CALCULATION (BAZETT): 395 MS
EKG R AXIS: 46 DEGREES
EKG T AXIS: 36 DEGREES
EKG VENTRICULAR RATE: 49 BPM

## 2022-01-25 PROCEDURE — 1111F DSCHRG MED/CURRENT MED MERGE: CPT | Performed by: INTERNAL MEDICINE

## 2022-01-25 PROCEDURE — 93010 ELECTROCARDIOGRAM REPORT: CPT | Performed by: INTERNAL MEDICINE

## 2022-01-25 NOTE — CARE COORDINATION
Dominique 45 Transitions Initial Follow Up Call    Call within 2 business days of discharge: Yes    Patient: [place] Patient : 1933    MRN: <[id number]>  Reason for Admission: TIA (transient ischemic attack)  Discharge Date: 22 RARS: Readmission Risk Score: 11.7 ( )      Last Discharge [facility]       Complaint Diagnosis Description Type Department Provider    22 Aphasia Altered mental status, unspecified altered mental status type . .. ED to Hosp-Admission (Discharged) (ADMITTED) Lucretia Street MD; Yayo Xiao,... Transitions of Care Initial Call:  Explained the role of Care Transition Nurse and the Transition program, patient is agreeable to follow up calls Post discharge from the [place] [address] and spoke to Patient. Patient states she is weak and uses walker to ambulate at this time. Has poor balance at this time. Patient states she is \"slow\" but is up. Denies weakness either side, pain, numbness, swallowing difficulties, incontinence, memory problems. Patient is alert and oriented to self, place, and time. Denies slurred speech and is able to move all extremities. Reviewed medications with Patient  Confirmed Patient obtained and is taking medications as directed. There are no questions concerning medications at this time. Medication education provided needed, questions answered, verbalizes understanding. Stressed importance of medication compliance. Advised contacting Pharmacy/MD for refill needs. Expresses understanding. 1111F Medication Reconciliation completed. Routed to PCP. Patient has fair appetite and is drinking adequate fluids. Normal bladder and bowel elimination patterns. Offered patient assistance making PCP, cardiology and Neurology appointments,  patient Declined. Patient states she is able and will call later today to make appointments. Instructed patient that PCP will need to be seen within 7 days of discharge. Expresses understanding. Explained the Transition to Home Program to patient. Patient is called after discharge by CTN to make sure all needs are met and to see if patient has any questions or problems. Expresses understanding. Patient is aware of when to contact MD with any new or worsening symptoms. Advised to contact PCP / with any health concerns for early outpatient intervention in an effort to avoid hospitalization. Report any worsening symptoms to PCP and/or Call 911 and/or GO TO  EMERGENCY ROOM if symptoms are severe. Expresses understanding. Will continue to follow. Quita Malcolm LPN    [phone]  Erica Willingham / Dominique Lizarraga Coordinator    Was this an external facility discharge? No Discharge Facility: [facility]    Challenges to be reviewed by the provider   Additional needs identified to be addressed with provider Yes  none             Method of communication with provider : none      Advance Care Planning:   Does patient have an Advance Directive:  reviewed and current. Was this a readmission? No   Patient stated reason for admission: TIA (transient ischemic attack)  Patients top risk factors for readmission: functional physical ability  lack of knowledge about disease  medical condition-TIA (transient ischemic attack)  medication management    Care Transition Nurse (CTN) contacted the patient by telephone to perform post hospital discharge assessment. Verified name and  with patient as identifiers. Provided introduction to self, and explanation of the CTN role. CTN reviewed discharge instructions, medical action plan and red flags with patient who verbalized understanding. Patient given an opportunity to ask questions and does not have any further questions or concerns at this time. Were discharge instructions available to patient? Yes.      Reviewed appropriate site of care based on symptoms and resources available to patient including: PCP, Specialist, Home health, When to call 911 and [address]. The patient agrees to contact the PCP office for questions related to their healthcare. Medication reconciliation was performed with patient, who verbalizes understanding of administration of home medications. Advised obtaining a 90-day supply of all daily and as-needed medications. Reviewed and educated patient on any new and changed medications related to discharge diagnosis     Was patient discharged with a pulse oximeter? No If Yes - Discussed and confirmed pulse oximeter discharge instructions and when to notify provider or seek emergency care. LPN CC provided contact information. Plan for follow-up call in 5-7 days based on severity of symptoms and risk factors.        Non-face-to-face services provided:  Obtained and reviewed discharge summary and/or continuity of care documents    Care Transitions 24 Hour Call    Schedule Follow Up Appointment with PCP: Declined  Do you have any ongoing symptoms?: Yes  Patient-reported symptoms: Weakness (Comment: Poor Balance)  Do you have a copy of your discharge instructions?: Yes  Do you have all of your prescriptions and are they filled?: Yes  Have you been contacted by a Wilfredo Collier Pharmacist?: No  Have you scheduled your follow up appointment?: No  Were you discharged with any Home Care or Post Acute Services: No  Do you feel like you have everything you need to keep you well at home?: Yes  Care Transitions Interventions  No Identified Needs         Follow Up  Future Appointments   Date Time Provider Sosa Paredes   2/28/2022  2:00 PM MD KOLTON DominguezIELD ALVINO Connor LPN

## 2022-01-25 NOTE — PROGRESS NOTES
Physician Progress Note      PATIENT:               Joya Lott  CSN #:                  [id number]  :                       1933  ADMIT DATE:       2022 6:54 PM  [address] [place] DATE:        2022 7:06 PM  RESPONDING  PROVIDER #:        Lu Eastman          QUERY TEXT:    Pt admitted with TIA. Pt noted to have severe high-grade right cervical ICA   stenosis. If possible, please document in progress notes and discharge summary   if you are evaluating and /or treating any of the following: The medical record reflects the following:  Risk Factors: Age, HTN  Clinical Indicators: Dr. Javon Victor in  consult note \"Dizziness and dysarthria   possibly due to TIA vs acute intracranial ischemia secondary to hypokalemia,   hypomagnesia. \", CTA head/neck on  \"right cervical ICA with severe   high-grade stenosis greater than 80%. \", MRI brain on  \"No acute   intracranial abnormality. No acute infarct. \" Dr. Candida Millan in  consult note   \"will need R carotid endarterectomy soon. \"  Treatment: Head/neck CTA, brain MRI, CTS and neurology consult. Thank you,  Viet Esteban RN, [facility]    Options provided:  -- TIA symptoms due to Cerebral Artery Occlusion/Stenosis (without Infarction)  -- TIA symptoms due to hypokalemia, hypomagnesia  -- TIA symptoms due to ## please specify, please specify. -- Other - I will add my own diagnosis  -- Disagree - Not applicable / Not valid  -- Disagree - Clinically unable to determine / Unknown  -- Refer to Clinical Documentation Reviewer    PROVIDER RESPONSE TEXT:    TIA symptoms are due to cerebral artery occlusion/stenosis (without   Infarction).     Query created by: Velma Boogie on 2022 11:38 AM      Electronically signed by:  Lu Eastman 2022 7:13 AM

## 2022-01-25 NOTE — TELEPHONE ENCOUNTER
Dominique 45 Transitions Initial Follow Up Call    Outreach made within 2 business days of discharge: Yes    Patient: [place] Patient : 1933   MRN: [id number]  Reason for Admission: There are no discharge diagnoses documented for the most recent discharge. Discharge Date: 22       Spoke with: Patient    Discharge department/facility: Psychiatric    TCM Interactive Patient Contact:  Was patient able to fill all prescriptions: Yes  Was patient instructed to bring all medications to the follow-up visit: Yes  Is patient taking all medications as directed in the discharge summary? Yes  Does patient understand their discharge instructions: Yes  Does patient have questions or concerns that need addressed prior to 7-14 day follow up office visit: no    Patient did not want to schedule with us sooner. She is scheduled 22. She has several other appointments to go to and transportation is tuff.      Scheduled appointment with PCP within 7-14 days    Follow Up  Future Appointments   Date Time Provider Sosa Paredes   2/3/2022  3:00 PM Bossman Thompson MD [facility]   2022  2:00 PM MD Clary Felix [address]

## 2022-01-26 LAB
CULTURE: NORMAL
Lab: NORMAL
SPECIMEN: NORMAL

## 2022-01-31 ENCOUNTER — OFFICE VISIT (OUTPATIENT)
Dept: NEUROLOGY | Age: 87
End: 2022-01-31
Payer: MEDICARE

## 2022-01-31 VITALS
BODY MASS INDEX: 23.4 KG/M2 | HEIGHT: 69 IN | SYSTOLIC BLOOD PRESSURE: 120 MMHG | DIASTOLIC BLOOD PRESSURE: 60 MMHG | WEIGHT: 158 LBS

## 2022-01-31 DIAGNOSIS — I65.21 INTERNAL CAROTID ARTERY STENOSIS, RIGHT: Primary | ICD-10-CM

## 2022-01-31 DIAGNOSIS — R47.01 APHASIA: ICD-10-CM

## 2022-01-31 PROCEDURE — 1123F ACP DISCUSS/DSCN MKR DOCD: CPT | Performed by: PHYSICIAN ASSISTANT

## 2022-01-31 PROCEDURE — G8484 FLU IMMUNIZE NO ADMIN: HCPCS | Performed by: PHYSICIAN ASSISTANT

## 2022-01-31 PROCEDURE — 1036F TOBACCO NON-USER: CPT | Performed by: PHYSICIAN ASSISTANT

## 2022-01-31 PROCEDURE — 1111F DSCHRG MED/CURRENT MED MERGE: CPT | Performed by: PHYSICIAN ASSISTANT

## 2022-01-31 PROCEDURE — G8420 CALC BMI NORM PARAMETERS: HCPCS | Performed by: PHYSICIAN ASSISTANT

## 2022-01-31 PROCEDURE — 1090F PRES/ABSN URINE INCON ASSESS: CPT | Performed by: PHYSICIAN ASSISTANT

## 2022-01-31 PROCEDURE — G8427 DOCREV CUR MEDS BY ELIG CLIN: HCPCS | Performed by: PHYSICIAN ASSISTANT

## 2022-01-31 PROCEDURE — 99213 OFFICE O/P EST LOW 20 MIN: CPT | Performed by: PHYSICIAN ASSISTANT

## 2022-01-31 PROCEDURE — 4040F PNEUMOC VAC/ADMIN/RCVD: CPT | Performed by: PHYSICIAN ASSISTANT

## 2022-01-31 NOTE — PROGRESS NOTES
1/31/22    [place] SUKHDEEP Xiong  2/20/1933    Chief Complaint   Patient presents with    Follow-Up from Hospital     TIA       History of [address] Sally Orozco is a 80 y.o. female presenting to [facility] office as a hospital follow up. Patient was evaluated 01/18/22 at [facility] by Dr. Geno Charles for symptoms of aphasia and word finding difficulties secondary to possible TIA or hypo perfusion secondary to right cervical ICA with severe stenosis indicated on CTA head and neck. Vascular surgery recommending Right carotid endarterectomy with Dr. Socorro Gordillo. MRI head was non acute with minimal global parenchymal volume loss. She was continued on secondary stroke prevention with ASA and Statin therapy daily. She has past medical history of GERD, HTN, HLD and neuropathy. On exam today, she denies any further speech disturbance or stroke like symptoms since discharge. She continues on secondary stroke prevention, saying she is on high dose ASA but chart still says 81 mg, from neurology perspective the lower dose is sufficient. She has follow up with cardiology and PCP scheduled within the next few weeks. She has not followed up outpatient with cardiothoracic surgery, Dr. Socorro Gordillo as instructed in consultation, saying our office was the only information she had received. We called their office to ensure another referral was not required and they scheduled her for an appointment. We provided their address and phone number for follow up. She denied any headache, dysphagia, some numbness in b/l feel secondary to history of neuropathy. She denies any falls and says she uses her walker when out of the house or at night.        Current Outpatient Medications   Medication Sig Dispense Refill    aspirin 81 MG EC tablet Take 1 tablet by mouth daily 30 tablet 3    amLODIPine (NORVASC) 10 MG tablet take 1 tablet by mouth once daily 90 tablet 1    atorvastatin (LIPITOR) 20 MG tablet take 1 tablet by mouth once daily 90 tablet 1    omeprazole (PRILOSEC) 20 MG delayed release capsule Take 1 capsule by mouth daily 90 capsule 1    triamterene-hydroCHLOROthiazide (MAXZIDE) 75-50 MG per tablet Take 1 tablet by mouth daily 90 tablet 1    cyanocobalamin 1000 MCG tablet Take 1 tablet by mouth daily 90 tablet 1    pregabalin (LYRICA) 150 MG capsule Take 1 capsule by mouth 2 times daily for 90 days. 180 capsule 0    benazepril (LOTENSIN) 40 MG tablet TAKE 1 TABLET EVERY DAY 90 tablet 1    loratadine (CLARITIN) 10 MG tablet TAKE 1 TABLET EVERY DAY 90 tablet 1    ferrous sulfate (IRON 325) 325 (65 Fe) MG tablet Take 1 tablet by mouth daily (with breakfast) 90 tablet 1     No current facility-administered medications for this visit.        Physical Exam:    Mental Status   Orientation: oriented to person, oriented to place, oriented to problem and oriented to time    Mood/affectappropriate mood and appropriate affect   Memory/Other: recent memory intact, remote memory intact, fund of knowledge intact, attention span normal and concentration normal  Language  Language: (normal) language, no dysarthria, (normal) articulation and no dysphasia/aphasia  Cranial Nerves   Eyes: pupils normal size and reactive to light and visual fields appear full   CN III, IV, VI : extraocular muscle strength normal, normal pursuit, no nystagmus and no ptosis   Facial Motor: normal facial motor   CN XII: tongue protrudes midline  Motor/Coordination Exam   Power: motor strength appears intact throughout, no arm drift and normal tone 4/5 strength in UE/LE bilaterally   Coordination: normal finger-to-nose and forearm rotation intact  Sensory Exam No Bradykinesia, No Dyskindesia, No Tremor, No myoclonus, Normal strength, Normal Tone Normal bulk and Normal tone     Gait and Stance   Gait/Posture: casual gait normal and needs assistance to walk uses a 2 wheel walker when out of the house or when ambulating at night        /60 (Site: Right Upper Arm, Position: Sitting, Cuff Size: Medium Adult)   Ht 5' 9\" (1.753 m)   Wt 158 lb (71.7 kg)   BMI 23.33 kg/m²     Assessment and Plan     Diagnosis Orders   1. Internal carotid artery stenosis, right     2. Aphasia       Ms. Alyx Vera presents in follow up regarding episode of speech disturbance that improved. She denies any further stroke like symptoms since discharge, will continue on secondary stroke prevention with daily ASA and statin therapy. We reviewed results of MRI head and CTA head and neck which were not indicative of any acute stroke but with 80% stenosis of the right ICA. We called Dr. Yassine Teresa office to schedule outpatient follow up as indicated upon discharge instructions for further evaluation and potential right carotid endarterectomy. She was provided their office phone number and address to ensure follow up. We discussed any similar/worsening or focal symptoms she should proceed to the ED for evaluation. Regarding neuropathy, she denies any falls and uses a 2 wheel walker for ambulatory assistance. She is quite mobile and independent for 79 yo. Return in about 6 months (around 7/31/2022). Thank you for allowing us to participate in the care of your patient. If we can be of further assistance or any questions arise, please do not hesitate to contact us.      ABHILASH Palafox

## 2022-02-02 ENCOUNTER — CARE COORDINATION (OUTPATIENT)
Dept: CASE MANAGEMENT | Age: 87
End: 2022-02-02

## 2022-02-02 NOTE — CARE COORDINATION
Dominique 45 Transitions Follow Up Call    2022    Patient: Severa Hash  Patient : 1933    MRN: <[id number]>  Reason for Admission: TIA (transient ischemic attack)  Discharge Date: 22 RARS: Readmission Risk Score: 11.7 ( )    Attempted to contact patient for Transition Subsequent call. Left HIPAA Compliant message on Voice Mail to call CTN (number given) with any questions and an update on patient's condition since discharge. Will continue to follow. Angeles Theodore LPN    [phone]  [address] Transitions Subsequent and Final Call    Subsequent and Final Calls  Care Transitions Interventions  Other Interventions:            Follow Up  Future Appointments   Date Time Provider Sosa Paredes   2/3/2022  3:00 PM Hari Campbell MD [facility]   2022 11:30 AM Stefan Haque MD [facility]  [facility] H[facility]   2022  2:00 PM Aisha Velasquez MD [facility]       Angeles Theodore LPN

## 2022-02-07 PROBLEM — I65.21 STENOSIS OF RIGHT CAROTID ARTERY: Status: ACTIVE | Noted: 2022-02-07

## 2022-02-09 ENCOUNTER — TELEPHONE (OUTPATIENT)
Dept: INTERNAL MEDICINE CLINIC | Age: 87
End: 2022-02-09

## 2022-02-09 NOTE — TELEPHONE ENCOUNTER
She had any strokelike symptoms and significant blockage. Her best option is to have surgery.   She needs clearance from her cardiologist

## 2022-02-09 NOTE — TELEPHONE ENCOUNTER
Called patient. Advised her surgery was the best option. Advised her to check with the cardiologist about clearance. She stated she does not want the surgery. She is going to think on it for a bit more and try to decide what is best for her.

## 2022-02-09 NOTE — TELEPHONE ENCOUNTER
Pt called in today stating PCP should have received papers regarding blockage in her neck. States that she is wondering if there is alternative to surgery. Please advise.

## 2022-02-10 ENCOUNTER — TELEPHONE (OUTPATIENT)
Dept: INTERNAL MEDICINE CLINIC | Age: 87
End: 2022-02-10

## 2022-02-10 NOTE — TELEPHONE ENCOUNTER
Her cholesterol and LDL were not very high.   Increasing cholesterol medications may not help her carotid artery blockages at this time, at least immediately

## 2022-02-10 NOTE — TELEPHONE ENCOUNTER
Pt called wanting to know if increasing her Lipitor will help with the blockage in her neck. Please advise.

## 2022-02-11 ENCOUNTER — TELEPHONE (OUTPATIENT)
Dept: CARDIOLOGY CLINIC | Age: 87
End: 2022-02-11

## 2022-02-11 NOTE — TELEPHONE ENCOUNTER
Called patient per Dr Angie Aparicio, needs OV and Eric Box prior to carotid surgery. Patient not sure is she wants surgery or not.  Scheduled OV with Dr Dionne Sky to discuss

## 2022-02-16 ENCOUNTER — CARE COORDINATION (OUTPATIENT)
Dept: CASE MANAGEMENT | Age: 87
End: 2022-02-16

## 2022-02-16 NOTE — CARE COORDINATION
Dominique [facility] Transitions Follow Up Call    2022    Patient: Haja Bolus  Patient : 1933    MRN: <[id number]>  Reason for Admission: TIA (transient ischemic attack)    Discharge Date: 22 RARS: Readmission Risk Score: 11.7 ( )    Care Transitions Follow Up Call:  Patient states she is getting better. Balance a little better. Only uses walker outside. Walks around the house independently. Patient confirms all medications are available and are being taken as directed. Denies extremity weakness, pain, numbness, swallowing difficulties, incontinence, memory problems. Patient Denies Transportation, Home, or Medication needs or concerns at this time. Patient had Transitional Follow up appointment. 2022   Patient has no needs or concerns for writer at this time. Patient is aware of when to contact MD with any new or worsening symptoms. Advised to contact PCP  with any health concerns for early outpatient intervention in an effort to avoid hospitalization. Report any worsening symptoms to PCP and/or Call 911 and/or GO TO  EMERGENCY ROOM if symptoms are severe. Expresses understanding. Instructed patient that this is the Final Transition Call and to call their Specialist/PCP for any problems or issues that may occur. Expresses understanding. Mahendra Anderson LPN    [phone]  Isaura Varma / Dominique Liz[facility]aga Coordinator      Needs to be reviewed by the provider   Additional needs identified to be addressed with provider No  none             Method of communication with provider : none      Care Transition Nurse (CTN) contacted the patient by telephone to follow up after admission on 2022. Verified name and  with patient as identifiers. Discussed follow-up appointments. If no appointment was previously scheduled, appointment scheduling offered: Yes   Is follow up appointment scheduled within 7 days of discharge?  Yes    Advance Care Planning: Does patient have an Advance Directive:  reviewed and current. CTN reviewed discharge instructions, medical action plan and red flags with patient and discussed any barriers to care and/or understanding of plan of care after discharge. Discussed appropriate site of care based on symptoms and resources available to patient including: Specialist and When to call 911. The patient agrees to contact the PCP office for questions related to their healthcare. Patients top risk factors for readmission: lack of knowledge about disease  medical condition-TIA (transient ischemic attack)  medication management  Interventions to address risk factors: Obtained and reviewed discharge summary and/or continuity of care documents    [facility] follow up appointment(s): 1/31/0222    CTN provided contact information for future needs. No further follow-up call identified based on severity of symptoms and risk factors. Plan for next call:              Care Transitions Subsequent and Final Call    Subsequent and Final Calls  Do you have any ongoing symptoms?: Yes  Patient-reported symptoms: Weakness  Have your medications changed?: No  Do you have any questions related to your medications?: No  Do you currently have any active services?: No  Do you have any needs or concerns that I can assist you with?: No  Identified Barriers: None  Care Transitions Interventions  Other Interventions:            Follow Up  Future Appointments   Date Time Provider Sosa Paredes   2/18/2022 11:30 AM Esteban Ewing MD [facility]   2/28/2022  2:00 PM MD Gio Whaley [facility]Y [place]ton LPN

## 2022-02-18 ENCOUNTER — OFFICE VISIT (OUTPATIENT)
Dept: CARDIOLOGY CLINIC | Age: 87
End: 2022-02-18
Payer: MEDICARE

## 2022-02-18 VITALS
BODY MASS INDEX: 23.4 KG/M2 | HEIGHT: 69 IN | DIASTOLIC BLOOD PRESSURE: 68 MMHG | HEART RATE: 60 BPM | WEIGHT: 158 LBS | SYSTOLIC BLOOD PRESSURE: 120 MMHG

## 2022-02-18 DIAGNOSIS — I10 ESSENTIAL HYPERTENSION: Primary | ICD-10-CM

## 2022-02-18 DIAGNOSIS — G45.9 TIA (TRANSIENT ISCHEMIC ATTACK): ICD-10-CM

## 2022-02-18 PROCEDURE — 1111F DSCHRG MED/CURRENT MED MERGE: CPT | Performed by: INTERNAL MEDICINE

## 2022-02-18 PROCEDURE — 1123F ACP DISCUSS/DSCN MKR DOCD: CPT | Performed by: INTERNAL MEDICINE

## 2022-02-18 PROCEDURE — G8484 FLU IMMUNIZE NO ADMIN: HCPCS | Performed by: INTERNAL MEDICINE

## 2022-02-18 PROCEDURE — G8427 DOCREV CUR MEDS BY ELIG CLIN: HCPCS | Performed by: INTERNAL MEDICINE

## 2022-02-18 PROCEDURE — 4040F PNEUMOC VAC/ADMIN/RCVD: CPT | Performed by: INTERNAL MEDICINE

## 2022-02-18 PROCEDURE — 99214 OFFICE O/P EST MOD 30 MIN: CPT | Performed by: INTERNAL MEDICINE

## 2022-02-18 PROCEDURE — 1036F TOBACCO NON-USER: CPT | Performed by: INTERNAL MEDICINE

## 2022-02-18 PROCEDURE — 1090F PRES/ABSN URINE INCON ASSESS: CPT | Performed by: INTERNAL MEDICINE

## 2022-02-18 PROCEDURE — G8420 CALC BMI NORM PARAMETERS: HCPCS | Performed by: INTERNAL MEDICINE

## 2022-02-18 NOTE — PROGRESS NOTES
Terrell Dodson MD        OFFICE  FOLLOWUP NOTE    Chief complaints: patient is here for management of mild AS, HTN, dyslipidemia, TIA, RT ICA stenosis    Subjective: patient feels better, no chest pain, no shortness of breath, no dizziness, no palpitations    HPI [place] is a 80 y. o.year old who  has a past medical history of GERD (gastroesophageal reflux disease), H/O echocardiogram, H/O echocardiogram, Hyperlipidemia, Hypertension, and Neuropathy. and presents for management of mild AS, HTN, dyslipidemia, TIA, RT ICA stenosis which are well controlled    She had TIA, and had 80% calcified plaque at rt ICA    Current Outpatient Medications   Medication Sig Dispense Refill    aspirin 81 MG EC tablet Take 1 tablet by mouth daily 30 tablet 3    amLODIPine (NORVASC) 10 MG tablet take 1 tablet by mouth once daily 90 tablet 1    atorvastatin (LIPITOR) 20 MG tablet take 1 tablet by mouth once daily 90 tablet 1    omeprazole (PRILOSEC) 20 MG delayed release capsule Take 1 capsule by mouth daily 90 capsule 1    triamterene-hydroCHLOROthiazide (MAXZIDE) 75-50 MG per tablet Take 1 tablet by mouth daily 90 tablet 1    cyanocobalamin 1000 MCG tablet Take 1 tablet by mouth daily 90 tablet 1    pregabalin (LYRICA) 150 MG capsule Take 1 capsule by mouth 2 times daily for 90 days. 180 capsule 0    benazepril (LOTENSIN) 40 MG tablet TAKE 1 TABLET EVERY DAY 90 tablet 1    loratadine (CLARITIN) 10 MG tablet TAKE 1 TABLET EVERY DAY 90 tablet 1    ferrous sulfate (IRON 325) 325 (65 Fe) MG tablet Take 1 tablet by mouth daily (with breakfast) 90 tablet 1     No current facility-administered medications for this visit. Allergies: Penicillins  Past Medical History:   Diagnosis Date    GERD (gastroesophageal reflux disease)     H/O echocardiogram 04/06/2017    EF 55% Left atrial enlargement. Normal LV systolic. Minimal aoritc stenosis.  H/O echocardiogram 02/04/2020    EF 55-60%, Grade I DD, Mild AS. Mildly dilated left atrium.  Hyperlipidemia     Hypertension     Neuropathy      Past Surgical History:   Procedure Laterality Date    APPENDECTOMY      COLON SURGERY      removed polops    HYSTERECTOMY, TOTAL ABDOMINAL      SPINE SURGERY  2013    scrape calcium off spine, pressing on spine/nerves     Family History   Problem Relation Age of Onset    Hypertension Mother     Hypertension Father      Social History     Tobacco Use    Smoking status: Never Smoker    Smokeless tobacco: Never Used   Substance Use Topics    Alcohol use: Not on file      [unfilled]  Review of Systems:   · Constitutional: No Fever or Weight Loss   · Eyes: No Decreased Vision  · ENT: No Headaches, Hearing Loss or Vertigo  · Cardiovascular: No chest pain, dyspnea on exertion, palpitations or loss of consciousness  · Respiratory: No cough or wheezing    · Gastrointestinal: No abdominal pain, appetite loss, blood in stools, constipation, diarrhea or heartburn  · Genitourinary: No dysuria, trouble voiding, or hematuria  · Musculoskeletal:  No gait disturbance, weakness or joint complaints  · Integumentary: No rash or pruritis  · Neurological: + TIA or stroke symptoms  · Psychiatric: No anxiety or depression  · Endocrine: No malaise, fatigue or temperature intolerance  · Hematologic/Lymphatic: No bleeding problems, blood clots or swollen lymph nodes  · Allergic/Immunologic: No nasal congestion or hives  All systems negative except as marked. Objective:  /68   Pulse 60   Ht 5' 9\" (1.753 m)   Wt 158 lb (71.7 kg)   BMI 23.33 kg/m²   Wt Readings from Last 3 Encounters:   02/18/22 158 lb (71.7 kg)   02/07/22 158 lb (71.7 kg)   01/31/22 158 lb (71.7 kg)     Body mass index is 23.33 kg/m². GENERAL - Alert, oriented, pleasant, in no apparent distress,normal grooming  HEENT - pupils are intact, cornea intact, conjunctive normal color, ears are normal in exam,  Neck - Supple. No jugular venous distention noted.  No carotid bruits, no apical -carotid delay  Respiratory:  Normal breath sounds, No respiratory distress, No wheezing, No chest tenderness. ,no use of accessory muscles, diaphragm movement is normal  Cardiovascular: (PMI) apex non displaced,no lifts no thrills, no s3,no s4, Normal heart rate, Normal rhythm, No murmurs, No rubs, No gallops. Carotid arteries pulse and amplitude are normal no bruit, no abdominal bruit noted ( normal abdominal aorta ausculation),   Extremities - No cyanosis, clubbing, or significant edema, no varicose veins    Abdomen - No masses, tenderness, or organomegaly, no hepato-splenomegally, no bruits  Musculoskeletal - No significant edema, no kyphosis or scoliosis, no deformity in any extremity noted, muscle strength and tone are normal  Skin: no ulcer,no scar,no stasis dermatitis   Neurologic - alert oriented times 3,Cranial nerves II through XII are grossly intact. There were no gross focal neurologic abnormalities. Psychiatric: normal mood and affect    Lab Results   Component Value Date    CKTOTAL 351 11/16/2015     BNP:  No results found for: BNP  PT/INR:  No results found for: [employer]  Lab Results   Component Value Date    LABA1C 4.7 01/18/2022     Lab Results   Component Value Date    CHOL 144 01/18/2022    TRIG 102 01/18/2022    HDL 51 01/18/2022    LDLCALC 40 08/18/2021    LDLDIRECT 76 01/18/2022     Lab Results   Component Value Date    ALT 13 01/23/2022    AST 19 01/23/2022     TSH:  No results found for: TSH    Impression:  [place] is a 80 y. o.year old who  has a past medical history of GERD (gastroesophageal reflux disease), H/O echocardiogram, H/O echocardiogram, Hyperlipidemia, Hypertension, and Neuropathy. and presents with     Plan:  1.  TIA WITH aphasia: resolved,MRI did not acute stroke, her rt ICA was 80% stenosis which is calcified plaque, will recommend optimal medical management at this time because of her age and physical condition, she is not able to walk and uses walker for balance and wheel chair, continue aspirin, statins, blood pressure control  2. MILD AS: echo reviewed, continue conservative therapy  3. Dyslipidemia: continue statins  4. HTN: stable, continue norvasc and maxide and lotensin  5. Health maintenance: exerise and diet    All labs, medications and tests reviewed, continue all other medications of all above medical condition listed as is.     [unfilled]

## 2022-03-01 DIAGNOSIS — G62.9 NEUROPATHY: ICD-10-CM

## 2022-03-01 DIAGNOSIS — M54.9 DORSALGIA: ICD-10-CM

## 2022-03-01 RX ORDER — PREGABALIN 150 MG/1
150 CAPSULE ORAL 2 TIMES DAILY
Qty: 180 CAPSULE | Refills: 0 | Status: SHIPPED | OUTPATIENT
Start: 2022-03-01 | End: 2022-05-19 | Stop reason: SDUPTHER

## 2022-03-16 DIAGNOSIS — J30.9 ALLERGIC RHINITIS, UNSPECIFIED SEASONALITY, UNSPECIFIED TRIGGER: ICD-10-CM

## 2022-03-17 RX ORDER — LORATADINE 10 MG/1
TABLET ORAL
Qty: 90 TABLET | Refills: 1 | Status: SHIPPED | OUTPATIENT
Start: 2022-03-17 | End: 2022-08-18

## 2022-04-06 DIAGNOSIS — K21.9 GASTROESOPHAGEAL REFLUX DISEASE WITHOUT ESOPHAGITIS: ICD-10-CM

## 2022-04-07 RX ORDER — OMEPRAZOLE 20 MG/1
CAPSULE, DELAYED RELEASE ORAL
Qty: 90 CAPSULE | Refills: 1 | Status: SHIPPED | OUTPATIENT
Start: 2022-04-07 | End: 2022-08-29 | Stop reason: SDUPTHER

## 2022-04-11 DIAGNOSIS — I10 ESSENTIAL HYPERTENSION: ICD-10-CM

## 2022-04-12 RX ORDER — BENAZEPRIL HYDROCHLORIDE 40 MG/1
TABLET, FILM COATED ORAL
Qty: 90 TABLET | Refills: 1 | Status: SHIPPED | OUTPATIENT
Start: 2022-04-12

## 2022-04-13 RX ORDER — LANOLIN ALCOHOL/MO/W.PET/CERES
CREAM (GRAM) TOPICAL
Qty: 90 TABLET | Refills: 1 | Status: SHIPPED | OUTPATIENT
Start: 2022-04-13

## 2022-05-16 DIAGNOSIS — E78.2 MIXED HYPERLIPIDEMIA: ICD-10-CM

## 2022-05-16 DIAGNOSIS — I10 ESSENTIAL HYPERTENSION: ICD-10-CM

## 2022-05-17 RX ORDER — ATORVASTATIN CALCIUM 20 MG/1
TABLET, FILM COATED ORAL
Qty: 90 TABLET | Refills: 1 | Status: SHIPPED | OUTPATIENT
Start: 2022-05-17

## 2022-05-17 RX ORDER — AMLODIPINE BESYLATE 10 MG/1
TABLET ORAL
Qty: 90 TABLET | Refills: 1 | Status: SHIPPED | OUTPATIENT
Start: 2022-05-17 | End: 2022-08-29 | Stop reason: SDUPTHER

## 2022-05-19 ENCOUNTER — OFFICE VISIT (OUTPATIENT)
Dept: INTERNAL MEDICINE CLINIC | Age: 87
End: 2022-05-19
Payer: MEDICARE

## 2022-05-19 VITALS
DIASTOLIC BLOOD PRESSURE: 78 MMHG | BODY MASS INDEX: 23.4 KG/M2 | OXYGEN SATURATION: 96 % | WEIGHT: 158 LBS | HEIGHT: 69 IN | HEART RATE: 66 BPM | SYSTOLIC BLOOD PRESSURE: 126 MMHG

## 2022-05-19 DIAGNOSIS — J30.9 ALLERGIC RHINITIS, UNSPECIFIED SEASONALITY, UNSPECIFIED TRIGGER: ICD-10-CM

## 2022-05-19 DIAGNOSIS — I10 ESSENTIAL HYPERTENSION: ICD-10-CM

## 2022-05-19 DIAGNOSIS — G45.9 TIA (TRANSIENT ISCHEMIC ATTACK): Primary | ICD-10-CM

## 2022-05-19 DIAGNOSIS — M54.9 DORSALGIA: ICD-10-CM

## 2022-05-19 DIAGNOSIS — I65.21 CAROTID STENOSIS, RIGHT: ICD-10-CM

## 2022-05-19 DIAGNOSIS — G62.9 NEUROPATHY: ICD-10-CM

## 2022-05-19 DIAGNOSIS — Z91.81 AT HIGH RISK FOR FALLS: ICD-10-CM

## 2022-05-19 DIAGNOSIS — K21.9 GASTROESOPHAGEAL REFLUX DISEASE WITHOUT ESOPHAGITIS: ICD-10-CM

## 2022-05-19 DIAGNOSIS — E78.2 MIXED HYPERLIPIDEMIA: ICD-10-CM

## 2022-05-19 DIAGNOSIS — D64.9 ANEMIA, UNSPECIFIED TYPE: ICD-10-CM

## 2022-05-19 PROCEDURE — G8427 DOCREV CUR MEDS BY ELIG CLIN: HCPCS | Performed by: INTERNAL MEDICINE

## 2022-05-19 PROCEDURE — 1090F PRES/ABSN URINE INCON ASSESS: CPT | Performed by: INTERNAL MEDICINE

## 2022-05-19 PROCEDURE — 1036F TOBACCO NON-USER: CPT | Performed by: INTERNAL MEDICINE

## 2022-05-19 PROCEDURE — G8420 CALC BMI NORM PARAMETERS: HCPCS | Performed by: INTERNAL MEDICINE

## 2022-05-19 PROCEDURE — 99214 OFFICE O/P EST MOD 30 MIN: CPT | Performed by: INTERNAL MEDICINE

## 2022-05-19 PROCEDURE — 1123F ACP DISCUSS/DSCN MKR DOCD: CPT | Performed by: INTERNAL MEDICINE

## 2022-05-19 PROCEDURE — 4040F PNEUMOC VAC/ADMIN/RCVD: CPT | Performed by: INTERNAL MEDICINE

## 2022-05-19 RX ORDER — PREGABALIN 150 MG/1
150 CAPSULE ORAL 2 TIMES DAILY
Qty: 180 CAPSULE | Refills: 0 | Status: SHIPPED | OUTPATIENT
Start: 2022-05-19 | End: 2022-08-26 | Stop reason: SDUPTHER

## 2022-05-19 SDOH — ECONOMIC STABILITY: FOOD INSECURITY: WITHIN THE PAST 12 MONTHS, THE FOOD YOU BOUGHT JUST DIDN'T LAST AND YOU DIDN'T HAVE MONEY TO GET MORE.: NEVER TRUE

## 2022-05-19 SDOH — ECONOMIC STABILITY: FOOD INSECURITY: WITHIN THE PAST 12 MONTHS, YOU WORRIED THAT YOUR FOOD WOULD RUN OUT BEFORE YOU GOT MONEY TO BUY MORE.: NEVER TRUE

## 2022-05-19 ASSESSMENT — SOCIAL DETERMINANTS OF HEALTH (SDOH): HOW HARD IS IT FOR YOU TO PAY FOR THE VERY BASICS LIKE FOOD, HOUSING, MEDICAL CARE, AND HEATING?: NOT HARD AT ALL

## 2022-05-19 NOTE — PROGRESS NOTES
Name: Dustin Burks  [id number]  Age: 80 y.o. YOB: 1933  Sex: female    CHIEF COMPLAINT:    Chief Complaint   Patient presents with    Hypertension    Hyperlipidemia    Other     other chronic conditions    Dizziness     for about several months on and off       HISTORY OF PRESENT ILLNESS:     This is a pleasant  80 y.o. female  is seen today for management of chronic medical problems and medications refills. Previous records reviewed . Patient has not seen me since November 18, 2021. In January 2022 she had a TIA. CT head/neck showed severe atherosclerotic calcification plaque at the level of right cervical ICA with severe high-grade stenosis greater than 80%  MRI of the brain showed no acute abnormality and no acute infarct. Minimal global parenchymal volume loss with mild chronic microvascular ischemic changes. 1. Echocardiogram showed an EF of 55-60% with mild aortic stenosis, mitral annular calcification, mild to moderate tricuspid regurgitation and a negative bubble study. 2. Stress test was negative for ischemia  She was seen by cardiothoracic surgeon later on and he recommended no  right carotid endarterectomy as she was told that she is high risk candidate. During this hospitalization patient had bradycardia and troponin were elevated. .  Patient claimed that she has generalized weakness. She uses a walker to go out and hold walls and railings to walk inside the home. She had no recent falls. She claimed that she is taking her medications regularly. Doing OK otherwise. Denies CP or SOB. No fever , sore throat or cough or congestion. Denies any abdominal pain. Appetite OK. Bowels moving [id number] No urinary symptoms. Complains of mild chronic low back pain and pain in her legs but medications help. Also her right knee gives away at times . No falls recently. Refuses PT/OT  She is taking Lyrica and Tylenol for pain. PDMP reviewed, no signs of drug abuse.   Hearing is ok.  Yoli Aquino with glasses. Denies  any significant skin lesions. Denies any significant depression or anxiety. No other complaints. She has been vaccinated for Covid 19 x 2 but did not get a booster dose yet. .  Labs from 1/23/2022 reviewed and explained to the patient    Past Medical History:    Patient Active Problem List   Diagnosis    Essential hypertension    Mixed hyperlipidemia    Gastroesophageal reflux disease without esophagitis    Dorsalgia    Neuropathy    Allergic rhinitis    Anemia    TIA (transient ischemic attack)    Altered mental status    Bradycardia    Coagulase negative Staphylococcus bacteremia    Carotid stenosis, right        Past Surgical History:        Procedure Laterality Date    APPENDECTOMY      COLON SURGERY      removed polops    HYSTERECTOMY, TOTAL ABDOMINAL      SPINE SURGERY  2013    scrape calcium off spine, pressing on spine/nerves       Social History:   Social History     Tobacco Use    Smoking status: Never Smoker    Smokeless tobacco: Never Used   Substance Use Topics    Alcohol use: Not on file       Family History:       Problem Relation Age of Onset    Hypertension Mother     Hypertension Father        Allergies:  Penicillins    Current Medications :      Prior to Admission medications    Medication Sig Start Date End Date Taking? Authorizing Provider   pregabalin (LYRICA) 150 MG capsule Take 1 capsule by mouth 2 times daily for 90 days.  5/19/22 8/17/22 Yes Martin West MD   atorvastatin (LIPITOR) 20 MG tablet TAKE 1 TABLET EVERY DAY 5/17/22  Yes Martin West MD   amLODIPine (NORVASC) 10 MG tablet TAKE 1 TABLET EVERY DAY 5/17/22  Yes Martin West MD   vitamin B-12 (CYANOCOBALAMIN) 1000 MCG tablet TAKE 1 TABLET EVERY DAY 4/13/22  Yes Martin West MD   benazepril (LOTENSIN) 40 MG tablet TAKE 1 TABLET EVERY DAY 4/12/22  Yes Martin West MD   omeprazole (PRILOSEC) 20 MG delayed release capsule TAKE 1 CAPSULE EVERY DAY 4/7/22  Yes Martin West MD   loratadine (CLARITIN) 10 MG tablet TAKE 1 TABLET EVERY DAY 3/17/22  Yes Edie Sanches MD   aspirin 81 MG EC tablet Take 1 tablet by mouth daily 1/25/22  Yes ARABELLA Ga - CNP   triamterene-hydroCHLOROthiazide (MAXZIDE) 75-50 MG per tablet Take 1 tablet by mouth daily 11/18/21  Yes Edie Sanches MD   ferrous sulfate (IRON 325) 325 (65 Fe) MG tablet Take 1 tablet by mouth daily (with breakfast) 9/8/21  Yes Edie Sanches MD       LAB DATA: Reviewed. REVIEW OF SYSTEMS:   see HPI/ Comprehensive review of systems negative except for the ones mentioned in HPI. PHYSICAL EXAMINATION:   /78 (Site: Left Upper Arm, Position: Sitting, Cuff Size: Medium Adult)   Pulse 66   Ht 5' 9\" (1.753 m)   Wt 158 lb (71.7 kg)   SpO2 96%   BMI 23.33 kg/m²      GENERAL APPEARANCE:      Alert, oriented x 3, well developed, cooperative, not in any distress, appears stated age. HEAD:                         Normocephalic, atraumatic   EYES:                          PERRLA, EOMI, lids normal, conjuctivea clear, sclera anicteric. NECK:                         Supple, symmetrical,  trachea midline, no thyromegaly, no JVD, no lymphadenopathy. LUNGS:                       Clear to auscultation bilaterally, respirations unlabored, accessory muscles are not used. HEART:                       Regular rate and rhythm, S1 and S2 normal, no murmur, rub or gallop. PMI in MCL. ABDOMEN:                 Soft, non-tender, bowel sounds are normoactive, no masses, no hepatospleenomegaly. EXTREMITY:              no bipedal edema  NEURO:                      Alert, oriented to person, place and time. Has generalized weakness but no focal deficit. Musculoskeletal:         No kyphosis or scoliosis, mild tenderness in her lower back and in her knees. Skin:                            Warm and dry. No rash or obvious suspicious lesions.                       PSYCH:                       Mood euthymic, insight and judgement good. ASSESSMENT/PLAN:    1. TIA (transient ischemic attack)  Continue aspirin and Lipitor. 2. Carotid stenosis, right  She has significant right carotid artery stenosis but clear cardiothoracic surgeon told her that she is not a good candidate for surgery. Patient herself does not want any surgery    3. Essential hypertension  Stable,Continue current medications, denies side effect with medicationss. Low salt diet and exercise advised. Continue Norvasc, Lotensin and hydrochlorothiazide    4. Mixed hyperlipidemia  Patient is taking cholesterol medications regularly. Denies any side effects. Diet and exercise advised. Continue Lipitor    5. Dorsalgia  Advised take Tylenol as needed and Lyrica. - pregabalin (LYRICA) 150 MG capsule; Take 1 capsule by mouth 2 times daily for 90 days. Dispense: 180 capsule; Refill: 0    6. Neuropathy  Continue Lyrica and Tylenol as needed  - pregabalin (LYRICA) 150 MG capsule; Take 1 capsule by mouth 2 times daily for 90 days. Dispense: 180 capsule; Refill: 0    7. Gastroesophageal reflux disease without esophagitis  Patient on Prilosec    8. Allergic rhinitis, unspecified seasonality, unspecified trigger  Continue Claritin as needed    9. Anemia, unspecified type  Mild anemia, continue iron pills. Advised to continue to follow COVID-19 precautions    Care discussed with patient. Questions answered and patient verbalizes understanding and agrees with plan. Medications reviewed and reconciled. Continue current medications. Appropriate prescriptions are ordered. Risks and benefits of meds are discussed. After visit summary provided. Advised to call for any problems, questions, or concerns. If symptoms worsen or don't improve as expected, to call us or go to ER. Follow up as directed, sooner if needed. No follow-ups on file.     This dictation was performed with a verbal recognition program and it was checked for errors. It is possible that there are still dictated errors within this office note. Any errors should be brought immediately to my attention for correction. All efforts were made to ensure that this office note is accurate. Quin Black MD MD        On the basis of positive falls risk screening, assessment and plan is as follows: home safety tips provided.

## 2022-06-13 DIAGNOSIS — D64.9 ANEMIA, UNSPECIFIED TYPE: ICD-10-CM

## 2022-06-13 RX ORDER — FERROUS SULFATE 325(65) MG
TABLET ORAL
Qty: 90 TABLET | Refills: 1 | Status: SHIPPED | OUTPATIENT
Start: 2022-06-13

## 2022-08-17 ENCOUNTER — OFFICE VISIT (OUTPATIENT)
Dept: INTERNAL MEDICINE CLINIC | Age: 87
End: 2022-08-17
Payer: MEDICARE

## 2022-08-17 VITALS
HEART RATE: 53 BPM | SYSTOLIC BLOOD PRESSURE: 124 MMHG | OXYGEN SATURATION: 98 % | BODY MASS INDEX: 22.19 KG/M2 | WEIGHT: 149.8 LBS | DIASTOLIC BLOOD PRESSURE: 74 MMHG | HEIGHT: 69 IN

## 2022-08-17 DIAGNOSIS — I10 ESSENTIAL HYPERTENSION: ICD-10-CM

## 2022-08-17 DIAGNOSIS — J30.9 ALLERGIC RHINITIS, UNSPECIFIED SEASONALITY, UNSPECIFIED TRIGGER: ICD-10-CM

## 2022-08-17 DIAGNOSIS — G62.9 NEUROPATHY: ICD-10-CM

## 2022-08-17 DIAGNOSIS — J06.9 UPPER RESPIRATORY TRACT INFECTION, UNSPECIFIED TYPE: Primary | ICD-10-CM

## 2022-08-17 DIAGNOSIS — G45.9 TIA (TRANSIENT ISCHEMIC ATTACK): ICD-10-CM

## 2022-08-17 DIAGNOSIS — E78.2 MIXED HYPERLIPIDEMIA: ICD-10-CM

## 2022-08-17 DIAGNOSIS — I65.21 CAROTID STENOSIS, RIGHT: ICD-10-CM

## 2022-08-17 DIAGNOSIS — M54.9 DORSALGIA: ICD-10-CM

## 2022-08-17 DIAGNOSIS — K21.9 GASTROESOPHAGEAL REFLUX DISEASE WITHOUT ESOPHAGITIS: ICD-10-CM

## 2022-08-17 DIAGNOSIS — D64.9 ANEMIA, UNSPECIFIED TYPE: ICD-10-CM

## 2022-08-17 LAB
A/G RATIO: 2.1 (ref 1.1–2.2)
ALBUMIN SERPL-MCNC: 4.1 G/DL (ref 3.4–5)
ALP BLD-CCNC: 83 U/L (ref 40–129)
ALT SERPL-CCNC: 11 U/L (ref 10–40)
ANION GAP SERPL CALCULATED.3IONS-SCNC: 10 MMOL/L (ref 3–16)
AST SERPL-CCNC: 16 U/L (ref 15–37)
BASOPHILS ABSOLUTE: 0 K/UL (ref 0–0.2)
BASOPHILS RELATIVE PERCENT: 0.5 %
BILIRUB SERPL-MCNC: 0.5 MG/DL (ref 0–1)
BUN BLDV-MCNC: 17 MG/DL (ref 7–20)
CALCIUM SERPL-MCNC: 9.3 MG/DL (ref 8.3–10.6)
CHLORIDE BLD-SCNC: 109 MMOL/L (ref 99–110)
CHOLESTEROL, FASTING: 128 MG/DL (ref 0–199)
CO2: 25 MMOL/L (ref 21–32)
CREAT SERPL-MCNC: 1.2 MG/DL (ref 0.6–1.2)
EOSINOPHILS ABSOLUTE: 0.1 K/UL (ref 0–0.6)
EOSINOPHILS RELATIVE PERCENT: 3.2 %
GFR AFRICAN AMERICAN: 51
GFR NON-AFRICAN AMERICAN: 42
GLUCOSE BLD-MCNC: 104 MG/DL (ref 70–99)
HCT VFR BLD CALC: 29.4 % (ref 36–48)
HDLC SERPL-MCNC: 47 MG/DL (ref 40–60)
HEMOGLOBIN: 9.9 G/DL (ref 12–16)
LDL CHOLESTEROL CALCULATED: 61 MG/DL
LYMPHOCYTES ABSOLUTE: 1.9 K/UL (ref 1–5.1)
LYMPHOCYTES RELATIVE PERCENT: 43.6 %
MCH RBC QN AUTO: 29.6 PG (ref 26–34)
MCHC RBC AUTO-ENTMCNC: 33.6 G/DL (ref 31–36)
MCV RBC AUTO: 88.2 FL (ref 80–100)
MONOCYTES ABSOLUTE: 0.3 K/UL (ref 0–1.3)
MONOCYTES RELATIVE PERCENT: 7.3 %
NEUTROPHILS ABSOLUTE: 2 K/UL (ref 1.7–7.7)
NEUTROPHILS RELATIVE PERCENT: 45.4 %
PDW BLD-RTO: 13.6 % (ref 12.4–15.4)
PLATELET # BLD: 146 K/UL (ref 135–450)
PMV BLD AUTO: 12.1 FL (ref 5–10.5)
POTASSIUM SERPL-SCNC: 4 MMOL/L (ref 3.5–5.1)
RBC # BLD: 3.33 M/UL (ref 4–5.2)
SODIUM BLD-SCNC: 144 MMOL/L (ref 136–145)
TOTAL PROTEIN: 6.1 G/DL (ref 6.4–8.2)
TRIGLYCERIDE, FASTING: 102 MG/DL (ref 0–150)
VLDLC SERPL CALC-MCNC: 20 MG/DL
WBC # BLD: 4.3 K/UL (ref 4–11)

## 2022-08-17 PROCEDURE — 36415 COLL VENOUS BLD VENIPUNCTURE: CPT | Performed by: INTERNAL MEDICINE

## 2022-08-17 PROCEDURE — G8427 DOCREV CUR MEDS BY ELIG CLIN: HCPCS | Performed by: INTERNAL MEDICINE

## 2022-08-17 PROCEDURE — 1123F ACP DISCUSS/DSCN MKR DOCD: CPT | Performed by: INTERNAL MEDICINE

## 2022-08-17 PROCEDURE — 1036F TOBACCO NON-USER: CPT | Performed by: INTERNAL MEDICINE

## 2022-08-17 PROCEDURE — G8420 CALC BMI NORM PARAMETERS: HCPCS | Performed by: INTERNAL MEDICINE

## 2022-08-17 PROCEDURE — 99214 OFFICE O/P EST MOD 30 MIN: CPT | Performed by: INTERNAL MEDICINE

## 2022-08-17 PROCEDURE — 1090F PRES/ABSN URINE INCON ASSESS: CPT | Performed by: INTERNAL MEDICINE

## 2022-08-17 RX ORDER — GUAIFENESIN 600 MG/1
600 TABLET, EXTENDED RELEASE ORAL 2 TIMES DAILY
Qty: 30 TABLET | Refills: 0 | Status: SHIPPED | OUTPATIENT
Start: 2022-08-17 | End: 2022-09-01

## 2022-08-17 RX ORDER — AZITHROMYCIN 250 MG/1
250 TABLET, FILM COATED ORAL SEE ADMIN INSTRUCTIONS
Qty: 6 TABLET | Refills: 0 | Status: SHIPPED | OUTPATIENT
Start: 2022-08-17 | End: 2022-08-22

## 2022-08-17 ASSESSMENT — PATIENT HEALTH QUESTIONNAIRE - PHQ9
SUM OF ALL RESPONSES TO PHQ QUESTIONS 1-9: 0
SUM OF ALL RESPONSES TO PHQ QUESTIONS 1-9: 0
2. FEELING DOWN, DEPRESSED OR HOPELESS: 0
SUM OF ALL RESPONSES TO PHQ QUESTIONS 1-9: 0
SUM OF ALL RESPONSES TO PHQ9 QUESTIONS 1 & 2: 0
1. LITTLE INTEREST OR PLEASURE IN DOING THINGS: 0
SUM OF ALL RESPONSES TO PHQ QUESTIONS 1-9: 0

## 2022-08-17 NOTE — PROGRESS NOTES
Name: Benito Randall  [id number]  Age: 80 y.o. YOB: 1933  Sex: female    CHIEF COMPLAINT:    Chief Complaint   Patient presents with    Hypertension    Gastroesophageal Reflux     Other chronic conditions         HISTORY OF PRESENT ILLNESS:     This is a pleasant  80 y.o. female  is seen today for management of chronic medical problems and medications refills. Previous records reviewed . C/O sore throat and sinus drainage since last few days. Occasional dizziness, rare  but when she sits still it improves. Doing OK otherwise. Denies CP or SOB. No fever. Denies any abdominal pain. Appetite OK. Bowels moving [id number] No urinary symptoms. Complains of mild chronic low back pain and pain in her legs but medications help. Also her right knee gives away at times . No falls recently. She uses a walker for her ambulation. Refuses PT/OT  She is taking Lyrica and Tylenol for pain. PDMP reviewed, no signs of drug abuse. Hearing is ok. Vision Ok with glasses. Denies  any significant skin lesions. Denies any significant depression or anxiety. No other complaints. She has been vaccinated for Covid 19 x 2 but did not get a booster dose yet. .  Labs from 1/23/2022 reviewed and explained to the patient    Past Medical History:    Patient Active Problem List   Diagnosis    Essential hypertension    Mixed hyperlipidemia    Gastroesophageal reflux disease without esophagitis    Dorsalgia    Neuropathy    Allergic rhinitis    Anemia    TIA (transient ischemic attack)    Altered mental status    Bradycardia    Coagulase negative Staphylococcus bacteremia    Carotid stenosis, right        Past Surgical History:        Procedure Laterality Date    APPENDECTOMY      COLON SURGERY      removed polops    HYSTERECTOMY, TOTAL ABDOMINAL (CERVIX REMOVED)      SPINE SURGERY  2013    scrape calcium off spine, pressing on spine/nerves       Social History:   Social History     Tobacco Use    Smoking status: Never    Smokeless tobacco: Never   Substance Use Topics    Alcohol use: Not on file       Family History:       Problem Relation Age of Onset    Hypertension Mother     Hypertension Father        Allergies:  Penicillins    Current Medications :      Prior to Admission medications    Medication Sig Start Date End Date Taking? Authorizing Provider   azithromycin (ZITHROMAX) 250 MG tablet Take 1 tablet by mouth See Admin Instructions for 5 days 500mg on day 1 followed by 250mg on days 2 - 5 8/17/22 8/22/22 Yes Lurdes Chan MD   guaiFENesin (MUCINEX) 600 MG extended release tablet Take 1 tablet by mouth 2 times daily for 15 days 8/17/22 9/1/22 Yes Lurdes Chan MD   FEROSUL 325 (65 Fe) MG tablet TAKE 1 TABLET BY MOUTH DAILY (WITH BREAKFAST) 6/13/22  Yes Lurdes Chan MD   pregabalin (LYRICA) 150 MG capsule Take 1 capsule by mouth 2 times daily for 90 days. 5/19/22 8/17/22 Yes Lurdes Chan MD   atorvastatin (LIPITOR) 20 MG tablet TAKE 1 TABLET EVERY DAY 5/17/22  Yes Lurdes Chan MD   amLODIPine (NORVASC) 10 MG tablet TAKE 1 TABLET EVERY DAY 5/17/22  Yes Lurdes Chan MD   vitamin B-12 (CYANOCOBALAMIN) 1000 MCG tablet TAKE 1 TABLET EVERY DAY 4/13/22  Yes Lurdes Chan MD   benazepril (LOTENSIN) 40 MG tablet TAKE 1 TABLET EVERY DAY 4/12/22  Yes Lurdes Chan MD   omeprazole (PRILOSEC) 20 MG delayed release capsule TAKE 1 CAPSULE EVERY DAY 4/7/22  Yes Lurdes Chan MD   loratadine (CLARITIN) 10 MG tablet TAKE 1 TABLET EVERY DAY 3/17/22  Yes Lurdes Chan MD   aspirin 81 MG EC tablet Take 1 tablet by mouth daily 1/25/22  Yes ARABELLA Perera - CNP   triamterene-hydroCHLOROthiazide (MAXZIDE) 75-50 MG per tablet Take 1 tablet by mouth daily 11/18/21  Yes Lurdes Chan MD       LAB DATA: Reviewed. REVIEW OF SYSTEMS:   see HPI/ Comprehensive review of systems negative except for the ones mentioned in HPI.     PHYSICAL EXAMINATION:   /74 (Site: Left Upper Arm, Position: Sitting, Cuff Size: Medium Adult)   Pulse 53   Ht 5' 9\" (1.753 m)   Wt 149 lb 12.8 oz (67.9 kg)   SpO2 98%   BMI 22.12 kg/m²      GENERAL APPEARANCE:      Alert, oriented x 3, well developed, cooperative, not in any distress, appears stated age. HEAD:                         Normocephalic, atraumatic maxillary sinus area tenderness bilaterally. Mild nasal congestion. EYES:                          PERRLA, EOMI, lids normal, conjuctivea clear, sclera anicteric. NECK:                         Supple, symmetrical,  trachea midline, no thyromegaly, no JVD, no lymphadenopathy. LUNGS:                       Clear to auscultation bilaterally, respirations unlabored, accessory muscles are not used. HEART:                       Regular rate and rhythm, S1 and S2 normal, no murmur, rub or gallop. PMI in MCL. ABDOMEN:                 Soft, non-tender, bowel sounds are normoactive, no masses, no hepatospleenomegaly. EXTREMITY:              no bipedal edema  NEURO:                      Alert, oriented to person, place and time. Has generalized weakness but no focal deficit. Musculoskeletal:         No kyphosis or scoliosis, mild tenderness in her lower back and in her knees. Skin:                            Warm and dry. No rash or obvious suspicious lesions. PSYCH:                       Mood euthymic, insight and judgement good. ASSESSMENT/PLAN:    1. Upper respiratory tract infection, unspecified type  We will treat her with Zithromax and Mucinex for now  - azithromycin (ZITHROMAX) 250 MG tablet; Take 1 tablet by mouth See Admin Instructions for 5 days 500mg on day 1 followed by 250mg on days 2 - 5  Dispense: 6 tablet; Refill: 0  - guaiFENesin (MUCINEX) 600 MG extended release tablet; Take 1 tablet by mouth 2 times daily for 15 days  Dispense: 30 tablet; Refill: 0    2. Allergic rhinitis, unspecified seasonality, unspecified trigger  Continue Claritin as needed.     3. TIA (transient ischemic attack)  Continue aspirin and Lipitor    4. Carotid stenosis, right  Continue aspirin and Lipitor for now    5. Essential hypertension  Continue current medications, denies side effect with medicationss. Low salt diet and exercise advised. Continue Norvasc and Lotensin and triamterene hydrochlorothiazide  - Comprehensive Metabolic Panel  - CBC with Auto Differential    6. Mixed hyperlipidemia  Advised low-cholesterol diet and exercise. Continue Lipitor  - Lipid, Fasting    7. Dorsalgia  Continue Tylenol and Lyrica    8. Neuropathy  Continue Lyrica. 9. Gastroesophageal reflux disease without esophagitis  Continue Prilosec    10. Anemia, unspecified type  Mild anemia, monitor closely. Advised to continue to follow COVID-19 precautions    Care discussed with patient. Questions answered and patient verbalizes understanding and agrees with plan. Medications reviewed and reconciled. Continue current medications. Appropriate prescriptions are ordered. Risks and benefits of meds are discussed. After visit summary provided. Advised to call for any problems, questions, or concerns. If symptoms worsen or don't improve as expected, to call us or go to ER. Follow up as directed, sooner if needed. No follow-ups on file. This dictation was performed with a verbal recognition program and it was checked for errors. It is possible that there are still dictated errors within this office note. Any errors should be brought immediately to my attention for correction. All efforts were made to ensure that this office note is accurate.      Lana Burgos MD MD

## 2022-08-18 RX ORDER — LORATADINE 10 MG/1
TABLET ORAL
Qty: 90 TABLET | Refills: 1 | Status: SHIPPED | OUTPATIENT
Start: 2022-08-18

## 2022-08-26 DIAGNOSIS — G62.9 NEUROPATHY: ICD-10-CM

## 2022-08-26 DIAGNOSIS — M54.9 DORSALGIA: ICD-10-CM

## 2022-08-26 RX ORDER — PREGABALIN 150 MG/1
150 CAPSULE ORAL 2 TIMES DAILY
Qty: 180 CAPSULE | Refills: 0 | Status: SHIPPED | OUTPATIENT
Start: 2022-08-26 | End: 2022-11-24

## 2022-08-26 NOTE — TELEPHONE ENCOUNTER
Patient is completely out of medication. Requested to be sent to [facility] on [address].

## 2022-08-29 DIAGNOSIS — K21.9 GASTROESOPHAGEAL REFLUX DISEASE WITHOUT ESOPHAGITIS: ICD-10-CM

## 2022-08-29 DIAGNOSIS — I10 ESSENTIAL HYPERTENSION: ICD-10-CM

## 2022-08-29 RX ORDER — OMEPRAZOLE 20 MG/1
CAPSULE, DELAYED RELEASE ORAL
Qty: 90 CAPSULE | Refills: 1 | Status: SHIPPED | OUTPATIENT
Start: 2022-08-29

## 2022-08-29 RX ORDER — AMLODIPINE BESYLATE 10 MG/1
TABLET ORAL
Qty: 90 TABLET | Refills: 1 | Status: SHIPPED | OUTPATIENT
Start: 2022-08-29

## 2022-09-13 DIAGNOSIS — I10 ESSENTIAL HYPERTENSION: ICD-10-CM

## 2022-09-13 RX ORDER — TRIAMTERENE AND HYDROCHLOROTHIAZIDE 75; 50 MG/1; MG/1
TABLET ORAL
Qty: 90 TABLET | Refills: 1 | Status: SHIPPED | OUTPATIENT
Start: 2022-09-13

## 2022-10-14 ENCOUNTER — TELEPHONE (OUTPATIENT)
Dept: INTERNAL MEDICINE CLINIC | Age: 87
End: 2022-10-14

## 2022-10-14 NOTE — TELEPHONE ENCOUNTER
----- Message from Vera Ramos sent at 10/14/2022 11:03 AM EDT -----  Subject: Message to Provider    QUESTIONS  Information for Provider? pt need to have her handicap placard renewed the   information cn be faxed to the [facility] [phone]   ---------------------------------------------------------------------------  --------------  [address] [employer]  [phone]; OK to leave message on voicemail  ---------------------------------------------------------------------------  --------------  SCRIPT ANSWERS  Relationship to Patient?  Self

## 2022-10-17 ENCOUNTER — TELEPHONE (OUTPATIENT)
Dept: INTERNAL MEDICINE CLINIC | Age: 87
End: 2022-10-17

## 2022-10-17 DIAGNOSIS — J06.9 UPPER RESPIRATORY TRACT INFECTION, UNSPECIFIED TYPE: Primary | ICD-10-CM

## 2022-10-17 RX ORDER — BENZONATATE 100 MG/1
100 CAPSULE ORAL 3 TIMES DAILY PRN
Qty: 30 CAPSULE | Refills: 0 | Status: SHIPPED | OUTPATIENT
Start: 2022-10-17 | End: 2022-10-24

## 2022-10-17 RX ORDER — AZITHROMYCIN 250 MG/1
250 TABLET, FILM COATED ORAL SEE ADMIN INSTRUCTIONS
Qty: 6 TABLET | Refills: 0 | Status: SHIPPED | OUTPATIENT
Start: 2022-10-17 | End: 2022-10-22

## 2022-10-17 RX ORDER — GUAIFENESIN 600 MG/1
600 TABLET, EXTENDED RELEASE ORAL 2 TIMES DAILY
Qty: 30 TABLET | Refills: 0 | Status: SHIPPED | OUTPATIENT
Start: 2022-10-17 | End: 2022-11-01

## 2022-10-17 NOTE — TELEPHONE ENCOUNTER
----- Message from Catrachita Siddiqui LPN sent at 02/43/6114 11:07 AM EDT -----  Subject: Message to Provider    QUESTIONS  Information for Provider? Patient started over the weekend with cold type   symptoms and would like something called in , runny nose, cough, no fever   but feels warm, slight shortness of breath at night when laying down not   when she is up and moving around. would like to know if something can be   called in   ---------------------------------------------------------------------------  --------------  [address] [employer]  [phone]; Do not leave any message, patient will call back for answer  ---------------------------------------------------------------------------  --------------  SCRIPT ANSWERS  Relationship to Patient?  Self

## 2022-10-26 PROBLEM — G45.9 TIA (TRANSIENT ISCHEMIC ATTACK): Status: RESOLVED | Noted: 2022-01-17 | Resolved: 2022-10-26

## 2022-10-26 PROBLEM — Z86.73 HISTORY OF TIA (TRANSIENT ISCHEMIC ATTACK): Status: ACTIVE | Noted: 2022-10-26

## 2022-10-26 PROBLEM — N18.31 STAGE 3A CHRONIC KIDNEY DISEASE (HCC): Status: ACTIVE | Noted: 2022-10-26

## 2022-11-07 DIAGNOSIS — I10 ESSENTIAL HYPERTENSION: ICD-10-CM

## 2022-11-07 RX ORDER — BENAZEPRIL HYDROCHLORIDE 40 MG/1
TABLET, FILM COATED ORAL
Qty: 90 TABLET | Refills: 1 | Status: SHIPPED | OUTPATIENT
Start: 2022-11-07 | End: 2022-11-15 | Stop reason: SDUPTHER

## 2022-11-10 ENCOUNTER — TELEPHONE (OUTPATIENT)
Dept: INTERNAL MEDICINE CLINIC | Age: 87
End: 2022-11-10

## 2022-11-10 DIAGNOSIS — J20.9 ACUTE BRONCHITIS, UNSPECIFIED ORGANISM: Primary | ICD-10-CM

## 2022-11-10 RX ORDER — BENZONATATE 100 MG/1
100 CAPSULE ORAL 3 TIMES DAILY PRN
Qty: 30 CAPSULE | Refills: 0 | Status: SHIPPED | OUTPATIENT
Start: 2022-11-10 | End: 2022-11-17

## 2022-11-10 RX ORDER — GUAIFENESIN 600 MG/1
600 TABLET, EXTENDED RELEASE ORAL 2 TIMES DAILY
Qty: 30 TABLET | Refills: 0 | Status: SHIPPED | OUTPATIENT
Start: 2022-11-10 | End: 2022-11-25

## 2022-11-10 RX ORDER — AZITHROMYCIN 250 MG/1
250 TABLET, FILM COATED ORAL SEE ADMIN INSTRUCTIONS
Qty: 6 TABLET | Refills: 0 | Status: SHIPPED | OUTPATIENT
Start: 2022-11-10 | End: 2022-11-15

## 2022-11-10 NOTE — TELEPHONE ENCOUNTER
----- Message from Onur Gould sent at 11/10/2022  3:04 PM EST -----  Subject: Message to Provider    QUESTIONS  Information for Provider? Patient is wondering if you can call medication   in for her cold she said called a week ago took the medication she got and   the cold came back. ---------------------------------------------------------------------------  --------------  Gricelda Lee [facility]  [phone]; Do not leave any message, patient will call back for answer  ---------------------------------------------------------------------------  --------------  SCRIPT ANSWERS  Relationship to Patient?  Self

## 2022-11-15 ENCOUNTER — TELEPHONE (OUTPATIENT)
Dept: INTERNAL MEDICINE CLINIC | Age: 87
End: 2022-11-15

## 2022-11-15 ENCOUNTER — OFFICE VISIT (OUTPATIENT)
Dept: INTERNAL MEDICINE CLINIC | Age: 87
End: 2022-11-15
Payer: MEDICARE

## 2022-11-15 VITALS
OXYGEN SATURATION: 99 % | HEART RATE: 90 BPM | SYSTOLIC BLOOD PRESSURE: 173 MMHG | BODY MASS INDEX: 20.59 KG/M2 | WEIGHT: 139 LBS | HEIGHT: 69 IN | DIASTOLIC BLOOD PRESSURE: 96 MMHG

## 2022-11-15 DIAGNOSIS — M54.9 DORSALGIA: ICD-10-CM

## 2022-11-15 DIAGNOSIS — G62.9 NEUROPATHY: ICD-10-CM

## 2022-11-15 DIAGNOSIS — G30.1 MODERATE LATE ONSET ALZHEIMER'S DEMENTIA WITHOUT BEHAVIORAL DISTURBANCE, PSYCHOTIC DISTURBANCE, MOOD DISTURBANCE, OR ANXIETY (HCC): ICD-10-CM

## 2022-11-15 DIAGNOSIS — J30.9 ALLERGIC RHINITIS, UNSPECIFIED SEASONALITY, UNSPECIFIED TRIGGER: ICD-10-CM

## 2022-11-15 DIAGNOSIS — D64.9 ANEMIA, UNSPECIFIED TYPE: ICD-10-CM

## 2022-11-15 DIAGNOSIS — F02.B0 MODERATE LATE ONSET ALZHEIMER'S DEMENTIA WITHOUT BEHAVIORAL DISTURBANCE, PSYCHOTIC DISTURBANCE, MOOD DISTURBANCE, OR ANXIETY (HCC): ICD-10-CM

## 2022-11-15 DIAGNOSIS — N18.31 STAGE 3A CHRONIC KIDNEY DISEASE (HCC): ICD-10-CM

## 2022-11-15 DIAGNOSIS — K21.9 GASTROESOPHAGEAL REFLUX DISEASE WITHOUT ESOPHAGITIS: ICD-10-CM

## 2022-11-15 DIAGNOSIS — Z86.73 HISTORY OF TIA (TRANSIENT ISCHEMIC ATTACK): Primary | ICD-10-CM

## 2022-11-15 DIAGNOSIS — I10 ESSENTIAL HYPERTENSION: Primary | ICD-10-CM

## 2022-11-15 DIAGNOSIS — I65.21 CAROTID STENOSIS, RIGHT: ICD-10-CM

## 2022-11-15 DIAGNOSIS — Z86.73 HISTORY OF TIA (TRANSIENT ISCHEMIC ATTACK): ICD-10-CM

## 2022-11-15 DIAGNOSIS — E78.2 MIXED HYPERLIPIDEMIA: ICD-10-CM

## 2022-11-15 PROBLEM — R78.81 COAGULASE NEGATIVE STAPHYLOCOCCUS BACTEREMIA: Status: RESOLVED | Noted: 2022-01-23 | Resolved: 2022-11-15

## 2022-11-15 PROBLEM — B95.7 COAGULASE NEGATIVE STAPHYLOCOCCUS BACTEREMIA: Status: RESOLVED | Noted: 2022-01-23 | Resolved: 2022-11-15

## 2022-11-15 LAB
BASOPHILS ABSOLUTE: 0 K/UL (ref 0–0.2)
BASOPHILS RELATIVE PERCENT: 0.5 %
EOSINOPHILS ABSOLUTE: 0.1 K/UL (ref 0–0.6)
EOSINOPHILS RELATIVE PERCENT: 1 %
HCT VFR BLD CALC: 35.5 % (ref 36–48)
HEMOGLOBIN: 11.7 G/DL (ref 12–16)
LYMPHOCYTES ABSOLUTE: 1.4 K/UL (ref 1–5.1)
LYMPHOCYTES RELATIVE PERCENT: 24.7 %
MCH RBC QN AUTO: 29.4 PG (ref 26–34)
MCHC RBC AUTO-ENTMCNC: 33.1 G/DL (ref 31–36)
MCV RBC AUTO: 89 FL (ref 80–100)
MONOCYTES ABSOLUTE: 0.5 K/UL (ref 0–1.3)
MONOCYTES RELATIVE PERCENT: 9.3 %
NEUTROPHILS ABSOLUTE: 3.7 K/UL (ref 1.7–7.7)
NEUTROPHILS RELATIVE PERCENT: 64.5 %
PDW BLD-RTO: 14.2 % (ref 12.4–15.4)
PLATELET # BLD: 227 K/UL (ref 135–450)
PMV BLD AUTO: 11.5 FL (ref 5–10.5)
RBC # BLD: 3.98 M/UL (ref 4–5.2)
WBC # BLD: 5.8 K/UL (ref 4–11)

## 2022-11-15 PROCEDURE — G8420 CALC BMI NORM PARAMETERS: HCPCS | Performed by: INTERNAL MEDICINE

## 2022-11-15 PROCEDURE — 1090F PRES/ABSN URINE INCON ASSESS: CPT | Performed by: INTERNAL MEDICINE

## 2022-11-15 PROCEDURE — 1123F ACP DISCUSS/DSCN MKR DOCD: CPT | Performed by: INTERNAL MEDICINE

## 2022-11-15 PROCEDURE — G8427 DOCREV CUR MEDS BY ELIG CLIN: HCPCS | Performed by: INTERNAL MEDICINE

## 2022-11-15 PROCEDURE — 1036F TOBACCO NON-USER: CPT | Performed by: INTERNAL MEDICINE

## 2022-11-15 PROCEDURE — G8484 FLU IMMUNIZE NO ADMIN: HCPCS | Performed by: INTERNAL MEDICINE

## 2022-11-15 PROCEDURE — 99214 OFFICE O/P EST MOD 30 MIN: CPT | Performed by: INTERNAL MEDICINE

## 2022-11-15 PROCEDURE — 36415 COLL VENOUS BLD VENIPUNCTURE: CPT | Performed by: INTERNAL MEDICINE

## 2022-11-15 RX ORDER — ATORVASTATIN CALCIUM 20 MG/1
TABLET, FILM COATED ORAL
Qty: 90 TABLET | Refills: 1 | Status: SHIPPED | OUTPATIENT
Start: 2022-11-15

## 2022-11-15 RX ORDER — BENAZEPRIL HYDROCHLORIDE 40 MG/1
TABLET, FILM COATED ORAL
Qty: 90 TABLET | Refills: 1 | Status: SHIPPED | OUTPATIENT
Start: 2022-11-15

## 2022-11-15 RX ORDER — ASPIRIN 81 MG/1
81 TABLET ORAL DAILY
Qty: 30 TABLET | Refills: 3 | Status: SHIPPED | OUTPATIENT
Start: 2022-11-15

## 2022-11-15 RX ORDER — MEMANTINE HYDROCHLORIDE 5 MG/1
5 TABLET ORAL 2 TIMES DAILY
Qty: 60 TABLET | Refills: 0 | Status: SHIPPED | OUTPATIENT
Start: 2022-11-15

## 2022-11-15 RX ORDER — LORATADINE 10 MG/1
TABLET ORAL
Qty: 90 TABLET | Refills: 1 | Status: SHIPPED | OUTPATIENT
Start: 2022-11-15

## 2022-11-15 RX ORDER — OMEPRAZOLE 20 MG/1
CAPSULE, DELAYED RELEASE ORAL
Qty: 90 CAPSULE | Refills: 1 | Status: SHIPPED | OUTPATIENT
Start: 2022-11-15

## 2022-11-15 RX ORDER — LANOLIN ALCOHOL/MO/W.PET/CERES
CREAM (GRAM) TOPICAL
Qty: 90 TABLET | Refills: 1 | Status: SHIPPED | OUTPATIENT
Start: 2022-11-15

## 2022-11-15 RX ORDER — TRIAMTERENE AND HYDROCHLOROTHIAZIDE 75; 50 MG/1; MG/1
TABLET ORAL
Qty: 90 TABLET | Refills: 1 | Status: SHIPPED | OUTPATIENT
Start: 2022-11-15

## 2022-11-15 RX ORDER — PREGABALIN 150 MG/1
150 CAPSULE ORAL 2 TIMES DAILY
Qty: 180 CAPSULE | Refills: 0 | Status: SHIPPED | OUTPATIENT
Start: 2022-11-15 | End: 2023-02-13

## 2022-11-15 RX ORDER — FERROUS SULFATE 325(65) MG
TABLET ORAL
Qty: 90 TABLET | Refills: 1 | Status: SHIPPED | OUTPATIENT
Start: 2022-11-15

## 2022-11-15 RX ORDER — AMLODIPINE BESYLATE 10 MG/1
TABLET ORAL
Qty: 90 TABLET | Refills: 1 | Status: SHIPPED | OUTPATIENT
Start: 2022-11-15

## 2022-11-15 RX ORDER — MEMANTINE HYDROCHLORIDE 10 MG/1
10 TABLET ORAL 2 TIMES DAILY
Qty: 60 TABLET | Refills: 5 | Status: SHIPPED | OUTPATIENT
Start: 2022-11-15

## 2022-11-15 NOTE — TELEPHONE ENCOUNTER
Natali Bullard from [facility] called stating she received referral for patient to have home health aide. They do not have any aides to fulfill this referral. Please advise.

## 2022-11-15 NOTE — PROGRESS NOTES
Name: Amadou Reyes  [id number]  Age: 80 y.o. YOB: 1933  Sex: female    CHIEF COMPLAINT:    Chief Complaint   Patient presents with    Hypertension     Not been taking her medications for a few days. She has been dizzy the last few days. Gastroesophageal Reflux    Other     Other chronic conditions         HISTORY OF PRESENT ILLNESS:     This is a pleasant  80 y.o. female  is seen today for management of chronic medical problems and medications refills. Previous records reviewed . Patient is accompanied by her granddaughter who is taking care of of her at this time. Patient's  is in the hospital and he is going to be discharged to nursing home as he is significantly sick. Patient's granddaughter claims that patient has become significantly confused recently and she has not taken any medication for the last 5 days. She also have significant memory issues recently and she thinks that she cannot be left alone. She is taking care of her grandmother but she cannot be there all the time. She wants home health care or home health aide to help her. She recently had some upper respiratory infection symptoms and antibiotics were called in but she did not take it as she claims that it was expensive for her. Her symptoms have improved anyway. Doing OK otherwise. Denies CP or SOB. No fever. Denies any abdominal pain. Appetite OK. Bowels moving [id number] No urinary symptoms. Complains of mild chronic low back pain and pain in her legs but medications help. Also her right knee gives away at times . No falls recently. She uses a walker for her ambulation. She is taking Lyrica and Tylenol for pain. PDMP reviewed, no signs of drug abuse. Hearing is ok. Vision Ok with glasses. Denies  any significant skin lesions. Denies any significant depression or anxiety. As mentioned above her memory is poor. No other complaints.   She has been vaccinated for Covid 19 x 2 but did not get 90 days. 11/15/22 2/13/23 Yes Sallie Yuan MD   triamterene-hydroCHLOROthiazide (MAXZIDE) 75-50 MG per tablet TAKE 1 TABLET EVERY DAY 11/15/22  Yes Sallie Yuan MD   vitamin B-12 (CYANOCOBALAMIN) 1000 MCG tablet TAKE 1 TABLET EVERY DAY 11/15/22  Yes Sallie Yuan MD   memantine [facility]) 5 MG tablet Take 1 tablet by mouth 2 times daily 11/15/22  Yes Sallie Yuan MD   memantine (NAMENDA) 10 MG tablet Take 1 tablet by mouth 2 times daily Start after taking Namenda 5 mg bid completed for 1 month. 11/15/22  Yes Sallie Yuan MD   azithromycin (ZITHROMAX) 250 MG tablet Take 1 tablet by mouth See Admin Instructions for 5 days 500mg on day 1 followed by 250mg on days 2 - 5  Patient not taking: Reported on 11/15/2022 11/10/22 11/15/22  Sallie Yuan MD   guaiFENesin (MUCINEX) 600 MG extended release tablet Take 1 tablet by mouth 2 times daily for 15 days  Patient not taking: Reported on 11/15/2022 11/10/22 11/25/22  Sallie Yuan MD   benzonatate (TESSALON) 100 MG capsule Take 1 capsule by mouth 3 times daily as needed for Cough  Patient not taking: Reported on 11/15/2022 11/10/22 11/17/22  Sallie Yuan MD       LAB DATA: Reviewed. REVIEW OF SYSTEMS:   see HPI/ Comprehensive review of systems negative except for the ones mentioned in HPI. PHYSICAL EXAMINATION:   BP (!) 173/96 (Site: Right Upper Arm, Position: Sitting, Cuff Size: Medium Adult)   Pulse 90   Ht 5' 9\" (1.753 m)   Wt 139 lb (63 kg)   SpO2 99%   BMI 20.53 kg/m²      GENERAL APPEARANCE:      Alert, oriented x 3, well developed, cooperative, not in any distress, appears stated age. HEAD:                         Normocephalic, atraumatic maxillary sinus area tenderness bilaterally. Mild nasal congestion. EYES:                          PERRLA, EOMI, lids normal, conjuctivea clear, sclera anicteric. NECK:                         Supple, symmetrical,  trachea midline, no thyromegaly, no JVD, no lymphadenopathy.     LUNGS:                       Clear to auscultation bilaterally, respirations unlabored, accessory muscles are not used. HEART:                       Regular rate and rhythm, S1 and S2 normal, no murmur, rub or gallop. PMI in MCL. ABDOMEN:                 Soft, non-tender, bowel sounds are normoactive, no masses, no hepatospleenomegaly. EXTREMITY:              no bipedal edema  NEURO:                      Alert, oriented to person, place and time. Has generalized weakness but no focal deficit. Musculoskeletal:         No kyphosis or scoliosis, mild tenderness in her lower back and in her knees. Skin:                            Warm and dry. No rash or obvious suspicious lesions. PSYCH:                       Mood euthymic, insight and judgement good           ASSESSMENT/PLAN:    1. Essential hypertension  Patient has not been taking her medications. But recently she has restarted taking it since her granddaughter has started taking care of her. Continue current medications, denies side effect with medicationss. Low salt diet and exercise advised. - amLODIPine (NORVASC) 10 MG tablet; TAKE 1 TABLET EVERY DAY  Dispense: 90 tablet; Refill: 1  - benazepril (LOTENSIN) 40 MG tablet; TAKE 1 TABLET EVERY DAY  Dispense: 90 tablet; Refill: 1  - triamterene-hydroCHLOROthiazide (MAXZIDE) 75-50 MG per tablet; TAKE 1 TABLET EVERY DAY  Dispense: 90 tablet; Refill: 1  - CBC with Auto Differential  - Comprehensive Metabolic Panel    2. Mixed hyperlipidemia  Patient is taking cholesterol medications regularly. Denies any side effects. Diet and exercise advised. Continue Lipitor  - atorvastatin (LIPITOR) 20 MG tablet; TAKE 1 TABLET EVERY DAY  Dispense: 90 tablet; Refill: 1    3. Stage 3a chronic kidney disease (HCC)  Avoid NSAID and keep well-hydrated. 4. Carotid stenosis, right  Not a surgical candidate. Continue aspirin and Lipitor    5.  History of TIA (transient ischemic attack)  Continue aspirin and Lipitor  - aspirin 81 MG EC tablet; Take 1 tablet by mouth daily  Dispense: 30 tablet; Refill: 3    6. Gastroesophageal reflux disease without esophagitis  Continue omeprazole  - omeprazole (PRILOSEC) 20 MG delayed release capsule; TAKE 1 CAPSULE EVERY DAY  Dispense: 90 capsule; Refill: 1    7. Dorsalgia  Continue Tylenol and Lyrica. PDMP reviewed: No signs of drug abuse. - pregabalin (LYRICA) 150 MG capsule; Take 1 capsule by mouth 2 times daily for 90 days. Dispense: 180 capsule; Refill: 0  - External Referral To Home Health    8. Neuropathy  Continue Lyrica  - pregabalin (LYRICA) 150 MG capsule; Take 1 capsule by mouth 2 times daily for 90 days. Dispense: 180 capsule; Refill: 0    9. Anemia, unspecified type  Continue ferrous sulfate  - ferrous sulfate (FEROSUL) 325 (65 Fe) MG tablet; TAKE 1 TABLET BY MOUTH DAILY (WITH BREAKFAST)  Dispense: 90 tablet; Refill: 1    10. Allergic rhinitis, unspecified seasonality, unspecified trigger  On Claritin as needed  - loratadine (CLARITIN) 10 MG tablet; TAKE 1 TABLET EVERY DAY  Dispense: 90 tablet; Refill: 1    11. Moderate late onset Alzheimer's dementia without behavioral disturbance, psychotic disturbance, mood disturbance, or anxiety ([address]  Will start on Namenda. - memantine (NAMENDA) 5 MG tablet; Take 1 tablet by mouth 2 times daily  Dispense: 60 tablet; Refill: 0  - memantine (NAMENDA) 10 MG tablet; Take 1 tablet by mouth 2 times daily Start after taking Namenda 5 mg bid completed for 1 month. Dispense: 60 tablet; Refill: 5    Also will refer her to home health care to evaluate for possible home health aide. Care discussed with patient. Questions answered and patient verbalizes understanding and agrees with plan. Medications reviewed and reconciled. Continue current medications. Appropriate prescriptions are ordered. Risks and benefits of meds are discussed. After visit summary provided.     Advised to call for any problems, questions, or concerns. If symptoms worsen or don't improve as expected, to call us or go to ER. Follow up as directed, sooner if needed. Return in about 2 months (around 1/15/2023). This dictation was performed with a verbal recognition program and it was checked for errors. It is possible that there are still dictated errors within this office note. Any errors should be brought immediately to my attention for correction. All efforts were made to ensure that this office note is accurate.      Vinicius Funk MD MD

## 2022-11-16 LAB
A/G RATIO: 1.6 (ref 1.1–2.2)
ALBUMIN SERPL-MCNC: 4.2 G/DL (ref 3.4–5)
ALP BLD-CCNC: 98 U/L (ref 40–129)
ALT SERPL-CCNC: 8 U/L (ref 10–40)
ANION GAP SERPL CALCULATED.3IONS-SCNC: 17 MMOL/L (ref 3–16)
AST SERPL-CCNC: 16 U/L (ref 15–37)
BILIRUB SERPL-MCNC: 0.7 MG/DL (ref 0–1)
BUN BLDV-MCNC: 17 MG/DL (ref 7–20)
CALCIUM SERPL-MCNC: 10 MG/DL (ref 8.3–10.6)
CHLORIDE BLD-SCNC: 100 MMOL/L (ref 99–110)
CO2: 24 MMOL/L (ref 21–32)
CREAT SERPL-MCNC: 1 MG/DL (ref 0.6–1.2)
GFR SERPL CREATININE-BSD FRML MDRD: 54 ML/MIN/{1.73_M2}
GLUCOSE BLD-MCNC: 106 MG/DL (ref 70–99)
POTASSIUM SERPL-SCNC: 3.7 MMOL/L (ref 3.5–5.1)
SODIUM BLD-SCNC: 141 MMOL/L (ref 136–145)
TOTAL PROTEIN: 6.8 G/DL (ref 6.4–8.2)

## 2022-12-01 ENCOUNTER — TELEPHONE (OUTPATIENT)
Dept: INTERNAL MEDICINE CLINIC | Age: 87
End: 2022-12-01

## 2022-12-01 NOTE — TELEPHONE ENCOUNTER
----- Message from Mary Ann Holcomb sent at 12/1/2022  2:14 PM EST -----  Subject: Message to Provider    QUESTIONS  Information for Provider? Pt is calling about the referral for home health   care. She is saying that it needs to be called into [facility] directly. Naty hasn't received it yet. Please call pt if you have questions.  ---------------------------------------------------------------------------  --------------  [facility]  [phone]; Do not leave any message, patient will call back for answer  ---------------------------------------------------------------------------  --------------  SCRIPT ANSWERS  Relationship to Patient?  Self

## 2022-12-02 NOTE — TELEPHONE ENCOUNTER
Spoke to the patient and she stated the insurance plan she has now does not cover home health aides. She is starting a new plan in January so she wants us to try to get a new referral when she comes back in 12/29/22.

## 2022-12-07 DIAGNOSIS — K21.9 GASTROESOPHAGEAL REFLUX DISEASE WITHOUT ESOPHAGITIS: ICD-10-CM

## 2022-12-07 RX ORDER — LANOLIN ALCOHOL/MO/W.PET/CERES
CREAM (GRAM) TOPICAL
Qty: 90 TABLET | Refills: 1 | Status: SHIPPED | OUTPATIENT
Start: 2022-12-07

## 2022-12-07 RX ORDER — OMEPRAZOLE 20 MG/1
CAPSULE, DELAYED RELEASE ORAL
Qty: 90 CAPSULE | Refills: 1 | Status: SHIPPED | OUTPATIENT
Start: 2022-12-07

## 2022-12-12 DIAGNOSIS — G30.1 MODERATE LATE ONSET ALZHEIMER'S DEMENTIA WITHOUT BEHAVIORAL DISTURBANCE, PSYCHOTIC DISTURBANCE, MOOD DISTURBANCE, OR ANXIETY (HCC): ICD-10-CM

## 2022-12-12 DIAGNOSIS — F02.B0 MODERATE LATE ONSET ALZHEIMER'S DEMENTIA WITHOUT BEHAVIORAL DISTURBANCE, PSYCHOTIC DISTURBANCE, MOOD DISTURBANCE, OR ANXIETY (HCC): ICD-10-CM

## 2022-12-12 RX ORDER — MEMANTINE HYDROCHLORIDE 10 MG/1
10 TABLET ORAL 2 TIMES DAILY
Qty: 60 TABLET | Refills: 3 | Status: SHIPPED | OUTPATIENT
Start: 2022-12-12

## 2022-12-14 ENCOUNTER — TELEPHONE (OUTPATIENT)
Dept: INTERNAL MEDICINE CLINIC | Age: 87
End: 2022-12-14

## 2022-12-14 NOTE — TELEPHONE ENCOUNTER
Arabella from [facility] called and stated they received the referral for home health care aide. Patient's insurance [facility]) will not cover just the single service of an aide. Arabella would like to have PT/OT orders added on as well to the home health aide. OT can also not be ordered by itself. OT would be able to help with bathing, etc. if there is no aide in [facility] available.   Arabella phone # [phone]  Fax # [phone]

## 2022-12-14 NOTE — TELEPHONE ENCOUNTER
----- Message from Maya Cuellar sent at 12/14/2022  8:55 AM EST -----  Subject: Referral Request    Reason for referral request? patient would like a referral to have Home   health care aid to do house work. Patient stated that her insurance will   cover this and she just need to send the referral to [facility]. Provider patient wants to be referred to(if known):     Provider Phone Number(if known):     Additional Information for Provider?   ---------------------------------------------------------------------------  --------------  [id number]    [phone]; OK to leave message on voicemail  ---------------------------------------------------------------------------  --------------

## 2022-12-15 DIAGNOSIS — Z86.73 HISTORY OF TIA (TRANSIENT ISCHEMIC ATTACK): ICD-10-CM

## 2022-12-15 DIAGNOSIS — M54.9 DORSALGIA: Primary | ICD-10-CM

## 2022-12-15 DIAGNOSIS — G62.9 NEUROPATHY: ICD-10-CM

## 2023-01-04 ENCOUNTER — TELEPHONE (OUTPATIENT)
Dept: INTERNAL MEDICINE CLINIC | Age: 88
End: 2023-01-04

## 2023-01-04 NOTE — TELEPHONE ENCOUNTER
Patient called requesting and OTC medications recommendations to help relieve fatigue and jaw pain. Patient also had a sore throat that has subsided. Symptoms x 1 week. Please advise.

## 2023-01-09 ENCOUNTER — OFFICE VISIT (OUTPATIENT)
Dept: INTERNAL MEDICINE CLINIC | Age: 88
End: 2023-01-09
Payer: MEDICARE

## 2023-01-09 ENCOUNTER — HOSPITAL ENCOUNTER (OUTPATIENT)
Age: 88
Setting detail: SPECIMEN
Discharge: HOME | End: 2023-01-09
Payer: MEDICARE

## 2023-01-09 VITALS
SYSTOLIC BLOOD PRESSURE: 120 MMHG | BODY MASS INDEX: 21.18 KG/M2 | OXYGEN SATURATION: 98 % | HEIGHT: 69 IN | DIASTOLIC BLOOD PRESSURE: 60 MMHG | HEART RATE: 67 BPM | WEIGHT: 143 LBS

## 2023-01-09 DIAGNOSIS — R35.0 URINARY FREQUENCY: ICD-10-CM

## 2023-01-09 DIAGNOSIS — R41.82 ALTERED MENTAL STATUS, UNSPECIFIED ALTERED MENTAL STATUS TYPE: ICD-10-CM

## 2023-01-09 DIAGNOSIS — R68.89 FLU-LIKE SYMPTOMS: Primary | ICD-10-CM

## 2023-01-09 LAB
INFLUENZA A ANTIBODY: NORMAL
INFLUENZA B ANTIBODY: NORMAL
S PYO AG THROAT QL: NORMAL

## 2023-01-09 PROCEDURE — 87804 INFLUENZA ASSAY W/OPTIC: CPT

## 2023-01-09 PROCEDURE — 81000 URINALYSIS NONAUTO W/SCOPE: CPT

## 2023-01-09 PROCEDURE — 1123F ACP DISCUSS/DSCN MKR DOCD: CPT

## 2023-01-09 PROCEDURE — U0003 INFECTIOUS AGENT DETECTION BY NUCLEIC ACID (DNA OR RNA); SEVERE ACUTE RESPIRATORY SYNDROME CORONAVIRUS 2 (SARS-COV-2) (CORONAVIRUS DISEASE [COVID-19]), AMPLIFIED PROBE TECHNIQUE, MAKING USE OF HIGH THROUGHPUT TECHNOLOGIES AS DESCRIBED BY CMS-2020-01-R: HCPCS

## 2023-01-09 PROCEDURE — 1036F TOBACCO NON-USER: CPT

## 2023-01-09 PROCEDURE — G8420 CALC BMI NORM PARAMETERS: HCPCS

## 2023-01-09 PROCEDURE — U0005 INFEC AGEN DETEC AMPLI PROBE: HCPCS

## 2023-01-09 PROCEDURE — 1090F PRES/ABSN URINE INCON ASSESS: CPT

## 2023-01-09 PROCEDURE — G8484 FLU IMMUNIZE NO ADMIN: HCPCS

## 2023-01-09 PROCEDURE — 87880 STREP A ASSAY W/OPTIC: CPT

## 2023-01-09 PROCEDURE — 99214 OFFICE O/P EST MOD 30 MIN: CPT

## 2023-01-09 PROCEDURE — G8427 DOCREV CUR MEDS BY ELIG CLIN: HCPCS

## 2023-01-09 RX ORDER — BENZONATATE 100 MG/1
100 CAPSULE ORAL 3 TIMES DAILY PRN
Qty: 30 CAPSULE | Refills: 0 | Status: SHIPPED | OUTPATIENT
Start: 2023-01-09 | End: 2023-01-16

## 2023-01-09 RX ORDER — GUAIFENESIN 600 MG/1
600 TABLET, EXTENDED RELEASE ORAL 2 TIMES DAILY
Qty: 30 TABLET | Refills: 0 | Status: SHIPPED | OUTPATIENT
Start: 2023-01-09 | End: 2023-01-24

## 2023-01-09 ASSESSMENT — ENCOUNTER SYMPTOMS
EYE REDNESS: 0
COUGH: 1
CHOKING: 0
WHEEZING: 0
SHORTNESS OF BREATH: 0
CHEST TIGHTNESS: 0
EYE DISCHARGE: 0
COLOR CHANGE: 0
DIARRHEA: 0
RHINORRHEA: 0
STRIDOR: 0
NAUSEA: 0
EYE PAIN: 0
SINUS PAIN: 0
SORE THROAT: 1
ABDOMINAL PAIN: 0
TROUBLE SWALLOWING: 0
SWOLLEN GLANDS: 0
FACIAL SWELLING: 0
VOMITING: 0
CONSTIPATION: 0

## 2023-01-09 ASSESSMENT — VISUAL ACUITY: OU: 1

## 2023-01-09 ASSESSMENT — PATIENT HEALTH QUESTIONNAIRE - PHQ9
3. TROUBLE FALLING OR STAYING ASLEEP: 0
2. FEELING DOWN, DEPRESSED OR HOPELESS: 2
8. MOVING OR SPEAKING SO SLOWLY THAT OTHER PEOPLE COULD HAVE NOTICED. OR THE OPPOSITE, BEING SO FIGETY OR RESTLESS THAT YOU HAVE BEEN MOVING AROUND A LOT MORE THAN USUAL: 2
4. FEELING TIRED OR HAVING LITTLE ENERGY: 3
SUM OF ALL RESPONSES TO PHQ QUESTIONS 1-9: 11
6. FEELING BAD ABOUT YOURSELF - OR THAT YOU ARE A FAILURE OR HAVE LET YOURSELF OR YOUR FAMILY DOWN: 0
7. TROUBLE CONCENTRATING ON THINGS, SUCH AS READING THE NEWSPAPER OR WATCHING TELEVISION: 1
1. LITTLE INTEREST OR PLEASURE IN DOING THINGS: 2
5. POOR APPETITE OR OVEREATING: 1
SUM OF ALL RESPONSES TO PHQ9 QUESTIONS 1 & 2: 4
10. IF YOU CHECKED OFF ANY PROBLEMS, HOW DIFFICULT HAVE THESE PROBLEMS MADE IT FOR YOU TO DO YOUR WORK, TAKE CARE OF THINGS AT HOME, OR GET ALONG WITH OTHER PEOPLE: 1
SUM OF ALL RESPONSES TO PHQ QUESTIONS 1-9: 11

## 2023-01-09 ASSESSMENT — COLUMBIA-SUICIDE SEVERITY RATING SCALE - C-SSRS
1. WITHIN THE PAST MONTH, HAVE YOU WISHED YOU WERE DEAD OR WISHED YOU COULD GO TO SLEEP AND NOT WAKE UP?: NO
2. HAVE YOU ACTUALLY HAD ANY THOUGHTS OF KILLING YOURSELF?: NO
6. HAVE YOU EVER DONE ANYTHING, STARTED TO DO ANYTHING, OR PREPARED TO DO ANYTHING TO END YOUR LIFE?: NO

## 2023-01-09 NOTE — PROGRESS NOTES
Subjective:      Chief Complaint   Patient presents with    Fatigue    Pharyngitis     Pt states some family members had strep    Cough    Congestion     3-4 days pt has had these sx's. Pt granddaughter states that pt has been \"talking out of her head. \" Tried to get a UA. Pt either missed the hat or couldn't urinate. HPI:  [place] is a 80 y.o. female who presents today for sore throat, cough, congestion, fatigue, headache, confusion x 5 days. Urinary frequency x this last weekend with incontinence. Contact ill with strep. URI   This is a new problem. Associated symptoms include congestion, coughing, headaches and a sore throat. Pertinent negatives include no abdominal pain, chest pain, diarrhea, dysuria, ear pain, joint pain, joint swelling, nausea, neck pain, plugged ear sensation, rash, rhinorrhea, sinus pain, sneezing, swollen glands, vomiting or wheezing. She has tried decongestant, increased fluids, eating, steam and sleep for the symptoms. The treatment provided mild relief. Urinary Frequency   This is a new problem. The current episode started in the past 7 days. The problem occurs every urination. The problem has been gradually worsening. There has been no fever. She is Not sexually active. There is No history of pyelonephritis. Associated symptoms include frequency. Pertinent negatives include no chills, discharge, flank pain, hematuria, hesitancy, nausea, possible pregnancy, sweats, urgency or vomiting. She has tried increased fluids for the symptoms. The treatment provided no relief. Memory Loss    Patient reports onset of memory loss was within the past week. Onset quality is sudden. Symptoms associated with memory loss include changes in short-term memory. Symptoms do not include changes in long-term memory, difficulty recalling words, repetitive questions, day/night behavior changes or disorientation outside familiar environments.     Behavorial problems for memory loss include hallucinations. Patient does not have the following behavorial problems associated with memory loss: paranoia, suspiciousness, delusions or agitation. The family and/or patient does not have the following concerns associated with memory loss: medication errors or cooking. Patient lives with adult children and family members. Patient lives in a/an family member's home. Additional narrative: Patient had sudden onset over the weekend of auditory and visual hallucinations intermittently at the same time of URI and urinary symptoms starting. Past Medical History:   Diagnosis Date    GERD (gastroesophageal reflux disease)     H/O echocardiogram 04/06/2017    EF 55% Left atrial enlargement. Normal LV systolic. Minimal aoritc stenosis. H/O echocardiogram 02/04/2020    EF 55-60%, Grade I DD, Mild AS. Mildly dilated left atrium. Hyperlipidemia     Hypertension     Neuropathy       Past Surgical History:   Procedure Laterality Date    APPENDECTOMY      COLON SURGERY      removed polops    HYSTERECTOMY, TOTAL ABDOMINAL (CERVIX REMOVED)      SPINE SURGERY  2013    scrape calcium off spine, pressing on spine/nerves     Social History     Tobacco Use    Smoking status: Never    Smokeless tobacco: Never   Substance Use Topics    Alcohol use: Not on file      Review of Systems   Constitutional:  Negative for activity change, appetite change, chills, diaphoresis, fatigue, fever and unexpected weight change. HENT:  Positive for congestion and sore throat. Negative for ear discharge, ear pain, facial swelling, nosebleeds, postnasal drip, rhinorrhea, sinus pain, sneezing and trouble swallowing. Eyes:  Negative for pain, discharge, redness and visual disturbance. Respiratory:  Positive for cough. Negative for choking, chest tightness, shortness of breath, wheezing and stridor. Cardiovascular:  Negative for chest pain, palpitations and leg swelling.    Gastrointestinal:  Negative for abdominal pain, constipation, diarrhea, nausea and vomiting. Genitourinary:  Positive for frequency. Negative for difficulty urinating, dysuria, flank pain, hematuria, hesitancy and urgency. Musculoskeletal:  Negative for gait problem, joint pain, myalgias and neck pain. Skin:  Negative for color change, pallor and rash. Neurological:  Positive for headaches. Negative for dizziness, syncope, weakness, light-headedness and numbness. Psychiatric/Behavioral:  Positive for memory loss. Negative for agitation, behavioral problems and decreased concentration. Prior to Visit Medications    Medication Sig Taking? Authorizing Provider   memantine (NAMENDA) 10 MG tablet Take 1 tablet by mouth 2 times daily Start after taking Namenda 5 mg bid completed for 1 month. Yes Leonora Jacob MD   omeprazole (PRILOSEC) 20 MG delayed release capsule TAKE 1 CAPSULE EVERY DAY Yes Leonora Jacob MD   vitamin B-12 (CYANOCOBALAMIN) 1000 MCG tablet TAKE 1 TABLET EVERY DAY Yes Leonora Jacob MD   amLODIPine (NORVASC) 10 MG tablet TAKE 1 TABLET EVERY DAY Yes Leonora Jacob MD   aspirin 81 MG EC tablet Take 1 tablet by mouth daily Yes Leonora Jacob MD   atorvastatin (LIPITOR) 20 MG tablet TAKE 1 TABLET EVERY DAY Yes Leonora Jacob MD   benazepril (LOTENSIN) 40 MG tablet TAKE 1 TABLET EVERY DAY Yes Leonora Jacob MD   ferrous sulfate (FEROSUL) 325 (65 Fe) MG tablet TAKE 1 TABLET BY MOUTH DAILY (WITH BREAKFAST) Yes Leonora Jacob MD   loratadine (CLARITIN) 10 MG tablet TAKE 1 TABLET EVERY DAY Yes Leonora Jacob MD   pregabalin (LYRICA) 150 MG capsule Take 1 capsule by mouth 2 times daily for 90 days. Yes Leonora Jacob MD   triamterene-hydroCHLOROthiazide (MAXZIDE) 75-50 MG per tablet TAKE 1 TABLET EVERY DAY Yes Leonora Jacob MD        Objective:      /60 (Site: Left Upper Arm, Position: Sitting, Cuff Size: Medium Adult)   Pulse 67   Ht 5' 9\" (1.753 m)   Wt 143 lb (64.9 kg)   SpO2 98%   BMI 21.12 kg/m²    Physical Exam  Vitals reviewed. Constitutional:       General: She is awake. She is not in acute distress. Appearance: Normal appearance. She is well-developed. She is not ill-appearing or toxic-appearing. HENT:      Head: Normocephalic. Jaw: There is normal jaw occlusion. Right Ear: Tympanic membrane, ear canal and external ear normal. There is no impacted cerumen. Left Ear: Tympanic membrane, ear canal and external ear normal. There is no impacted cerumen. Nose: Congestion and rhinorrhea present. Mouth/Throat:      Mouth: Mucous membranes are moist.      Pharynx: Oropharynx is clear. Posterior oropharyngeal erythema present. No oropharyngeal exudate. Eyes:      General: Lids are normal. Vision grossly intact. Gaze aligned appropriately. Right eye: No discharge. Left eye: No discharge. Extraocular Movements: Extraocular movements intact. Conjunctiva/sclera: Conjunctivae normal.      Pupils: Pupils are equal, round, and reactive to light. Cardiovascular:      Rate and Rhythm: Normal rate and regular rhythm. Pulses: Normal pulses. Heart sounds: Normal heart sounds, S1 normal and S2 normal.   Pulmonary:      Effort: Pulmonary effort is normal. No respiratory distress. Breath sounds: Normal breath sounds. Abdominal:      General: Bowel sounds are normal.      Palpations: Abdomen is soft. Tenderness: There is no abdominal tenderness. There is no right CVA tenderness, left CVA tenderness, guarding or rebound. Musculoskeletal:         General: Normal range of motion. Cervical back: Normal range of motion. Skin:     General: Skin is warm and dry. Capillary Refill: Capillary refill takes less than 2 seconds. Neurological:      General: No focal deficit present. Mental Status: She is alert and oriented to person, place, and time. Mental status is at baseline. GCS: GCS eye subscore is 4. GCS verbal subscore is 5. GCS motor subscore is 6. Cranial Nerves: No cranial nerve deficit. Sensory: Sensation is intact. No sensory deficit. Motor: Motor function is intact. Coordination: Coordination is intact. Gait: Gait is intact. Psychiatric:         Attention and Perception: Attention and perception normal.         Mood and Affect: Mood and affect normal.         Speech: Speech normal.         Behavior: Behavior normal. Behavior is cooperative. Thought Content: Thought content normal.         Cognition and Memory: Cognition and memory normal.         Judgment: Judgment normal.        Assessment / Plan:        1. Flu-like symptoms  POCT flu negative, POCT strep negative. COVID pending. Tessalon and mucinex Rx placed. Questions answered at time of appointment and patient agrees with plan of care. - POCT Influenza A/B  - POCT rapid strep A  - COVID-19  - benzonatate (TESSALON) 100 MG capsule; Take 1 capsule by mouth 3 times daily as needed for Cough  Dispense: 30 capsule; Refill: 0  - guaiFENesin (MUCINEX) 600 MG extended release tablet; Take 1 tablet by mouth 2 times daily for 15 days  Dispense: 30 tablet; Refill: 0    2. Altered mental status, unspecified altered mental status type  Lab evaluation ordered. Suspect based on history that this is related to infectious cause. If negative will pursue further workup, discussed with patient and granddaughter. Questions answered at time of appointment and patient agrees with plan of care. - CBC with Auto Differential; Future  - Comprehensive Metabolic Panel; Future    3. Urinary frequency  POCT urine unsuccessful in office, provided hat and cup for collection at home and instructed to take with her to lab to drop off tomorrow. Questions answered at time of appointment and patient agrees with plan of care. - POCT Urine with Microscopic  - Urinalysis with Reflex to Culture; Future      I have spent 35 minutes on this patient encounter.      Patient voiced understanding and agreement with plan. All questions/concerns were addressed, risks/side effects of medications were reviewed. Return precautions and after visit summary were provided. No follow-ups on file. or earlier as needed. Please note this report has been produced using speech recognition software and may contain errors related to that system including errors in grammar, punctuation, and spelling, as well as words and phrases that may be inappropriate. If there are any questions or concerns please feel free to contact the dictating provider for clarification.     Caren Landers, APRN - CNP

## 2023-01-10 LAB
SARS-COV-2: DETECTED
SOURCE: ABNORMAL

## 2023-03-13 DIAGNOSIS — I10 ESSENTIAL HYPERTENSION: ICD-10-CM

## 2023-03-13 RX ORDER — AMLODIPINE BESYLATE 10 MG/1
TABLET ORAL
Qty: 30 TABLET | Refills: 0 | Status: SHIPPED | OUTPATIENT
Start: 2023-03-13 | End: 2023-03-30 | Stop reason: SDUPTHER

## 2023-03-30 ENCOUNTER — OFFICE VISIT (OUTPATIENT)
Dept: INTERNAL MEDICINE CLINIC | Age: 88
End: 2023-03-30
Payer: MEDICARE

## 2023-03-30 VITALS
WEIGHT: 137 LBS | DIASTOLIC BLOOD PRESSURE: 70 MMHG | BODY MASS INDEX: 20.29 KG/M2 | HEART RATE: 82 BPM | HEIGHT: 69 IN | OXYGEN SATURATION: 94 % | SYSTOLIC BLOOD PRESSURE: 165 MMHG

## 2023-03-30 DIAGNOSIS — F02.B0 MODERATE LATE ONSET ALZHEIMER'S DEMENTIA WITHOUT BEHAVIORAL DISTURBANCE, PSYCHOTIC DISTURBANCE, MOOD DISTURBANCE, OR ANXIETY (HCC): ICD-10-CM

## 2023-03-30 DIAGNOSIS — E78.2 MIXED HYPERLIPIDEMIA: ICD-10-CM

## 2023-03-30 DIAGNOSIS — I10 ESSENTIAL HYPERTENSION: ICD-10-CM

## 2023-03-30 DIAGNOSIS — Z86.73 HISTORY OF TIA (TRANSIENT ISCHEMIC ATTACK): ICD-10-CM

## 2023-03-30 DIAGNOSIS — K21.9 GASTROESOPHAGEAL REFLUX DISEASE WITHOUT ESOPHAGITIS: ICD-10-CM

## 2023-03-30 DIAGNOSIS — G62.9 NEUROPATHY: ICD-10-CM

## 2023-03-30 DIAGNOSIS — N18.31 STAGE 3A CHRONIC KIDNEY DISEASE (HCC): Primary | ICD-10-CM

## 2023-03-30 DIAGNOSIS — G30.1 MODERATE LATE ONSET ALZHEIMER'S DEMENTIA WITHOUT BEHAVIORAL DISTURBANCE, PSYCHOTIC DISTURBANCE, MOOD DISTURBANCE, OR ANXIETY (HCC): ICD-10-CM

## 2023-03-30 PROCEDURE — G8427 DOCREV CUR MEDS BY ELIG CLIN: HCPCS | Performed by: INTERNAL MEDICINE

## 2023-03-30 PROCEDURE — 1090F PRES/ABSN URINE INCON ASSESS: CPT | Performed by: INTERNAL MEDICINE

## 2023-03-30 PROCEDURE — 99214 OFFICE O/P EST MOD 30 MIN: CPT | Performed by: INTERNAL MEDICINE

## 2023-03-30 PROCEDURE — G8420 CALC BMI NORM PARAMETERS: HCPCS | Performed by: INTERNAL MEDICINE

## 2023-03-30 PROCEDURE — G8484 FLU IMMUNIZE NO ADMIN: HCPCS | Performed by: INTERNAL MEDICINE

## 2023-03-30 PROCEDURE — 1036F TOBACCO NON-USER: CPT | Performed by: INTERNAL MEDICINE

## 2023-03-30 PROCEDURE — 1123F ACP DISCUSS/DSCN MKR DOCD: CPT | Performed by: INTERNAL MEDICINE

## 2023-03-30 RX ORDER — BENAZEPRIL HYDROCHLORIDE 40 MG/1
TABLET, FILM COATED ORAL
Qty: 90 TABLET | Refills: 1 | Status: SHIPPED | OUTPATIENT
Start: 2023-03-30

## 2023-03-30 RX ORDER — TRIAMTERENE AND HYDROCHLOROTHIAZIDE 75; 50 MG/1; MG/1
TABLET ORAL
Qty: 90 TABLET | Refills: 1 | Status: SHIPPED | OUTPATIENT
Start: 2023-03-30

## 2023-03-30 RX ORDER — LANOLIN ALCOHOL/MO/W.PET/CERES
1000 CREAM (GRAM) TOPICAL DAILY
Qty: 90 TABLET | Refills: 1 | Status: SHIPPED | OUTPATIENT
Start: 2023-03-30

## 2023-03-30 RX ORDER — OMEPRAZOLE 20 MG/1
20 CAPSULE, DELAYED RELEASE ORAL DAILY
Qty: 90 CAPSULE | Refills: 1 | Status: SHIPPED | OUTPATIENT
Start: 2023-03-30

## 2023-03-30 RX ORDER — GABAPENTIN 300 MG/1
300 CAPSULE ORAL 2 TIMES DAILY
Qty: 60 CAPSULE | Refills: 5 | Status: SHIPPED | OUTPATIENT
Start: 2023-03-30 | End: 2023-09-26

## 2023-03-30 RX ORDER — ASPIRIN 81 MG/1
81 TABLET ORAL DAILY
Qty: 90 TABLET | Refills: 1 | Status: SHIPPED | OUTPATIENT
Start: 2023-03-30

## 2023-03-30 RX ORDER — MEMANTINE HYDROCHLORIDE 5 MG/1
5 TABLET ORAL 2 TIMES DAILY
Qty: 60 TABLET | Refills: 4 | Status: SHIPPED | OUTPATIENT
Start: 2023-03-30

## 2023-03-30 RX ORDER — AMLODIPINE BESYLATE 10 MG/1
10 TABLET ORAL DAILY
Qty: 90 TABLET | Refills: 1 | Status: SHIPPED | OUTPATIENT
Start: 2023-03-30

## 2023-03-30 RX ORDER — ATORVASTATIN CALCIUM 20 MG/1
TABLET, FILM COATED ORAL
Qty: 90 TABLET | Refills: 1 | Status: SHIPPED | OUTPATIENT
Start: 2023-03-30

## 2023-03-30 SDOH — ECONOMIC STABILITY: FOOD INSECURITY: WITHIN THE PAST 12 MONTHS, YOU WORRIED THAT YOUR FOOD WOULD RUN OUT BEFORE YOU GOT MONEY TO BUY MORE.: NEVER TRUE

## 2023-03-30 SDOH — ECONOMIC STABILITY: INCOME INSECURITY: HOW HARD IS IT FOR YOU TO PAY FOR THE VERY BASICS LIKE FOOD, HOUSING, MEDICAL CARE, AND HEATING?: NOT HARD AT ALL

## 2023-03-30 SDOH — ECONOMIC STABILITY: HOUSING INSECURITY
IN THE LAST 12 MONTHS, WAS THERE A TIME WHEN YOU DID NOT HAVE A STEADY PLACE TO SLEEP OR SLEPT IN A SHELTER (INCLUDING NOW)?: NO

## 2023-03-30 SDOH — ECONOMIC STABILITY: FOOD INSECURITY: WITHIN THE PAST 12 MONTHS, THE FOOD YOU BOUGHT JUST DIDN'T LAST AND YOU DIDN'T HAVE MONEY TO GET MORE.: NEVER TRUE

## 2023-03-30 NOTE — PROGRESS NOTES
Name: Jayesh Jett  [id number]  Age: 80 y.o. YOB: 1933  Sex: female    CHIEF COMPLAINT:    Chief Complaint   Patient presents with    Hypertension    Gastroesophageal Reflux    Other     Other chronic conditions         HISTORY OF PRESENT ILLNESS:     This is a pleasant  80 y.o. female  is seen today for management of chronic medical problems and medications refills. Previous records reviewed . Patient is accompanied by her granddaughter who is taking care of of her at this time. Patient is mostly independent and she does help her with cleaning , washing etc.   Patient's  is at a NH now. Her confusion has improved. . She is taking her medications regularly. Doing OK otherwise. Denies CP or SOB. No fever. Denies any abdominal pain. Appetite is poor . Bowels moving [id number] No urinary symptoms. Complains of mild chronic low back pain and pain in her legs but medications help. Also her right knee gives away at times . No falls recently. She uses a walker for her ambulation. She is not  taking Lyrica  any more and takes  Tylenol for pain. C/O paraesthesia legs. PDMP reviewed, no signs of drug abuse. Hearing is ok. Vision Ok with glasses. Denies  any significant skin lesions. Denies any significant depression or anxiety. Her memory is poor. No other complaints. She has been vaccinated for Covid 19 x 2 but did not get a booster dose yet. .  She did not get a flu shot yet. Labs from  11/15/2022  were reviewed and explained to the patient.       Past Medical History:    Patient Active Problem List   Diagnosis    Essential hypertension    Mixed hyperlipidemia    Gastroesophageal reflux disease without esophagitis    Dorsalgia    Neuropathy    Allergic rhinitis    Anemia    Altered mental status    Bradycardia    Carotid stenosis, right    History of TIA (transient ischemic attack)    Stage 3a chronic kidney disease (HCC)    Moderate late onset Alzheimer's dementia

## 2023-05-04 ENCOUNTER — TELEPHONE (OUTPATIENT)
Dept: INTERNAL MEDICINE CLINIC | Age: 88
End: 2023-05-04

## 2023-05-04 NOTE — TELEPHONE ENCOUNTER
----- Message from Yvette Liu sent at 5/4/2023 12:27 PM EDT -----  Subject: Message to Provider    QUESTIONS  Information for Provider? patient called and would like the nurse to call   her regarding loss of taste and allergies, wants to know if there is   something she can take to help  ---------------------------------------------------------------------------  --------------  [id number]  [phone]; OK to leave message on voicemail  ---------------------------------------------------------------------------  --------------  SCRIPT ANSWERS  Relationship to Patient?  Self

## 2023-05-04 NOTE — TELEPHONE ENCOUNTER
Called and spoke to the Pt and she states she has COVID kits at home and say she doesn't have it she thinks its her allergies and so I stated to the Pt that she can come in for an Appt or we can wait it out until the allergie season is done. She doesn't want to come in at this time she would like to wait.

## 2023-05-17 DIAGNOSIS — G62.9 NEUROPATHY: ICD-10-CM

## 2023-05-17 RX ORDER — GABAPENTIN 300 MG/1
300 CAPSULE ORAL 2 TIMES DAILY
Qty: 60 CAPSULE | Refills: 5 | Status: SHIPPED | OUTPATIENT
Start: 2023-05-17 | End: 2023-11-13

## 2023-05-17 NOTE — TELEPHONE ENCOUNTER
A pharmacist from [facility] called and stated the Pt is all out of medication and would like a 7-10 day supply to hold her over until medication gets to her. Please advise.

## 2023-07-25 NOTE — LETTER
Bekah Ordaz  2/20/1933  [id number]    Have you had any Chest Pain that is not new? - No       Have you had any Shortness of Breath - No    Have you had any dizziness - No    Have you had any palpitations that are not new?  - No    Do you have any edema - swelling in No      When did you have your last labs drawn 01/24/2022    Do you have a surgery or procedure scheduled in the near future - No     [id number] Score for Atrial Fibrillation Stroke Risk   Risk   Factors  Component Value   C CHF No 0   H HTN Yes 1   A2 Age >= 76 Yes,  (80 y.o.) 2   D DM No 0   S2 Prior Stroke/TIA Yes 2   V Vascular Disease No 0   A Age 74-69 No,  (80 y.o.) 0   Sc Sex female 1    [id number]  Score  6   Score last updated 2/18/22 11:40 AM EST
Statement Selected

## 2023-07-27 ENCOUNTER — OFFICE VISIT (OUTPATIENT)
Dept: INTERNAL MEDICINE CLINIC | Age: 88
End: 2023-07-27
Payer: MEDICARE

## 2023-07-27 VITALS
SYSTOLIC BLOOD PRESSURE: 154 MMHG | DIASTOLIC BLOOD PRESSURE: 88 MMHG | WEIGHT: 138 LBS | OXYGEN SATURATION: 96 % | HEIGHT: 69 IN | HEART RATE: 82 BPM | BODY MASS INDEX: 20.44 KG/M2

## 2023-07-27 DIAGNOSIS — N18.31 STAGE 3A CHRONIC KIDNEY DISEASE (HCC): ICD-10-CM

## 2023-07-27 DIAGNOSIS — G62.9 NEUROPATHY: ICD-10-CM

## 2023-07-27 DIAGNOSIS — I10 ESSENTIAL HYPERTENSION: Primary | ICD-10-CM

## 2023-07-27 DIAGNOSIS — F02.B0 MODERATE LATE ONSET ALZHEIMER'S DEMENTIA WITHOUT BEHAVIORAL DISTURBANCE, PSYCHOTIC DISTURBANCE, MOOD DISTURBANCE, OR ANXIETY (HCC): ICD-10-CM

## 2023-07-27 DIAGNOSIS — Z86.73 HISTORY OF TIA (TRANSIENT ISCHEMIC ATTACK): ICD-10-CM

## 2023-07-27 DIAGNOSIS — K21.9 GASTROESOPHAGEAL REFLUX DISEASE WITHOUT ESOPHAGITIS: ICD-10-CM

## 2023-07-27 DIAGNOSIS — E78.2 MIXED HYPERLIPIDEMIA: ICD-10-CM

## 2023-07-27 DIAGNOSIS — G30.1 MODERATE LATE ONSET ALZHEIMER'S DEMENTIA WITHOUT BEHAVIORAL DISTURBANCE, PSYCHOTIC DISTURBANCE, MOOD DISTURBANCE, OR ANXIETY (HCC): ICD-10-CM

## 2023-07-27 LAB
BASOPHILS # BLD: 0 K/UL (ref 0–0.2)
BASOPHILS NFR BLD: 0.8 %
DEPRECATED RDW RBC AUTO: 12.9 % (ref 12.4–15.4)
EOSINOPHIL # BLD: 0.1 K/UL (ref 0–0.6)
EOSINOPHIL NFR BLD: 1.7 %
HCT VFR BLD AUTO: 32.3 % (ref 36–48)
HGB BLD-MCNC: 10.9 G/DL (ref 12–16)
LYMPHOCYTES # BLD: 1.8 K/UL (ref 1–5.1)
LYMPHOCYTES NFR BLD: 35 %
MCH RBC QN AUTO: 29.3 PG (ref 26–34)
MCHC RBC AUTO-ENTMCNC: 33.7 G/DL (ref 31–36)
MCV RBC AUTO: 86.7 FL (ref 80–100)
MONOCYTES # BLD: 0.4 K/UL (ref 0–1.3)
MONOCYTES NFR BLD: 7.8 %
NEUTROPHILS # BLD: 2.8 K/UL (ref 1.7–7.7)
NEUTROPHILS NFR BLD: 54.7 %
PLATELET # BLD AUTO: 271 K/UL (ref 135–450)
PMV BLD AUTO: 11 FL (ref 5–10.5)
RBC # BLD AUTO: 3.73 M/UL (ref 4–5.2)
WBC # BLD AUTO: 5.2 K/UL (ref 4–11)

## 2023-07-27 PROCEDURE — 1036F TOBACCO NON-USER: CPT | Performed by: INTERNAL MEDICINE

## 2023-07-27 PROCEDURE — G8420 CALC BMI NORM PARAMETERS: HCPCS | Performed by: INTERNAL MEDICINE

## 2023-07-27 PROCEDURE — G8427 DOCREV CUR MEDS BY ELIG CLIN: HCPCS | Performed by: INTERNAL MEDICINE

## 2023-07-27 PROCEDURE — 1090F PRES/ABSN URINE INCON ASSESS: CPT | Performed by: INTERNAL MEDICINE

## 2023-07-27 PROCEDURE — 36415 COLL VENOUS BLD VENIPUNCTURE: CPT | Performed by: INTERNAL MEDICINE

## 2023-07-27 PROCEDURE — 1123F ACP DISCUSS/DSCN MKR DOCD: CPT | Performed by: INTERNAL MEDICINE

## 2023-07-27 PROCEDURE — 99214 OFFICE O/P EST MOD 30 MIN: CPT | Performed by: INTERNAL MEDICINE

## 2023-07-27 NOTE — PROGRESS NOTES
congestion. EYES:                          PERRLA, EOMI, lids normal, conjuctivea clear, sclera anicteric. NECK:                         Supple, symmetrical,  trachea midline, no thyromegaly, no JVD, no lymphadenopathy. LUNGS:                       Clear to auscultation bilaterally, respirations unlabored, accessory muscles are not used. HEART:                       Regular rate and rhythm, S1 and S2 normal, no murmur, rub or gallop. PMI in MCL. ABDOMEN:                 Soft, non-tender, bowel sounds are normoactive, no masses, no hepatospleenomegaly. EXTREMITY:              no bipedal edema  NEURO:                      Alert, oriented to person, place and time. Has generalized weakness but no focal deficit. Musculoskeletal:         No kyphosis or scoliosis, mild tenderness in her lower back and in her knees. Skin:                            Warm and dry. No rash or obvious suspicious lesions. PSYCH:                       Mood euthymic, insight and judgement good           ASSESSMENT/PLAN:    1. Essential hypertension  Continue current medications, denies side effect with medicationss. Low salt diet and exercise advised. Continue Norvasc, Lotensin and triamterene-hydrochlorothiazide  - Comprehensive Metabolic Panel  - CBC with Auto Differential    2. History of TIA (transient ischemic attack)  Continue aspirin and Lipitor    3. Mixed hyperlipidemia  Patient is taking cholesterol medications regularly. Denies any side effects. Diet and exercise advised. Continue Lipitor  - Lipid, Fasting    4. Gastroesophageal reflux disease without esophagitis  Patient on Prilosec    5. Moderate late onset Alzheimer's dementia without behavioral disturbance, psychotic disturbance, mood disturbance, or anxiety ([address])  Continue Namenda    6. Neuropathy  On Neurontin.     7. Stage 3a chronic kidney disease ([address])  Advised to avoid NSAID and keep

## 2023-07-28 DIAGNOSIS — N18.31 STAGE 3A CHRONIC KIDNEY DISEASE (HCC): Primary | ICD-10-CM

## 2023-07-28 LAB
ALBUMIN SERPL-MCNC: 4.5 G/DL (ref 3.4–5)
ALBUMIN/GLOB SERPL: 1.9 {RATIO} (ref 1.1–2.2)
ALP SERPL-CCNC: 91 U/L (ref 40–129)
ALT SERPL-CCNC: 6 U/L (ref 10–40)
ANION GAP SERPL CALCULATED.3IONS-SCNC: 12 MMOL/L (ref 3–16)
AST SERPL-CCNC: 14 U/L (ref 15–37)
BILIRUB SERPL-MCNC: 0.4 MG/DL (ref 0–1)
BUN SERPL-MCNC: 16 MG/DL (ref 7–20)
CALCIUM SERPL-MCNC: 9.7 MG/DL (ref 8.3–10.6)
CHLORIDE SERPL-SCNC: 101 MMOL/L (ref 99–110)
CHOLEST SERPL-MCNC: 146 MG/DL (ref 0–199)
CO2 SERPL-SCNC: 24 MMOL/L (ref 21–32)
CREAT SERPL-MCNC: 1.4 MG/DL (ref 0.6–1.2)
GFR SERPLBLD CREATININE-BSD FMLA CKD-EPI: 36 ML/MIN/{1.73_M2}
GLUCOSE SERPL-MCNC: 126 MG/DL (ref 70–99)
HDLC SERPL-MCNC: 55 MG/DL (ref 40–60)
LDL CHOLESTEROL CALCULATED: 66 MG/DL
POTASSIUM SERPL-SCNC: 4 MMOL/L (ref 3.5–5.1)
PROT SERPL-MCNC: 6.9 G/DL (ref 6.4–8.2)
SODIUM SERPL-SCNC: 137 MMOL/L (ref 136–145)
TRIGL SERPL-MCNC: 123 MG/DL (ref 0–150)
VLDLC SERPL CALC-MCNC: 25 MG/DL

## 2023-08-07 DIAGNOSIS — G62.9 NEUROPATHY: ICD-10-CM

## 2023-08-07 RX ORDER — GABAPENTIN 300 MG/1
300 CAPSULE ORAL 2 TIMES DAILY
Qty: 180 CAPSULE | Refills: 1 | Status: SHIPPED | OUTPATIENT
Start: 2023-08-07 | End: 2024-02-03

## 2023-10-06 DIAGNOSIS — I10 ESSENTIAL HYPERTENSION: ICD-10-CM

## 2023-10-06 RX ORDER — AMLODIPINE BESYLATE 10 MG/1
10 TABLET ORAL DAILY
Qty: 90 TABLET | Refills: 10 | Status: SHIPPED | OUTPATIENT
Start: 2023-10-06

## 2023-10-16 ENCOUNTER — TELEPHONE (OUTPATIENT)
Dept: INTERNAL MEDICINE CLINIC | Age: 88
End: 2023-10-16

## 2023-10-16 NOTE — TELEPHONE ENCOUNTER
----- Message from Tomi Maher sent at 10/16/2023  9:06 AM EDT -----  Subject: Message to Provider    QUESTIONS  Information for Provider? Would like to talk to a nurse to see if it is   recommended for her to get the next covid shot because she is hearing a   lot of side effects from it.   ---------------------------------------------------------------------------  --------------  Gael ROBERTS  [phone]; Do not leave any message, patient will call back for answer  ---------------------------------------------------------------------------  --------------  SCRIPT ANSWERS  Relationship to Patient?  Self

## 2023-10-20 DIAGNOSIS — K21.9 GASTROESOPHAGEAL REFLUX DISEASE WITHOUT ESOPHAGITIS: ICD-10-CM

## 2023-10-20 RX ORDER — OMEPRAZOLE 20 MG/1
20 CAPSULE, DELAYED RELEASE ORAL DAILY
Qty: 90 CAPSULE | Refills: 10 | Status: SHIPPED | OUTPATIENT
Start: 2023-10-20

## 2023-10-21 DIAGNOSIS — E78.2 MIXED HYPERLIPIDEMIA: ICD-10-CM

## 2023-10-21 DIAGNOSIS — I10 ESSENTIAL HYPERTENSION: ICD-10-CM

## 2023-10-23 RX ORDER — ATORVASTATIN CALCIUM 20 MG/1
TABLET, FILM COATED ORAL
Qty: 90 TABLET | Refills: 10 | Status: SHIPPED | OUTPATIENT
Start: 2023-10-23

## 2023-10-23 RX ORDER — BENAZEPRIL HYDROCHLORIDE 40 MG/1
TABLET, FILM COATED ORAL
Qty: 90 TABLET | Refills: 10 | Status: SHIPPED | OUTPATIENT
Start: 2023-10-23

## 2024-01-02 ENCOUNTER — TELEPHONE (OUTPATIENT)
Dept: INTERNAL MEDICINE CLINIC | Age: 89
End: 2024-01-02

## 2024-01-02 NOTE — TELEPHONE ENCOUNTER
Patient called in stating she has had a cough for over a week.  Started two weeks ago with slight body aches and congestion, and no fever, now with a cough requesting something to help.  Would like a cough suppressant called to [facility] on [address]

## 2024-01-02 NOTE — TELEPHONE ENCOUNTER
MAYANK--   You asked that I call the patient since she has not been seen. She now lives out of town in [facility]. They have looked to change providers but been unable to find someone close to her now. She will get with her daughter and call to schedule an appointment. She will use OTC cough medication at this time. She will call back to schedule.

## 2024-02-06 ENCOUNTER — TELEPHONE (OUTPATIENT)
Dept: INTERNAL MEDICINE CLINIC | Age: 89
End: 2024-02-06

## 2024-02-06 NOTE — TELEPHONE ENCOUNTER
Spoke to the patient and informed her she has an appointment on 2/16/24. We will gladly go over the medication list and what she is supposed to be taking and why at that visit. She voiced understanding.

## 2024-02-06 NOTE — TELEPHONE ENCOUNTER
----- Message from Vianey Layton sent at 2/6/2024 10:36 AM EST -----  Subject: Message to Provider    QUESTIONS  Information for Provider? Patient is requesting a call from the office,   wants someone to go over her medications and tell her what each one is   supposed to be for. Please advise.  ---------------------------------------------------------------------------  --------------  CALL BACK INFO  [phone]; Do not leave any message, patient will call back for answer  ---------------------------------------------------------------------------  --------------  SCRIPT ANSWERS  Relationship to Patient? Self

## 2024-02-16 ENCOUNTER — OFFICE VISIT (OUTPATIENT)
Dept: INTERNAL MEDICINE CLINIC | Age: 89
End: 2024-02-16
Payer: MEDICARE

## 2024-02-16 ENCOUNTER — OFFICE VISIT (OUTPATIENT)
Dept: INTERNAL MEDICINE CLINIC | Age: 89
End: 2024-02-16

## 2024-02-16 VITALS
DIASTOLIC BLOOD PRESSURE: 80 MMHG | OXYGEN SATURATION: 95 % | SYSTOLIC BLOOD PRESSURE: 159 MMHG | WEIGHT: 122 LBS | BODY MASS INDEX: 18.02 KG/M2 | HEART RATE: 72 BPM

## 2024-02-16 VITALS
BODY MASS INDEX: 18.07 KG/M2 | WEIGHT: 122 LBS | SYSTOLIC BLOOD PRESSURE: 159 MMHG | DIASTOLIC BLOOD PRESSURE: 80 MMHG | HEART RATE: 72 BPM | OXYGEN SATURATION: 95 % | HEIGHT: 69 IN

## 2024-02-16 DIAGNOSIS — G30.1 MODERATE LATE ONSET ALZHEIMER'S DEMENTIA WITHOUT BEHAVIORAL DISTURBANCE, PSYCHOTIC DISTURBANCE, MOOD DISTURBANCE, OR ANXIETY (HCC): ICD-10-CM

## 2024-02-16 DIAGNOSIS — K21.9 GASTROESOPHAGEAL REFLUX DISEASE WITHOUT ESOPHAGITIS: ICD-10-CM

## 2024-02-16 DIAGNOSIS — I10 ESSENTIAL HYPERTENSION: Primary | ICD-10-CM

## 2024-02-16 DIAGNOSIS — G62.9 NEUROPATHY: ICD-10-CM

## 2024-02-16 DIAGNOSIS — F02.B0 MODERATE LATE ONSET ALZHEIMER'S DEMENTIA WITHOUT BEHAVIORAL DISTURBANCE, PSYCHOTIC DISTURBANCE, MOOD DISTURBANCE, OR ANXIETY (HCC): ICD-10-CM

## 2024-02-16 DIAGNOSIS — R63.4 WEIGHT LOSS: ICD-10-CM

## 2024-02-16 DIAGNOSIS — N18.31 STAGE 3A CHRONIC KIDNEY DISEASE (HCC): ICD-10-CM

## 2024-02-16 DIAGNOSIS — M54.9 DORSALGIA: ICD-10-CM

## 2024-02-16 DIAGNOSIS — Z86.73 HISTORY OF TIA (TRANSIENT ISCHEMIC ATTACK): ICD-10-CM

## 2024-02-16 DIAGNOSIS — D64.9 ANEMIA, UNSPECIFIED TYPE: ICD-10-CM

## 2024-02-16 DIAGNOSIS — Z00.00 MEDICARE ANNUAL WELLNESS VISIT, SUBSEQUENT: Primary | ICD-10-CM

## 2024-02-16 DIAGNOSIS — E78.2 MIXED HYPERLIPIDEMIA: ICD-10-CM

## 2024-02-16 PROCEDURE — 1090F PRES/ABSN URINE INCON ASSESS: CPT | Performed by: INTERNAL MEDICINE

## 2024-02-16 PROCEDURE — 36415 COLL VENOUS BLD VENIPUNCTURE: CPT | Performed by: INTERNAL MEDICINE

## 2024-02-16 PROCEDURE — G8419 CALC BMI OUT NRM PARAM NOF/U: HCPCS | Performed by: INTERNAL MEDICINE

## 2024-02-16 PROCEDURE — 1036F TOBACCO NON-USER: CPT | Performed by: INTERNAL MEDICINE

## 2024-02-16 PROCEDURE — 1123F ACP DISCUSS/DSCN MKR DOCD: CPT | Performed by: INTERNAL MEDICINE

## 2024-02-16 PROCEDURE — G8484 FLU IMMUNIZE NO ADMIN: HCPCS | Performed by: INTERNAL MEDICINE

## 2024-02-16 PROCEDURE — G8427 DOCREV CUR MEDS BY ELIG CLIN: HCPCS | Performed by: INTERNAL MEDICINE

## 2024-02-16 PROCEDURE — 99214 OFFICE O/P EST MOD 30 MIN: CPT | Performed by: INTERNAL MEDICINE

## 2024-02-16 RX ORDER — ASCORBIC ACID 500 MG
500 TABLET ORAL DAILY
COMMUNITY

## 2024-02-16 RX ORDER — GABAPENTIN 300 MG/1
300 CAPSULE ORAL 2 TIMES DAILY
Qty: 180 CAPSULE | Refills: 1 | Status: SHIPPED | OUTPATIENT
Start: 2024-02-16 | End: 2024-08-14

## 2024-02-16 RX ORDER — OMEPRAZOLE 20 MG/1
20 CAPSULE, DELAYED RELEASE ORAL DAILY
Qty: 90 CAPSULE | Refills: 1 | Status: SHIPPED | OUTPATIENT
Start: 2024-02-16

## 2024-02-16 RX ORDER — MEMANTINE HYDROCHLORIDE 5 MG/1
5 TABLET ORAL 2 TIMES DAILY
Qty: 180 TABLET | Refills: 1 | Status: SHIPPED | OUTPATIENT
Start: 2024-02-16

## 2024-02-16 RX ORDER — TRIAMTERENE AND HYDROCHLOROTHIAZIDE 37.5; 25 MG/1; MG/1
1 TABLET ORAL DAILY
Qty: 90 TABLET | Refills: 1 | Status: SHIPPED | OUTPATIENT
Start: 2024-02-16

## 2024-02-16 RX ORDER — AMLODIPINE BESYLATE 10 MG/1
10 TABLET ORAL DAILY
Qty: 90 TABLET | Refills: 1 | Status: SHIPPED | OUTPATIENT
Start: 2024-02-16

## 2024-02-16 RX ORDER — BENAZEPRIL HYDROCHLORIDE 40 MG/1
40 TABLET ORAL DAILY
Qty: 90 TABLET | Refills: 1 | Status: SHIPPED | OUTPATIENT
Start: 2024-02-16

## 2024-02-16 NOTE — PROGRESS NOTES
Medicare Annual Wellness Visit    Carol Xiong is here for Medicare AWV    Assessment & Plan   Medicare annual wellness visit, subsequent  Recommendations for Preventive Services Due: see orders and patient instructions/AVS.  Recommended screening schedule for the next 5-10 years is provided to the patient in written form: see Patient Instructions/AVS.     Return in 1 year (on 2/16/2025).     Subjective   Medicare AWV    Patient's complete Health Risk Assessment and screening values have been reviewed and are found in Flowsheets. The following problems were reviewed today and where indicated follow up appointments were made and/or referrals ordered.    Positive Risk Factor Screenings with Interventions:    Fall Risk:        Interventions:    Reviewed medications, home hazards, visual acuity, and co-morbidities that can increase risk for falls  Patient declines any further evaluation or treatment  Patient currently has walker she uses for assistance            General HRA Questions:  Select all that apply: (!) Stress    Stress Interventions:  Patient declined any further interventions or treatment  Recently moved in with her [facility]      Activity, Diet, and Weight:  On average, how many days per week do you engage in moderate to strenuous exercise (like a brisk walk)?: 3 days  On average, how many minutes do you engage in exercise at this level?: 10 min    Do you eat balanced/healthy meals regularly?: (!) No    Body mass index is 18.02 kg/m². (!) Abnormal    Do you eat balanced/healthy meals regularly Interventions:  Referring for Meals on Wheels  Underweight Interventions:  Patient declines any further evaluation or treatment         Hearing Screen:  Do you or your family notice any trouble with your hearing that hasn't been managed with hearing aids?: (!) Yes    Interventions:  She has a hearing aide but she still has difficulty hearing    Vision Screen:  Do you have difficulty driving, watching TV, or

## 2024-02-16 NOTE — PROGRESS NOTES
TAKE 1 TABLET EVERY DAY 10/23/23  Yes Kayden Gonzalez MD   aspirin 81 MG EC tablet Take 1 tablet by mouth daily 3/30/23  Yes Kayden Gonzalez MD   vitamin B-12 (CYANOCOBALAMIN) 1000 MCG tablet Take 1 tablet by mouth daily 3/30/23  Yes Kayden Gonzalez MD       LAB DATA: Reviewed.    REVIEW OF SYSTEMS:   see HPI/ Comprehensive review of systems negative except for the ones mentioned in HPI.    PHYSICAL EXAMINATION:   BP (!) 159/80 (Site: Right Upper Arm, Position: Sitting, Cuff Size: Medium Adult)   Pulse 72   Ht 1.753 m (5' 9\")   Wt 55.3 kg (122 lb)   SpO2 95%   BMI 18.02 kg/m²      GENERAL APPEARANCE:      Alert, oriented x 3, well developed, cooperative, not in any distress, appears stated age.  HEAD:                         Normocephalic, atraumatic maxillary sinus area tenderness bilaterally.  Mild nasal congestion.  EYES:                          PERRLA, EOMI, lids normal, conjuctivea clear, sclera anicteric.  NECK:                         Supple, symmetrical,  trachea midline, no thyromegaly, no JVD, no lymphadenopathy.    LUNGS:                       Clear to auscultation bilaterally, respirations unlabored, accessory muscles are not used.  HEART:                       Regular rate and rhythm, S1 and S2 normal, no murmur, rub or gallop. PMI in MCL.  ABDOMEN:                 Soft, non-tender, bowel sounds are normoactive, no masses, no hepatospleenomegaly.  EXTREMITY:              no bipedal edema.  Mild tenderness in her knees  NEURO:                      Alert, oriented to person, place and time.  Has generalized weakness but no focal deficit.                                     Musculoskeletal:         No kyphosis or scoliosis, mild tenderness in her lower back and in her knees.  Skin:                            Warm and dry. No rash or obvious suspicious lesions.                      PSYCH:                       Mood euthymic, insight and judgement good           ASSESSMENT/PLAN:    1. Essential

## 2024-02-17 LAB
ALBUMIN SERPL-MCNC: 4.1 G/DL (ref 3.4–5)
ALBUMIN/GLOB SERPL: 1.6 {RATIO} (ref 1.1–2.2)
ALP SERPL-CCNC: 73 U/L (ref 40–129)
ALT SERPL-CCNC: 8 U/L (ref 10–40)
ANION GAP SERPL CALCULATED.3IONS-SCNC: 15 MMOL/L (ref 3–16)
AST SERPL-CCNC: 17 U/L (ref 15–37)
BASOPHILS # BLD: 0 K/UL (ref 0–0.2)
BASOPHILS NFR BLD: 0 %
BILIRUB SERPL-MCNC: 0.8 MG/DL (ref 0–1)
BUN SERPL-MCNC: 15 MG/DL (ref 7–20)
CALCIUM SERPL-MCNC: 9.8 MG/DL (ref 8.3–10.6)
CHLORIDE SERPL-SCNC: 101 MMOL/L (ref 99–110)
CHOLEST SERPL-MCNC: 152 MG/DL (ref 0–199)
CO2 SERPL-SCNC: 23 MMOL/L (ref 21–32)
CREAT SERPL-MCNC: 0.8 MG/DL (ref 0.6–1.2)
DEPRECATED RDW RBC AUTO: 15.2 % (ref 12.4–15.4)
EOSINOPHIL # BLD: 0.5 K/UL (ref 0–0.6)
EOSINOPHIL NFR BLD: 14 %
GFR SERPLBLD CREATININE-BSD FMLA CKD-EPI: >60 ML/MIN/{1.73_M2}
GLUCOSE SERPL-MCNC: 84 MG/DL (ref 70–99)
HCT VFR BLD AUTO: 35.1 % (ref 36–48)
HDLC SERPL-MCNC: 79 MG/DL (ref 40–60)
HGB BLD-MCNC: 12 G/DL (ref 12–16)
LDL CHOLESTEROL CALCULATED: 58 MG/DL
LYMPHOCYTES # BLD: 1.9 K/UL (ref 1–5.1)
LYMPHOCYTES NFR BLD: 48 %
MCH RBC QN AUTO: 30.4 PG (ref 26–34)
MCHC RBC AUTO-ENTMCNC: 34.1 G/DL (ref 31–36)
MCV RBC AUTO: 89 FL (ref 80–100)
MONOCYTES # BLD: 0.2 K/UL (ref 0–1.3)
MONOCYTES NFR BLD: 6 %
NEUTROPHILS # BLD: 1.2 K/UL (ref 1.7–7.7)
NEUTROPHILS NFR BLD: 32 %
PLATELET # BLD AUTO: 255 K/UL (ref 135–450)
PMV BLD AUTO: 10.6 FL (ref 5–10.5)
POTASSIUM SERPL-SCNC: 3.8 MMOL/L (ref 3.5–5.1)
PROT SERPL-MCNC: 6.6 G/DL (ref 6.4–8.2)
RBC # BLD AUTO: 3.94 M/UL (ref 4–5.2)
SODIUM SERPL-SCNC: 139 MMOL/L (ref 136–145)
TRIGL SERPL-MCNC: 75 MG/DL (ref 0–150)
TSH SERPL DL<=0.005 MIU/L-ACNC: 1.86 UIU/ML (ref 0.27–4.2)
VLDLC SERPL CALC-MCNC: 15 MG/DL
WBC # BLD AUTO: 3.9 K/UL (ref 4–11)

## 2024-02-22 ENCOUNTER — CARE COORDINATION (OUTPATIENT)
Dept: CARE COORDINATION | Age: 89
End: 2024-02-22

## 2024-02-22 SDOH — ECONOMIC STABILITY: INCOME INSECURITY: IN THE LAST 12 MONTHS, WAS THERE A TIME WHEN YOU WERE NOT ABLE TO PAY THE MORTGAGE OR RENT ON TIME?: NO

## 2024-02-22 SDOH — ECONOMIC STABILITY: FOOD INSECURITY: WITHIN THE PAST 12 MONTHS, YOU WORRIED THAT YOUR FOOD WOULD RUN OUT BEFORE YOU GOT MONEY TO BUY MORE.: NEVER TRUE

## 2024-02-22 SDOH — ECONOMIC STABILITY: HOUSING INSECURITY: IN THE LAST 12 MONTHS, HOW MANY PLACES HAVE YOU LIVED?: 1

## 2024-02-22 SDOH — ECONOMIC STABILITY: TRANSPORTATION INSECURITY
IN THE PAST 12 MONTHS, HAS THE LACK OF TRANSPORTATION KEPT YOU FROM MEDICAL APPOINTMENTS OR FROM GETTING MEDICATIONS?: NO

## 2024-02-22 SDOH — ECONOMIC STABILITY: FOOD INSECURITY: WITHIN THE PAST 12 MONTHS, THE FOOD YOU BOUGHT JUST DIDN'T LAST AND YOU DIDN'T HAVE MONEY TO GET MORE.: NEVER TRUE

## 2024-02-22 SDOH — ECONOMIC STABILITY: TRANSPORTATION INSECURITY
IN THE PAST 12 MONTHS, HAS LACK OF TRANSPORTATION KEPT YOU FROM MEETINGS, WORK, OR FROM GETTING THINGS NEEDED FOR DAILY LIVING?: NO

## 2024-02-22 NOTE — CARE COORDINATION
Ambulatory Care Coordination Note  2024    Patient Current Location:  Home: [id number] [place] [address] [address]  [address]  [place] [address]    ACM contacted the patient by telephone. Verified name and  with patient as identifiers. Provided introduction to self, and explanation of the ACM role.     ACM: Caridad Judd RN    Challenges to be reviewed by the provider   Additional needs identified to be addressed with provider: No  none               Method of communication with provider: chart routing.     ACM completed follow up call with patient who said she is doing okay. Patient reports her granddaughter lives with her and provides transportation to doctor appts. Patient said her  is living in a nursing home as she is not able to care for him d/t strokes. Patient does not see him often but does call him on the phone. Patient said she is having arthritic pain in left hip/ thigh area and would like to see if Dr. Gonzalez has any recommendations. ACM will outreach to patient in one week.     Plan:    Route to PCP  Complete enrollment  Med/ Rec  F/U call 1 week     Offered patient enrollment in the Remote Patient Monitoring (RPM) program for in-home monitoring: Yes, but did not enroll at this time.    Ambulatory Care Coordination Assessment    Care Coordination Protocol  Referral from Primary Care Provider: No  Week 1 - Initial Assessment     Do you have all of your prescriptions and are they filled?: Yes  Barriers to medication adherence: None  Are you able to afford your medications?: Yes  How often do you have trouble taking your medications the way you have been told to take them?: I always take them as prescribed.     Do you have Home O2 Therapy?: No      Is patient able to live independently?: Yes     Current Housing: Private Residence  Who do you live with?: Grandchild  Are you an active caregiver in your home?: No     Do you have any DME?: Yes  Patient DME: Walker     Per the Fall Risk Screening, did the

## 2024-02-29 ENCOUNTER — CARE COORDINATION (OUTPATIENT)
Dept: CARE COORDINATION | Age: 89
End: 2024-02-29

## 2024-02-29 NOTE — CARE COORDINATION
ACM called but patient did not answer. ACM unable to leave message at this time. ACM will make outreach at a later date.

## 2024-03-08 ENCOUNTER — CARE COORDINATION (OUTPATIENT)
Dept: CARE COORDINATION | Age: 89
End: 2024-03-08

## 2024-03-08 NOTE — CARE COORDINATION
ACM called but patient did not answer. ACM unable to leave message for a return call. ACM will follow up at a later date.

## 2024-03-22 ENCOUNTER — CARE COORDINATION (OUTPATIENT)
Dept: CARE COORDINATION | Age: 89
End: 2024-03-22

## 2024-03-22 ASSESSMENT — SOCIAL DETERMINANTS OF HEALTH (SDOH)
DO YOU BELONG TO ANY CLUBS OR ORGANIZATIONS SUCH AS CHURCH GROUPS UNIONS, FRATERNAL OR ATHLETIC GROUPS, OR SCHOOL GROUPS?: YES
HOW OFTEN DO YOU ATTEND CHURCH OR RELIGIOUS SERVICES?: 1 TO 4 TIMES PER YEAR
HOW OFTEN DO YOU GET TOGETHER WITH FRIENDS OR RELATIVES?: MORE THAN THREE TIMES A WEEK
IN A TYPICAL WEEK, HOW MANY TIMES DO YOU TALK ON THE PHONE WITH FAMILY, FRIENDS, OR NEIGHBORS?: MORE THAN THREE TIMES A WEEK
HOW OFTEN DO YOU ATTENT MEETINGS OF THE CLUB OR ORGANIZATION YOU BELONG TO?: 1 TO 4 TIMES PER YEAR

## 2024-03-22 NOTE — CARE COORDINATION
ACM placed call to patient to clarify medication dose. Per Dr. Gonzalez, patient should be taking triamterene-hydrochlorothiazide 37.5-25 MG per tablet once daily, not 70-50 MG as patient stated in previous call.    ACM asked patient to get pill bottle and read how many MG bottle states. Patient stated bottle said 37.5-25 MG. Patient states she is only taking 1 tablet per day from this bottle. Patient states the paper print off of medication list from PCP office states to take the dose of 70-50 MG. ACM advised the patient to only take 1 tablet as it states on the bottle. Patient verbalized understanding.   
this time.   ACP reviewed with patient. Patient states she wants her granddaughter, Coleen Villar, to be her healthcare decision maker. ACM educated patient on [place] Next of Kin hierarchy. Patient states Coleen is her HC POA and that Coleen has official documents at her home. ACM encouraged patient to take these documents to her next PCP appointment so they could be uploaded into the chart. Patient verbalized understanding.     Advance Care Planning   Healthcare Decision Maker:    Primary Decision Maker: Coleen Villar - Grandester - [phone]    Click here to complete Healthcare Decision Makers including selection of the Healthcare Decision Maker Relationship (ie \"Primary\").             Plan:    F/U in 2 weeks for outreach  SW referral for electric bill assistance and meals on wheels  Route to PCP for medication dosage clarification    Offered patient enrollment in the Remote Patient Monitoring (RPM) program for in-home monitoring: Yes, but did not enroll at this time.    Lab Results       None            Care Coordination Interventions    Referral from Primary Care Provider: No  Suggested Interventions and Community Resources          Goals Addressed                   This Visit's Progress     Conditions and Symptoms   On track     I will schedule office visits, as directed by my provider.  I will keep my appointment or reschedule if I have to cancel.  I will notify my provider of any barriers to my plan of care.  I will follow my Zone Management tool to seek urgent or emergent care.  I will notify my provider of any symptoms that indicate a worsening of my condition.    Barriers: lack of motivation and overwhelmed by complexity of regimen  Plan for overcoming my barriers: AC will engage with patient care management phone calls.    Confidence: 8/10  Anticipated Goal Completion Date: 4/22/24         Self Monitoring        Blood Pressure - I will take my blood pressure as directed - Daily  I will notify my provider

## 2024-03-22 NOTE — TELEPHONE ENCOUNTER
Patient reports taking triamterene-hydrochlorothiazide, but dose is different than what is on med list. Patient's list at home states 75-50 mg tablet, not 37.5-25 mg per our med list. What is the correct dose you want the patient to be on?

## 2024-03-25 ENCOUNTER — CARE COORDINATION (OUTPATIENT)
Dept: CARE COORDINATION | Age: 89
End: 2024-03-25

## 2024-03-25 NOTE — CARE COORDINATION
Date of referral: 3/22/24  Referral received from: MIRNA Dutton  Reason for referral: electric bill / MOW    Attempted phone call to Pt to complete initial assessment. No answer on home number, not able to leave vm message.     Attempted phone call to Pt's mobile number listed. Mailbox is full, could not accept messages.     TONY plan of care: SW will make next outreach attempt on 3/28 to complete initial assessment and will discuss utility assistance / MOW.

## 2024-03-28 ENCOUNTER — CARE COORDINATION (OUTPATIENT)
Dept: CARE COORDINATION | Age: 89
End: 2024-03-28

## 2024-03-28 NOTE — CARE COORDINATION
Patient Current Location: Home: [address] [place] [address]  [address]  [place] [address]     Phone call to Pt who was in agreement with completing initial SW assessment and with ongoing support.     Initial Contact Social Work Note - Ambulatory  3/28/2024      Date of referral: 3/22/24  Referral received from: MIRNA Dutton  Reason for referral: MOW and utility assistance    Previous SW referral: No  If yes, brief summary of outcome: n/a    Two Identifiers Verified: Yes    Insurance Provider: [employer]    Support System:   granddaughter    [place] Status:  unk    Community Providers:  n/a    ADL Assistance Needed: N/A    Housing/Living Concerns or Home Modification Needs: Pt resides with her granddaughter, uses walker to get around, doesn't answer door during the day     Transportation Concern: Family provides     Medication Cost Concern: none reported     Medication Adherence Concern: n/a    Financial Concern(s): Pt is requesting assistance with utility bill cost, but stated she cannot get out of her home to apply for assistance.     Income (only if applicable): unk    Ability to Read/Write: Yes    Advance Care Plan:  N/A    Other: MOW - in agreement with referral     Lives with granddaughter - cooks for self     Utility assistance - utility bill is in Pt's name, but Pt cannot leave the home to go to the office to apply for assistance. TONY will call to obtain information on alternate ways to apply.     Phone call to Benjamin Mcgregor to discuss referral process for utility assistance. Benjamin reported Pt will need check marks everything that pertains to her, sign on bottom of last page, will need to provide copies of bill, financial documents etc.   If lives with granddaughter, will need to provide income info for both.   Benjamin will mail application to Pt's home.       TONY did make referral to [facility] for MOW.    Phone call to Pt to provide update regarding [employer] sending application via postal

## 2024-04-01 ENCOUNTER — CARE COORDINATION (OUTPATIENT)
Dept: CARE COORDINATION | Age: 89
End: 2024-04-01

## 2024-04-01 NOTE — CARE COORDINATION
Ambulatory Care Coordination Note  2024    Patient Current Location:  Home: [id number] [place] [address] [address]  [address]  [place] [address]     ACM contacted the patient by telephone. Verified name and  with patient as identifiers. Provided introduction to self, and explanation of the ACM role.     Challenges to be reviewed by the provider   Additional needs identified to be addressed with provider: Yes  Patient states she has not been able to taste as well since \"getting old\". Patient reports this has been ongoing for a few months now. Patient can taste spices, but reports taste is generally dull now. Patient would like to know if this is due to age or if it could be caused by something else. Patient reports no change to sense of smell and has no other s/sx to report. Patient states this is a non-urgent question and she forgot to ask at her last appointment. Patient states she will address at her next OV on 24.                Method of communication with provider: chart routing.    ACM: Chela Dutton RN    ACM received a secure email from  that patient had a non urgent question about her taste buds. ACM placed call to patient for follow-up outreach. Patient states she has not been able to taste as well since \"getting old\". Patient reports this has been ongoing for a few months now. Patient can taste spices, but reports taste is generally dull now. Patient would like to know if this is due to age or if it could be caused by something else. Patient reports no change to sense of smell and has no other s/sx to report. Patient states this is a non-urgent question and she forgot to ask at her last PCP appointment. Patient states she will address at her next OV on 24.   Patient reports she has not been checking her blood pressure as the BP machine needs batteries. Patient states she will ask her granddaughter to get batteries so she can begin monitoring her BP daily.   Patient reports she has been in contact

## 2024-04-05 ENCOUNTER — TELEPHONE (OUTPATIENT)
Dept: INTERNAL MEDICINE CLINIC | Age: 89
End: 2024-04-05

## 2024-04-05 NOTE — TELEPHONE ENCOUNTER
Got a call from the patient. Stated that [employer] is going to shut off her lights today and she needs her doctor to call them to get a medical release form so they will not shut them off. Advised that she will have to speak to [employer], give them our fax number and have them send that over to the office for the doctor to sign. We cannot call for her. Patient voiced understanding, I gave her the offices fax and she stated she will call them.

## 2024-04-08 ENCOUNTER — CARE COORDINATION (OUTPATIENT)
Dept: CARE COORDINATION | Age: 89
End: 2024-04-08

## 2024-04-08 NOTE — CARE COORDINATION
Patient Current Location: Home: [address] [address]  [address]  [place] [address]     Trying to work with [employer] to try to get a payment plan set up. 1,000 behind. Paying the budget amount     Received an application, plans to complete it and sent to [place]    They faxed something to the  office,  office was only open 1/2 day on Friday, was not completed in time so Pt paid a portion of the bill to keep power on at this time.     Could granddaughter take her to [facility] for walk-in emergency assistance for utility bill? Pt reported she is out of town this week, but may be able to take her next week Pt reported she is supposed to call [employer] on Wednesday to set up payment plan.     Pt provided verbal consent for this SW to contact her granddaughter, Coleen to discuss.     Phone call to [facility], no answer, vm message left requesting return call with walk-in hours    Phone call to Pt's granddaughter, Coleen, no answer, vm message left requesting return call, contact number provided.     TONY plan of care: SW will follow up with Pt on 4/9 regarding utility assistance and toiletries.

## 2024-04-08 NOTE — TELEPHONE ENCOUNTER
Spoke to the patient and informed her we did not get the fax yet. She stated she got help with the bill and she did not need us to fax it anymore. If she needs help next month she will let us know.

## 2024-04-09 ENCOUNTER — CARE COORDINATION (OUTPATIENT)
Dept: CARE COORDINATION | Age: 89
End: 2024-04-09

## 2024-04-09 NOTE — CARE COORDINATION
Patient Current Location: Home: [id number] [place] [address] [address]  [address]  [place] [address]     Phone call to Pt to follow up regarding utility assistance. No answer, SW will try to reach Pt later in the day.     Phone call to Pt to report this SW was not able to reach her granddaughter. Pt reported she has not talked with her since yesterday either. Pt is not able to get to mailbox to check mail to see if the application for utility assistance through [employer] has arrived. Pt reported family lives out of town, son in [place] - retired - not in a position to come help, talks with him often on the phone. He's going to try to help her with paper products / toiletries that she needs. SW asked if she would be in agreement with this SW sending referral to community paramedic program for assistance. Pt was in agreement.     MOW referral follow up - meals started last week - going ok, can't eat everything they send. Will be sending more out tomorrow, Receives a meal Monday, Wednesday, Friday.     TONY sent secure message to Nathaly Reeves to determine if referral is appropriate. Nathaly responded that this referral is appropriate and that she will reach out to Pt to schedule a home visit. Nathaly reported there is a kenyatta through [employer] to assist with toiletries and will meet with Pt to identify needs. Discussed concern regarding Pt's inability to check her mailbox for [employer] application. Nathaly discussed plans to reach out to Pt to schedule a visit.     TONY plan of care: TONY will make next outreach to Pt on Friday 4/12 to follow up regarding utility assistance and toiletries.

## 2024-04-10 ENCOUNTER — TELEPHONE (OUTPATIENT)
Dept: OTHER | Age: 89
End: 2024-04-10

## 2024-04-12 ENCOUNTER — TELEPHONE (OUTPATIENT)
Dept: OTHER | Age: 89
End: 2024-04-12

## 2024-04-12 ENCOUNTER — CARE COORDINATION (OUTPATIENT)
Dept: CARE COORDINATION | Age: 89
End: 2024-04-12

## 2024-04-12 NOTE — CARE COORDINATION
Outreach deferred, Community paramedic, Nathaly Reeves will be going to visit Pt on 4/16, will bring toiletries and [employer] HEAP / PIPP application.    TONY plan of care: SW will make next outreach to Pt to follow up regarding HEAP application on 4/17.

## 2024-04-12 NOTE — TELEPHONE ENCOUNTER
Reached out to Chantel at [facility] to see if she could get together some toiletry supplies. [facility] is going to open a case and see if they can help get patients electric bill paid. APS will be going on the home visit with me on 4/16/24.

## 2024-04-15 ENCOUNTER — CARE COORDINATION (OUTPATIENT)
Dept: CARE COORDINATION | Age: 89
End: 2024-04-15

## 2024-04-15 NOTE — CARE COORDINATION
ACM placed call to patient for follow-up outreach, patient did not answer. ACM attempted home phone with no answer or ability to leave a message. ACM attempted mobile phone, but unable to leave a voice message as the mailbox was full. ACM will follow up at a later date.

## 2024-04-16 ENCOUNTER — PARAMEDICINE (OUTPATIENT)
Dept: OTHER | Age: 89
End: 2024-04-16

## 2024-04-16 NOTE — PROGRESS NOTES
her  went to [facility] she got behind on filing her federal taxes. She does have an  working with her and states she pays $222 a month for taxes.     Patient's income is above the threshold to receive any assistance with bills. APS does have an open case with her right now so I will continue to follow incase there are any additional services that I can provide.     Patient states she is receiving meals on wheels and denies any other needs at this time except for assistance with her electric bill.

## 2024-04-17 ENCOUNTER — CARE COORDINATION (OUTPATIENT)
Dept: CARE COORDINATION | Age: 89
End: 2024-04-17

## 2024-04-17 NOTE — CARE COORDINATION
Patient Current Location: Home: [address] [place] [address] [address]  [place] [address]     Phone call to Pt to follow up regarding toiletries and electric bill. Discussed visit with community paramedic and APS on 4/16. Pt reported it went well and was appreciative of the toiletries they brought to her.     Pt reported she is planning to move back to [place] bc granddaughter is not going to be able to stay with Pt any longer due to difficulty finding work. Pt reports she will move back into her house, which needs some fixing up. Pt identified that a window is broken and the glass would need replaced and the bathroom has mold.     Pt reported her current lease is up in June.     SW plan of care: SW will make next outreach to Pt to follow up regarding her move to [place] and [employer] bill on 4/29.

## 2024-04-22 ENCOUNTER — CARE COORDINATION (OUTPATIENT)
Dept: CARE COORDINATION | Age: 89
End: 2024-04-22

## 2024-04-22 NOTE — CARE COORDINATION
Ambulatory Care Coordination Note  2024    Patient Current Location:  [facility] [id number] [facility] [address]  [place] [address]     ACM contacted the patient by telephone. Verified name and  with patient as identifiers. Provided introduction to self, and explanation of the ACM role.     Challenges to be reviewed by the provider   Additional needs identified to be addressed with provider: No  none               Method of communication with provider: none.    ACM: Chela Dutton RN    ACM placed call to patient for follow-up outreach. Patient states she is doing well and has no concerns to report at this time. Patient states she did get batteries for her BP cuff and has taken it a couple times since last ACM outreach. Patient reports she took her BP yesterday and was able to \"get it down\" to SBP of 162. ACM encouraged patient to take BP readings daily, in the morning about an hour after taking medications, when relaxed, and write them down. Patient agreeable to keeping a BP log for this week so BP trend can be reviewed with ACM during next outreach. Patient denies any s/sx of HTN - no headache, blurred vision, palpitations or chest pain.   Patient reports she will be moving back into her home on 2024. Patient states her phone number will be changing at that time. Effective 24, home phone number will be [phone]. Patient states the mobile phone listed on file for her [phone] is not her phone number anymore as she gave this phone to her grandson temporarily. She may consider getting it back. Patient's new address, will be [address] [place] [address]. ACM will follow-up with patient in 1 week to confirm move, change of phone number & address before updating in Epic.     Plan:  -ACM will follow-up in 1 week (24)  -BP reading log  -Update phone number/address once move is confirmed    Offered patient enrollment in the Remote Patient Monitoring (RPM) program for in-home

## 2024-04-24 ENCOUNTER — HOSPITAL ENCOUNTER (OUTPATIENT)
Age: 89
Setting detail: OBSERVATION
Discharge: HOME | End: 2024-04-25
Attending: STUDENT IN AN ORGANIZED HEALTH CARE EDUCATION/TRAINING PROGRAM | Admitting: STUDENT IN AN ORGANIZED HEALTH CARE EDUCATION/TRAINING PROGRAM
Payer: MEDICARE

## 2024-04-24 ENCOUNTER — APPOINTMENT (OUTPATIENT)
Dept: GENERAL RADIOLOGY | Age: 89
End: 2024-04-24
Payer: MEDICARE

## 2024-04-24 ENCOUNTER — APPOINTMENT (OUTPATIENT)
Dept: CT IMAGING | Age: 89
End: 2024-04-24
Payer: MEDICARE

## 2024-04-24 DIAGNOSIS — R41.0 TRANSIENT CONFUSION: ICD-10-CM

## 2024-04-24 DIAGNOSIS — I10 ASYMPTOMATIC HYPERTENSION: Primary | ICD-10-CM

## 2024-04-24 PROBLEM — F40.9 FEAR FOR PERSONAL SAFETY: Status: ACTIVE | Noted: 2024-04-24

## 2024-04-24 LAB
ALBUMIN SERPL-MCNC: 4.3 GM/DL (ref 3.4–5)
ALP BLD-CCNC: 79 IU/L (ref 40–128)
ALT SERPL-CCNC: 9 U/L (ref 10–40)
ANION GAP SERPL CALCULATED.3IONS-SCNC: 14 MMOL/L (ref 7–16)
AST SERPL-CCNC: 17 IU/L (ref 15–37)
BACTERIA: NEGATIVE /HPF
BASOPHILS ABSOLUTE: 0 K/CU MM
BASOPHILS RELATIVE PERCENT: 0.3 % (ref 0–1)
BILIRUB SERPL-MCNC: 0.9 MG/DL (ref 0–1)
BILIRUBIN URINE: NEGATIVE MG/DL
BLOOD, URINE: NEGATIVE
BUN SERPL-MCNC: 23 MG/DL (ref 6–23)
CALCIUM SERPL-MCNC: 9.2 MG/DL (ref 8.3–10.6)
CHLORIDE BLD-SCNC: 94 MMOL/L (ref 99–110)
CLARITY: CLEAR
CO2: 24 MMOL/L (ref 21–32)
COLOR: YELLOW
CREAT SERPL-MCNC: 0.8 MG/DL (ref 0.6–1.1)
DIFFERENTIAL TYPE: ABNORMAL
EKG ATRIAL RATE: 54 BPM
EKG DIAGNOSIS: NORMAL
EKG P AXIS: 60 DEGREES
EKG P-R INTERVAL: 174 MS
EKG Q-T INTERVAL: 416 MS
EKG QRS DURATION: 86 MS
EKG QTC CALCULATION (BAZETT): 394 MS
EKG R AXIS: 44 DEGREES
EKG T AXIS: 43 DEGREES
EKG VENTRICULAR RATE: 54 BPM
EOSINOPHILS ABSOLUTE: 0 K/CU MM
EOSINOPHILS RELATIVE PERCENT: 0.3 % (ref 0–3)
GFR SERPL CREATININE-BSD FRML MDRD: 70 ML/MIN/1.73M2
GLUCOSE SERPL-MCNC: 83 MG/DL (ref 70–99)
GLUCOSE, URINE: NEGATIVE MG/DL
HCT VFR BLD CALC: 35.5 % (ref 37–47)
HEMOGLOBIN: 11.8 GM/DL (ref 12.5–16)
IMMATURE NEUTROPHIL %: 0.2 % (ref 0–0.43)
KETONES, URINE: ABNORMAL MG/DL
LACTATE: 1.4 MMOL/L (ref 0.5–1.9)
LEUKOCYTE ESTERASE, URINE: ABNORMAL
LIPASE: 35 IU/L (ref 13–60)
LYMPHOCYTES ABSOLUTE: 1.6 K/CU MM
LYMPHOCYTES RELATIVE PERCENT: 25.3 % (ref 24–44)
MAGNESIUM: 1.8 MG/DL (ref 1.8–2.4)
MCH RBC QN AUTO: 29.8 PG (ref 27–31)
MCHC RBC AUTO-ENTMCNC: 33.2 % (ref 32–36)
MCV RBC AUTO: 89.6 FL (ref 78–100)
MONOCYTES ABSOLUTE: 0.6 K/CU MM
MONOCYTES RELATIVE PERCENT: 9 % (ref 0–4)
MUCUS: ABNORMAL HPF
NEUTROPHILS RELATIVE PERCENT: 64.9 % (ref 36–66)
NITRITE URINE, QUANTITATIVE: NEGATIVE
NUCLEATED RBC %: 0 %
PDW BLD-RTO: 12.6 % (ref 11.7–14.9)
PH, URINE: 7 (ref 5–8)
PLATELET # BLD: 269 K/CU MM (ref 140–440)
PMV BLD AUTO: 12.8 FL (ref 7.5–11.1)
POTASSIUM SERPL-SCNC: 3.6 MMOL/L (ref 3.5–5.1)
PROTEIN UA: ABNORMAL MG/DL
RBC # BLD: 3.96 M/CU MM (ref 4.2–5.4)
RBC URINE: 1 /HPF (ref 0–6)
SEGMENTED NEUTROPHILS ABSOLUTE COUNT: 4.1 K/CU MM
SODIUM BLD-SCNC: 132 MMOL/L (ref 135–145)
SPECIFIC GRAVITY UA: 1.01 (ref 1–1.03)
SQUAMOUS EPITHELIAL: 2 /HPF
TOTAL IMMATURE NEUTOROPHIL: 0.01 K/CU MM
TOTAL NUCLEATED RBC: 0 K/CU MM
TOTAL PROTEIN: 6.7 GM/DL (ref 6.4–8.2)
TRICHOMONAS: ABNORMAL /HPF
TROPONIN, HIGH SENSITIVITY: 29 NG/L (ref 0–14)
TROPONIN, HIGH SENSITIVITY: 31 NG/L (ref 0–14)
TSH SERPL DL<=0.005 MIU/L-ACNC: 2.91 UIU/ML (ref 0.27–4.2)
UROBILINOGEN, URINE: 1 MG/DL (ref 0.2–1)
WBC # BLD: 6.2 K/CU MM (ref 4–10.5)
WBC UA: 1 /HPF (ref 0–5)

## 2024-04-24 PROCEDURE — 83690 ASSAY OF LIPASE: CPT

## 2024-04-24 PROCEDURE — 6370000000 HC RX 637 (ALT 250 FOR IP): Performed by: STUDENT IN AN ORGANIZED HEALTH CARE EDUCATION/TRAINING PROGRAM

## 2024-04-24 PROCEDURE — 83735 ASSAY OF MAGNESIUM: CPT

## 2024-04-24 PROCEDURE — G0378 HOSPITAL OBSERVATION PER HR: HCPCS

## 2024-04-24 PROCEDURE — 85025 COMPLETE CBC W/AUTO DIFF WBC: CPT

## 2024-04-24 PROCEDURE — 83605 ASSAY OF LACTIC ACID: CPT

## 2024-04-24 PROCEDURE — 81001 URINALYSIS AUTO W/SCOPE: CPT

## 2024-04-24 PROCEDURE — 70450 CT HEAD/BRAIN W/O DYE: CPT

## 2024-04-24 PROCEDURE — 2580000003 HC RX 258: Performed by: STUDENT IN AN ORGANIZED HEALTH CARE EDUCATION/TRAINING PROGRAM

## 2024-04-24 PROCEDURE — 71046 X-RAY EXAM CHEST 2 VIEWS: CPT

## 2024-04-24 PROCEDURE — 80053 COMPREHEN METABOLIC PANEL: CPT

## 2024-04-24 PROCEDURE — 93010 ELECTROCARDIOGRAM REPORT: CPT | Performed by: INTERNAL MEDICINE

## 2024-04-24 PROCEDURE — 93005 ELECTROCARDIOGRAM TRACING: CPT | Performed by: STUDENT IN AN ORGANIZED HEALTH CARE EDUCATION/TRAINING PROGRAM

## 2024-04-24 PROCEDURE — 96361 HYDRATE IV INFUSION ADD-ON: CPT

## 2024-04-24 PROCEDURE — 84484 ASSAY OF TROPONIN QUANT: CPT

## 2024-04-24 PROCEDURE — 96360 HYDRATION IV INFUSION INIT: CPT

## 2024-04-24 PROCEDURE — 84443 ASSAY THYROID STIM HORMONE: CPT

## 2024-04-24 PROCEDURE — 99285 EMERGENCY DEPT VISIT HI MDM: CPT

## 2024-04-24 RX ORDER — MEMANTINE HYDROCHLORIDE 5 MG/1
5 TABLET ORAL 2 TIMES DAILY
Status: DISCONTINUED | OUTPATIENT
Start: 2024-04-24 | End: 2024-04-25 | Stop reason: HOSPADM

## 2024-04-24 RX ORDER — LISINOPRIL 20 MG/1
40 TABLET ORAL DAILY
Status: DISCONTINUED | OUTPATIENT
Start: 2024-04-25 | End: 2024-04-25 | Stop reason: HOSPADM

## 2024-04-24 RX ORDER — ACETAMINOPHEN 650 MG/1
650 SUPPOSITORY RECTAL EVERY 6 HOURS PRN
Status: DISCONTINUED | OUTPATIENT
Start: 2024-04-24 | End: 2024-04-25 | Stop reason: HOSPADM

## 2024-04-24 RX ORDER — TRIAMTERENE AND HYDROCHLOROTHIAZIDE 37.5; 25 MG/1; MG/1
1 TABLET ORAL DAILY
Status: DISCONTINUED | OUTPATIENT
Start: 2024-04-25 | End: 2024-04-25 | Stop reason: HOSPADM

## 2024-04-24 RX ORDER — SODIUM CHLORIDE 0.9 % (FLUSH) 0.9 %
5-40 SYRINGE (ML) INJECTION EVERY 12 HOURS SCHEDULED
Status: DISCONTINUED | OUTPATIENT
Start: 2024-04-24 | End: 2024-04-25 | Stop reason: HOSPADM

## 2024-04-24 RX ORDER — AMLODIPINE BESYLATE 10 MG/1
10 TABLET ORAL DAILY
Status: DISCONTINUED | OUTPATIENT
Start: 2024-04-25 | End: 2024-04-25 | Stop reason: HOSPADM

## 2024-04-24 RX ORDER — ONDANSETRON 2 MG/ML
4 INJECTION INTRAMUSCULAR; INTRAVENOUS EVERY 6 HOURS PRN
Status: DISCONTINUED | OUTPATIENT
Start: 2024-04-24 | End: 2024-04-25 | Stop reason: HOSPADM

## 2024-04-24 RX ORDER — ATORVASTATIN CALCIUM 10 MG/1
20 TABLET, FILM COATED ORAL DAILY
Status: DISCONTINUED | OUTPATIENT
Start: 2024-04-25 | End: 2024-04-25 | Stop reason: HOSPADM

## 2024-04-24 RX ORDER — ASCORBIC ACID 500 MG
500 TABLET ORAL DAILY
Status: DISCONTINUED | OUTPATIENT
Start: 2024-04-25 | End: 2024-04-25 | Stop reason: HOSPADM

## 2024-04-24 RX ORDER — ONDANSETRON 4 MG/1
4 TABLET, ORALLY DISINTEGRATING ORAL EVERY 8 HOURS PRN
Status: DISCONTINUED | OUTPATIENT
Start: 2024-04-24 | End: 2024-04-25 | Stop reason: HOSPADM

## 2024-04-24 RX ORDER — ACETAMINOPHEN 325 MG/1
650 TABLET ORAL EVERY 6 HOURS PRN
Status: DISCONTINUED | OUTPATIENT
Start: 2024-04-24 | End: 2024-04-25 | Stop reason: HOSPADM

## 2024-04-24 RX ORDER — GABAPENTIN 300 MG/1
300 CAPSULE ORAL 2 TIMES DAILY
Status: DISCONTINUED | OUTPATIENT
Start: 2024-04-24 | End: 2024-04-25 | Stop reason: HOSPADM

## 2024-04-24 RX ORDER — LISINOPRIL 20 MG/1
40 TABLET ORAL DAILY
Status: DISCONTINUED | OUTPATIENT
Start: 2024-04-24 | End: 2024-04-25

## 2024-04-24 RX ORDER — ASPIRIN 81 MG/1
81 TABLET, CHEWABLE ORAL DAILY
Status: DISCONTINUED | OUTPATIENT
Start: 2024-04-25 | End: 2024-04-25 | Stop reason: HOSPADM

## 2024-04-24 RX ORDER — TRIAMTERENE AND HYDROCHLOROTHIAZIDE 37.5; 25 MG/1; MG/1
1 TABLET ORAL DAILY
Status: DISCONTINUED | OUTPATIENT
Start: 2024-04-24 | End: 2024-04-25

## 2024-04-24 RX ORDER — AMLODIPINE BESYLATE 5 MG/1
10 TABLET ORAL ONCE
Status: COMPLETED | OUTPATIENT
Start: 2024-04-24 | End: 2024-04-24

## 2024-04-24 RX ORDER — SODIUM CHLORIDE 0.9 % (FLUSH) 0.9 %
5-40 SYRINGE (ML) INJECTION PRN
Status: DISCONTINUED | OUTPATIENT
Start: 2024-04-24 | End: 2024-04-25 | Stop reason: HOSPADM

## 2024-04-24 RX ORDER — LANOLIN ALCOHOL/MO/W.PET/CERES
1000 CREAM (GRAM) TOPICAL DAILY
Status: DISCONTINUED | OUTPATIENT
Start: 2024-04-25 | End: 2024-04-25 | Stop reason: HOSPADM

## 2024-04-24 RX ORDER — POLYETHYLENE GLYCOL 3350 17 G/17G
17 POWDER, FOR SOLUTION ORAL DAILY PRN
Status: DISCONTINUED | OUTPATIENT
Start: 2024-04-24 | End: 2024-04-25 | Stop reason: HOSPADM

## 2024-04-24 RX ORDER — PANTOPRAZOLE SODIUM 40 MG/1
40 TABLET, DELAYED RELEASE ORAL
Status: DISCONTINUED | OUTPATIENT
Start: 2024-04-25 | End: 2024-04-25 | Stop reason: HOSPADM

## 2024-04-24 RX ORDER — SODIUM CHLORIDE 9 MG/ML
INJECTION, SOLUTION INTRAVENOUS PRN
Status: DISCONTINUED | OUTPATIENT
Start: 2024-04-24 | End: 2024-04-25 | Stop reason: HOSPADM

## 2024-04-24 RX ORDER — POTASSIUM CHLORIDE 7.45 MG/ML
10 INJECTION INTRAVENOUS PRN
Status: DISCONTINUED | OUTPATIENT
Start: 2024-04-24 | End: 2024-04-25 | Stop reason: HOSPADM

## 2024-04-24 RX ORDER — 0.9 % SODIUM CHLORIDE 0.9 %
500 INTRAVENOUS SOLUTION INTRAVENOUS ONCE
Status: COMPLETED | OUTPATIENT
Start: 2024-04-24 | End: 2024-04-24

## 2024-04-24 RX ORDER — VITAMIN B COMPLEX
5 TABLET ORAL DAILY
Status: DISCONTINUED | OUTPATIENT
Start: 2024-04-25 | End: 2024-04-25 | Stop reason: HOSPADM

## 2024-04-24 RX ORDER — MAGNESIUM SULFATE IN WATER 40 MG/ML
2000 INJECTION, SOLUTION INTRAVENOUS PRN
Status: DISCONTINUED | OUTPATIENT
Start: 2024-04-24 | End: 2024-04-25 | Stop reason: HOSPADM

## 2024-04-24 RX ORDER — POTASSIUM CHLORIDE 20 MEQ/1
40 TABLET, EXTENDED RELEASE ORAL PRN
Status: DISCONTINUED | OUTPATIENT
Start: 2024-04-24 | End: 2024-04-25 | Stop reason: HOSPADM

## 2024-04-24 RX ADMIN — SODIUM CHLORIDE 500 ML: 9 INJECTION, SOLUTION INTRAVENOUS at 12:12

## 2024-04-24 RX ADMIN — TRIAMTERENE AND HYDROCHLOROTHIAZIDE 1 TABLET: 37.5; 25 TABLET ORAL at 15:06

## 2024-04-24 RX ADMIN — SODIUM CHLORIDE, PRESERVATIVE FREE 10 ML: 5 INJECTION INTRAVENOUS at 22:18

## 2024-04-24 RX ADMIN — AMLODIPINE BESYLATE 10 MG: 5 TABLET ORAL at 12:05

## 2024-04-24 RX ADMIN — MEMANTINE HYDROCHLORIDE 5 MG: 5 TABLET ORAL at 22:18

## 2024-04-24 RX ADMIN — GABAPENTIN 300 MG: 300 CAPSULE ORAL at 22:18

## 2024-04-24 RX ADMIN — LISINOPRIL 40 MG: 20 TABLET ORAL at 15:06

## 2024-04-24 ASSESSMENT — ENCOUNTER SYMPTOMS
COUGH: 0
VOMITING: 0
ABDOMINAL PAIN: 1
DIARRHEA: 1
NAUSEA: 1
SORE THROAT: 0
SHORTNESS OF BREATH: 0

## 2024-04-24 ASSESSMENT — PAIN SCALES - GENERAL: PAINLEVEL_OUTOF10: 0

## 2024-04-24 NOTE — ED NOTES
This RN left a message on pt son Claude's (ciarra) voicemail for him to call back charge RN phone.  Charge RN Marianne notified that this person may be calling the charge phone.    His phone number is [phone]

## 2024-04-24 NOTE — ED PROVIDER NOTES
DO  Emergency Medicine    (Please note that portions of this note were completed with a voice recognition program.  Efforts were made to edit the dictations but occasionally words are mis-transcribed.)       Pedrito Ventura, DO  04/24/24 1727

## 2024-04-24 NOTE — ED NOTES
[phone] Coleen Day wife of grandson     [phone] Evan Day - grandson     Electronically signed by Elizabeth Richardson on 4/24/2024 at 10:50 AM

## 2024-04-24 NOTE — DISCHARGE INSTRUCTIONS
you feel like eating, start with small amounts. Do not have alcohol, caffeine, or spicy, hot, or high-fat foods for a day or two.  Avoid anti-inflammatory medicines such as aspirin, ibuprofen (Advil, Motrin), and naproxen (Aleve). These can cause stomach upset. Talk to your doctor if you take daily aspirin for another health problem.  When should you call for help?   Call 911 anytime you think you may need emergency care. For example, call if:    You passed out (lost consciousness).     You pass maroon or very bloody stools.     You vomit blood or what looks like coffee grounds.     You have severe belly pain.   Call your doctor now or seek immediate medical care if:    Your pain gets worse, especially if it becomes focused in one area of your belly.     You have a new or higher fever.     Your stools are black and look like tar, or they have streaks of blood.     You have unexpected vaginal bleeding.     You have symptoms of a urinary tract infection. These may include:  Pain when you urinate.  Urinating more often than usual.  Blood in your urine.     You are dizzy or lightheaded, or you feel like you may faint.   Watch closely for changes in your health, and be sure to contact your doctor if:    You are not getting better as expected.   Where can you learn more?  Go to https://www.[employer].net/patientEd and enter E907 to learn more about \"Abdominal Pain: Care Instructions.\"  Current as of: October 19, 2023               Content Version: 14.0  © 2006-2024 [employer].   Care instructions adapted under license by [employer]. If you have questions about a medical condition or this instruction, always ask your healthcare professional. [employer] disclaims any warranty or liability for your use of this information.

## 2024-04-24 NOTE — ED NOTES
This RN spoke with pt family Horne, states she is unable to come get patient because she is stuck in [place]. Her ex  who is pt grandson is at work and works over night and she does not know what time he gets off work. He is unable to come get pt. She states that pt is unsafe to go home to her place in [place] on her own. Dr. Ventura notified and a case management consult has been placed,

## 2024-04-24 NOTE — CARE COORDINATION
CM consulted due to social issues. Pt was dropped off by Evan? Unsure if this is pt's grandson or her landlords son. Spoke to grand daughter Coleen who states she lives with pt but is in [place] unable to get home. Coleen states pt is confused and unsafe to return home without someone. States her ex- is the one who brought pt. Gives number of [phone]. Coleen states unsure if phone works and unsure of when ex- gets off work. CM attempted to call number and it states phone is not taking calls.  Coleen again states pt unsafe to return home, unsure if pt has keys and granddaughter will not be back from [place] till at least next week. Coleen states ex- can for sure pick pt up after he gets off work. This CM is unable to reach ex-. Coleen states her and pt are moving back to pt's home at [address] [place] [address].     Pt is active with [facility] Paramedic program. Per notes APS is also involved and pt has an open case with them.     CM speaks with pt. Pt tells CM hiteshloranselmo son Evan brought her here. She is unsure of number. Pt reports Coleen has a car and can come and get her. CM explained that Coleen says she can not come get pt. Pt circles around information and repeats questions and statements back to CM. Pt then states she has a phone but the battery is dead. CM ask to see phone to see if we can charge it for numbers. Pt digs in purse and pulls out a portable house phone and hands to CM. Pt has no other phones. Pt has no keys either. CM tells pt she will keep making calls and come back.     CM updates Dr. Moscoso on concerns.     CM speaks with Dr. Dailey and explains concerns.     CM did find spouse has guardian and it is daughter Cherelle Xiong. Number is listed as [phone]. CM spoke with pt about her. Pt states this is a step daughter and she lives in [place]. States she doesn't really talk to her. Pt gives permission to contact granddaughter

## 2024-04-24 NOTE — ED NOTES
ED TO INPATIENT SBAR HANDOFF    Patient Name: Carol Xiong   :  1933  89 y.o.   Preferred Name    Family/Caregiver Present no   Restraints no   C-SSRS: Risk of Suicide: No Risk  Sitter no   Sepsis Risk Score Sepsis Risk Score: 1.23      Situation  Chief Complaint   Patient presents with    Altered Mental Status     Family reports this has happened in the past.      Brief Description of Patient's Condition:  female who presents with vague symptoms ongoing for the last month.  She reports some weight loss, nausea, epigastric queasiness, intermittent lightheadedness, diarrhea.  All the symptoms are fleeting and come and go.  She was brought in by family today due to concerns of confusion.  Patient is alert and oriented x 3 has no focal neurological deficits and is able to provide me her medical history.  States she did not take her blood pressure medications today as she was in a rush to get the door.  Currently feeling asymptomatic.  Patient denies any falls.     Discussed with her grandson Evan to obtain further history.  He states that in the past she has had some intermittent confusion.  He thinks in the past she might of had a \"blood infection\" when she was confused.  He reports that she has had some odd statements such as asking if he is in [place] when he lives in [place].  No reported history of dementia.  No known falls  Mental Status: disoriented  Arrived from: home    Imaging:   CT HEAD WO CONTRAST   Final Result      1. No acute findings.   2. 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   3. Mild chronic small vessel ischemic disease in the white matter.      Electronically signed by Sahji Patton MD      XR CHEST (2 VW)   Final Result   Impression:   1. No acute findings in the chest.      Electronically signed by Shaji Patton MD        Abnormal labs:   Abnormal Labs Reviewed   COMPREHENSIVE METABOLIC PANEL - Abnormal; Notable for the following components:       Result Value    Sodium 132

## 2024-04-25 ENCOUNTER — PARAMEDICINE (OUTPATIENT)
Dept: OTHER | Age: 89
End: 2024-04-25

## 2024-04-25 VITALS
RESPIRATION RATE: 6 BRPM | HEART RATE: 47 BPM | TEMPERATURE: 97.9 F | OXYGEN SATURATION: 98 % | WEIGHT: 114.64 LBS | SYSTOLIC BLOOD PRESSURE: 106 MMHG | HEIGHT: 69 IN | DIASTOLIC BLOOD PRESSURE: 46 MMHG | BODY MASS INDEX: 16.98 KG/M2

## 2024-04-25 PROBLEM — E43 SEVERE MALNUTRITION (HCC): Chronic | Status: ACTIVE | Noted: 2024-04-25

## 2024-04-25 LAB
ALBUMIN SERPL-MCNC: 4.3 GM/DL (ref 3.4–5)
ALP BLD-CCNC: 79 IU/L (ref 40–128)
ALT SERPL-CCNC: 9 U/L (ref 10–40)
ANION GAP SERPL CALCULATED.3IONS-SCNC: 16 MMOL/L (ref 7–16)
AST SERPL-CCNC: 17 IU/L (ref 15–37)
BASOPHILS ABSOLUTE: 0 K/CU MM
BASOPHILS RELATIVE PERCENT: 0.4 % (ref 0–1)
BILIRUB SERPL-MCNC: 0.7 MG/DL (ref 0–1)
BUN SERPL-MCNC: 20 MG/DL (ref 6–23)
CALCIUM SERPL-MCNC: 9.2 MG/DL (ref 8.3–10.6)
CHLORIDE BLD-SCNC: 92 MMOL/L (ref 99–110)
CO2: 23 MMOL/L (ref 21–32)
CREAT SERPL-MCNC: 1 MG/DL (ref 0.6–1.1)
DIFFERENTIAL TYPE: ABNORMAL
EOSINOPHILS ABSOLUTE: 0.1 K/CU MM
EOSINOPHILS RELATIVE PERCENT: 1.6 % (ref 0–3)
GFR SERPL CREATININE-BSD FRML MDRD: 53 ML/MIN/1.73M2
GLUCOSE SERPL-MCNC: 110 MG/DL (ref 70–99)
HCT VFR BLD CALC: 34.6 % (ref 37–47)
HEMOGLOBIN: 11.5 GM/DL (ref 12.5–16)
IMMATURE NEUTROPHIL %: 0.2 % (ref 0–0.43)
INR BLD: 1 INDEX
LYMPHOCYTES ABSOLUTE: 1.7 K/CU MM
LYMPHOCYTES RELATIVE PERCENT: 34.3 % (ref 24–44)
MAGNESIUM: 1.7 MG/DL (ref 1.8–2.4)
MCH RBC QN AUTO: 29.4 PG (ref 27–31)
MCHC RBC AUTO-ENTMCNC: 33.2 % (ref 32–36)
MCV RBC AUTO: 88.5 FL (ref 78–100)
MONOCYTES ABSOLUTE: 0.5 K/CU MM
MONOCYTES RELATIVE PERCENT: 10.1 % (ref 0–4)
NEUTROPHILS RELATIVE PERCENT: 53.4 % (ref 36–66)
NUCLEATED RBC %: 0 %
PDW BLD-RTO: 12.9 % (ref 11.7–14.9)
PLATELET # BLD: 287 K/CU MM (ref 140–440)
PMV BLD AUTO: 12.1 FL (ref 7.5–11.1)
POTASSIUM SERPL-SCNC: 3.3 MMOL/L (ref 3.5–5.1)
PROTHROMBIN TIME: 13.6 SECONDS (ref 11.7–14.5)
RBC # BLD: 3.91 M/CU MM (ref 4.2–5.4)
SEGMENTED NEUTROPHILS ABSOLUTE COUNT: 2.7 K/CU MM
SODIUM BLD-SCNC: 131 MMOL/L (ref 135–145)
TOTAL IMMATURE NEUTOROPHIL: 0.01 K/CU MM
TOTAL NUCLEATED RBC: 0 K/CU MM
TOTAL PROTEIN: 6.4 GM/DL (ref 6.4–8.2)
WBC # BLD: 5.1 K/CU MM (ref 4–10.5)

## 2024-04-25 PROCEDURE — 97162 PT EVAL MOD COMPLEX 30 MIN: CPT

## 2024-04-25 PROCEDURE — 6370000000 HC RX 637 (ALT 250 FOR IP): Performed by: STUDENT IN AN ORGANIZED HEALTH CARE EDUCATION/TRAINING PROGRAM

## 2024-04-25 PROCEDURE — 80053 COMPREHEN METABOLIC PANEL: CPT

## 2024-04-25 PROCEDURE — 85025 COMPLETE CBC W/AUTO DIFF WBC: CPT

## 2024-04-25 PROCEDURE — 94761 N-INVAS EAR/PLS OXIMETRY MLT: CPT

## 2024-04-25 PROCEDURE — G0378 HOSPITAL OBSERVATION PER HR: HCPCS

## 2024-04-25 PROCEDURE — 97166 OT EVAL MOD COMPLEX 45 MIN: CPT

## 2024-04-25 PROCEDURE — 97535 SELF CARE MNGMENT TRAINING: CPT

## 2024-04-25 PROCEDURE — 97530 THERAPEUTIC ACTIVITIES: CPT

## 2024-04-25 PROCEDURE — 36415 COLL VENOUS BLD VENIPUNCTURE: CPT

## 2024-04-25 PROCEDURE — 85610 PROTHROMBIN TIME: CPT

## 2024-04-25 PROCEDURE — 2580000003 HC RX 258: Performed by: STUDENT IN AN ORGANIZED HEALTH CARE EDUCATION/TRAINING PROGRAM

## 2024-04-25 PROCEDURE — 83735 ASSAY OF MAGNESIUM: CPT

## 2024-04-25 RX ADMIN — OXYCODONE HYDROCHLORIDE AND ACETAMINOPHEN 500 MG: 500 TABLET ORAL at 08:51

## 2024-04-25 RX ADMIN — SODIUM CHLORIDE, PRESERVATIVE FREE 10 ML: 5 INJECTION INTRAVENOUS at 08:52

## 2024-04-25 RX ADMIN — ATORVASTATIN CALCIUM 20 MG: 10 TABLET, FILM COATED ORAL at 08:51

## 2024-04-25 RX ADMIN — TRIAMTERENE AND HYDROCHLOROTHIAZIDE 1 TABLET: 37.5; 25 TABLET ORAL at 08:51

## 2024-04-25 RX ADMIN — LISINOPRIL 40 MG: 20 TABLET ORAL at 08:51

## 2024-04-25 RX ADMIN — PANTOPRAZOLE SODIUM 40 MG: 40 TABLET, DELAYED RELEASE ORAL at 06:03

## 2024-04-25 RX ADMIN — GABAPENTIN 300 MG: 300 CAPSULE ORAL at 08:51

## 2024-04-25 RX ADMIN — CYANOCOBALAMIN TAB 1000 MCG 1000 MCG: 1000 TAB at 08:51

## 2024-04-25 RX ADMIN — Medication 5000 UNITS: at 08:56

## 2024-04-25 RX ADMIN — AMLODIPINE BESYLATE 10 MG: 10 TABLET ORAL at 08:51

## 2024-04-25 RX ADMIN — ASPIRIN 81 MG: 81 TABLET, CHEWABLE ORAL at 08:51

## 2024-04-25 RX ADMIN — MEMANTINE HYDROCHLORIDE 5 MG: 5 TABLET ORAL at 08:56

## 2024-04-25 NOTE — DISCHARGE SUMMARY
V2.0  Discharge Summary    Name:  Carol Xiong /Age/Sex: 1933 (89 y.o. female)   Admit Date: 2024  Discharge Date: 24    MRN & CSN:  [id number] & [id number] Encounter Date and Time 24 1:40 PM EDT    Attending:  Darryn Bocanegra MD Discharging Provider: ARABELLA Keenan - CNP       Hospital Course:     Brief HPI: Carol Xiong is a 89 y.o. female with pmh of  HTN, HLD, GERD, and alzheimer's who presents with Fear for personal safety     Pt lives with her granddaughter in a mobile home currently. Her granddaughter went to [place] until next week. Pt was dropped off to ER entrance by her grandson Jed who lives in [place]. APS is involved.     Patient is alert and oriented x 3.  She reported that she did not eat and drink well in the past months and lost some weight.  She started eating and drinking well in past 2 weeks.  Patient's labs are unremarkable.   is consulted for discharge planning.  Patient declined SNF or home health care.  Patient's granddaughter Day, Coleen's  Evan will come to pick patient up and check on her daily until coleen is back with patient early next week.    Brief Problem Based Course:   Fear for personal safety   - pt stated she hasn't eaten well for a month. She lost some weight  -pt is alert and oriented  -PT/OT evaluated patient and encourage a facility for moderate postacute rehabilitation, anticipate 1 to 2 hours/day and 5 days/week      Alzheimers  -seems early stage  - Continue Namenda  - Delirium precautions     HTN  -Continue home meds: amlodipine and lisinopril     Non-MI troponin elevation  - Denied any typical CP.  - Initial Tn mildly elevated and plateaued. ECG without acute ischemic changes.  - Likely secondary to HTN.      HLD  -Continue statin      GERD  -Continue PPI     Severe malnutrition  -BMI 16.93  -reported not eating and drinking well for a month at home  -dietician consult      The patient expressed

## 2024-04-25 NOTE — CARE COORDINATION
04/25/24 1325   Service Assessment   Patient Orientation Alert and Oriented   Cognition Alert   History Provided By Patient;Child/Family   Primary Caregiver Self   Support Systems Family Members   PCP Verified by CM Yes   Last Visit to PCP Within last year   Prior Functional Level Independent in ADLs/IADLs   Current Functional Level Independent in ADLs/IADLs   Can patient return to prior living arrangement Yes   Ability to make needs known: Good   Family able to assist with home care needs: Yes   Would you like for me to discuss the discharge plan with any other family members/significant others, and if so, who? Yes  (son Claude and grand daughter Coleen)   Financial Resources Medicare   Social/Functional History   Lives With Family;Other (comment)  (grand daughter Coleen)   Type of Home Mobile home   Active  No   Patient's  Info grand daughter provides all transportation   Mode of Transportation Car   Condition of Participation: Discharge Planning   The Patient and/Or Patient Representative agree with the Discharge Plan? Yes     Reviewed chart, discussed in IDR and spoke with pt, her son and grand daughter Coleen about discharge needs/plans. Pt lives in mobile home with grand daughter but plans on moving back to home in [facility].  Pt is independent with ADL's and Coleen does all grocery shopping.   Pt has a PCP and insurance. She is an open case with APS and they did a home visit in recent past , they will be continuing to follow pt.  Discussed HC and SNU, pt denied both, oCleen is in agreement with plan , Evan Horne's  will come to pick pt up and will check on her daily till Coleen is back with pt early next week.  I spoke with Claude early today and will call him with update of pt's discharge plan.  Ps to NP Rashad. CM available as needed.

## 2024-04-25 NOTE — PLAN OF CARE
Problem: Discharge Planning  Goal: Discharge to home or other facility with appropriate resources  4/25/2024 1020 by Annmarie Hargrove LPN  Outcome: Progressing  4/25/2024 0000 by Olivia Yun RN  Outcome: Progressing     Problem: Safety - Adult  Goal: Free from fall injury  4/25/2024 1020 by Annmarie Hargrove LPN  Outcome: Progressing  4/25/2024 0000 by Olivia Yun RN  Outcome: Progressing

## 2024-04-25 NOTE — PLAN OF CARE
Problem: Discharge Planning  Goal: Discharge to home or other facility with appropriate resources  4/25/2024 1341 by Annmarie Hargrove LPN  Outcome: Adequate for Discharge  4/25/2024 1020 by Annmarie Hargrove LPN  Outcome: Progressing  4/25/2024 0000 by Olivia Yun RN  Outcome: Progressing

## 2024-04-25 NOTE — CONSULTS
[facility] ACUTE CARE PHYSICAL THERAPY EVALUATION  Carol Xiong, 2/20/1933, 4104/4104-A, 4/25/2024    History  [place]:  The primary encounter diagnosis was Asymptomatic hypertension. A diagnosis of Transient confusion was also pertinent to this visit.  Patient  has a past medical history of GERD (gastroesophageal reflux disease), H/O echocardiogram, H/O echocardiogram, Hyperlipidemia, Hypertension, and Neuropathy.  Patient  has a past surgical history that includes Spine surgery (2013); Colon surgery; Hysterectomy, total abdominal; and Appendectomy.    Discharge Recommendation: Encourage facility for moderate post-acute rehabilitation, anticipate 1-2 hours per day and 5 days per week.    Subjective:    Patient states:  \"I feel good after eating, I was starving\"     Pain:  pt denied having any pain      Communication with other providers:  Handoff to RN, co-evaluation with OTClark for tolerance and to maximize safety     Restrictions: general precautions, fall risk     Home Setup/Prior level of function  Social/Functional History  Lives With: Alone  Type of Home: House  Home Layout: One level  Home Access: Level entry  Bathroom Shower/Tub: Tub/Shower unit  Bathroom Toilet: Standard  Bathroom Accessibility: Accessible  Home Equipment: Walker, rolling  ADL Assistance: Needs assistance (granddaughter assists with bathing, pt able to dress and toilet independenty)  Homemaking Assistance: Needs assistance  Homemaking Responsibilities: Yes  Ambulation Assistance: Independent (mod I with RW)  Transfer Assistance: Independent  Active : No  Occupation: Retired    Examination of body systems (includes body structures/functions, activity/participation limitations):  Observation:  pt found resting in bed upon arrival and agreeable to therapy  Vision:  glasses   Hearing:  WFL  Cardiopulmonary:  on room air   Cognition: AxOx3-4 with intermittent confusion, poor safety awareness, reduced insight to current

## 2024-04-25 NOTE — PROGRESS NOTES
4 Eyes Skin Assessment     NAME:  Carol Xiong  YOB: 1933  MEDICAL RECORD NUMBER:  [id number]    The patient is being assessed for  Admission    I agree that at least one RN has performed a thorough Head to Toe Skin Assessment on the patient. ALL assessment sites listed below have been assessed.      Areas assessed by both nurses:    Head, Face, Ears, Shoulders, Back, Chest, Arms, Elbows, Hands, Sacrum. Buttock, Coccyx, Ischium, Legs. Feet and Heels, and Under Medical Devices         Does the Patient have a Wound? No noted wound(s)       Daron Prevention initiated by RN: Yes  Wound Care Orders initiated by RN: No    Pressure Injury (Stage 3,4, Unstageable, DTI, NWPT, and Complex wounds) if present, place Wound referral order by RN under : No    New Ostomies, if present place, Ostomy referral order under : No     Nurse 1 eSignature: Electronically signed by Olivia Yun RN on 4/25/24 at 12:01 AM EDT    **SHARE this note so that the co-signing nurse can place an eSignature**    Nurse 2 eSignature: Electronically signed by Merced Feldman LPN on 4/25/24 at 4:31 AM EDT  
Occupational Therapy    [facility] ACUTE CARE OCCUPATIONAL THERAPY EVALUATION    Carol Xiong, 2/20/1933, 4104/4104-A, 4/25/2024    Discharge Recommendation: Patient requires minimal to moderate OT services at discharge, typically 1-2 hours/day, 5 days/week    History:  [place]:  The primary encounter diagnosis was Asymptomatic hypertension. A diagnosis of Transient confusion was also pertinent to this visit.    Subjective:  Patient states: \"I want to reach into that candy dish and get myself some chocolates!\"   Pain: Pt denied pain this date  Communication with other providers: PT Keith, RN SAPNA [place]  Restrictions: General Precautions, Fall Risk, Bed/chair alarm    Home Setup/Prior level of function:  Social/Functional History  Lives With: Alone  Type of Home: House  Home Layout: One level  Home Access: Level entry  Bathroom Shower/Tub: Tub/Shower unit  Bathroom Toilet: Standard  Bathroom Accessibility: Accessible  Home Equipment: Walker, rolling  ADL Assistance: Needs assistance (granddaughter assists with bathing, pt able to dress and toilet independenty)  Homemaking Assistance: Needs assistance  Homemaking Responsibilities: Yes  Ambulation Assistance: Independent (mod I with RW)  Transfer Assistance: Independent  Active : No  Occupation: Retired    Examination:  Observation: Supine in bed upon arrival. Pleasant and agreeable to evaluation.  Vision: WFL (Glasses)  Hearing: WFL  Vitals: Stable vitals throughout session    Body Systems and functions:  ROM: WFL all joints in BL UEs  Strength: 4/5 MMT all major muscle groups BL UEs  Sensation: WFL (denies numbness/tingling)  Tone: Normal  Coordination: WFL for ADLs  Perception: WNL    Activities of Daily Living (ADLs):  Feeding: Supervision/setup (for opening packages/containers)  Grooming: CGA (completed hand hygiene, facial hygiene, and oral hygiene task of brushing dentures in standing at sink; min cues for safe RW placement during tasks 
UA 1.015 1.001 - 1.035    Blood, Urine NEGATIVE NEGATIVE    pH, Urine 7.0 5.0 - 8.0    Protein, UA TRACE (A) NEGATIVE MG/DL    Urobilinogen, Urine 1.0 0.2 - 1.0 MG/DL    Nitrite Urine, Quantitative NEGATIVE NEGATIVE    Leukocyte Esterase, Urine TRACE (A) NEGATIVE   Urine Microscopic with Reflex to Culture    Collection Time: 04/24/24 12:00 PM   Result Value Ref Range    RBC, UA 1 0 - 6 /HPF    WBC, UA 1 0 - 5 /HPF    Bacteria, UA NEGATIVE NEGATIVE /HPF    Squam Epithel, UA 2 /HPF    Mucus, UA RARE (A) NEGATIVE HPF    Trichomonas NONE SEEN NONE SEEN /HPF   EKG 12 Lead    Collection Time: 04/24/24 12:04 PM   Result Value Ref Range    Ventricular Rate 54 BPM    Atrial Rate 54 BPM    P-R Interval 174 ms    QRS Duration 86 ms    Q-T Interval 416 ms    QTc Calculation (Bazett) 394 ms    P Axis 60 degrees    R Axis 44 degrees    T Axis 43 degrees    Diagnosis       Sinus bradycardia  Otherwise normal ECG  When compared with ECG of 22-JAN-2022 15:22,  No significant change was found  Confirmed by DARIUS MERLOS ([id number]) on 4/24/2024 9:12:32 PM     Comprehensive Metabolic Panel    Collection Time: 04/24/24 12:15 PM   Result Value Ref Range    Sodium 132 (L) 135 - 145 MMOL/L    Potassium 3.6 3.5 - 5.1 MMOL/L    Chloride 94 (L) 99 - 110 mMol/L    CO2 24 21 - 32 MMOL/L    Anion Gap 14 7 - 16    Glucose 83 70 - 99 MG/DL    BUN 23 6 - 23 MG/DL    Creatinine 0.8 0.6 - 1.1 MG/DL    Est, Glom Filt Rate 70 >60 mL/min/1.73m2    Calcium 9.2 8.3 - 10.6 MG/DL    Total Protein 6.7 6.4 - 8.2 GM/DL    Albumin 4.3 3.4 - 5.0 GM/DL    Total Bilirubin 0.9 0.0 - 1.0 MG/DL    Alkaline Phosphatase 79 40 - 128 IU/L    ALT 9 (L) 10 - 40 U/L    AST 17 15 - 37 IU/L   TSH    Collection Time: 04/24/24 12:15 PM   Result Value Ref Range    TSH, High Sensitivity 2.910 0.270 - 4.20 uIu/ml   Troponin    Collection Time: 04/24/24 12:15 PM   Result Value Ref Range    Troponin, High Sensitivity 31 (H) 0 - 14 ng/L   Lipase    Collection Time: 04/24/24 12:15 
discussed with: pt, PS NP re: pcm dx    Goals:     Goals: Meet at least 75% of estimated needs       Nutrition Monitoring and Evaluation:   Behavioral-Environmental Outcomes: None Identified  Food/Nutrient Intake Outcomes: Food and Nutrient Intake, Supplement Intake  Physical Signs/Symptoms Outcomes: Biochemical Data, Chewing or Swallowing, Meal Time Behavior, Nutrition Focused Physical Findings, Weight    Discharge Planning:    Too soon to determine     Evelyn Anton RD, LD  Contact: [phone]

## 2024-04-25 NOTE — PROGRESS NOTES
Cm reached out to see if CP could help with patient. When CP and APS were at the home on 4/16 we did not feel that the patient was unsafe to stay in the home. She was alert and oriented at that time. I have reached out to Chantel at APS and left message.

## 2024-04-26 ENCOUNTER — CARE COORDINATION (OUTPATIENT)
Dept: CASE MANAGEMENT | Age: 89
End: 2024-04-26

## 2024-04-26 ENCOUNTER — TELEPHONE (OUTPATIENT)
Dept: INTERNAL MEDICINE CLINIC | Age: 89
End: 2024-04-26

## 2024-04-26 ENCOUNTER — PARAMEDICINE (OUTPATIENT)
Dept: OTHER | Age: 89
End: 2024-04-26

## 2024-04-26 NOTE — CARE COORDINATION
Care Transitions Outreach Attempt    Call within 2 business days of discharge: Yes   Attempted to reach patient for transitions of care follow up. Unable to reach patient.    Patient: Carol Xiong Patient : 1933 MRN: [id number]      1st attempt to reach for Care Transition discharge call unsuccessful. HIPAA compliant message left requesting call back to [phone]        Last Discharge Facility       Date Complaint Diagnosis Description Type Department Provider    24 Altered Mental Status Asymptomatic hypertension ... ED to Hosp-Admission (Discharged) (ADMITTED) [facility] Darryn Hewitt MD; Lesly Ventura...          Was this an external facility discharge? No Discharge Facility Name: [facility]    Noted following upcoming appointments from discharge chart review:   [facility] follow up appointment(s):   Future Appointments   Date Time Provider Department Center   2024 10:00 AM Kayden Gonzalez MD [facility]     Non-[facility]  follow up appointment(s): XOCHITL Jensen RN CTN

## 2024-04-26 NOTE — PROGRESS NOTES
Reached out to APS to advised patient is now home. Chantel is going to reach out to the patient today. Advised APS that if they wanted to make another home visit to let me know and I would go with her.

## 2024-04-26 NOTE — TELEPHONE ENCOUNTER
Care Transitions Initial Follow Up Call    Outreach made within 2 business days of discharge: Yes    Patient: Carol Xiong Patient : 1933   MRN: [id number]  Reason for Admission: There are no discharge diagnoses documented for the most recent discharge.  Discharge Date: 24       Spoke with: Hollis     Discharge department/facility: [facility]    TCM Interactive Patient Contact:  Was patient able to fill all prescriptions: Yes  Was patient instructed to bring all medications to the follow-up visit: Yes  Is patient taking all medications as directed in the discharge summary? Yes  Does patient understand their discharge instructions: Yes  Does patient have questions or concerns that need addressed prior to 7-14 day follow up office visit: yes -     Patient stated she's was making her breakfast now. She was expecting her granddaughter home on Monday. Her granddaughter is her ride so she could not change her upcoming appointment from 24 sooner since she had no way in. I informed her to reach out if she needed anything and she voiced understanding.     Scheduled appointment with PCP within 7-14 days    Follow Up  Future Appointments   Date Time Provider Department Center   2024 10:00 AM Kayden Gonzalez MD [facility] [place]

## 2024-04-29 ENCOUNTER — CARE COORDINATION (OUTPATIENT)
Dept: CARE COORDINATION | Age: 89
End: 2024-04-29

## 2024-04-29 ENCOUNTER — CARE COORDINATION (OUTPATIENT)
Dept: CASE MANAGEMENT | Age: 89
End: 2024-04-29

## 2024-04-29 NOTE — CARE COORDINATION
Patient Current Location: Home: [address] [place] [address] [address]  [address]  [place] [address]     Attempted phone call to Pt. Pt not available. SW will try again later.    Phone call to Pt who reported she is planning to move into her previous home today. SW will follow up with Pt on 5/2 to confirm if she has moved and reassess needs once she is back into her home.     TONY notified Nathaly Reeves via secure email of Pt's plans to move today.    TONY plan of care: TONY will make next outreach to Pt to confirm she has moved on 5/3

## 2024-04-29 NOTE — CARE COORDINATION
Care Transitions Outreach Attempt    Call within 2 business days of discharge: Yes   Attempted to reach patient for transitions of care follow up. Unable to reach patient. This is the 2nd unsuccessful attempt. Per protocol, episode will be closed & name taken off care team    Unable to Reach letter sent via My Chart    Patient: Carol Xiong Patient : 1933 MRN: [id number]    Last Discharge Facility       Date Complaint Diagnosis Description Type Department Provider    24 Altered Mental Status Asymptomatic hypertension ... ED to Hosp-Admission (Discharged) (ADMITTED) [facility] Darryn Bocanegra MD; Lesly Ventura...          Noted following upcoming appointments from discharge chart review:   BS follow up appointment(s):   Future Appointments   Date Time Provider Department Center   2024 10:00 AM Kayden Gonzalez MD [facility]     Non-BS  follow up appointment(s): XOCHITL Jensen RN [phone]

## 2024-05-01 ENCOUNTER — CARE COORDINATION (OUTPATIENT)
Dept: CARE COORDINATION | Age: 89
End: 2024-05-01

## 2024-05-01 NOTE — CARE COORDINATION
Phone call to Pt who reported she is currently in the process of moving to her home in [place]. Pt reported she has a new temporary phone number for when she moves to [place] [phone]. Pt reports her grandson has her cell phone currently.     TONY plan of care: SW will follow up with Pt regarding her move back to [place] and reassess needs on 5/7

## 2024-05-02 ENCOUNTER — CARE COORDINATION (OUTPATIENT)
Dept: CARE COORDINATION | Age: 89
End: 2024-05-02

## 2024-05-02 NOTE — CARE COORDINATION
ACM placed calls to patient's \"other\" temporary phone number, home phone number, and phone number that patient stated would be her new home phone number effective 5/14 ([phone]), and all phone numbers rang to [employer] messages that state the call cannot be completed as dialed or the number has been disconnected.     ACM will follow up at a later date.

## 2024-05-07 ENCOUNTER — CARE COORDINATION (OUTPATIENT)
Dept: CARE COORDINATION | Age: 89
End: 2024-05-07

## 2024-05-07 NOTE — CARE COORDINATION
Patient Current Location: Home: [address] [place] [address]     Phone call to Pt to confirm she moved and reassess needs. TONY updated address in Pt chart.    Pt did confirm she is now living back at her home in [place], reporting she is in the process of emptying boxes. Pt's granddaughter is living with her, but is not there currently. Pt reports her granddaughter, Coleen did obtain a new job and did provide her with some groceries. Pt has financial concerns, reporting she needed to pay $200 to have something fixed so she could have gas turned back on.     Pt reports she has a broken window in the back door, the big patio door needs replaced (glass is broke). SW will look into [employer] for assistance. TONY asked for Pt to provide income. Pt's income is 2,500 in [employer] and 1600 in social security. SW did confirm her income is too matt for assistance through [employer].    [facility] - Pt contacted [facility] for in-home support, but they are not accepting new referrals as they have a long waitlist. TONY also notified Pt that she cannot obtain in-home support services through [employer] as long as she has someone under the age of 60 living with her. Pt stated understanding.     Passport - not eligible due to income being too high    5/17 -  appointment -  needs transportation - Pt is going to call [employer]. TONY offered to merge call to assist in setting up transportation. Pt declined, stating she can call.     TONY plan of care: TONY will outreach Pt on 5/14 to discuss private pay options for in home support and discuss option of AL placement.

## 2024-05-09 ENCOUNTER — CARE COORDINATION (OUTPATIENT)
Dept: CARE COORDINATION | Age: 89
End: 2024-05-09

## 2024-05-09 NOTE — CARE COORDINATION
ACM placed call to patient for follow-up outreach, but patient did not answer. ACM unable to leave a voice message/no option for voicemail. ACM will follow up at a later date.     
BP has been \"ok\" and she feels fine. Patient denies chest pain, palpitations, changes in vision, headaches or edema. ACM encouraged patient to keep BP log so PCP could review trends. Patient verbalized understanding.   Patient tells ACM that she did move back into her home and has been slowly unpacking. Patient states she was able to get her gas turned back on in her home, but there's a problem with her water heater that is going to cost $200 to fix, so at this time she does not have hot water. Patient does have running water, but it is cold right now. Patient states she does have the $200 to cover the cost of water heater repair and is just waiting on the repairman to fix it.     Plan:  -ACM will follow up in 2 weeks  -Review AVS from 5/17 OV  -BP log?      Offered patient enrollment in the Remote Patient Monitoring (RPM) program for in-home monitoring: Yes, but did not enroll at this time: limited patient ability to navigate RPM/equipment.    Lab Results       None            Care Coordination Interventions    Referral from Primary Care Provider: No  Suggested Interventions and Community Resources          Goals Addressed    None         Future Appointments   Date Time Provider Department Center   5/17/2024 10:00 AM Kayden Gonzalez MD [facility]   ,   Hypertension - Encounter Level    Symptom course: stable     , and   General Assessment    Do you have any symptoms that are causing concern?: No

## 2024-05-14 ENCOUNTER — CARE COORDINATION (OUTPATIENT)
Dept: CARE COORDINATION | Age: 89
End: 2024-05-14

## 2024-05-14 NOTE — CARE COORDINATION
Attempted phone call to Pt to follow up regarding her adjustment to moving back into her home in [place].  No answer, not able to leave vm message.     Received return call from Pt who reported she is continuing to     Granddaughter suggested selling the house and move out.    Discussed AL facility. Pt reported she does not want to go into AL facility at this time. Granddaughter would like Pt to move in with her once they find a bigger house.     Pt called about MOW - charge 4.50 / day. Pt reported she can prepare herself a meal cheaper than that. Pt is considering this option    [facility] - scheduled to take Pt to medical appointment on 5/20. Pt reported she has utilized them previously. Pt reported she cannot get out of the house on her own.     Pt's sister in [place] calls Pt daily to check on her.     TONY plan of care: SW will follow up with Pt regarding adjustment to new home on 6/4

## 2024-05-23 ENCOUNTER — CARE COORDINATION (OUTPATIENT)
Dept: CARE COORDINATION | Age: 89
End: 2024-05-23

## 2024-05-23 ENCOUNTER — OFFICE VISIT (OUTPATIENT)
Dept: INTERNAL MEDICINE CLINIC | Age: 89
End: 2024-05-23

## 2024-05-23 VITALS
BODY MASS INDEX: 18.22 KG/M2 | HEIGHT: 69 IN | OXYGEN SATURATION: 94 % | WEIGHT: 123 LBS | HEART RATE: 83 BPM | DIASTOLIC BLOOD PRESSURE: 56 MMHG | SYSTOLIC BLOOD PRESSURE: 110 MMHG

## 2024-05-23 DIAGNOSIS — I10 ESSENTIAL HYPERTENSION: Primary | ICD-10-CM

## 2024-05-23 DIAGNOSIS — N18.31 STAGE 3A CHRONIC KIDNEY DISEASE (HCC): ICD-10-CM

## 2024-05-23 DIAGNOSIS — D64.9 ANEMIA, UNSPECIFIED TYPE: ICD-10-CM

## 2024-05-23 DIAGNOSIS — E55.9 VITAMIN D DEFICIENCY: ICD-10-CM

## 2024-05-23 DIAGNOSIS — E78.2 MIXED HYPERLIPIDEMIA: ICD-10-CM

## 2024-05-23 DIAGNOSIS — G62.9 NEUROPATHY: ICD-10-CM

## 2024-05-23 DIAGNOSIS — G30.1 MODERATE LATE ONSET ALZHEIMER'S DEMENTIA WITHOUT BEHAVIORAL DISTURBANCE, PSYCHOTIC DISTURBANCE, MOOD DISTURBANCE, OR ANXIETY (HCC): ICD-10-CM

## 2024-05-23 DIAGNOSIS — K21.9 GASTROESOPHAGEAL REFLUX DISEASE WITHOUT ESOPHAGITIS: ICD-10-CM

## 2024-05-23 DIAGNOSIS — M54.9 DORSALGIA: ICD-10-CM

## 2024-05-23 DIAGNOSIS — F02.B0 MODERATE LATE ONSET ALZHEIMER'S DEMENTIA WITHOUT BEHAVIORAL DISTURBANCE, PSYCHOTIC DISTURBANCE, MOOD DISTURBANCE, OR ANXIETY (HCC): ICD-10-CM

## 2024-05-23 DIAGNOSIS — Z86.73 HISTORY OF TIA (TRANSIENT ISCHEMIC ATTACK): ICD-10-CM

## 2024-05-23 DIAGNOSIS — R63.4 WEIGHT LOSS: ICD-10-CM

## 2024-05-23 LAB
ALBUMIN SERPL-MCNC: 4.1 G/DL (ref 3.4–5)
ALBUMIN/GLOB SERPL: 1.8 {RATIO} (ref 1.1–2.2)
ALP SERPL-CCNC: 89 U/L (ref 40–129)
ALT SERPL-CCNC: 6 U/L (ref 10–40)
ANION GAP SERPL CALCULATED.3IONS-SCNC: 9 MMOL/L (ref 3–16)
AST SERPL-CCNC: 14 U/L (ref 15–37)
BASOPHILS # BLD: 0 K/UL (ref 0–0.2)
BASOPHILS NFR BLD: 0.7 %
BILIRUB SERPL-MCNC: 0.5 MG/DL (ref 0–1)
BUN SERPL-MCNC: 19 MG/DL (ref 7–20)
CALCIUM SERPL-MCNC: 9.6 MG/DL (ref 8.3–10.6)
CHLORIDE SERPL-SCNC: 102 MMOL/L (ref 99–110)
CO2 SERPL-SCNC: 27 MMOL/L (ref 21–32)
CREAT SERPL-MCNC: 1 MG/DL (ref 0.6–1.2)
DEPRECATED RDW RBC AUTO: 13.2 % (ref 12.4–15.4)
EOSINOPHIL # BLD: 0.2 K/UL (ref 0–0.6)
EOSINOPHIL NFR BLD: 4.8 %
GFR SERPLBLD CREATININE-BSD FMLA CKD-EPI: 53 ML/MIN/{1.73_M2}
GLUCOSE SERPL-MCNC: 77 MG/DL (ref 70–99)
HCT VFR BLD AUTO: 31.8 % (ref 36–48)
HGB BLD-MCNC: 11 G/DL (ref 12–16)
LYMPHOCYTES # BLD: 1.5 K/UL (ref 1–5.1)
LYMPHOCYTES NFR BLD: 31.5 %
MCH RBC QN AUTO: 31.2 PG (ref 26–34)
MCHC RBC AUTO-ENTMCNC: 34.7 G/DL (ref 31–36)
MCV RBC AUTO: 90 FL (ref 80–100)
MONOCYTES # BLD: 0.4 K/UL (ref 0–1.3)
MONOCYTES NFR BLD: 7.9 %
NEUTROPHILS # BLD: 2.7 K/UL (ref 1.7–7.7)
NEUTROPHILS NFR BLD: 55.1 %
PLATELET # BLD AUTO: 248 K/UL (ref 135–450)
PMV BLD AUTO: 10.3 FL (ref 5–10.5)
POTASSIUM SERPL-SCNC: 4.9 MMOL/L (ref 3.5–5.1)
PROT SERPL-MCNC: 6.4 G/DL (ref 6.4–8.2)
RBC # BLD AUTO: 3.53 M/UL (ref 4–5.2)
SODIUM SERPL-SCNC: 138 MMOL/L (ref 136–145)
WBC # BLD AUTO: 4.8 K/UL (ref 4–11)

## 2024-05-23 SDOH — ECONOMIC STABILITY: INCOME INSECURITY: HOW HARD IS IT FOR YOU TO PAY FOR THE VERY BASICS LIKE FOOD, HOUSING, MEDICAL CARE, AND HEATING?: SOMEWHAT HARD

## 2024-05-23 SDOH — ECONOMIC STABILITY: FOOD INSECURITY: WITHIN THE PAST 12 MONTHS, THE FOOD YOU BOUGHT JUST DIDN'T LAST AND YOU DIDN'T HAVE MONEY TO GET MORE.: SOMETIMES TRUE

## 2024-05-23 SDOH — ECONOMIC STABILITY: FOOD INSECURITY: WITHIN THE PAST 12 MONTHS, YOU WORRIED THAT YOUR FOOD WOULD RUN OUT BEFORE YOU GOT MONEY TO BUY MORE.: SOMETIMES TRUE

## 2024-05-23 NOTE — PROGRESS NOTES
Name: Carol Xiong  [id number]  Age: 89 y.o.  YOB: 1933  Sex: female    CHIEF COMPLAINT:    Chief Complaint   Patient presents with    Hypertension    Gastroesophageal Reflux    Other     Hospital follow up, and other chronic conditions       HISTORY OF PRESENT ILLNESS:     This is a pleasant  89 y.o. female  is seen today for management of chronic medical problems and medications refills.  Previous records reviewed .    Patient was admitted to the hospital from 4/24/2024 to 4/25/2024 for fear of personal safety and dementia and chronic medical problems..  Also had severe malnutrition.    She has multiple medical problems which include hypertension, hyperlipidemia, history of TIA, mild dementia, gastroesophageal reflux disease, chronic kidney disease, neuropathy, chronic back pain and mild anemia etc..    Since her  went into nursing home she is living alone.  Her granddaughter checks on her frequently and helps her with the groceries etc. but patient claimed that she is unable to cook food and has not been eating well and has been losing weight.  Also patient is skips meals frequently as she does not feel like fixing meals for herself.    Her granddaughter makes her food at times but she has a full-time job.     Doing OK otherwise  Denies CP or SOB.  No fever.  Denies any abdominal pain.  Appetite is poor .  Bowels moving Ok.  No urinary symptoms.  Complains of mild chronic low back pain and pain in her legs but takes Tylenol as needed and it helps her back pain. .  Also taking Neurontin.  Also her right knee gives away at times .  No falls recently.  She uses a walker for her ambulation.  She is taking Neurontin and Tylenol for pain and paresthesia of her legs     PDMP reviewed, no signs of drug abuse.  Hearing is poor.  Vision Ok with glasses.  Denies  any significant skin lesions.  Denies any significant depression or anxiety.  Her memory is poor but stable.    No other

## 2024-05-23 NOTE — CARE COORDINATION
ACM placed call to patient for follow-up outreach, but patient did not answer. There was no option to leave a voice message. ACM will follow up at a later date.

## 2024-05-23 NOTE — CARE COORDINATION
Ambulatory Care Coordination Note  2024    Patient Current Location:  Home: [address] [place] [address]     ACM contacted the patient by telephone. Verified name and  with patient as identifiers. Provided introduction to self, and explanation of the ACM role.     Challenges to be reviewed by the provider   Additional needs identified to be addressed with provider: No  none               Method of communication with provider: none.    ACM: Chela Dutton RN    ACM received return call today from patient. Patient reports that she is doing well. Patient states she had appointment with PCP this morning and everything went well. BP was 110/56. Patient states no new medications were added and PCP recommended MOW for malnutrition. Patient states PCP gave her a paper for MOW that would be covered by insurance & patient placed a call to them this afternoon and is awaiting a return call. Patient states she utilized [employer] for transportation to & from appointment today.   Patient states she has not yet gotten batteries for home BP cuff, so has not been monitoring her BP. ADM reviewed s/sx to watch for that could indicate HTN: palpitations, chest pain, headache, etc. Patient verbalized understanding and states she feels fine.     Plan:  -ACM will follow up in 1 month  -Assess readiness for graduation from     Offered patient enrollment in the Remote Patient Monitoring (RPM) program for in-home monitoring: Patient is not eligible for RPM program because: patient declined .    Lab Results       None            Care Coordination Interventions    Referral from Primary Care Provider: No  Suggested Interventions and Community Resources          Goals Addressed    None         Future Appointments   Date Time Provider Department Center   2024  9:20 AM Kayden Gonzalez MD [facility]   ,   Hypertension - Encounter Level         , and   General Assessment    Do you have any symptoms that are causing

## 2024-05-23 NOTE — PATIENT INSTRUCTIONS
We are committed to providing you the best care possible.    If you receive a survey after visiting one of our offices, please take time to share your experience concerning your physician office visit.  These surveys are confidential and no health information about you is shared.    We are eager to improve for you and we are counting on your feedback to help make that happen.       [facility] Financial Resources*  (Call 211 if need more resources.)    Medical/Medication:  [employer] Financial Assistance  What they offer: Assistance with [employer] bills  Phone: 468.813.1263; [phone]    Good Rx:  What they offer: Good Rx tracks prescription drug prices and provides free drug coupons for discounts on medications.  Website: https://www.[employer]  NeedyMeds:  What they offer: [employer] offers free information on medications and healthcare cost savings programs including prescription assistance programs, coupons, and discount programs.  Helpline: 435.725.2133  Website: https://www.[employer]  RX Assist:  What they offer: Information about free and low-cost medicine programs.  Website: https://www.rxassist.org    Utility:  [employer] : [place], [place], [place], [facility] and [facility]  What they offer: Rent, Mortgage & utility assistance (HEAP/PIPP)  Phone Numbers: 482.156.5475 or 768-865-6096  Website: https://www.[employer].org     Opportunities for Individual Change ([facility]):   What they offer: HEAP / PIPP / Rent Assistance / Educational Services / Training programs   How to Apply: Print application from website or  from drive thru located at [address] in [place], once you complete the application and obtain requested documents, drop back off to the drive thru.   Phone number: (995) 956-5014 or (579)052-2185  Website: www.oicofclarkco.org     [facility] Department of Job and Family Services:   What they offer: Assists in the

## 2024-05-30 ENCOUNTER — TELEPHONE (OUTPATIENT)
Dept: INTERNAL MEDICINE CLINIC | Age: 89
End: 2024-05-30

## 2024-05-30 NOTE — TELEPHONE ENCOUNTER
Patient called stating that they are not honoring meals on wheels for her. Patient does not understand because she is a senior citizen and wants to know she would be denied. Patient would like a call back.

## 2024-06-04 ENCOUNTER — CARE COORDINATION (OUTPATIENT)
Dept: CARE COORDINATION | Age: 89
End: 2024-06-04

## 2024-06-04 NOTE — CARE COORDINATION
Patient Current Location: Home: [address] [place] [id number]     Phone call to Pt to follow up regarding MOW and adjustment to being back in [place].     Pt got 1000 behind on electric bill - Pt got medical letter signed by  to prevent shut off - pt is not eligible for assistance due to income. Plans to pay off over the next several months.    Neighbors will help out with cutting grass      Pt reports the progress is slow, glad to be back home. Granddaughter and Pt will be remaining in Pt's home. She will live with Pt and try to help her get the house fixed up.    Pt has received quotes for getting work done and fixing up bathroom. Pt has looked into assistance from community resources with cost, but income is too high.     Pt declined MOW due to cost of meals being 4.50 / meal.     SW plan of care: SW will resolve form SW services as Pt does not meet income requirements for community resource assistance.

## 2024-06-11 ENCOUNTER — PARAMEDICINE (OUTPATIENT)
Dept: OTHER | Age: 89
End: 2024-06-11

## 2024-06-11 NOTE — PROGRESS NOTES
Patient has relocated to [place] and no longer in the district for the CP Program. Will remove the patient from the program at this time.

## 2024-06-25 ENCOUNTER — OFFICE VISIT (OUTPATIENT)
Dept: INTERNAL MEDICINE CLINIC | Age: 89
End: 2024-06-25
Payer: MEDICARE

## 2024-06-25 VITALS
BODY MASS INDEX: 17.77 KG/M2 | HEART RATE: 78 BPM | SYSTOLIC BLOOD PRESSURE: 112 MMHG | DIASTOLIC BLOOD PRESSURE: 68 MMHG | WEIGHT: 120 LBS | OXYGEN SATURATION: 95 % | HEIGHT: 69 IN

## 2024-06-25 DIAGNOSIS — D64.9 ANEMIA, UNSPECIFIED TYPE: ICD-10-CM

## 2024-06-25 DIAGNOSIS — G62.9 NEUROPATHY: ICD-10-CM

## 2024-06-25 DIAGNOSIS — Z86.73 HISTORY OF TIA (TRANSIENT ISCHEMIC ATTACK): ICD-10-CM

## 2024-06-25 DIAGNOSIS — M54.9 DORSALGIA: ICD-10-CM

## 2024-06-25 DIAGNOSIS — I10 ESSENTIAL HYPERTENSION: Primary | ICD-10-CM

## 2024-06-25 DIAGNOSIS — N18.31 STAGE 3A CHRONIC KIDNEY DISEASE (HCC): ICD-10-CM

## 2024-06-25 DIAGNOSIS — G30.1 MODERATE LATE ONSET ALZHEIMER'S DEMENTIA WITHOUT BEHAVIORAL DISTURBANCE, PSYCHOTIC DISTURBANCE, MOOD DISTURBANCE, OR ANXIETY (HCC): ICD-10-CM

## 2024-06-25 DIAGNOSIS — E78.2 MIXED HYPERLIPIDEMIA: ICD-10-CM

## 2024-06-25 DIAGNOSIS — E55.9 VITAMIN D DEFICIENCY: ICD-10-CM

## 2024-06-25 DIAGNOSIS — F02.B0 MODERATE LATE ONSET ALZHEIMER'S DEMENTIA WITHOUT BEHAVIORAL DISTURBANCE, PSYCHOTIC DISTURBANCE, MOOD DISTURBANCE, OR ANXIETY (HCC): ICD-10-CM

## 2024-06-25 DIAGNOSIS — K21.9 GASTROESOPHAGEAL REFLUX DISEASE WITHOUT ESOPHAGITIS: ICD-10-CM

## 2024-06-25 DIAGNOSIS — R63.4 WEIGHT LOSS: ICD-10-CM

## 2024-06-25 PROCEDURE — 1036F TOBACCO NON-USER: CPT | Performed by: INTERNAL MEDICINE

## 2024-06-25 PROCEDURE — 1123F ACP DISCUSS/DSCN MKR DOCD: CPT | Performed by: INTERNAL MEDICINE

## 2024-06-25 PROCEDURE — 99214 OFFICE O/P EST MOD 30 MIN: CPT | Performed by: INTERNAL MEDICINE

## 2024-06-25 PROCEDURE — G8427 DOCREV CUR MEDS BY ELIG CLIN: HCPCS | Performed by: INTERNAL MEDICINE

## 2024-06-25 PROCEDURE — 1090F PRES/ABSN URINE INCON ASSESS: CPT | Performed by: INTERNAL MEDICINE

## 2024-06-25 PROCEDURE — G8419 CALC BMI OUT NRM PARAM NOF/U: HCPCS | Performed by: INTERNAL MEDICINE

## 2024-06-25 RX ORDER — TRIAMTERENE AND HYDROCHLOROTHIAZIDE 37.5; 25 MG/1; MG/1
1 TABLET ORAL DAILY
Qty: 90 TABLET | Refills: 1 | Status: SHIPPED | OUTPATIENT
Start: 2024-06-25

## 2024-06-25 RX ORDER — MEMANTINE HYDROCHLORIDE 5 MG/1
5 TABLET ORAL 2 TIMES DAILY
Qty: 180 TABLET | Refills: 1 | Status: SHIPPED | OUTPATIENT
Start: 2024-06-25

## 2024-06-25 NOTE — PROGRESS NOTES
Name: Carol Xiong  [id number]  Age: 89 y.o.  YOB: 1933  Sex: female    CHIEF COMPLAINT:    Chief Complaint   Patient presents with    Hypertension    Gastroesophageal Reflux    Other     Other chronic conditions         HISTORY OF PRESENT ILLNESS:     This is a pleasant  89 y.o. female  is seen today for management of chronic medical problems and medications refills.  Previous records reviewed .    Patient was admitted to the hospital from 4/24/2024 to 4/25/2024 for fear of personal safety and dementia and chronic medical problems..  Also had severe malnutrition.     She has multiple medical problems which include hypertension, hyperlipidemia, history of TIA, mild dementia, gastroesophageal reflux disease, chronic kidney disease, neuropathy, chronic back pain and mild anemia etc..     Since her  went into nursing home she is living alone.  Her granddaughter checks on her frequently and helps her with the groceries etc. Patient claims that she is cooking  food  for herself now.  Her granddaughter makes her food at times but she has a full-time job.  Orders for meals on wheels placed at her last visit but she refused meals on wheels as they were charging her  because she did not qualify  for her income    Also patient  skips meals at times as she does not feel like fixing meals for herself.     Doing OK otherwise  Denies CP or SOB.  No fever.  Denies any abdominal pain.  Appetite is poor .  Bowels moving Ok.  No urinary symptoms.  Complains of mild chronic low back pain and pain in her legs but takes Tylenol as needed and it helps her back pain. .  Also taking Neurontin.  Also her right knee gives away at times .  No falls recently.  She uses a walker for her ambulation.  She is taking Neurontin and Tylenol for pain and paresthesia of her legs     PDMP reviewed, no signs of drug abuse.  Hearing is poor.  Vision Ok with glasses.  Denies  any significant skin lesions.  Denies any

## 2024-06-28 ENCOUNTER — CARE COORDINATION (OUTPATIENT)
Dept: CARE COORDINATION | Age: 89
End: 2024-06-28

## 2024-06-28 NOTE — CARE COORDINATION
granddaughter purchased fans for the home also.     Plan:  -ACM will place RD referral for soft food nutrition guidance, meal replacement shake coupons or even prescription if eligible    Offered patient enrollment in the Remote Patient Monitoring (RPM) program for in-home monitoring: Yes, but did not enroll at this time: limited patient ability to navigate RPM/equipment.     Assessments Completed:   General Assessment    Do you have any symptoms that are causing concern?: No          Medications Reviewed:   Not completed during this call:      Advance Care Planning:   Reviewed during previous call      Care Planning:   Not completed during this call    PCP/Specialist follow up:   Future Appointments         Provider Specialty Dept Phone    9/24/2024 11:20 AM Kayden Gonzalez MD Internal Medicine [phone]            Follow Up:   Plan for next ACM outreach in approximately 3 weeks to complete:  - Graduate from [employer] .   patient  is agreeable to this plan.

## 2024-07-01 ENCOUNTER — CARE COORDINATION (OUTPATIENT)
Dept: CARE COORDINATION | Age: 89
End: 2024-07-01

## 2024-07-01 NOTE — CARE COORDINATION
Reviewed soft diet nutrition therapy. Patient states she has a  that she uses because she cannot chew meat. RD discussed the benefits of utilizing oral nutrition supplements such as Ensure or Boost.  Explained that oral nutrition supplements offer protein, carbohydrates, and vitamins/minerals that the patient may be missing out on if not eating adequately. RD recommended Ensure Plus TID to help better meet estimated nutrition needs and promote weight gain. RD will mail Ensure coupons. Patient states she has Medicare, which is not eligible for ONS coverage. Pt has no nutrition related questions or concerns at this time. RD offered to mail educational handouts to pt to reinforce concepts discussed during phone conversation, pt accepted. RD verified address.       24-Hour Diet Recall  Breakfast  Consumed: eggs, baked potato     Lunch  Consumed: sandwich     Dinner  Consumed: sweet and sour chicken or salmon kofi    Beverages: water      Nutrition Diagnosis:  #1 Problem Underweight       Etiology related to inadequate protein-energy intake        Signs/Symptoms as evidenced by unintentional weight loss, BMI 17.72, %IBW 82.8% and diagnosis of severe malnutrition (2 months ago)    Nutrition Intervention:     Estimated Needs  regular diet providing 1600 kcals to promote wt gain (30 kcal/kg based on CBW).    Estimated daily Protein Needs: 65-82 g (based on 1.2-1.5 g/kg based on CBW)  Estimated daily Fluid Needs: per MD    #1 Nutrition Information: Provided patient with High Calorie High Protein Nutrition Therapy, Tips for Adding Protein, Soft Diet IDDS handouts. For reinforcement of concepts discussed during nutrition interview.     #2 Nutrition Counseling: Used open-ended questions to assess patients perceived susceptibility and severity of disease state. Discussed potential impact of health threat on patient's lifestyle. Used open-ended questions to assess patient's perception regarding benefits of and

## 2024-07-12 ENCOUNTER — TELEPHONE (OUTPATIENT)
Dept: INTERNAL MEDICINE CLINIC | Age: 89
End: 2024-07-12

## 2024-07-12 NOTE — TELEPHONE ENCOUNTER
I called and spoke with Pt and she asked me what the hospital Drs. Said was wrong with her on 4/24/24 they marked confusion and HTN. Pt then voiced understanding and wanted to know what she was doing to be marked confused I told her that the note does not say and we couldn't tell her only the drs there could. Pt voiced understanding and said thank you

## 2024-07-12 NOTE — TELEPHONE ENCOUNTER
Got a call from the patient, was asking for information about her hospital stay back in April. Asked patient what exactly she is needing, she stated she needs to know what she was there for, how she was treated, etc and just needs to discuss that visit. Patient seemed a little confused on the phone. Wondering if pcp can give her a call back about it.

## 2024-07-15 ENCOUNTER — CARE COORDINATION (OUTPATIENT)
Dept: CARE COORDINATION | Age: 89
End: 2024-07-15

## 2024-07-15 NOTE — CARE COORDINATION
Carol Xiong  7/15/2024    Registered Dietitian Progress Note for Care Coordination    Assessment: [place] is a 89 y.o. female.  RD referred for Soft Food Diet and Oral Nutrition Supplements. RD spoke with patient for initial nutrition assessment on 7/1/24. RD called to follow up with pt today 7/15/24. RD discussed previous goals with pt. Patient states she received the handouts and coupons in the mail. RD reiterated the importance of eating 3 meals/day and following a high calorie high protein diet to help promote weight gain. Patient states she is trying to eat 3 meals/day, no examples of meals provided per patient. RD reviewed protein sources and tips for adding protein to meals. Reiterated the importance of supplementing with ONS daily to help better meet estimated nutrition needs daily and prevent further weight loss. Patient states she cannot tolerate dairy products. RD recommended Ensure Plant Based Protein TID. Patient verbalizes understanding. No weight provided per patient- per chart review, patient's weight documented as 120 lb on 6/25/24. Patient has no nutrition related questions or concerns at this time.     Barriers to meeting goals: overwhelmed by complexity of regimen    Nutrition Monitoring and Evaluation  Indicator/Goal Criteria Progress   #1 Eat balanced meals consistently throughout the day.  #1  Aim for 3 meals/day. Make meals balanced using MyPlate. Follow a high calorie high protein diet.  #1 Patient is trying to eat 3 meals/day. Patient will make meals balanced using MyPlate.   #2  Supplement with ONS TID.  #2 Utilize Ensure coupons. Start drinking Ensure Plus TID to help better meet estimated nutrition needs daily.  #2 Patient is not drinking ONS. Patient will utilize coupons and buy Ensure Plant Based Protein. Patient will drink TID to help better meet estimated nutrition needs daily.      Plan of Care:  RD encouraged pt to keep working toward goals set. RD will follow up with pt

## 2024-07-20 DIAGNOSIS — I10 ESSENTIAL HYPERTENSION: ICD-10-CM

## 2024-07-22 RX ORDER — AMLODIPINE BESYLATE 10 MG/1
10 TABLET ORAL DAILY
Qty: 90 TABLET | Refills: 3 | Status: SHIPPED | OUTPATIENT
Start: 2024-07-22

## 2024-07-29 ENCOUNTER — CARE COORDINATION (OUTPATIENT)
Dept: CARE COORDINATION | Age: 89
End: 2024-07-29

## 2024-07-29 NOTE — CARE COORDINATION
Ambulatory Care Coordination Note     2024 10:47 AM     Patient Current Location:  Home: [address] [place], [place] [id number]     Patient contacted the patient by telephone. Verified name and  with patient as identifiers.     Patient graduated from the High Risk Care Management program on 2024.  Patient verbalizes confidence in the ability to self-manage at this time. has the ability to self manage at this time. reinforced resources provided during this care transition period..  Care management goals have been completed. No further Ambulatory Care Manager follow up scheduled.      ACM: Chela Dutton RN     Challenges to be reviewed by the provider   Additional needs identified to be addressed with provider No  none         Method of communication with provider: none.    Care Summary Note: ACM placed call to patient today for follow-up outreach. Patient reports she is doing well and denies any symptoms or concerns to report to provider at this time. Patient reports she had an annual nurse visit from [facility] last week and all went well. Patient reports that her granddaughter is still living with her.     Patient reports that she does not have AC in her home and has been having some sinus issues recently. Patient reports feeling better today. ACM encouraged patient to notify PCP office if she feels worse over the next couple days. Patient verbalized understanding.     Patient reports that she did get new batteries in her home BP cuff. Her BP reading yesterday was 153/80. Patient denies vision changes, SOB, edema, chest pain or palpitations. ACM encouraged patient to contact PCP should any of these s/sx occur or if BP is higher than 160/90. Patient verbalized understanding. Patient states that she has all of her medications filled and denies need for refills at this time.     ACM reviewed options for calling PCP provider on-call after hours, WIC and Virtualist Visits. Patient verbalized

## 2024-08-03 DIAGNOSIS — I10 ESSENTIAL HYPERTENSION: ICD-10-CM

## 2024-08-03 DIAGNOSIS — K21.9 GASTROESOPHAGEAL REFLUX DISEASE WITHOUT ESOPHAGITIS: ICD-10-CM

## 2024-08-05 RX ORDER — BENAZEPRIL HYDROCHLORIDE 40 MG/1
40 TABLET ORAL DAILY
Qty: 90 TABLET | Refills: 3 | Status: SHIPPED | OUTPATIENT
Start: 2024-08-05

## 2024-08-05 RX ORDER — OMEPRAZOLE 20 MG/1
20 CAPSULE, DELAYED RELEASE ORAL DAILY
Qty: 90 CAPSULE | Refills: 3 | Status: SHIPPED | OUTPATIENT
Start: 2024-08-05

## 2024-08-08 ENCOUNTER — CARE COORDINATION (OUTPATIENT)
Dept: CARE COORDINATION | Age: 89
End: 2024-08-08

## 2024-08-08 NOTE — CARE COORDINATION
concerns.    Follow Up:    Final follow up call today 8/8/24. RD will continue to follow/assist with patient return call.        Myra Aguirre RDN, LD  [phone]

## 2024-08-28 ENCOUNTER — APPOINTMENT (OUTPATIENT)
Dept: CT IMAGING | Age: 89
DRG: 641 | End: 2024-08-28
Payer: MEDICARE

## 2024-08-28 ENCOUNTER — HOSPITAL ENCOUNTER (INPATIENT)
Age: 89
LOS: 12 days | Discharge: NURSING, REHAB OR LONG-TERM CARE | DRG: 641 | End: 2024-09-10
Attending: STUDENT IN AN ORGANIZED HEALTH CARE EDUCATION/TRAINING PROGRAM | Admitting: STUDENT IN AN ORGANIZED HEALTH CARE EDUCATION/TRAINING PROGRAM
Payer: MEDICARE

## 2024-08-28 DIAGNOSIS — R42 DIZZINESS: ICD-10-CM

## 2024-08-28 DIAGNOSIS — E87.1 HYPONATREMIA: Primary | ICD-10-CM

## 2024-08-28 DIAGNOSIS — R03.0 ELEVATED BLOOD PRESSURE READING: ICD-10-CM

## 2024-08-28 DIAGNOSIS — E46 MALNUTRITION, UNSPECIFIED TYPE (HCC): ICD-10-CM

## 2024-08-28 DIAGNOSIS — I10 ESSENTIAL HYPERTENSION: ICD-10-CM

## 2024-08-28 DIAGNOSIS — R13.10 DYSPHAGIA, UNSPECIFIED TYPE: ICD-10-CM

## 2024-08-28 LAB
ALBUMIN SERPL-MCNC: 3.8 GM/DL (ref 3.4–5)
ALP BLD-CCNC: 86 IU/L (ref 40–129)
ALT SERPL-CCNC: 11 U/L (ref 10–40)
ANION GAP SERPL CALCULATED.3IONS-SCNC: 12 MMOL/L (ref 7–16)
AST SERPL-CCNC: 23 IU/L (ref 15–37)
BASOPHILS ABSOLUTE: 0 K/CU MM
BASOPHILS RELATIVE PERCENT: 0.2 % (ref 0–1)
BILIRUB SERPL-MCNC: 0.5 MG/DL (ref 0–1)
BILIRUBIN DIRECT: 0.2 MG/DL (ref 0–0.3)
BILIRUBIN, INDIRECT: 0.3 MG/DL (ref 0–0.7)
BUN SERPL-MCNC: 11 MG/DL (ref 6–23)
CALCIUM SERPL-MCNC: 9.1 MG/DL (ref 8.3–10.6)
CHLORIDE BLD-SCNC: 80 MMOL/L (ref 99–110)
CO2: 21 MMOL/L (ref 21–32)
CREAT SERPL-MCNC: 0.6 MG/DL (ref 0.6–1.1)
DIFFERENTIAL TYPE: ABNORMAL
EOSINOPHILS ABSOLUTE: 0 K/CU MM
EOSINOPHILS RELATIVE PERCENT: 0.5 % (ref 0–3)
GFR, ESTIMATED: 85 ML/MIN/1.73M2
GLUCOSE SERPL-MCNC: 129 MG/DL (ref 70–99)
HCT VFR BLD CALC: 29.4 % (ref 37–47)
HEMOGLOBIN: 10.6 GM/DL (ref 12.5–16)
IMMATURE NEUTROPHIL %: 0.3 % (ref 0–0.43)
LYMPHOCYTES ABSOLUTE: 1.2 K/CU MM
LYMPHOCYTES RELATIVE PERCENT: 20.2 % (ref 24–44)
MCH RBC QN AUTO: 30.7 PG (ref 27–31)
MCHC RBC AUTO-ENTMCNC: 36.1 % (ref 32–36)
MCV RBC AUTO: 85.2 FL (ref 78–100)
MONOCYTES ABSOLUTE: 0.5 K/CU MM
MONOCYTES RELATIVE PERCENT: 8.8 % (ref 0–4)
NEUTROPHILS ABSOLUTE: 4.2 K/CU MM
NEUTROPHILS RELATIVE PERCENT: 70 % (ref 36–66)
NUCLEATED RBC %: 0 %
PDW BLD-RTO: 12 % (ref 11.7–14.9)
PLATELET # BLD: 284 K/CU MM (ref 140–440)
PMV BLD AUTO: 11 FL (ref 7.5–11.1)
POTASSIUM SERPL-SCNC: 4.3 MMOL/L (ref 3.5–5.1)
PRO-BNP: 230.4 PG/ML
RBC # BLD: 3.45 M/CU MM (ref 4.2–5.4)
SODIUM BLD-SCNC: 113 MMOL/L (ref 135–145)
TOTAL IMMATURE NEUTOROPHIL: 0.02 K/CU MM
TOTAL NUCLEATED RBC: 0 K/CU MM
TOTAL PROTEIN: 6.4 GM/DL (ref 6.4–8.2)
TROPONIN, HIGH SENSITIVITY: 14 NG/L (ref 0–14)
TROPONIN, HIGH SENSITIVITY: 15 NG/L (ref 0–14)
WBC # BLD: 5.9 K/CU MM (ref 4–10.5)

## 2024-08-28 PROCEDURE — 70498 CT ANGIOGRAPHY NECK: CPT

## 2024-08-28 PROCEDURE — 84295 ASSAY OF SERUM SODIUM: CPT

## 2024-08-28 PROCEDURE — 81003 URINALYSIS AUTO W/O SCOPE: CPT

## 2024-08-28 PROCEDURE — 84300 ASSAY OF URINE SODIUM: CPT

## 2024-08-28 PROCEDURE — 84484 ASSAY OF TROPONIN QUANT: CPT

## 2024-08-28 PROCEDURE — 83880 ASSAY OF NATRIURETIC PEPTIDE: CPT

## 2024-08-28 PROCEDURE — 82570 ASSAY OF URINE CREATININE: CPT

## 2024-08-28 PROCEDURE — 6360000004 HC RX CONTRAST MEDICATION: Performed by: STUDENT IN AN ORGANIZED HEALTH CARE EDUCATION/TRAINING PROGRAM

## 2024-08-28 PROCEDURE — 93005 ELECTROCARDIOGRAM TRACING: CPT | Performed by: STUDENT IN AN ORGANIZED HEALTH CARE EDUCATION/TRAINING PROGRAM

## 2024-08-28 PROCEDURE — 85025 COMPLETE CBC W/AUTO DIFF WBC: CPT

## 2024-08-28 PROCEDURE — 80053 COMPREHEN METABOLIC PANEL: CPT

## 2024-08-28 PROCEDURE — 2580000003 HC RX 258: Performed by: STUDENT IN AN ORGANIZED HEALTH CARE EDUCATION/TRAINING PROGRAM

## 2024-08-28 PROCEDURE — 96360 HYDRATION IV INFUSION INIT: CPT

## 2024-08-28 PROCEDURE — 99285 EMERGENCY DEPT VISIT HI MDM: CPT

## 2024-08-28 PROCEDURE — 70450 CT HEAD/BRAIN W/O DYE: CPT

## 2024-08-28 PROCEDURE — 82248 BILIRUBIN DIRECT: CPT

## 2024-08-28 PROCEDURE — 83935 ASSAY OF URINE OSMOLALITY: CPT

## 2024-08-28 RX ORDER — IOPAMIDOL 755 MG/ML
75 INJECTION, SOLUTION INTRAVASCULAR
Status: COMPLETED | OUTPATIENT
Start: 2024-08-28 | End: 2024-08-28

## 2024-08-28 RX ORDER — 0.9 % SODIUM CHLORIDE 0.9 %
500 INTRAVENOUS SOLUTION INTRAVENOUS ONCE
Status: COMPLETED | OUTPATIENT
Start: 2024-08-28 | End: 2024-08-28

## 2024-08-28 RX ADMIN — IOPAMIDOL 75 ML: 755 INJECTION, SOLUTION INTRAVENOUS at 22:24

## 2024-08-28 RX ADMIN — SODIUM CHLORIDE 500 ML: 9 INJECTION, SOLUTION INTRAVENOUS at 22:35

## 2024-08-28 ASSESSMENT — PAIN SCALES - GENERAL: PAINLEVEL_OUTOF10: 0

## 2024-08-29 ENCOUNTER — TELEPHONE (OUTPATIENT)
Dept: INTERNAL MEDICINE CLINIC | Age: 89
End: 2024-08-29

## 2024-08-29 PROBLEM — E44.0 MODERATE MALNUTRITION (HCC): Chronic | Status: ACTIVE | Noted: 2024-08-29

## 2024-08-29 PROBLEM — E87.1 HYPONATREMIA: Status: ACTIVE | Noted: 2024-08-29

## 2024-08-29 LAB
ALBUMIN SERPL-MCNC: 3.7 GM/DL (ref 3.4–5)
ALP BLD-CCNC: 96 IU/L (ref 40–128)
ALT SERPL-CCNC: 10 U/L (ref 10–40)
ANION GAP SERPL CALCULATED.3IONS-SCNC: 10 MMOL/L (ref 7–16)
ANION GAP SERPL CALCULATED.3IONS-SCNC: 11 MMOL/L (ref 7–16)
ANION GAP SERPL CALCULATED.3IONS-SCNC: 7 MMOL/L (ref 7–16)
AST SERPL-CCNC: 18 IU/L (ref 15–37)
BILIRUB SERPL-MCNC: 0.4 MG/DL (ref 0–1)
BILIRUBIN, URINE: NEGATIVE MG/DL
BLOOD, URINE: NEGATIVE
BUN SERPL-MCNC: 8 MG/DL (ref 6–23)
BUN SERPL-MCNC: 8 MG/DL (ref 6–23)
BUN SERPL-MCNC: 9 MG/DL (ref 6–23)
CALCIUM SERPL-MCNC: 8.9 MG/DL (ref 8.3–10.6)
CALCIUM SERPL-MCNC: 9 MG/DL (ref 8.3–10.6)
CALCIUM SERPL-MCNC: 9.5 MG/DL (ref 8.3–10.6)
CHLORIDE BLD-SCNC: 81 MMOL/L (ref 99–110)
CHLORIDE BLD-SCNC: 82 MMOL/L (ref 99–110)
CHLORIDE BLD-SCNC: 82 MMOL/L (ref 99–110)
CLARITY, UA: CLEAR
CO2: 24 MMOL/L (ref 21–32)
CO2: 24 MMOL/L (ref 21–32)
CO2: 26 MMOL/L (ref 21–32)
COLOR, UA: YELLOW
COMMENT UA: NORMAL
CREAT SERPL-MCNC: 0.5 MG/DL (ref 0.6–1.1)
CREAT SERPL-MCNC: 0.6 MG/DL (ref 0.6–1.1)
CREAT SERPL-MCNC: 0.6 MG/DL (ref 0.6–1.1)
CREAT UR-MCNC: 30.9 MG/DL (ref 28–217)
EKG ATRIAL RATE: 56 BPM
EKG DIAGNOSIS: NORMAL
EKG P AXIS: 73 DEGREES
EKG P-R INTERVAL: 192 MS
EKG Q-T INTERVAL: 430 MS
EKG QRS DURATION: 94 MS
EKG QTC CALCULATION (BAZETT): 414 MS
EKG R AXIS: 46 DEGREES
EKG T AXIS: 57 DEGREES
EKG VENTRICULAR RATE: 56 BPM
GFR, ESTIMATED: 85 ML/MIN/1.73M2
GFR, ESTIMATED: 85 ML/MIN/1.73M2
GFR, ESTIMATED: 88 ML/MIN/1.73M2
GLUCOSE SERPL-MCNC: 102 MG/DL (ref 70–99)
GLUCOSE SERPL-MCNC: 111 MG/DL (ref 70–99)
GLUCOSE SERPL-MCNC: 96 MG/DL (ref 70–99)
GLUCOSE URINE: NEGATIVE MG/DL
HCT VFR BLD CALC: 31.9 % (ref 37–47)
HEMOGLOBIN: 11.5 GM/DL (ref 12.5–16)
KETONES, URINE: NEGATIVE MG/DL
LEUKOCYTE ESTERASE, URINE: NEGATIVE
MAGNESIUM: 1.4 MG/DL (ref 1.8–2.4)
MCH RBC QN AUTO: 30.4 PG (ref 27–31)
MCHC RBC AUTO-ENTMCNC: 36.1 % (ref 32–36)
MCV RBC AUTO: 84.4 FL (ref 78–100)
NITRITE URINE, QUANTITATIVE: NEGATIVE
OSMOLALITY UR: 339 MOS/L (ref 292–1090)
PDW BLD-RTO: 12 % (ref 11.7–14.9)
PH, URINE: 7 (ref 5–8)
PHOSPHORUS: 2.9 MG/DL (ref 2.5–4.9)
PLATELET # BLD: 306 K/CU MM (ref 140–440)
PMV BLD AUTO: 10.9 FL (ref 7.5–11.1)
POTASSIUM SERPL-SCNC: 4.2 MMOL/L (ref 3.5–5.1)
POTASSIUM SERPL-SCNC: 4.3 MMOL/L (ref 3.5–5.1)
POTASSIUM SERPL-SCNC: 4.4 MMOL/L (ref 3.5–5.1)
PROTEIN UA: NEGATIVE MG/DL
RBC # BLD: 3.78 M/CU MM (ref 4.2–5.4)
SODIUM BLD-SCNC: 114 MMOL/L (ref 135–145)
SODIUM BLD-SCNC: 114 MMOL/L (ref 135–145)
SODIUM BLD-SCNC: 116 MMOL/L (ref 135–145)
SODIUM BLD-SCNC: 117 MMOL/L (ref 135–145)
SODIUM URINE: 87 MMOL/L (ref 35–167)
SPECIFIC GRAVITY UA: 1.01 (ref 1–1.03)
TOTAL PROTEIN: 5.7 GM/DL (ref 6.4–8.2)
UROBILINOGEN, URINE: 0.2 MG/DL (ref 0.2–1)
WBC # BLD: 7.4 K/CU MM (ref 4–10.5)

## 2024-08-29 PROCEDURE — 6370000000 HC RX 637 (ALT 250 FOR IP): Performed by: STUDENT IN AN ORGANIZED HEALTH CARE EDUCATION/TRAINING PROGRAM

## 2024-08-29 PROCEDURE — 6360000002 HC RX W HCPCS: Performed by: STUDENT IN AN ORGANIZED HEALTH CARE EDUCATION/TRAINING PROGRAM

## 2024-08-29 PROCEDURE — 0DB78ZX EXCISION OF STOMACH, PYLORUS, VIA NATURAL OR ARTIFICIAL OPENING ENDOSCOPIC, DIAGNOSTIC: ICD-10-PCS | Performed by: INTERNAL MEDICINE

## 2024-08-29 PROCEDURE — 0D718ZZ DILATION OF UPPER ESOPHAGUS, VIA NATURAL OR ARTIFICIAL OPENING ENDOSCOPIC: ICD-10-PCS | Performed by: INTERNAL MEDICINE

## 2024-08-29 PROCEDURE — 93010 ELECTROCARDIOGRAM REPORT: CPT | Performed by: INTERNAL MEDICINE

## 2024-08-29 PROCEDURE — 0DH63UZ INSERTION OF FEEDING DEVICE INTO STOMACH, PERCUTANEOUS APPROACH: ICD-10-PCS | Performed by: INTERNAL MEDICINE

## 2024-08-29 PROCEDURE — 80053 COMPREHEN METABOLIC PANEL: CPT

## 2024-08-29 PROCEDURE — 80048 BASIC METABOLIC PNL TOTAL CA: CPT

## 2024-08-29 PROCEDURE — 6370000000 HC RX 637 (ALT 250 FOR IP): Performed by: INTERNAL MEDICINE

## 2024-08-29 PROCEDURE — 83735 ASSAY OF MAGNESIUM: CPT

## 2024-08-29 PROCEDURE — 3E0G76Z INTRODUCTION OF NUTRITIONAL SUBSTANCE INTO UPPER GI, VIA NATURAL OR ARTIFICIAL OPENING: ICD-10-PCS | Performed by: INTERNAL MEDICINE

## 2024-08-29 PROCEDURE — 85027 COMPLETE CBC AUTOMATED: CPT

## 2024-08-29 PROCEDURE — 94761 N-INVAS EAR/PLS OXIMETRY MLT: CPT

## 2024-08-29 PROCEDURE — 84100 ASSAY OF PHOSPHORUS: CPT

## 2024-08-29 PROCEDURE — 2000000000 HC ICU R&B

## 2024-08-29 PROCEDURE — 2580000003 HC RX 258: Performed by: STUDENT IN AN ORGANIZED HEALTH CARE EDUCATION/TRAINING PROGRAM

## 2024-08-29 RX ORDER — FOLIC ACID 1 MG/1
1 TABLET ORAL DAILY
Status: DISCONTINUED | OUTPATIENT
Start: 2024-08-29 | End: 2024-09-10 | Stop reason: HOSPADM

## 2024-08-29 RX ORDER — LISINOPRIL 20 MG/1
40 TABLET ORAL DAILY
Status: DISCONTINUED | OUTPATIENT
Start: 2024-08-29 | End: 2024-09-01

## 2024-08-29 RX ORDER — POTASSIUM CHLORIDE 7.45 MG/ML
10 INJECTION INTRAVENOUS PRN
Status: DISCONTINUED | OUTPATIENT
Start: 2024-08-29 | End: 2024-09-10 | Stop reason: HOSPADM

## 2024-08-29 RX ORDER — MAGNESIUM HYDROXIDE/ALUMINUM HYDROXICE/SIMETHICONE 120; 1200; 1200 MG/30ML; MG/30ML; MG/30ML
30 SUSPENSION ORAL EVERY 6 HOURS PRN
Status: DISCONTINUED | OUTPATIENT
Start: 2024-08-29 | End: 2024-09-10 | Stop reason: HOSPADM

## 2024-08-29 RX ORDER — ONDANSETRON 4 MG/1
4 TABLET, ORALLY DISINTEGRATING ORAL EVERY 8 HOURS PRN
Status: DISCONTINUED | OUTPATIENT
Start: 2024-08-29 | End: 2024-09-10 | Stop reason: HOSPADM

## 2024-08-29 RX ORDER — PANTOPRAZOLE SODIUM 40 MG/1
40 TABLET, DELAYED RELEASE ORAL DAILY
Status: DISCONTINUED | OUTPATIENT
Start: 2024-08-29 | End: 2024-09-08

## 2024-08-29 RX ORDER — ONDANSETRON 2 MG/ML
4 INJECTION INTRAMUSCULAR; INTRAVENOUS EVERY 6 HOURS PRN
Status: DISCONTINUED | OUTPATIENT
Start: 2024-08-29 | End: 2024-09-10 | Stop reason: HOSPADM

## 2024-08-29 RX ORDER — SODIUM CHLORIDE 9 MG/ML
INJECTION, SOLUTION INTRAVENOUS CONTINUOUS
Status: ACTIVE | OUTPATIENT
Start: 2024-08-29 | End: 2024-08-29

## 2024-08-29 RX ORDER — ACETAMINOPHEN 325 MG/1
650 TABLET ORAL EVERY 6 HOURS PRN
Status: DISCONTINUED | OUTPATIENT
Start: 2024-08-29 | End: 2024-09-10 | Stop reason: HOSPADM

## 2024-08-29 RX ORDER — ENOXAPARIN SODIUM 100 MG/ML
40 INJECTION SUBCUTANEOUS DAILY
Status: DISCONTINUED | OUTPATIENT
Start: 2024-08-29 | End: 2024-09-10 | Stop reason: HOSPADM

## 2024-08-29 RX ORDER — POLYETHYLENE GLYCOL 3350 17 G/17G
17 POWDER, FOR SOLUTION ORAL DAILY PRN
Status: DISCONTINUED | OUTPATIENT
Start: 2024-08-29 | End: 2024-09-10 | Stop reason: HOSPADM

## 2024-08-29 RX ORDER — ZINC SULFATE 50(220)MG
50 CAPSULE ORAL DAILY
Status: DISCONTINUED | OUTPATIENT
Start: 2024-08-29 | End: 2024-09-10 | Stop reason: HOSPADM

## 2024-08-29 RX ORDER — SODIUM CHLORIDE 0.9 % (FLUSH) 0.9 %
5-40 SYRINGE (ML) INJECTION EVERY 12 HOURS SCHEDULED
Status: DISCONTINUED | OUTPATIENT
Start: 2024-08-29 | End: 2024-09-10 | Stop reason: HOSPADM

## 2024-08-29 RX ORDER — ACETAMINOPHEN 650 MG/1
650 SUPPOSITORY RECTAL EVERY 6 HOURS PRN
Status: DISCONTINUED | OUTPATIENT
Start: 2024-08-29 | End: 2024-09-10 | Stop reason: HOSPADM

## 2024-08-29 RX ORDER — M-VIT,TX,IRON,MINS/CALC/FOLIC 27MG-0.4MG
1 TABLET ORAL DAILY
Status: DISCONTINUED | OUTPATIENT
Start: 2024-08-29 | End: 2024-09-10 | Stop reason: HOSPADM

## 2024-08-29 RX ORDER — AMLODIPINE BESYLATE 10 MG/1
10 TABLET ORAL DAILY
Status: DISCONTINUED | OUTPATIENT
Start: 2024-08-29 | End: 2024-09-01

## 2024-08-29 RX ORDER — POTASSIUM CHLORIDE 29.8 MG/ML
20 INJECTION INTRAVENOUS PRN
Status: DISCONTINUED | OUTPATIENT
Start: 2024-08-29 | End: 2024-09-10 | Stop reason: HOSPADM

## 2024-08-29 RX ORDER — ATORVASTATIN CALCIUM 10 MG/1
20 TABLET, FILM COATED ORAL DAILY
Status: DISCONTINUED | OUTPATIENT
Start: 2024-08-29 | End: 2024-09-10 | Stop reason: HOSPADM

## 2024-08-29 RX ORDER — LANOLIN ALCOHOL/MO/W.PET/CERES
100 CREAM (GRAM) TOPICAL DAILY
Status: DISCONTINUED | OUTPATIENT
Start: 2024-08-29 | End: 2024-09-10 | Stop reason: HOSPADM

## 2024-08-29 RX ORDER — HYDRALAZINE HYDROCHLORIDE 20 MG/ML
10 INJECTION INTRAMUSCULAR; INTRAVENOUS EVERY 6 HOURS PRN
Status: ACTIVE | OUTPATIENT
Start: 2024-08-29 | End: 2024-08-31

## 2024-08-29 RX ORDER — GABAPENTIN 300 MG/1
300 CAPSULE ORAL 2 TIMES DAILY
Status: DISCONTINUED | OUTPATIENT
Start: 2024-08-29 | End: 2024-09-10 | Stop reason: HOSPADM

## 2024-08-29 RX ORDER — LANOLIN ALCOHOL/MO/W.PET/CERES
400 CREAM (GRAM) TOPICAL DAILY
Status: DISCONTINUED | OUTPATIENT
Start: 2024-08-29 | End: 2024-09-10 | Stop reason: HOSPADM

## 2024-08-29 RX ORDER — SODIUM CHLORIDE 1 G/1
1 TABLET ORAL
Status: DISCONTINUED | OUTPATIENT
Start: 2024-08-29 | End: 2024-09-10 | Stop reason: HOSPADM

## 2024-08-29 RX ORDER — MEMANTINE HYDROCHLORIDE 5 MG/1
5 TABLET ORAL 2 TIMES DAILY
Status: DISCONTINUED | OUTPATIENT
Start: 2024-08-29 | End: 2024-09-10 | Stop reason: HOSPADM

## 2024-08-29 RX ORDER — SODIUM CHLORIDE 9 MG/ML
INJECTION, SOLUTION INTRAVENOUS PRN
Status: DISCONTINUED | OUTPATIENT
Start: 2024-08-29 | End: 2024-09-10 | Stop reason: HOSPADM

## 2024-08-29 RX ORDER — MAGNESIUM SULFATE IN WATER 40 MG/ML
2000 INJECTION, SOLUTION INTRAVENOUS PRN
Status: DISCONTINUED | OUTPATIENT
Start: 2024-08-29 | End: 2024-09-10 | Stop reason: HOSPADM

## 2024-08-29 RX ORDER — SODIUM CHLORIDE 0.9 % (FLUSH) 0.9 %
5-40 SYRINGE (ML) INJECTION PRN
Status: DISCONTINUED | OUTPATIENT
Start: 2024-08-29 | End: 2024-09-10 | Stop reason: HOSPADM

## 2024-08-29 RX ADMIN — Medication 15 G: at 20:01

## 2024-08-29 RX ADMIN — ACETAMINOPHEN 650 MG: 325 TABLET ORAL at 03:02

## 2024-08-29 RX ADMIN — SODIUM CHLORIDE, PRESERVATIVE FREE 10 ML: 5 INJECTION INTRAVENOUS at 08:12

## 2024-08-29 RX ADMIN — PANTOPRAZOLE SODIUM 40 MG: 40 TABLET, DELAYED RELEASE ORAL at 07:56

## 2024-08-29 RX ADMIN — MEMANTINE 5 MG: 10 TABLET ORAL at 20:01

## 2024-08-29 RX ADMIN — ZINC SULFATE 220 MG (50 MG) CAPSULE 50 MG: CAPSULE at 11:45

## 2024-08-29 RX ADMIN — Medication 1 TABLET: at 11:46

## 2024-08-29 RX ADMIN — FOLIC ACID 1 MG: 1 TABLET ORAL at 11:46

## 2024-08-29 RX ADMIN — SODIUM CHLORIDE, PRESERVATIVE FREE 10 ML: 5 INJECTION INTRAVENOUS at 20:02

## 2024-08-29 RX ADMIN — ENOXAPARIN SODIUM 40 MG: 100 INJECTION SUBCUTANEOUS at 11:09

## 2024-08-29 RX ADMIN — SODIUM CHLORIDE: 9 INJECTION, SOLUTION INTRAVENOUS at 02:37

## 2024-08-29 RX ADMIN — LISINOPRIL 40 MG: 20 TABLET ORAL at 11:10

## 2024-08-29 RX ADMIN — Medication 400 MG: at 11:10

## 2024-08-29 RX ADMIN — ATORVASTATIN CALCIUM 20 MG: 10 TABLET, FILM COATED ORAL at 11:10

## 2024-08-29 RX ADMIN — GABAPENTIN 300 MG: 300 CAPSULE ORAL at 20:01

## 2024-08-29 RX ADMIN — Medication 100 MG: at 11:46

## 2024-08-29 RX ADMIN — MEMANTINE 5 MG: 10 TABLET ORAL at 11:09

## 2024-08-29 RX ADMIN — GABAPENTIN 300 MG: 300 CAPSULE ORAL at 11:11

## 2024-08-29 RX ADMIN — AMLODIPINE BESYLATE 10 MG: 10 TABLET ORAL at 11:11

## 2024-08-29 RX ADMIN — SODIUM CHLORIDE 1 G: 1 TABLET ORAL at 20:01

## 2024-08-29 ASSESSMENT — PAIN SCALES - WONG BAKER
WONGBAKER_NUMERICALRESPONSE: NO HURT

## 2024-08-29 ASSESSMENT — PAIN SCALES - GENERAL
PAINLEVEL_OUTOF10: 0
PAINLEVEL_OUTOF10: 3
PAINLEVEL_OUTOF10: 1

## 2024-08-29 ASSESSMENT — PAIN DESCRIPTION - LOCATION: LOCATION: BACK

## 2024-08-29 NOTE — ED NOTES
ED TO INPATIENT SBAR HANDOFF    Patient Name: Carol Xiong   :  1933  89 y.o.   Preferred Name  [place]  Family/Caregiver Present no   Restraints no   C-SSRS: Risk of Suicide: No Risk  Sitter no   Sepsis Risk Score        Situation  Chief Complaint   Patient presents with    Dizziness     For the last week. Per EMS house of patient was really hot. 140 blood sugar for EMS.     Fatigue     Pt states for awhile.      Brief Description of Patient's Condition: Carol Xiong is a 89 y.o. female that presents with 1 week of dizziness and inability to ambulate due to the dizziness.  States that she has felt more tired than usual as well.  Describes her sensation is more lightheadedness but tells me that over the last week she can even get up and go to the bathroom because of her symptoms.  She denies any pain anywhere.  No headaches.  No vision changes.  Does report decreased p.o. intake over the last several days.  EMS reported that her house was very hot.  She denies any abdominal pain nausea or vomiting.  No chest pain or shortness of breath.   Mental Status: oriented, alert, and coherent  Arrived from: home    Imaging:   CT Head W/O Contrast   Final Result   Chronic involutional changes of the brain. No acute intracranial   hemorrhage.      Electronically signed by Obey Tapia      CTA HEAD NECK W CONTRAST   Final Result   Normal CTA Head with contrast.   Critical (99%) right carotid stenosis.   Severe (80%) left carotid stenosis.   Patent vertebral arteries bilaterally.            Electronically signed by Obey Tapia        Abnormal labs:   Abnormal Labs Reviewed   CBC WITH AUTO DIFFERENTIAL - Abnormal; Notable for the following components:       Result Value    RBC 3.45 (*)     Hemoglobin 10.6 (*)     Hematocrit 29.4 (*)     MCHC 36.1 (*)     Neutrophils % 70.0 (*)     Lymphocytes % 20.2 (*)     Monocytes % 8.8 (*)     All other components within normal limits   BASIC METABOLIC PANEL -

## 2024-08-29 NOTE — ED PROVIDER NOTES
Emergency Department Encounter    Patient: Carol Xiong  MRN: [id number]  : 1933  Date of Evaluation: 2024  ED Provider:  Cordell Woods DO    Triage Chief Complaint:   Dizziness (For the last week. Per EMS house of patient was really hot. 140 blood sugar for EMS. ) and Fatigue (Pt states for awhile. )    [place]:  Carol Xiong is a 89 y.o. female that presents with 1 week of dizziness and inability to ambulate due to the dizziness.  States that she has felt more tired than usual as well.  Describes her sensation is more lightheadedness but tells me that over the last week she can even get up and go to the bathroom because of her symptoms.  She denies any pain anywhere.  No headaches.  No vision changes.  Does report decreased p.o. intake over the last several days.  EMS reported that her house was very hot.  She denies any abdominal pain nausea or vomiting.  No chest pain or shortness of breath.    MDM:    History from : Patient and EMS    On arrival patient with hypertension otherwise stable vital signs.  She is nontoxic-appearing.  Appears underweight and has dry lips.  Neurologic exam was reassuring however her symptoms are concerning as she is having dizziness and inability to walk so I did order a CT scan of her head and CT of the head and neck.  Her symptoms have been ongoing for about a week now so I do not think she needs to have a stroke alert called.  On workup she has pretty significant hyponatremia.  Decreased p.o. intake and heat in the home likely contributory.  I did order her IV fluids given hypovolemic state with hyponatremia.  EKG is without any signs of acute ischemia, initial troponin not elevated BNP is normal as well.  She will ultimately need hospitalization given the profound hyponatremia but will wait for CTAs of her head and neck to result prior to the admission.      ED Course as of 24 2306   Wed Aug 28, 2024   2200 EKG interpreted without cardiology.  Sinus  undetermined  When compared with ECG of 24-APR-2024 12:04,  premature atrial complexes are now present  Septal infarct is now present        Radiographs (if obtained):  Radiologist's Report Reviewed:  No results found.      Clinical Impression:  1. Hyponatremia    2. Dizziness    3. Elevated blood pressure reading      Disposition referral (if applicable):  No follow-up provider specified.  Disposition medications (if applicable):  New Prescriptions    No medications on file     ED Provider Disposition Time  DISPOSITION Decision To Admit 08/28/2024 11:06:22 PM  Condition at Disposition: Stable      Comment: Please note this report has been produced using speech recognition software and may contain errors related to that system including errors in grammar, punctuation, and spelling, as well as words and phrases that may be inappropriate.  Efforts were made to edit the dictations.        Cordell Woods,   08/28/24 2210       Cordell Woods DO  08/28/24 1435

## 2024-08-29 NOTE — PLAN OF CARE
Problem: Discharge Planning  Goal: Discharge to home or other facility with appropriate resources  8/29/2024 1922 by Jed Mathews RN  Outcome: Progressing  8/29/2024 1113 by Anu Leary RN  Outcome: Progressing  8/29/2024 0846 by Anu Leary RN  Outcome: Progressing     Problem: Pain  Goal: Verbalizes/displays adequate comfort level or baseline comfort level  8/29/2024 1922 by Jed Mathews RN  Outcome: Progressing  8/29/2024 1113 by Anu Leary RN  Outcome: Progressing  8/29/2024 0846 by Anu Leary RN  Outcome: Progressing     Problem: ABCDS Injury Assessment  Goal: Absence of physical injury  8/29/2024 1922 by Jed Mathews RN  Outcome: Progressing  8/29/2024 1113 by Anu Leary RN  Outcome: Progressing  8/29/2024 0846 by Anu Leary RN  Outcome: Progressing     Problem: Skin/Tissue Integrity  Goal: Absence of new skin breakdown  Description: 1.  Monitor for areas of redness and/or skin breakdown  2.  Assess vascular access sites hourly  3.  Every 4-6 hours minimum:  Change oxygen saturation probe site  4.  Every 4-6 hours:  If on nasal continuous positive airway pressure, respiratory therapy assess nares and determine need for appliance change or resting period.  8/29/2024 1922 by Jed Mathews RN  Outcome: Progressing  8/29/2024 1113 by Anu Leary RN  Outcome: Progressing  8/29/2024 0846 by Anu Leary RN  Outcome: Progressing     Problem: Safety - Adult  Goal: Free from fall injury  8/29/2024 1922 by Jed Mathews RN  Outcome: Progressing  8/29/2024 1113 by Anu Leary RN  Outcome: Progressing  8/29/2024 0846 by Anu Leary RN  Outcome: Progressing     Problem: Nutrition Deficit:  Goal: Optimize nutritional status  8/29/2024 1922 by Jed Mathews RN  Outcome: Progressing  8/29/2024 1113 by Anu Leary RN  Outcome: Progressing

## 2024-08-29 NOTE — CONSULTS
V2.0  Consult Note      Name:  Carol Xiong /Age/Sex: 1933  (89 y.o. female)   MRN & CSN:  [id number] & [id number] Encounter Date/Time: 2024 2:57 PM EDT   Location:  -A PCP: Kayden Goznalez MD       Hospital Day: 2    Assessment and Plan:   Carol Xiong is a 89 y.o. female who presents with Hyponatremia    Hospital Problems             Last Modified POA    * (Principal) Hyponatremia 2024 Yes    Moderate malnutrition (HCC) (Chronic) 2024 Yes       Plan:  Hyponatremia -Presented with Na 113 (from wnl ~3 months prior), improving  Stopped home HCTZ (avoid thiazides)  S/p fluids in ER, sodium studies obtained after this  Fluid restriction to 1800 mL, salt tablets and urea  Nephrology following  Slow correction of Na by no greater than 6-8 mEq in 24 hrs  Recent fall -likely 2/2 above  Correct Na as above  PT/OT  Dizziness with hx of TIA -CTH negative for acute abnormality. CTA head/neck with critical stenosis of right carotid artery and severe stenosis of left carotid artery.  Evaluated by CTS in  for similar findings at which time CEA was recommended however pt did not wish to proceed with this at the time, will confirm pt's wishes once again  Hypertension  Avoid thiazides thus stopped home HCTZ  Initiated lisinopril, continue home amlodipine  Nephrology following  Neuropathy -continue home neurontin    Disposition:   Current Living situation: Home  Expected Disposition: TBD  Estimated D/C: 2-3 days    Diet ADULT ORAL NUTRITION SUPPLEMENT; Breakfast, Lunch, Dinner; Standard High Calorie/High Protein Oral Supplement  ADULT DIET; Regular; 1800 ml   DVT Prophylaxis [] Lovenox, []  Heparin, [] SCDs, [] Ambulation,  [] Eliquis, [] Xarelto, [] Coumadin   Code Status Full Code   Surrogate Decision Maker/ POA      Personally reviewed Lab Studies and Imaging     Discussed management of the case with ICU who recommended as above    Imaging that was interpreted personally includes

## 2024-08-29 NOTE — CARE COORDINATION
Chart reviewed. Spoke with pt's MPOA/Granddaughter Coleen via phone. Pt lives at home with her and her son (8yrs). Coleen describes the pt's mobility as SBA using front wheeled walker. Live in 1 story house with level entry. CM requested that Coleen bring in MPOA paperwork, she will bring them next time that she visits. POA is able to drive, however does not have a vehicle. When a car is needed, POA rents one. States that she has trouble getting the pt to appointments due to vehicle inconsistency and because the pt struggles to get in and out of cars. Requested resources for extra help with transportation and services at home to make the pt more comfortable. CM printed off and left information on [facility] in pt's room and informed POA and pt. POA would like [facility] for continued therapy PT/OT at home to improve pt's mobility. Pt is very firm that she does not want to go to a SNF or LTC.    Pt's  lives in a nursing home and his daughter is his guardian.  is unable to make decisions for himself or others. Pt's siblings are both involved and supportive, however Coleen/POA is the decision maker.     Spoke with pt in room. She is A/O. Confirms she is from home with POA. States that she does not want to go to a SNF or LTC, but that her memory has started to worsen and she thinks she may be getting dementia. Pt uses front wheeled walker for mobility. Pt is agreeable to [facility] at discharge.     CM made referral to [facility] and requested inpt HC orders for PT/OT/Skilled RN.   CM printed [facility] paperwork for services for pt and family and placed in pt's room.  CM available for further needs. POA to bring POA paperwork later.    08/29/24 7353   Service Assessment   Patient Orientation Alert and Oriented   Cognition Alert   History Provided By Patient   Primary Caregiver Self   Support Systems Family Members   Patient's Healthcare Decision Maker is: Legal Next of Kin   PCP Verified by CM Yes   Prior Functional Level Assistance

## 2024-08-29 NOTE — PROGRESS NOTES
Patient seen and evaluated bedside, alert oriented, following commands.  No complaints.  Complain of some epigastric burning sensation associated with her GERD.    Problem list  Hypovolemic hyponatremia  Dizziness  Fall  Hypertension  Neuropathy      Neuro: Awake, alert, following commands.  Pain control with current multimodal regimen and adjust as needed.  History of dementia on amantadine.  Continue home meds.  Cardio: Hemodynamically stable, started on home antihypertensives.  Monitor vitals adjust as needed.  Resp: On room air.  Continue inhalers nebs aggressive pulmonary hygiene.  GI: Advance diet as tolerated, GI prophylaxis.  Symptoms of GERD on Protonix and Maalox.    : Creatinine of 0.6, monitor urine output, monitor electrolytes and replenish as needed.  Found to be hyponatremic, thought to be hypovolemic hyponatremia in the setting of decreased p.o. intake at home.  Watch sodium replenishment closely with IV fluids 6 to 8 mEq in the next 24 hours.  Heme: Hemoglobin of 11.5, platelets of 306, DVT prophylaxis with Lovenox.  ID: WBC of 7.4 noninfectious off antibiotics.  Monitor closely.  Endo: POC BG ISS as needed.  Maintain euglycemia avoid hypoglycemia.  MSK: DTI prevention, presented with dizziness and fall, PT/OT/out of bed to chair.    Tubes/Lines/Drains: PIV    Code Status: Full

## 2024-08-29 NOTE — H&P
V2.0  History and Physical      Name:  Carol Xiong /Age/Sex: 1933  (89 y.o. female)   MRN & CSN:  [id number] & [id number] Encounter Date/Time: 2024 3:33 AM EDT   Location:  -A PCP: Kayden Gonzalez MD       Hospital Day: 2    Assessment and Plan:   Carol iXong is a 89 y.o. female with a pmh of HTN, who presents withlight headedness for 1 week + fall. Admitted to the ICU with  Hyponatremia    Hospital Problems             Last Modified POA    * (Principal) Hyponatremia 2024 Yes   Dizziness  Fall (no LOC)  HTN  Hx of neuropathy    Plan:   Neuro - awake, alert. Provided hx. Oriented x 4. Denies dizziness/light-headedness. Home gabapentin and memantine resumed.     CV - hypertensive. Ordered home ACEI and amlodipine. On triamterene-HCTZ , not recently changed. Hold off for now. PRN hydral for SBP > 160    Resp - no active issues    GI - GI ppx.      - hyponatremia likely hypovolemic. S/p 500ml NS in the ER. Continue gentle IVF hydration. Q6 CCP. Goal correction 4-6meq in 24 hrs.     ID - no active issues    Heme- dvt ppx    I have performed 40 minutes of critical care time, excluding and separately billable procedures      Disposition:   Current Living situation: home  Expected Disposition: tbd  Estimated D/C: tbd    Diet Diet NPO   DVT Prophylaxis [x] Lovenox, []  Heparin, [] SCDs, [] Ambulation,  [] Eliquis, [] Xarelto, [] Coumadin   Code Status Full Code   Surrogate Decision Maker/ POA Tbd      Personally reviewed Lab Studies and Imaging         History from:     patient, electronic medical record    History of Present Illness:     Chief Complaint: dizziness for 1 week. Fall from standing without LoC.    Reports poor po intake. Also reports 'feeling hot'. Per report, her house was pretty hot per EMS. States that she had been feeling light headed on and off. Fell the other day, no LoC. Grand-daughter's  helped lift her up. Reports some generalized weakness. Denies  (80%) left carotid stenosis. Patent vertebral arteries bilaterally. Electronically signed by Obey Tapia    CT Head W/O Contrast    Result Date: 8/28/2024  Head CT INDICATION: Dizziness TECHNIQUE: Multiple 2D axial images obtained through the brain without intravenous contrast.  Radiation dose reduction techniques were used for this study:  All CT scans performed at this facility use one or all of the following: Automated exposure control, adjustment of the mA and/or kVp according to patient's size, iterative reconstruction. COMPARISON: 4/24/2024 FINDINGS: Confluent areas of decreased attenuation within the paravertebral white matter consistent with ischemic white matter changes. No change in encephalomalacia in the left frontal lobe consistent with a chronic infarct. There is no CT evidence of acute hemorrhage or infarction. The ventricles are normal in size. There are no extra-axial fluid collections. No masses are seen. The sinuses are clear. There are no bony lesions.     Chronic involutional changes of the brain. No acute intracranial hemorrhage. Electronically signed by Obey Tapia        Electronically signed by Angie Caal MD on 8/29/2024 at 3:33 AM

## 2024-08-29 NOTE — FLOWSHEET NOTE
Labs drawn and sent x 2--after calling lab--blood can't be found--new sample drawn again by this RN and not phlebotomy.

## 2024-08-29 NOTE — TELEPHONE ENCOUNTER
Spoke to Donna and informed her it was ok to start home care once the patient is released and she can order nursing and therapy. I advised her once the patient was released we would do a TCM on her and bring her back to the office to follow. She voiced understanding.

## 2024-08-29 NOTE — PLAN OF CARE
Problem: Discharge Planning  Goal: Discharge to home or other facility with appropriate resources  8/29/2024 0846 by Anu Leary, RN  Outcome: Progressing  8/29/2024 0334 by Angela De La Curz RN  Outcome: Progressing     Problem: Pain  Goal: Verbalizes/displays adequate comfort level or baseline comfort level  8/29/2024 0846 by Anu Leary, RN  Outcome: Progressing  8/29/2024 0334 by Angela De La Cruz RN  Outcome: Progressing     Problem: ABCDS Injury Assessment  Goal: Absence of physical injury  8/29/2024 0846 by Anu Leary, RN  Outcome: Progressing  8/29/2024 0334 by Angela De La Cruz RN  Outcome: Progressing     Problem: Skin/Tissue Integrity  Goal: Absence of new skin breakdown  Description: 1.  Monitor for areas of redness and/or skin breakdown  2.  Assess vascular access sites hourly  3.  Every 4-6 hours minimum:  Change oxygen saturation probe site  4.  Every 4-6 hours:  If on nasal continuous positive airway pressure, respiratory therapy assess nares and determine need for appliance change or resting period.  8/29/2024 0846 by Anu Leary, RN  Outcome: Progressing  8/29/2024 0334 by Angela De La Cruz RN  Outcome: Progressing     Problem: Safety - Adult  Goal: Free from fall injury  8/29/2024 0846 by Anu Leary RN  Outcome: Progressing  8/29/2024 0334 by Angela De La Cruz RN  Outcome: Progressing

## 2024-08-29 NOTE — PROGRESS NOTES
4 Eyes Skin Assessment     NAME:  Carol Xiong  YOB: 1933  MEDICAL RECORD NUMBER:  [id number]    The patient is being assessed for  Admission    I agree that at least one RN has performed a thorough Head to Toe Skin Assessment on the patient. ALL assessment sites listed below have been assessed.      Areas assessed by both nurses:    Head, Face, Ears, Shoulders, Back, Chest, Arms, Elbows, Hands, Sacrum. Buttock, Coccyx, Ischium, Legs. Feet and Heels, and Under Medical Devices         Does the Patient have a Wound? Yes wound(s) were present on assessment. LDA wound assessment was Initiated and completed by RN       Daron Prevention initiated by RN: Yes  Wound Care Orders initiated by RN: Yes    Pressure Injury (Stage 3,4, Unstageable, DTI, NWPT, and Complex wounds) if present, place Wound referral order by RN under : No    New Ostomies, if present place, Ostomy referral order under : No     Nurse 1 eSignature: Electronically signed by Anglea De La Cruz RN on 8/29/24 at 3:26 AM EDT    **SHARE this note so that the co-signing nurse can place an eSignature**    Nurse 2 eSignature: Electronically signed by DURAN ADAMS RN on 8/29/24 at 5:01 AM EDT     The patient has wound only on the toes caused by her nails rubbing and pushing against her skin.

## 2024-08-29 NOTE — DISCHARGE INSTR - DIET
Good nutrition is important when healing from an illness, injury, or surgery.  Follow any nutrition recommendations given to you during your hospital stay.   If you were given an oral nutrition supplement while in the hospital, continue to take this supplement at home.  You can take it with meals, in-between meals, and/or before bedtime. These supplements can be purchased at most local grocery stores, pharmacies, and chain super-stores.   If you have any questions about your diet or nutrition, call the hospital and ask for the dietitian.  Due to malnutrition diagnosis, after discharge, RD recommends patient to drink high calorie, high protein oral nutrition supplements of greater than 200 calories per serving with at least or greater than 10 gm protein per serving, at least 2-3 times per day, to promote increased po intakes and weight gain. Coupons and High Calorie, High Protein Nutrition Therapy handouts provided.     Suggestions to follow at home to improve appetite:   Aim for small, frequent eating sessions. (I.e. 5-8 times per day of smaller portions)   Schedule eating times (I.e. every 2-4 hours would have a snack)   Enhance the eating environment (I.e. Eating with family and friends, watching favorite movie, or listening to favorite music with meal)   Identify problem smells taste, textures, and temperatures  Choose foods that are easy to chew and swallow   Avoid drinking fluids during eating session. Save drinks and sips between meals.     There is no need to apply all of these strategies at once.     Many patients benefit from adding 1 or 2 suggestions at a time to see how they affect their ability to eat, and then making an informed decision on how to proceed.   Please read Nutrition handout below:     High Calorie, High Protein Nutrition Therapy from Nutrition Care Manual     A high-calorie, high-protein diet has been recommended to you. Your registered dietitian nutritionist (RDN) may have recommended

## 2024-08-29 NOTE — CONSULTS
Nephrology Service Consultation      [address] [facility], [address]  [place] [address]  Phone: [phone]  Office Hours: 8:30AM - 4:30PM  Monday - Friday        MEDICAL DECISION MAKING and Recommendations   -Hyponatremia: 113 on admission: Uosm 339 and Beverly 87, obtained after 500ml NS bolus in ER so interpretation not very helpful right now//stop all thiazides please//monitor sodium level//allow her to eat if safe, needs adequate protein intake//limit oral fluids to 1800ml per day  -HTN    Thank you    Patient Active Problem List    Diagnosis Date Noted    History of TIA (transient ischemic attack) 10/26/2022    Stage 3a chronic kidney disease (HCC) 10/26/2022    Hyponatremia 08/29/2024    Vitamin D deficiency 06/25/2024    Weight loss 06/25/2024    Severe malnutrition (HCC) 04/25/2024    Fear for personal safety 04/24/2024    Moderate late onset Alzheimer's dementia without behavioral disturbance, psychotic disturbance, mood disturbance, or anxiety (HCC) 03/30/2023    Carotid stenosis, right 02/07/2022    Bradycardia 01/21/2022    Altered mental status     Anemia 01/18/2021    Essential hypertension 10/13/2020    Mixed hyperlipidemia 10/13/2020    Gastroesophageal reflux disease without esophagitis 10/13/2020    Dorsalgia 10/13/2020    Neuropathy 10/13/2020    Allergic rhinitis 10/13/2020         Patient:  Carol Xiong  MRN: [id number]  Consulting physician:  Niya Resendez MD  Reason for Consult:   PCP: Kayden Gonzalez MD    HISTORY OF PRESENT ILLNESS:   The patient is a 89 y.o. female with HTN presented with dizziness and feeling hot  She was found to have a serum sodium level of 113 thus the renal consult.  Sodium level was 138 in May 2024  She received NS 500ml bolus in the ER and then has been on NS 75ml/hr  She is on HCTZ at home    REVIEW OF SYSTEMS:  Can not obtain due to drowsiness    Past Medical History:        Diagnosis Date    GERD (gastroesophageal reflux disease)     H/O  disease)     H/O echocardiogram 04/06/2017    EF 55% Left atrial enlargement. Normal LV systolic. Minimal aoritc stenosis.     H/O echocardiogram 02/04/2020    EF 55-60%, Grade I DD, Mild AS.  Mildly dilated left atrium.    Hyperlipidemia     Hypertension     Neuropathy        Past Surgical History:        Procedure Laterality Date    APPENDECTOMY      COLON SURGERY      removed polops    HYSTERECTOMY, TOTAL ABDOMINAL (CERVIX REMOVED)      SPINE SURGERY  2013    scrape calcium off spine, pressing on spine/nerves       Medications:   Prior to Admission medications    Medication Sig Start Date End Date Taking? Authorizing Provider   omeprazole (PRILOSEC) 20 MG delayed release capsule TAKE 1 CAPSULE EVERY DAY 8/5/24  Yes Kayden Gonzalez MD   benazepril (LOTENSIN) 40 MG tablet TAKE 1 TABLET EVERY DAY 8/5/24  Yes Kayden Gonzalez MD   amLODIPine (NORVASC) 10 MG tablet TAKE 1 TABLET EVERY DAY 7/22/24  Yes Kayden Gonzalez MD   triamterene-hydroCHLOROthiazide (MAXZIDE-25) 37.5-25 MG per tablet Take 1 tablet by mouth daily 6/25/24  Yes Kayden Gonzalez MD   memantine (NAMENDA) 5 MG tablet Take 1 tablet by mouth 2 times daily 6/25/24  Yes Kayden Gonzalez MD   Cholecalciferol (VITAMIN D3) 125 MCG (5000 UT) TABS Take 1 tablet by mouth daily 6/25/24  Yes Kayden Gonzalez MD   vitamin C (ASCORBIC ACID) 500 MG tablet Take 1 tablet by mouth daily   Yes ProviderElliott MD   atorvastatin (LIPITOR) 20 MG tablet TAKE 1 TABLET EVERY DAY 10/23/23  Yes Kayden Gonzalez MD   vitamin B-12 (CYANOCOBALAMIN) 1000 MCG tablet Take 1 tablet by mouth daily 3/30/23  Yes Kayden Gonzalez MD   gabapentin (NEURONTIN) 300 MG capsule Take 1 capsule by mouth 2 times daily for 180 days. Intended supply: 30 days 2/16/24 8/14/24  Kayden Gonzalez MD   aspirin 81 MG EC tablet Take 1 tablet by mouth daily  Patient not taking: Reported on 8/29/2024 3/30/23   Kayden Gonzalez MD        Allergies:  Penicillins    Social History:   TOBACCO:   reports that she has never

## 2024-08-29 NOTE — PLAN OF CARE
Problem: Discharge Planning  Goal: Discharge to home or other facility with appropriate resources  8/29/2024 0846 by Anu Leary, RN  Outcome: Progressing  8/29/2024 0334 by Angela De La Cruz RN  Outcome: Progressing     Problem: Pain  Goal: Verbalizes/displays adequate comfort level or baseline comfort level  8/29/2024 0846 by Anu Leary, RN  Outcome: Progressing  8/29/2024 0334 by Angela De La Cruz RN  Outcome: Progressing     Problem: ABCDS Injury Assessment  Goal: Absence of physical injury  8/29/2024 0846 by Anu Leary, RN  Outcome: Progressing  8/29/2024 0334 by Angela De La Cruz RN  Outcome: Progressing     Problem: Skin/Tissue Integrity  Goal: Absence of new skin breakdown  Description: 1.  Monitor for areas of redness and/or skin breakdown  2.  Assess vascular access sites hourly  3.  Every 4-6 hours minimum:  Change oxygen saturation probe site  4.  Every 4-6 hours:  If on nasal continuous positive airway pressure, respiratory therapy assess nares and determine need for appliance change or resting period.  8/29/2024 0846 by Anu Leary, RN  Outcome: Progressing  8/29/2024 0334 by Angela De La Cruz RN  Outcome: Progressing     Problem: Safety - Adult  Goal: Free from fall injury  8/29/2024 0846 by Anu Leary RN  Outcome: Progressing  8/29/2024 0334 by Angela De La Cruz RN  Outcome: Progressing

## 2024-08-29 NOTE — CARE COORDINATION
Spoke with Coleen/granddaughter. She found the POA paperwork and it is only for financial. CM encouraged Coleen to come speak with a  to fill out POA paperwork while the pt is A/O. Appointment made with  to speak with the pt and her grand daughter tomorrow between 2:30-3:30 for POA paperwork.

## 2024-08-29 NOTE — CONSULTS
Comprehensive Nutrition Assessment    Type and Reason for Visit:  Initial, Consult    Nutrition Recommendations/Plan:   Continue regular diet  Offer standard oral nutrition supplement TID  Monitor weights, po intakes, sodium, POC     Malnutrition Assessment:  Malnutrition Status:  Moderate malnutrition (08/29/24 0910)    Context:  Social/Environmental Circumstances     Findings of the 6 clinical characteristics of malnutrition:  Energy Intake:  Less than 75% estimated energy requirements for 3 months or longer  Weight Loss:  Mild weight loss (specify amount and time period)     Body Fat Loss:  Severe body fat loss Triceps   Muscle Mass Loss:   (moderate) Clavicles (pectoralis & deltoids), Thigh (quadraceps), Hand (interosseous)  Fluid Accumulation:  No significant fluid accumulation     Strength:  Not Performed    Nutrition Assessment:    Admitted w/ hyponatremia,  pmh of HTN. Pt states she doesn't typically eat three meals a day, but MD encouraged her to eat. At baseline states she has no appetite, it has just slowly decreased over the years, c/o GERD. Feels like she forces herself to eat. Endorses a 20# wt loss since she had COVID 19 in 2022. Non-significant loss over the past year. Also notes she has trouble chewing meats, will offer easy to chew diet during stay. NFPE performed, did note mild-moderate wasting, meets criteria for malnutrition. Pt agreeable to drink oral supplements, send TID. Follow at moderate nutrition risk.    Nutrition Related Findings:    +zinc, thiamine, MV, folic acid; Na 117 Wound Type: None       Current Nutrition Intake & Therapies:    Average Meal Intake: 51-75% (per pt)  Average Supplements Intake: None Ordered  ADULT DIET; Regular    Anthropometric Measures:  Height: 175.3 cm (5' 9\")  Ideal Body Weight (IBW): 145 lbs (66 kg)    Admission Body Weight: 55.8 kg (123 lb 0.3 oz)  Current Body Weight: 55.8 kg (123 lb 0.3 oz),   IBW. Weight Source: Stated  Current BMI (kg/m2):

## 2024-08-30 LAB
ANION GAP SERPL CALCULATED.3IONS-SCNC: 10 MMOL/L (ref 7–16)
ANION GAP SERPL CALCULATED.3IONS-SCNC: 10 MMOL/L (ref 7–16)
ANION GAP SERPL CALCULATED.3IONS-SCNC: 11 MMOL/L (ref 7–16)
ANION GAP SERPL CALCULATED.3IONS-SCNC: 14 MMOL/L (ref 7–16)
BASOPHILS ABSOLUTE: 0 K/CU MM
BASOPHILS RELATIVE PERCENT: 0.3 % (ref 0–1)
BUN SERPL-MCNC: 25 MG/DL (ref 6–23)
BUN SERPL-MCNC: 25 MG/DL (ref 6–23)
BUN SERPL-MCNC: 41 MG/DL (ref 6–23)
BUN SERPL-MCNC: 43 MG/DL (ref 6–23)
CALCIUM IONIZED: NORMAL MMOL/L (ref 1.12–1.32)
CALCIUM SERPL-MCNC: 9.1 MG/DL (ref 8.3–10.6)
CALCIUM SERPL-MCNC: 9.4 MG/DL (ref 8.3–10.6)
CALCIUM SERPL-MCNC: 9.4 MG/DL (ref 8.3–10.6)
CALCIUM SERPL-MCNC: 9.6 MG/DL (ref 8.3–10.6)
CHLORIDE BLD-SCNC: 86 MMOL/L (ref 99–110)
CHLORIDE BLD-SCNC: 88 MMOL/L (ref 99–110)
CHLORIDE BLD-SCNC: 89 MMOL/L (ref 99–110)
CHLORIDE BLD-SCNC: 89 MMOL/L (ref 99–110)
CO2: 20 MMOL/L (ref 21–32)
CO2: 21 MMOL/L (ref 21–32)
CREAT SERPL-MCNC: 0.6 MG/DL (ref 0.6–1.1)
CREAT SERPL-MCNC: 0.6 MG/DL (ref 0.6–1.1)
CREAT SERPL-MCNC: 0.7 MG/DL (ref 0.6–1.1)
CREAT SERPL-MCNC: 0.8 MG/DL (ref 0.6–1.1)
DIFFERENTIAL TYPE: ABNORMAL
EOSINOPHILS ABSOLUTE: 0.1 K/CU MM
EOSINOPHILS RELATIVE PERCENT: 1.2 % (ref 0–3)
GFR, ESTIMATED: 70 ML/MIN/1.73M2
GFR, ESTIMATED: 82 ML/MIN/1.73M2
GFR, ESTIMATED: 85 ML/MIN/1.73M2
GFR, ESTIMATED: 85 ML/MIN/1.73M2
GLUCOSE SERPL-MCNC: 102 MG/DL (ref 70–99)
GLUCOSE SERPL-MCNC: 102 MG/DL (ref 70–99)
GLUCOSE SERPL-MCNC: 107 MG/DL (ref 70–99)
GLUCOSE SERPL-MCNC: 119 MG/DL (ref 70–99)
HCT VFR BLD CALC: 28.5 % (ref 37–47)
HEMOGLOBIN: 10.3 GM/DL (ref 12.5–16)
IMMATURE NEUTROPHIL %: 0.3 % (ref 0–0.43)
LYMPHOCYTES ABSOLUTE: 1.3 K/CU MM
LYMPHOCYTES RELATIVE PERCENT: 19.3 % (ref 24–44)
MAGNESIUM: 1.5 MG/DL (ref 1.7–2.4)
MAGNESIUM: 1.5 MG/DL (ref 1.8–2.4)
MAGNESIUM: 2.3 MG/DL (ref 1.8–2.4)
MAGNESIUM: 2.5 MG/DL (ref 1.8–2.4)
MCH RBC QN AUTO: 30.5 PG (ref 27–31)
MCHC RBC AUTO-ENTMCNC: 36.1 % (ref 32–36)
MCV RBC AUTO: 84.3 FL (ref 78–100)
MONOCYTES ABSOLUTE: 0.5 K/CU MM
MONOCYTES RELATIVE PERCENT: 7.6 % (ref 0–4)
NEUTROPHILS ABSOLUTE: 4.7 K/CU MM
NEUTROPHILS RELATIVE PERCENT: 71.3 % (ref 36–66)
NUCLEATED RBC %: 0 %
PDW BLD-RTO: 12.2 % (ref 11.7–14.9)
PHOSPHORUS: 2.8 MG/DL (ref 2.5–4.9)
PHOSPHORUS: 2.8 MG/DL (ref 2.7–4.5)
PHOSPHORUS: 3 MG/DL (ref 2.5–4.9)
PHOSPHORUS: 3 MG/DL (ref 2.5–4.9)
PLATELET # BLD: 280 K/CU MM (ref 140–440)
PMV BLD AUTO: 10.4 FL (ref 7.5–11.1)
POTASSIUM SERPL-SCNC: 4 MMOL/L (ref 3.5–5.1)
POTASSIUM SERPL-SCNC: 4.3 MMOL/L (ref 3.5–5.1)
POTASSIUM SERPL-SCNC: 4.5 MMOL/L (ref 3.5–5.1)
POTASSIUM SERPL-SCNC: 4.5 MMOL/L (ref 3.5–5.1)
RBC # BLD: 3.38 M/CU MM (ref 4.2–5.4)
SODIUM BLD-SCNC: 120 MMOL/L (ref 135–145)
SODIUM BLD-SCNC: 120 MMOL/L (ref 136–145)
TOTAL IMMATURE NEUTOROPHIL: 0.02 K/CU MM
TOTAL NUCLEATED RBC: 0 K/CU MM
WBC # BLD: 6.6 K/CU MM (ref 4–10.5)

## 2024-08-30 PROCEDURE — 6370000000 HC RX 637 (ALT 250 FOR IP): Performed by: INTERNAL MEDICINE

## 2024-08-30 PROCEDURE — 6360000002 HC RX W HCPCS: Performed by: STUDENT IN AN ORGANIZED HEALTH CARE EDUCATION/TRAINING PROGRAM

## 2024-08-30 PROCEDURE — 97530 THERAPEUTIC ACTIVITIES: CPT

## 2024-08-30 PROCEDURE — 80048 BASIC METABOLIC PNL TOTAL CA: CPT

## 2024-08-30 PROCEDURE — 6370000000 HC RX 637 (ALT 250 FOR IP): Performed by: STUDENT IN AN ORGANIZED HEALTH CARE EDUCATION/TRAINING PROGRAM

## 2024-08-30 PROCEDURE — 97162 PT EVAL MOD COMPLEX 30 MIN: CPT

## 2024-08-30 PROCEDURE — 84100 ASSAY OF PHOSPHORUS: CPT

## 2024-08-30 PROCEDURE — 36415 COLL VENOUS BLD VENIPUNCTURE: CPT

## 2024-08-30 PROCEDURE — 1200000000 HC SEMI PRIVATE

## 2024-08-30 PROCEDURE — 97112 NEUROMUSCULAR REEDUCATION: CPT

## 2024-08-30 PROCEDURE — 94761 N-INVAS EAR/PLS OXIMETRY MLT: CPT

## 2024-08-30 PROCEDURE — 82330 ASSAY OF CALCIUM: CPT

## 2024-08-30 PROCEDURE — 2580000003 HC RX 258: Performed by: STUDENT IN AN ORGANIZED HEALTH CARE EDUCATION/TRAINING PROGRAM

## 2024-08-30 PROCEDURE — 97535 SELF CARE MNGMENT TRAINING: CPT

## 2024-08-30 PROCEDURE — 85025 COMPLETE CBC W/AUTO DIFF WBC: CPT

## 2024-08-30 PROCEDURE — 83735 ASSAY OF MAGNESIUM: CPT

## 2024-08-30 PROCEDURE — 97166 OT EVAL MOD COMPLEX 45 MIN: CPT

## 2024-08-30 RX ADMIN — FOLIC ACID 1 MG: 1 TABLET ORAL at 08:33

## 2024-08-30 RX ADMIN — Medication 400 MG: at 08:33

## 2024-08-30 RX ADMIN — SODIUM CHLORIDE 1 G: 1 TABLET ORAL at 16:48

## 2024-08-30 RX ADMIN — MEMANTINE 5 MG: 10 TABLET ORAL at 20:45

## 2024-08-30 RX ADMIN — ATORVASTATIN CALCIUM 20 MG: 10 TABLET, FILM COATED ORAL at 08:33

## 2024-08-30 RX ADMIN — MAGNESIUM SULFATE HEPTAHYDRATE 2000 MG: 40 INJECTION, SOLUTION INTRAVENOUS at 13:53

## 2024-08-30 RX ADMIN — SODIUM CHLORIDE, PRESERVATIVE FREE 10 ML: 5 INJECTION INTRAVENOUS at 20:48

## 2024-08-30 RX ADMIN — ENOXAPARIN SODIUM 40 MG: 100 INJECTION SUBCUTANEOUS at 08:34

## 2024-08-30 RX ADMIN — Medication 100 MG: at 08:33

## 2024-08-30 RX ADMIN — SODIUM CHLORIDE 1 G: 1 TABLET ORAL at 12:41

## 2024-08-30 RX ADMIN — SODIUM CHLORIDE, PRESERVATIVE FREE 10 ML: 5 INJECTION INTRAVENOUS at 08:34

## 2024-08-30 RX ADMIN — LISINOPRIL 40 MG: 20 TABLET ORAL at 08:34

## 2024-08-30 RX ADMIN — SODIUM CHLORIDE 1 G: 1 TABLET ORAL at 08:34

## 2024-08-30 RX ADMIN — GABAPENTIN 300 MG: 300 CAPSULE ORAL at 08:34

## 2024-08-30 RX ADMIN — MEMANTINE 5 MG: 10 TABLET ORAL at 08:34

## 2024-08-30 RX ADMIN — Medication 1 TABLET: at 08:34

## 2024-08-30 RX ADMIN — PANTOPRAZOLE SODIUM 40 MG: 40 TABLET, DELAYED RELEASE ORAL at 05:44

## 2024-08-30 RX ADMIN — ZINC SULFATE 220 MG (50 MG) CAPSULE 50 MG: CAPSULE at 08:34

## 2024-08-30 RX ADMIN — GABAPENTIN 300 MG: 300 CAPSULE ORAL at 20:45

## 2024-08-30 RX ADMIN — ACETAMINOPHEN 650 MG: 325 TABLET ORAL at 20:44

## 2024-08-30 RX ADMIN — AMLODIPINE BESYLATE 10 MG: 10 TABLET ORAL at 08:34

## 2024-08-30 RX ADMIN — Medication 15 G: at 08:33

## 2024-08-30 ASSESSMENT — PAIN SCALES - GENERAL
PAINLEVEL_OUTOF10: 3
PAINLEVEL_OUTOF10: 0

## 2024-08-30 ASSESSMENT — PAIN SCALES - WONG BAKER
WONGBAKER_NUMERICALRESPONSE: NO HURT

## 2024-08-30 ASSESSMENT — PAIN DESCRIPTION - LOCATION: LOCATION: BACK

## 2024-08-30 ASSESSMENT — PAIN DESCRIPTION - DESCRIPTORS: DESCRIPTORS: ACHING

## 2024-08-30 ASSESSMENT — PAIN - FUNCTIONAL ASSESSMENT: PAIN_FUNCTIONAL_ASSESSMENT: ACTIVITIES ARE NOT PREVENTED

## 2024-08-30 NOTE — PROGRESS NOTES
08/30/24 1517   Encounter Summary   Encounter Overview/Reason Advance Care Planning   Service Provided For Patient   Referral/Consult From Nurse;Patient   Support System Family members   Last Encounter  08/30/24  (We were able to complete the AD, PT was calm and alert, papers scanned to her chart, coppies made and returned to the PT.)   Complexity of Encounter High   Begin Time 1418   End Time  1519   Total Time Calculated 61 min   Spiritual/Emotional needs   Type Spiritual Support   Grief, Loss, and Adjustments   Type Adjustment to illness   Advance Care Planning   Type ACP conversation;Completed AD/ACP document(s)   Assessment/Intervention/Outcome   Assessment Calm;Concerns with suffering;Coping   Intervention Active listening;Discussed illness injury and it’s impact;Explored/Affirmed feelings, thoughts, concerns;Sustaining Presence/Ministry of presence   Outcome Connection/Belonging;Coping;Expressed Gratitude   Plan and Referrals   Plan/Referrals Developed Care Plan (see consult note);Continue Support (comment)

## 2024-08-30 NOTE — PROGRESS NOTES
oriented x3. Decreased safety awareness   Affect: Normal       Functional Mobility:  Bed mobility:  supine to sitting EOB w/ min A, Vcs for sequencing  Sitting balance:  SBA    Transfers: STS to/from EOB w/ min A, Vcs for sequencing. SPT from EOB to recliner w/ min A regressing to mod Ax2 for safety d/t attempting to sit before reaching recliner.   Standing balance:  min A w/ RW  Functional Mobility: NT d/t safety  Toilet/Shower Transfers: NT        Activities of Daily Living (ADLs):  Feeding: set up A  Grooming: SBA seated at sink to brush teeth/denture care and comb hair  UB bathing: mod A  LB bathing: mod A  UB dressing: min A  LB dressing: mod A  Toileting: mod A    *ADL determined per observation of functional mobility, balance, activity tolerance, cognition, or actual ADL performance.     AM-PAC 6 click short form for inpatient daily activity:   How much help from another person does the patient currently need... Unable  Dep A Lot  Max A A Lot   Mod A A Little  Min A A Little   CGA  SBA None   Mod I  Indep  Sup   1.  Putting on and taking off regular lower body clothing? [] 1    [] 2   [x] 2   [] 3   [] 3   [] 4      2. Bathing (including washing, rinsing, drying)? [] 1   [] 2   [x] 2 [] 3 [] 3 [] 4   3. Toileting, which includes using toilet, bedpan, or urinal? [] 1    [] 2   [x] 2   [] 3   [] 3   [] 4     4. Putting on and taking off regular upper body clothing? [] 1   [] 2   [] 2   [x] 3   [] 3    [] 4      5. Taking care of personal grooming such as brushing teeth? [] 1   [] 2    [] 2 [] 3    [x] 3   [] 4      6. Eating meals?   [] 1   [] 2   [] 2   [] 3   [] 3   [x] 4        Raw Score:  16     [24=0% impaired(CH), 23=1-19%(CI), 20-22=20-39%(CJ), 15-19=40-59%(CK), 10-14=60-79%(CL), 7-9=80-99%(CM), 6=100%(CN)]     Treatment:    Therapeutic Activity Training:   Therapeutic activity training was instructed today.  Cues were given for safety, sequence, UE/LE placement, awareness, and balance.    Activities  performed today included bed mobility training, sup-sit, sit-stand, SPT.    Self Care Training:   Cues were given for safety, sequence, UE/LE placement, visual cues, and balance.    Activities performed today included grooming, oral care      Educated pt on role of OT, therapy POC and functional goals, progression w/ ADLs and transfers, importance of movement and OOB activity, d/c recommendations     Safety Measures: Gait belt used, Left in recliner, Alarm in place  Recommendations for NURSING activity:  Up to chair for all 3 meals and up to bathroom for all toileting needs     Assessment:  Pt is a 89 y o F admitted d/t hyponatremia. Pt at baseline is mod I for ADLs, has assistance for high level IADLs, and mod I for functional transfers/mobility w/ RW. Pt currently presents w/ deficits in ADL and high level IADL independence, functional ADL transfers, strength, and functional activity tolerance. Continued OT services recommended to increase safety and independence with ADL routine and to address remaining functional deficits. Pt would benefit from continued acute care OT services w/ discharge to facility for moderate post-acute rehabilitation, anticipate 1-2 hours per day and 5 days per week.      Complexity: Moderate  Prognosis: Good, no significant barriers to participation at this time.   Occupational Therapy Plan  Times Per Week: 3+         Goals:  Pt will complete all aspects of bed mobility for EOB/OOB ADLs w/ SBA.  Pt will complete UB ADLs w/ SBA.  Pt will complete LB ADLs w/ SBA.  Pt will complete all functional transfers to and from bed, chair, toilet, shower chair w/ SBA.  Pt will ambulate functional household distance w/ SBA.  Pt will complete all aspects of toileting task w/ SBA.  Pt will perform therex/theract in order to increase strength and functional activity tolerance necessary for increased independence w/ ADL routine.    Pt goal: go home, get stronger  Time Frame for STGs:

## 2024-08-30 NOTE — PROGRESS NOTES
Inpatient Progress Note 8/30/2024        Carol Xiong  2/20/1933  [id number]      Assessment/Plan:  Carol Xiong is a 89 y.o. female with a pmh of HTN, who presents withlight headedness for 1 week + fall. Admitted to the ICU with  Hyponatremia       Problem list  Hypovolemic hyponatremia  Dizziness  Fall  Hypertension  Neuropathy        Neuro: Awake, alert, following commands.  Pain control with current multimodal regimen and adjust as needed.  History of dementia on amantadine.  Continue home meds.  Cardio: Hemodynamically stable, started on home antihypertensives.  Monitor vitals adjust as needed.  Resp: On room air.  Continue inhalers nebs aggressive pulmonary hygiene.  GI: Advance diet as tolerated, GI prophylaxis.  Symptoms of GERD on Protonix and Maalox.    : Creatinine of 0.6, monitor urine output, monitor electrolytes and replenish as needed.  Found to be hyponatremic, thought to be hypovolemic hyponatremia in the setting of decreased p.o. intake at home.  Watch sodium replenishment closely with IV fluids 6 to 8 mEq in the next 24 hours.  Heme: Hemoglobin of 10.5, platelets of 280 DVT prophylaxis with Lovenox.  ID: WBC of 6.6 noninfectious off antibiotics.  Monitor closely.  Endo: POC BG ISS as needed.  Maintain euglycemia avoid hypoglycemia.  MSK: DTI prevention, presented with dizziness and fall, PT/OT/out of bed to chair.     Tubes/Lines/Drains: PIV     Code Status: Full      Subjective:  Seen and evaluated bedside, no complaints.    Physical Exam:            /61   Pulse 54   Temp 97.7 °F (36.5 °C) (Oral)   Resp 12   Ht 1.753 m (5' 9\")   Wt 52 kg (114 lb 10.2 oz)   SpO2 100%   BMI 16.93 kg/m²     General: NAD  Eyes: EOMI  ENT: neck supple  Cardiovascular: Regular rate.  Respiratory: Clear to auscultation  Gastrointestinal: Soft, non tender  Genitourinary: no suprapubic tenderness  Musculoskeletal: No edema.  Skin: warm, dry  Neuro: Alert.  Psych: Mood  neurovascular checks  [x] Frequent evaluation of patient's response to treatment and titration of therapies,  [x] Interpretation of laboratory and radiological data,  [x] Application and interpretation of advanced monitoring technologies,  [x] Extensive interpretation of multiple databases,  [x] Development of treatment plan with patient, surrogate, or consultants.  [] Others:  Electronically signed by Niya Resendez MD on 8/30/2024 at 11:44 AM

## 2024-08-30 NOTE — FLOWSHEET NOTE
Phone call to lab to check on CCP/BMP, calcium results as only the CBC has resulted; per lab they will look into.  Notified lab this was a problem all day yesterday with this patients labs not being received or resulted and we need sodium result as patient is here with HYPONATREMIA, voices understanding.    1035  Lab returned call and states that CCP/BMP is on the analyzer at this time being processed

## 2024-08-30 NOTE — CARE COORDINATION
Follow up visit with pt. Pt is sitting up in chair. Cm discussed PT/OT recommendation for SNF for short term rehab at discharge. Pt states that today is the 1st time she has been up since  and she feels that she will progress and be able to use her walker and walk enough to return home with quinn at discharge. Pt states that her grandauirma had been working at a SNF in [place] but today was to be her last day because they no longer have a working vehicle for her to be able to get back and forth to work. Pt's spouse is in a SNF for pt's wife alzheimers in [place]. Pt has 2 sons; 1  and 1 in [place]. Cm requested permission to contact pt's quinn to be sure that she understands pt physical limitations at present and to ensure that she is able to meet pt care needs at home and pt in agreement.

## 2024-08-30 NOTE — PROGRESS NOTES
Physician Progress Note      PATIENT:               NYA CHIN  [facility] #:                  [id number]  :                       1933  ADMIT DATE:       2024 8:43 PM  DISCH DATE:  RESPONDING  PROVIDER #:        Niya Resendez MD          QUERY TEXT:    Patient admitted with hyponatremia. Noted to have documentation of weight   loss, dietician assessment with malnutrition diagnosis, etc. in the nutrition   consult notes. If possible, please document in progress notes and discharge   summary if you are evaluating and /or treating any of the following:    The medical record reflects the following:    Risk Factors: Dementia, Alzheimer's, Hyponatremia  Clinical Indicators:  Endorses a 20# wt loss since she had COVID 19 in   .NFPE performed, did note mild-moderate wasting, meets criteria for   malnutrition.Moderate malnutrition, In context of social or environmental   circumstances related to inadequate protein-energy intake as evidenced by   moderate muscle loss, poor intake prior to admission  Treatment: Nutrition consult, Continue regular diet, Offer standard oral   nutrition supplement TID, Monitor weights, po intakes, sodium, POC    Thank you, Christie Luna RN, [facility] [phone]    ASPEN Criteria:    https://aspenjournals.onlinelibrary.jimenez.com/doi/full/10.1177/574735027061287  5  Options provided:  -- Protein calorie malnutrition moderate  -- Other - I will add my own diagnosis  -- Disagree - Not applicable / Not valid  -- Disagree - Clinically unable to determine / Unknown  -- Refer to Clinical Documentation Reviewer    PROVIDER RESPONSE TEXT:    This patient has moderate protein calorie malnutrition.    Query created by: Christie Luna on 2024 12:27 PM      Electronically signed by:  Niya Resendez MD 2024 7:20 PM

## 2024-08-30 NOTE — PROGRESS NOTES
Nephrology Progress Note        [address] [facility], [address] [place] [address]  Phone: [phone]  Office Hours: 8:30AM - 4:30PM  Monday - Friday 8/30/2024 7:10 AM  Subjective:   Admit Date: 8/28/2024  PCP: Kayden Gonzalez MD  Interval History:     Resting  On room air  Diet: ADULT ORAL NUTRITION SUPPLEMENT; Breakfast, Lunch, Dinner; Standard High Calorie/High Protein Oral Supplement  ADULT DIET; Regular; 1800 ml      Data:   Scheduled Meds:   sodium chloride flush  5-40 mL IntraVENous 2 times per day    enoxaparin  40 mg SubCUTAneous Daily    amLODIPine  10 mg Oral Daily    atorvastatin  20 mg Oral Daily    gabapentin  300 mg Oral BID    memantine  5 mg Oral BID    pantoprazole  40 mg Oral Daily    lisinopril  40 mg Oral Daily    magnesium oxide  400 mg Oral Daily    thiamine  100 mg Oral Daily    folic acid  1 mg Oral Daily    zinc sulfate  50 mg Oral Daily    multivitamin  1 tablet Oral Daily    sodium chloride  1 g Oral TID WC    urea  15 g Oral Daily     Continuous Infusions:   sodium chloride       PRN Meds:sodium chloride flush, sodium chloride, potassium chloride **OR** potassium chloride, magnesium sulfate, ondansetron **OR** ondansetron, polyethylene glycol, acetaminophen **OR** acetaminophen, hydrALAZINE, aluminum & magnesium hydroxide-simethicone  I/O last 3 completed shifts:  In: 1458.2 [P.O.:480; I.V.:978.2]  Out: 1600 [Urine:1600]  No intake/output data recorded.    Intake/Output Summary (Last 24 hours) at 8/30/2024 0710  Last data filed at 8/29/2024 2300  Gross per 24 hour   Intake 1458.21 ml   Output 1600 ml   Net -141.79 ml       CBC:   Recent Labs     08/28/24  2109 08/29/24 0444   WBC 5.9 7.4   HGB 10.6* 11.5*    306       BMP:    Recent Labs     08/29/24  0444 08/29/24  1722 08/29/24  1738   * 114* 116*   K 4.3 4.2 4.4   CL 82* 81* 82*   CO2 24 26 24   BUN 9 8 8   CREATININE 0.6 0.6 0.5*   GLUCOSE 111* 96 102*   CALCIUM 9.5 8.9 9.0     Hepatic:   Recent Labs

## 2024-08-30 NOTE — PROGRESS NOTES
Advance Care Planning     Advance Care Planning Inpatient Note  [facility] Department    Today's Date: 8/30/2024  Unit: [facility] ICU    Received request from patient.  Upon review of chart and communication with care team, patient's decision making abilities are not in question.. Patient and Child/Children was/were present in the room during visit.    Goals of ACP Conversation:  Facilitate a discussion related to patient's goals of care as they align with the patient's values and beliefs.    Health Care Decision Makers:       Primary Decision Maker: Coleen Villar - [phone]  Summary:  Completed New Documents  Verified Healthcare Decision Maker  Sukhwinder Villar - [phone]  Advance Care Planning Documents (Patient Wishes):  Healthcare Power of /Advance Directive Appointment of Health Care Agent  Living Will/Advance Directive     Assessment:  We were able to complete the AD, PT was calm and alert, papers scanned to her chart, coppies made and returned to the PT.     Interventions:  Provided education on documents for clarity and greater understanding  Discussed and provided education on state decision maker hierarchy  Assisted in the completion of documents according to patient's wishes at this time    Care Preferences Communicated:   No    Outcomes/Plan:  ACP Discussion: Completed  New advance directive completed.  Returned original document(s) to patient, as well as copies for distribution to appointed agents  Copy of advance directive given to staff to scan into medical record.    Electronically signed by HEMANT Hallman on 8/30/2024 at 3:19 PM

## 2024-08-30 NOTE — CONSULTS
[facility] ACUTE CARE PHYSICAL THERAPY EVALUATION  Carol Xiong, 2/20/1933, 2105/2105-A, 8/30/2024    History  [place]:  The primary encounter diagnosis was Hyponatremia. Diagnoses of Dizziness and Elevated blood pressure reading were also pertinent to this visit.  Patient  has a past medical history of GERD (gastroesophageal reflux disease), H/O echocardiogram, H/O echocardiogram, Hyperlipidemia, Hypertension, and Neuropathy.  Patient  has a past surgical history that includes Spine surgery (2013); Colon surgery; Hysterectomy, total abdominal; and Appendectomy.    Discharge Recommendation: Encourage facility for moderate post-acute rehabilitation, anticipate 1-2 hours per day and 5 days per week.     Equipment: TBD at next level of care    Subjective:    Patient states:  \"I haven't been up in a while.\"      Pain:  denies pain.      Communication with other providers:  Handoff to RN, OT    Restrictions: general precautions, fall risk    Home Setup/Prior level of function  Lives With: Granddaughter and great grandson (9 y.o.)  Type of Home: House  Home Layout: One level  Home Access: Level entry  Bathroom Shower/Tub: Tub/Shower unit (Sponge bathes  Bathroom Toilet: Standard  Bathroom Accessibility: Accessible  Home Equipment: Walker, rolling  ADL Assistance: Independent  Homemaking Assistance: Needs assistance  Homemaking Responsibilities: Yes  Ambulation Assistance: Independent (mod I with RW)  Transfer Assistance: Independent  Active : No  Occupation: Retired    Examination of body systems (includes body structures/functions, activity/participation limitations):  Observation:  pt supine in bed upon arrival and agreeable to therapy  Vision:  wears glasses  Hearing:  False Pass  Cardiopulmonary:  no O2 needs  Cognition: WFL, see OT/SLP note for further evaluation.    Musculoskeletal  ROM R/L:  WFL.    Strength R/L:  4-/5, moderate impairment in function and endurance.    Neuro:  WFL

## 2024-08-31 LAB
ANION GAP SERPL CALCULATED.3IONS-SCNC: 10 MMOL/L (ref 7–16)
ANION GAP SERPL CALCULATED.3IONS-SCNC: 8 MMOL/L (ref 7–16)
BASOPHILS ABSOLUTE: 0 K/CU MM
BASOPHILS RELATIVE PERCENT: 0.5 % (ref 0–1)
BUN SERPL-MCNC: 40 MG/DL (ref 6–23)
BUN SERPL-MCNC: 47 MG/DL (ref 6–23)
CALCIUM IONIZED: NORMAL MMOL/L (ref 1.12–1.32)
CALCIUM IONIZED: NORMAL MMOL/L (ref 1.12–1.32)
CALCIUM SERPL-MCNC: 8.8 MG/DL (ref 8.3–10.6)
CALCIUM SERPL-MCNC: 8.9 MG/DL (ref 8.3–10.6)
CHLORIDE BLD-SCNC: 94 MMOL/L (ref 99–110)
CHLORIDE BLD-SCNC: 95 MMOL/L (ref 99–110)
CO2: 23 MMOL/L (ref 21–32)
CO2: 24 MMOL/L (ref 21–32)
CREAT SERPL-MCNC: 0.7 MG/DL (ref 0.6–1.1)
CREAT SERPL-MCNC: 0.7 MG/DL (ref 0.6–1.1)
DIFFERENTIAL TYPE: ABNORMAL
EOSINOPHILS ABSOLUTE: 0.1 K/CU MM
EOSINOPHILS RELATIVE PERCENT: 1.1 % (ref 0–3)
GFR, ESTIMATED: 82 ML/MIN/1.73M2
GFR, ESTIMATED: 82 ML/MIN/1.73M2
GLUCOSE SERPL-MCNC: 83 MG/DL (ref 70–99)
GLUCOSE SERPL-MCNC: 96 MG/DL (ref 70–99)
HCT VFR BLD CALC: 37.7 % (ref 37–47)
HEMOGLOBIN: 12.9 GM/DL (ref 12.5–16)
IMMATURE NEUTROPHIL %: 0.3 % (ref 0–0.43)
LYMPHOCYTES ABSOLUTE: 1.8 K/CU MM
LYMPHOCYTES RELATIVE PERCENT: 24.4 % (ref 24–44)
MAGNESIUM: 2 MG/DL (ref 1.8–2.4)
MAGNESIUM: 2 MG/DL (ref 1.8–2.4)
MCH RBC QN AUTO: 30.1 PG (ref 27–31)
MCHC RBC AUTO-ENTMCNC: 34.2 % (ref 32–36)
MCV RBC AUTO: 88.1 FL (ref 78–100)
MONOCYTES ABSOLUTE: 0.4 K/CU MM
MONOCYTES RELATIVE PERCENT: 5.3 % (ref 0–4)
NEUTROPHILS ABSOLUTE: 5.2 K/CU MM
NEUTROPHILS RELATIVE PERCENT: 68.4 % (ref 36–66)
NUCLEATED RBC %: 0 %
PDW BLD-RTO: 12.6 % (ref 11.7–14.9)
PHOSPHORUS: 2.4 MG/DL (ref 2.5–4.9)
PHOSPHORUS: 2.7 MG/DL (ref 2.5–4.9)
PLATELET # BLD: 350 K/CU MM (ref 140–440)
PMV BLD AUTO: 11.4 FL (ref 7.5–11.1)
POTASSIUM SERPL-SCNC: 3.7 MMOL/L (ref 3.5–5.1)
POTASSIUM SERPL-SCNC: 4.5 MMOL/L (ref 3.5–5.1)
RBC # BLD: 4.28 M/CU MM (ref 4.2–5.4)
REASON FOR REJECTION: NORMAL
REJECTED TEST: NORMAL
SODIUM BLD-SCNC: 126 MMOL/L (ref 135–145)
SODIUM BLD-SCNC: 128 MMOL/L (ref 135–145)
TOTAL IMMATURE NEUTOROPHIL: 0.02 K/CU MM
TOTAL NUCLEATED RBC: 0 K/CU MM
WBC # BLD: 7.5 K/CU MM (ref 4–10.5)

## 2024-08-31 PROCEDURE — 85025 COMPLETE CBC W/AUTO DIFF WBC: CPT

## 2024-08-31 PROCEDURE — 6360000002 HC RX W HCPCS: Performed by: STUDENT IN AN ORGANIZED HEALTH CARE EDUCATION/TRAINING PROGRAM

## 2024-08-31 PROCEDURE — 99223 1ST HOSP IP/OBS HIGH 75: CPT | Performed by: INTERNAL MEDICINE

## 2024-08-31 PROCEDURE — 83735 ASSAY OF MAGNESIUM: CPT

## 2024-08-31 PROCEDURE — 84100 ASSAY OF PHOSPHORUS: CPT

## 2024-08-31 PROCEDURE — 94761 N-INVAS EAR/PLS OXIMETRY MLT: CPT

## 2024-08-31 PROCEDURE — 2580000003 HC RX 258: Performed by: STUDENT IN AN ORGANIZED HEALTH CARE EDUCATION/TRAINING PROGRAM

## 2024-08-31 PROCEDURE — 1200000000 HC SEMI PRIVATE

## 2024-08-31 PROCEDURE — 6370000000 HC RX 637 (ALT 250 FOR IP): Performed by: INTERNAL MEDICINE

## 2024-08-31 PROCEDURE — 82330 ASSAY OF CALCIUM: CPT

## 2024-08-31 PROCEDURE — 80048 BASIC METABOLIC PNL TOTAL CA: CPT

## 2024-08-31 PROCEDURE — 36415 COLL VENOUS BLD VENIPUNCTURE: CPT

## 2024-08-31 PROCEDURE — 6370000000 HC RX 637 (ALT 250 FOR IP): Performed by: STUDENT IN AN ORGANIZED HEALTH CARE EDUCATION/TRAINING PROGRAM

## 2024-08-31 PROCEDURE — 84443 ASSAY THYROID STIM HORMONE: CPT

## 2024-08-31 RX ADMIN — GABAPENTIN 300 MG: 300 CAPSULE ORAL at 08:31

## 2024-08-31 RX ADMIN — AMLODIPINE BESYLATE 10 MG: 10 TABLET ORAL at 08:31

## 2024-08-31 RX ADMIN — SODIUM CHLORIDE 1 G: 1 TABLET ORAL at 16:11

## 2024-08-31 RX ADMIN — GABAPENTIN 300 MG: 300 CAPSULE ORAL at 21:26

## 2024-08-31 RX ADMIN — SODIUM CHLORIDE, PRESERVATIVE FREE 10 ML: 5 INJECTION INTRAVENOUS at 21:29

## 2024-08-31 RX ADMIN — SODIUM CHLORIDE 1 G: 1 TABLET ORAL at 08:31

## 2024-08-31 RX ADMIN — PANTOPRAZOLE SODIUM 40 MG: 40 TABLET, DELAYED RELEASE ORAL at 05:56

## 2024-08-31 RX ADMIN — ACETAMINOPHEN 650 MG: 325 TABLET ORAL at 18:07

## 2024-08-31 RX ADMIN — ENOXAPARIN SODIUM 40 MG: 100 INJECTION SUBCUTANEOUS at 08:31

## 2024-08-31 RX ADMIN — Medication 400 MG: at 08:31

## 2024-08-31 RX ADMIN — ATORVASTATIN CALCIUM 20 MG: 10 TABLET, FILM COATED ORAL at 08:30

## 2024-08-31 RX ADMIN — SODIUM CHLORIDE, PRESERVATIVE FREE 10 ML: 5 INJECTION INTRAVENOUS at 08:32

## 2024-08-31 RX ADMIN — MEMANTINE 5 MG: 10 TABLET ORAL at 08:30

## 2024-08-31 RX ADMIN — MEMANTINE 5 MG: 10 TABLET ORAL at 21:26

## 2024-08-31 RX ADMIN — SODIUM CHLORIDE 1 G: 1 TABLET ORAL at 13:08

## 2024-08-31 RX ADMIN — ZINC SULFATE 220 MG (50 MG) CAPSULE 50 MG: CAPSULE at 08:31

## 2024-08-31 RX ADMIN — Medication 100 MG: at 08:31

## 2024-08-31 RX ADMIN — Medication 15 G: at 08:31

## 2024-08-31 RX ADMIN — LISINOPRIL 40 MG: 20 TABLET ORAL at 08:30

## 2024-08-31 RX ADMIN — FOLIC ACID 1 MG: 1 TABLET ORAL at 08:31

## 2024-08-31 RX ADMIN — Medication 1 TABLET: at 08:31

## 2024-08-31 ASSESSMENT — PAIN SCALES - GENERAL
PAINLEVEL_OUTOF10: 6
PAINLEVEL_OUTOF10: 5

## 2024-08-31 ASSESSMENT — PAIN DESCRIPTION - ORIENTATION: ORIENTATION: RIGHT

## 2024-08-31 ASSESSMENT — PAIN - FUNCTIONAL ASSESSMENT: PAIN_FUNCTIONAL_ASSESSMENT: PREVENTS OR INTERFERES WITH ALL ACTIVE AND SOME PASSIVE ACTIVITIES

## 2024-08-31 ASSESSMENT — PAIN DESCRIPTION - DESCRIPTORS: DESCRIPTORS: ACHING

## 2024-08-31 ASSESSMENT — PAIN DESCRIPTION - LOCATION: LOCATION: HEAD

## 2024-08-31 NOTE — CONSULTS
INPATIENT CARDIOLOGY CONSULT NOTE         Reason for consultation: Bradycardia    History of present illness:[place] is a 89 y.o.year old who is admitted with   Chief Complaint   Patient presents with    Dizziness     For the last week. Per EMS house of patient was really hot. 140 blood sugar for EMS.     Fatigue     Pt states for awhile.      Patient is a pleasant 89-year-old female was admitted to the hospital with dizziness.  She is noted to have severe hyponatremia with sodium of 113 on admission.  Cardiology consulted to evaluate patient for bradycardia.    Overnight heart rate was around 30 to 40 bpm currently around 59 bpm sinus rhythm without any AV blocks noted.    Patient denies any presyncope, she mentions that she had a fall at home however she tripped and had a fall and was not secondary to dizziness.  Denies any loss of consciousness    Prior history of TIA noted with history of severe carotid artery stenosis    EKG shows sinus rhythm, sinus bradycardia at 48 bpm  Telemetry shows sinus rhythm at 59 bpm    Pertinent Lab Personally Review     Recent Labs     08/31/24  1019   WBC 7.5   HGB 12.9   HCT 37.7         Recent Labs     08/30/24 2002   *   K 4.0   CL 88*   CO2 21   PHOS 3.0   BUN 41*   CREATININE 0.8     Recent Labs     08/28/24  2109 08/29/24  1738   AST 23 18   ALT 11 10   BILIDIR 0.2  --    BILITOT 0.5 0.4   ALKPHOS 86 96     Lab Results   Component Value Date    PROBNP 230.4 08/28/2024         Past medical history:    has a past medical history of GERD (gastroesophageal reflux disease), H/O echocardiogram, H/O echocardiogram, Hyperlipidemia, Hypertension, and Neuropathy.  Past surgical history:   has a past surgical history that includes Spine surgery (2013); Colon surgery; Hysterectomy, total abdominal; and Appendectomy.  Social History:   reports that she has never smoked. She has never used smokeless tobacco. She reports that she does not currently use  Daily Niya Resendez MD   50 mg at 08/31/24 0831    therapeutic multivitamin-minerals 1 tablet  1 tablet Oral Daily Niya Resendez MD   1 tablet at 08/31/24 0831    aluminum & magnesium hydroxide-simethicone (MAALOX) 200-200-20 MG/5ML suspension 30 mL  30 mL Oral Q6H PRN Niya Resendez MD        sodium chloride tablet 1 g  1 g Oral TID WC Lang Pope, DO   1 g at 08/31/24 1308    urea (URE-NA) packet 15 g  15 g Oral Daily Lang Pope, DO   15 g at 08/31/24 0831         Review of Systems:     Constitutional: No Weight Loss   Eyes: No Decreased Vision  ENT: No Headaches   Cardiovascular:   no chest pain,  no dyspnea on exertion, no palpitations or loss of consciousness  Respiratory: No cough or wheezing    Gastrointestinal: No abdominal pain  Genitourinary: No dysuria, trouble voiding   Musculoskeletal:  No gait disturbance, weakness or joint complaints  Integumentary: No rash or pruritis  Neurological:  No TIA or stroke symptoms  Psychiatric: No anxiety    Endocrine: No malaise, fatigue   Hematologic/Lymphatic: No bleeding problems, blood clots   Allergic/Immunologic: No nasal congestion     All other systems were reviewed and were negative otherwise.         Physical Examination:      Vitals:    08/31/24 1400   BP: (!) 111/47   Pulse: 54   Resp: 11   Temp:    SpO2: 99%      Wt Readings from Last 3 Encounters:   08/31/24 54.2 kg (119 lb 7.8 oz)   06/25/24 54.4 kg (120 lb)   05/23/24 55.8 kg (123 lb)     Body mass index is 17.65 kg/m².    General Appearance:  No distress  Constitutional:  Well developed   HEENT:  Normocephalic, Atraumatic   Eyes:   No discharge.   Respiratory:    Normal breath sounds, No respiratory distress,  No wheezing   No chest wall Tenderness   Cardiovascular: S1-S2  no murmurs auscultated.  Pedal pulses are normal. Nopedal edema  GI:  Soft Non tender, non distended.   Musculoskeletal:   No tenderness   Integument:  Warm   Lymphatic:  No lymphadenopathy noted.   Neurologic:  Alert

## 2024-08-31 NOTE — PROGRESS NOTES
V2.0  Progress Note      Name:  Carol Xiong /Age/Sex: 1933  (89 y.o. female)   MRN & CSN:  [id number] & [id number] Encounter Date/Time: 2024 2:57 PM EDT   Location:  -A PCP: Kayden Gonzalez MD       Hospital Day: 4    Assessment and Plan:   Carol Xiong is a 89 y.o. female who presents with Hyponatremia    Hospital Problems             Last Modified POA    * (Principal) Hyponatremia 2024 Yes    Moderate malnutrition (HCC) (Chronic) 2024 Yes       Plan:    Hyponatremia -Presented with Na 113 (from wnl ~3 months prior), improving  Stopped home HCTZ (avoid thiazides)  S/p fluids in ER, sodium studies obtained after this  Fluid restriction to 1800 mL, salt tablets and urea  Nephrology following  Slow correction of Na by no greater than 6-8 mEq in 24 hrs  Recent fall -likely 2/2 above  Correct Na as above  PT/OT  Dizziness with hx of TIA -CTH negative for acute abnormality. CTA head/neck with critical stenosis of right carotid artery and severe stenosis of left carotid artery.  Evaluated by CTS in  for similar findings at which time CEA was recommended however pt did not wish to proceed with this at the time. Discussed this again with her and pt would like more information to determine about whether she would like to pursue intervention  Hypertension  Avoid thiazides thus stopped home HCTZ  Initiated lisinopril, continue home amlodipine  Nephrology following  Neuropathy -continue home neurontin      Disposition:   Current Living situation: Home  Expected Disposition: TBD  Estimated D/C: 2-3 days    Diet ADULT ORAL NUTRITION SUPPLEMENT; Breakfast, Lunch, Dinner; Standard High Calorie/High Protein Oral Supplement  ADULT DIET; Regular; 1800 ml   DVT Prophylaxis [x] Lovenox, []  Heparin, [] SCDs, [] Ambulation,  [] Eliquis, [] Xarelto, [] Coumadin   Code Status Full Code   Surrogate Decision Maker/ POA      Personally reviewed Lab Studies and Imaging     Discussed  EVERY DAY 10/23/23  Yes Kayden Gonzalez MD   vitamin B-12 (CYANOCOBALAMIN) 1000 MCG tablet Take 1 tablet by mouth daily 3/30/23  Yes Kayden Gonzalez MD   gabapentin (NEURONTIN) 300 MG capsule Take 1 capsule by mouth 2 times daily for 180 days. Intended supply: 30 days 2/16/24 8/14/24  Kayden Gonzalez MD   aspirin 81 MG EC tablet Take 1 tablet by mouth daily  Patient not taking: Reported on 8/29/2024 3/30/23   Kayden Gonzalez MD       Physical Exam:    Physical Exam     General: NAD  Eyes: EOMI  ENT: neck supple  Cardiovascular: Regular rate.  Respiratory: Clear to auscultation  Gastrointestinal: Soft, non tender  Genitourinary: no suprapubic tenderness  Musculoskeletal: No edema  Skin: warm, dry  Neuro: Alert.  Psych: Mood appropriate.       Past Medical History:   PMHx   Past Medical History:   Diagnosis Date    GERD (gastroesophageal reflux disease)     H/O echocardiogram 04/06/2017    EF 55% Left atrial enlargement. Normal LV systolic. Minimal aoritc stenosis.     H/O echocardiogram 02/04/2020    EF 55-60%, Grade I DD, Mild AS.  Mildly dilated left atrium.    Hyperlipidemia     Hypertension     Neuropathy      PSHX:  has a past surgical history that includes Spine surgery (2013); Colon surgery; Hysterectomy, total abdominal; and Appendectomy.  Allergies:   Allergies   Allergen Reactions    Penicillins     Thiazide-Type Diuretics Other (See Comments)     Recurrent hyponatremia//sodium 113 on at 8/29/24//avoid thiazides please     Fam HX:  family history includes Hypertension in her father and mother.  Soc HX:   Social History     Socioeconomic History    Marital status:    Tobacco Use    Smoking status: Never    Smokeless tobacco: Never   Substance and Sexual Activity    Alcohol use: Not Currently    Sexual activity: Not Currently     Social Determinants of Health     Financial Resource Strain: Medium Risk (5/23/2024)    Overall Financial Resource Strain (CARDIA)     Difficulty of Paying Living Expenses:

## 2024-08-31 NOTE — PROGRESS NOTES
Neurointervention     Bilateral severe cervical ICA stenosis.   Right (99%) worse than left (80%). Will discuss options

## 2024-08-31 NOTE — PLAN OF CARE
Problem: Discharge Planning  Goal: Discharge to home or other facility with appropriate resources  Outcome: Progressing  Flowsheets (Taken 8/30/2024 2000)  Discharge to home or other facility with appropriate resources:   Identify barriers to discharge with patient and caregiver   Arrange for needed discharge resources and transportation as appropriate   Identify discharge learning needs (meds, wound care, etc)     Problem: Pain  Goal: Verbalizes/displays adequate comfort level or baseline comfort level  Outcome: Progressing  Flowsheets (Taken 8/30/2024 2000)  Verbalizes/displays adequate comfort level or baseline comfort level:   Encourage patient to monitor pain and request assistance   Assess pain using appropriate pain scale   Administer analgesics based on type and severity of pain and evaluate response   Implement non-pharmacological measures as appropriate and evaluate response   Consider cultural and social influences on pain and pain management   Notify Licensed Independent Practitioner if interventions unsuccessful or patient reports new pain     Problem: ABCDS Injury Assessment  Goal: Absence of physical injury  Outcome: Progressing     Problem: Skin/Tissue Integrity  Goal: Absence of new skin breakdown  Description: 1.  Monitor for areas of redness and/or skin breakdown  2.  Assess vascular access sites hourly  3.  Every 4-6 hours minimum:  Change oxygen saturation probe site  4.  Every 4-6 hours:  If on nasal continuous positive airway pressure, respiratory therapy assess nares and determine need for appliance change or resting period.  Outcome: Progressing     Problem: Safety - Adult  Goal: Free from fall injury  Outcome: Progressing     Problem: Nutrition Deficit:  Goal: Optimize nutritional status  Outcome: Progressing

## 2024-08-31 NOTE — PROGRESS NOTES
Nephrology Progress Note        [address], [address]  [place] [address]  Phone: [phone]  Office Hours: 8:30AM - 4:30PM  Monday - Friday 8/31/2024 7:32 AM  Subjective:   Admit Date: 8/28/2024  PCP: Kayden Gonzalez MD  Interval History:   ON ROOM Air  Reports loose stools    Diet: ADULT ORAL NUTRITION SUPPLEMENT; Breakfast, Lunch, Dinner; Standard High Calorie/High Protein Oral Supplement  ADULT DIET; Regular; 1800 ml      Data:   Scheduled Meds:   sodium chloride flush  5-40 mL IntraVENous 2 times per day    enoxaparin  40 mg SubCUTAneous Daily    amLODIPine  10 mg Oral Daily    atorvastatin  20 mg Oral Daily    gabapentin  300 mg Oral BID    memantine  5 mg Oral BID    pantoprazole  40 mg Oral Daily    lisinopril  40 mg Oral Daily    magnesium oxide  400 mg Oral Daily    thiamine  100 mg Oral Daily    folic acid  1 mg Oral Daily    zinc sulfate  50 mg Oral Daily    multivitamin  1 tablet Oral Daily    sodium chloride  1 g Oral TID WC    urea  15 g Oral Daily     Continuous Infusions:   sodium chloride       PRN Meds:sodium chloride flush, sodium chloride, potassium chloride **OR** potassium chloride, magnesium sulfate, ondansetron **OR** ondansetron, polyethylene glycol, acetaminophen **OR** acetaminophen, aluminum & magnesium hydroxide-simethicone  I/O last 3 completed shifts:  In: 10 [I.V.:10]  Out: 550 [Urine:550]  No intake/output data recorded.    Intake/Output Summary (Last 24 hours) at 8/31/2024 0732  Last data filed at 8/30/2024 0834  Gross per 24 hour   Intake 10 ml   Output --   Net 10 ml       CBC:   Recent Labs     08/28/24  2109 08/29/24  0444 08/30/24  0815   WBC 5.9 7.4 6.6   HGB 10.6* 11.5* 10.3*    306 280       BMP:    Recent Labs     08/30/24  0815 08/30/24  1609 08/30/24 2002   * 120* 120*   K 4.5 4.3 4.0   CL 89* 86* 88*   CO2 21 20* 21   BUN 25* 43* 41*   CREATININE 0.6 0.7 0.8   GLUCOSE 102* 107* 119*   CALCIUM 9.4 9.6 9.1     Hepatic:   Recent

## 2024-08-31 NOTE — PROGRESS NOTES
V2.0  Progress Note      Name:  Carol Xiong /Age/Sex: 1933  (89 y.o. female)   MRN & CSN:  [id number] & [id number] Encounter Date/Time: 2024 2:57 PM EDT   Location:  -A PCP: Kayden Gonzalez MD       Hospital Day: 3    Assessment and Plan:   Carol Xiong is a 89 y.o. female who presents with Hyponatremia    Hospital Problems             Last Modified POA    * (Principal) Hyponatremia 2024 Yes    Moderate malnutrition (HCC) (Chronic) 2024 Yes       Plan:  Hyponatremia -Presented with Na 113 (from wnl ~3 months prior), improving  Stopped home HCTZ (avoid thiazides)  S/p fluids in ER, sodium studies obtained after this  Fluid restriction to 1800 mL, salt tablets and urea  Nephrology following  Slow correction of Na by no greater than 6-8 mEq in 24 hrs  Recent fall -likely 2/2 above  Correct Na as above  PT/OT  Dizziness with hx of TIA -CTH negative for acute abnormality. CTA head/neck with critical stenosis of right carotid artery and severe stenosis of left carotid artery.  Evaluated by CTS in  for similar findings at which time CEA was recommended however pt did not wish to proceed with this at the time, will confirm pt's wishes once again  Hypertension  Avoid thiazides thus stopped home HCTZ  Initiated lisinopril, continue home amlodipine  Nephrology following  Neuropathy -continue home neurontin    Disposition:   Current Living situation: Home  Expected Disposition: TBD  Estimated D/C: 2-3 days    Diet ADULT ORAL NUTRITION SUPPLEMENT; Breakfast, Lunch, Dinner; Standard High Calorie/High Protein Oral Supplement  ADULT DIET; Regular; 1800 ml   DVT Prophylaxis [] Lovenox, []  Heparin, [] SCDs, [] Ambulation,  [] Eliquis, [] Xarelto, [] Coumadin   Code Status Full Code   Surrogate Decision Maker/ POA      Personally reviewed Lab Studies and Imaging     Discussed management of the case with ICU who recommended as above    Imaging that was interpreted personally includes  Sometimes true    • Ran Out of Food in the Last Year: Sometimes true   Transportation Needs: Unmet Transportation Needs (8/29/2024)    PRAPARE - Transportation    • Lack of Transportation (Medical): Yes    • Lack of Transportation (Non-Medical): Yes   Physical Activity: Insufficiently Active (2/16/2024)    Exercise Vital Sign    • Days of Exercise per Week: 3 days    • Minutes of Exercise per Session: 10 min   Stress: No Stress Concern Present (2/22/2024)    [employer] [place] - Occupational Stress Questionnaire    • Feeling of Stress : Not at all    Social Connections ([facility] HRSN)   Housing Stability: Low Risk  (8/29/2024)    Housing Stability Vital Sign    • Unable to Pay for Housing in the Last Year: No    • Number of Times Moved in the Last Year: 0    • Homeless in the Last Year: No       Medications:   Medications:   • sodium chloride flush  5-40 mL IntraVENous 2 times per day   • enoxaparin  40 mg SubCUTAneous Daily   • amLODIPine  10 mg Oral Daily   • atorvastatin  20 mg Oral Daily   • gabapentin  300 mg Oral BID   • memantine  5 mg Oral BID   • pantoprazole  40 mg Oral Daily   • lisinopril  40 mg Oral Daily   • magnesium oxide  400 mg Oral Daily   • thiamine  100 mg Oral Daily   • folic acid  1 mg Oral Daily   • zinc sulfate  50 mg Oral Daily   • multivitamin  1 tablet Oral Daily   • sodium chloride  1 g Oral TID WC   • urea  15 g Oral Daily      Infusions:   • sodium chloride       PRN Meds: sodium chloride flush, 5-40 mL, PRN  sodium chloride, , PRN  potassium chloride, 20 mEq, PRN   Or  potassium chloride, 10 mEq, PRN  magnesium sulfate, 2,000 mg, PRN  ondansetron, 4 mg, Q8H PRN   Or  ondansetron, 4 mg, Q6H PRN  polyethylene glycol, 17 g, Daily PRN  acetaminophen, 650 mg, Q6H PRN   Or  acetaminophen, 650 mg, Q6H PRN  hydrALAZINE, 10 mg, Q6H PRN  aluminum & magnesium hydroxide-simethicone, 30 mL, Q6H PRN        Labs      CBC:   Recent Labs     08/28/24  2109 08/29/24  1818

## 2024-09-01 LAB
ANION GAP SERPL CALCULATED.3IONS-SCNC: 10 MMOL/L (ref 7–16)
BASOPHILS ABSOLUTE: 0 K/CU MM
BASOPHILS RELATIVE PERCENT: 0.4 % (ref 0–1)
BUN SERPL-MCNC: 39 MG/DL (ref 6–23)
CALCIUM IONIZED: ABNORMAL MMOL/L (ref 1.12–1.32)
CALCIUM IONIZED: NORMAL MMOL/L (ref 1.12–1.32)
CALCIUM SERPL-MCNC: 8.8 MG/DL (ref 8.3–10.6)
CHLORIDE BLD-SCNC: 94 MMOL/L (ref 99–110)
CO2: 24 MMOL/L (ref 21–32)
CREAT SERPL-MCNC: 0.7 MG/DL (ref 0.6–1.1)
DIFFERENTIAL TYPE: ABNORMAL
EOSINOPHILS ABSOLUTE: 0.1 K/CU MM
EOSINOPHILS RELATIVE PERCENT: 1.5 % (ref 0–3)
GFR, ESTIMATED: 82 ML/MIN/1.73M2
GLUCOSE SERPL-MCNC: 92 MG/DL (ref 70–99)
HCT VFR BLD CALC: 26.9 % (ref 37–47)
HEMOGLOBIN: 9.2 GM/DL (ref 12.5–16)
IMMATURE NEUTROPHIL %: 0.4 % (ref 0–0.43)
LYMPHOCYTES ABSOLUTE: 1.8 K/CU MM
LYMPHOCYTES RELATIVE PERCENT: 26.7 % (ref 24–44)
MAGNESIUM: 2 MG/DL (ref 1.8–2.4)
MCH RBC QN AUTO: 30.8 PG (ref 27–31)
MCHC RBC AUTO-ENTMCNC: 34.2 % (ref 32–36)
MCV RBC AUTO: 90 FL (ref 78–100)
MONOCYTES ABSOLUTE: 0.6 K/CU MM
MONOCYTES RELATIVE PERCENT: 9.2 % (ref 0–4)
NEUTROPHILS ABSOLUTE: 4.2 K/CU MM
NEUTROPHILS RELATIVE PERCENT: 61.8 % (ref 36–66)
NUCLEATED RBC %: 0 %
PDW BLD-RTO: 12.6 % (ref 11.7–14.9)
PHOSPHORUS: 2.7 MG/DL (ref 2.5–4.9)
PLATELET # BLD: 278 K/CU MM (ref 140–440)
PMV BLD AUTO: 10.8 FL (ref 7.5–11.1)
POTASSIUM SERPL-SCNC: 4.4 MMOL/L (ref 3.5–5.1)
RBC # BLD: 2.99 M/CU MM (ref 4.2–5.4)
SODIUM BLD-SCNC: 128 MMOL/L (ref 135–145)
TOTAL IMMATURE NEUTOROPHIL: 0.03 K/CU MM
TOTAL NUCLEATED RBC: 0 K/CU MM
WBC # BLD: 6.8 K/CU MM (ref 4–10.5)

## 2024-09-01 PROCEDURE — 2580000003 HC RX 258: Performed by: STUDENT IN AN ORGANIZED HEALTH CARE EDUCATION/TRAINING PROGRAM

## 2024-09-01 PROCEDURE — 6360000002 HC RX W HCPCS: Performed by: STUDENT IN AN ORGANIZED HEALTH CARE EDUCATION/TRAINING PROGRAM

## 2024-09-01 PROCEDURE — 6370000000 HC RX 637 (ALT 250 FOR IP): Performed by: STUDENT IN AN ORGANIZED HEALTH CARE EDUCATION/TRAINING PROGRAM

## 2024-09-01 PROCEDURE — 83735 ASSAY OF MAGNESIUM: CPT

## 2024-09-01 PROCEDURE — 94761 N-INVAS EAR/PLS OXIMETRY MLT: CPT

## 2024-09-01 PROCEDURE — 36415 COLL VENOUS BLD VENIPUNCTURE: CPT

## 2024-09-01 PROCEDURE — 6370000000 HC RX 637 (ALT 250 FOR IP): Performed by: INTERNAL MEDICINE

## 2024-09-01 PROCEDURE — 82330 ASSAY OF CALCIUM: CPT

## 2024-09-01 PROCEDURE — 84100 ASSAY OF PHOSPHORUS: CPT

## 2024-09-01 PROCEDURE — 85025 COMPLETE CBC W/AUTO DIFF WBC: CPT

## 2024-09-01 PROCEDURE — 1200000000 HC SEMI PRIVATE

## 2024-09-01 PROCEDURE — 80048 BASIC METABOLIC PNL TOTAL CA: CPT

## 2024-09-01 RX ORDER — IPRATROPIUM BROMIDE AND ALBUTEROL SULFATE 2.5; .5 MG/3ML; MG/3ML
SOLUTION RESPIRATORY (INHALATION)
Status: DISPENSED
Start: 2024-09-01 | End: 2024-09-02

## 2024-09-01 RX ORDER — LISINOPRIL 20 MG/1
20 TABLET ORAL DAILY
Status: DISCONTINUED | OUTPATIENT
Start: 2024-09-02 | End: 2024-09-10 | Stop reason: HOSPADM

## 2024-09-01 RX ORDER — AMLODIPINE BESYLATE 5 MG/1
5 TABLET ORAL DAILY
Status: DISCONTINUED | OUTPATIENT
Start: 2024-09-02 | End: 2024-09-10 | Stop reason: HOSPADM

## 2024-09-01 RX ADMIN — ACETAMINOPHEN 650 MG: 325 TABLET ORAL at 17:05

## 2024-09-01 RX ADMIN — ZINC SULFATE 220 MG (50 MG) CAPSULE 50 MG: CAPSULE at 08:56

## 2024-09-01 RX ADMIN — Medication 15 G: at 08:58

## 2024-09-01 RX ADMIN — ATORVASTATIN CALCIUM 20 MG: 10 TABLET, FILM COATED ORAL at 08:57

## 2024-09-01 RX ADMIN — ENOXAPARIN SODIUM 40 MG: 100 INJECTION SUBCUTANEOUS at 08:58

## 2024-09-01 RX ADMIN — FOLIC ACID 1 MG: 1 TABLET ORAL at 08:57

## 2024-09-01 RX ADMIN — MEMANTINE 5 MG: 10 TABLET ORAL at 08:57

## 2024-09-01 RX ADMIN — Medication 1 TABLET: at 08:57

## 2024-09-01 RX ADMIN — MEMANTINE 5 MG: 10 TABLET ORAL at 21:42

## 2024-09-01 RX ADMIN — PANTOPRAZOLE SODIUM 40 MG: 40 TABLET, DELAYED RELEASE ORAL at 06:16

## 2024-09-01 RX ADMIN — GABAPENTIN 300 MG: 300 CAPSULE ORAL at 21:42

## 2024-09-01 RX ADMIN — SODIUM CHLORIDE, PRESERVATIVE FREE 10 ML: 5 INJECTION INTRAVENOUS at 21:43

## 2024-09-01 RX ADMIN — Medication 400 MG: at 08:57

## 2024-09-01 RX ADMIN — SODIUM CHLORIDE 1 G: 1 TABLET ORAL at 08:57

## 2024-09-01 RX ADMIN — ACETAMINOPHEN 650 MG: 325 TABLET ORAL at 00:48

## 2024-09-01 RX ADMIN — SODIUM CHLORIDE 1 G: 1 TABLET ORAL at 17:00

## 2024-09-01 RX ADMIN — SODIUM CHLORIDE 1 G: 1 TABLET ORAL at 12:00

## 2024-09-01 RX ADMIN — GABAPENTIN 300 MG: 300 CAPSULE ORAL at 08:56

## 2024-09-01 RX ADMIN — Medication 100 MG: at 08:56

## 2024-09-01 RX ADMIN — SODIUM CHLORIDE, PRESERVATIVE FREE 10 ML: 5 INJECTION INTRAVENOUS at 08:58

## 2024-09-01 ASSESSMENT — PAIN DESCRIPTION - ORIENTATION
ORIENTATION: LOWER
ORIENTATION: LOWER

## 2024-09-01 ASSESSMENT — PAIN SCALES - WONG BAKER
WONGBAKER_NUMERICALRESPONSE: NO HURT

## 2024-09-01 ASSESSMENT — PAIN DESCRIPTION - LOCATION
LOCATION: BACK
LOCATION: LEG;BACK
LOCATION: BACK

## 2024-09-01 ASSESSMENT — PAIN DESCRIPTION - DESCRIPTORS
DESCRIPTORS: ACHING
DESCRIPTORS: ACHING

## 2024-09-01 ASSESSMENT — PAIN DESCRIPTION - FREQUENCY
FREQUENCY: INTERMITTENT
FREQUENCY: INTERMITTENT

## 2024-09-01 ASSESSMENT — PAIN DESCRIPTION - PAIN TYPE
TYPE: CHRONIC PAIN
TYPE: CHRONIC PAIN

## 2024-09-01 ASSESSMENT — PAIN SCALES - GENERAL
PAINLEVEL_OUTOF10: 0
PAINLEVEL_OUTOF10: 7
PAINLEVEL_OUTOF10: 4
PAINLEVEL_OUTOF10: 4

## 2024-09-01 ASSESSMENT — PAIN - FUNCTIONAL ASSESSMENT: PAIN_FUNCTIONAL_ASSESSMENT: ACTIVITIES ARE NOT PREVENTED

## 2024-09-01 NOTE — PROGRESS NOTES
V2.0  Progress Note      Name:  Carol Xiong /Age/Sex: 1933  (89 y.o. female)   MRN & CSN:  [id number] & [id number] Encounter Date/Time: 2024 2:57 PM EDT   Location:  -A PCP: Kayden Gonzalez MD       Hospital Day: 5    Assessment and Plan:   Carol Xiong is a 89 y.o. female who presents with Hyponatremia    Hospital Problems             Last Modified POA    * (Principal) Hyponatremia 2024 Yes    Moderate malnutrition (HCC) (Chronic) 2024 Yes       Plan:    Hyponatremia -Presented with Na 113 (from wnl ~3 months prior), improving  Stopped home HCTZ (avoid thiazides)  S/p fluids in ER, sodium studies obtained after this  Fluid restriction to 1800 mL, salt tablets and urea  Nephrology following  Slow correction of Na by no greater than 6-8 mEq in 24 hrs  Recent fall -likely 2/2 above  Correct Na as above  PT/OT  Dizziness with hx of TIA -CTH negative for acute abnormality. CTA head/neck with critical stenosis of right carotid artery and severe stenosis of left carotid artery.  Evaluated by CTS in  for similar findings at which time CEA was recommended however pt did not wish to proceed with this at the time. Discussed this again with her and pt would like more information to determine about whether she would like to pursue intervention  Neurointervention consulted for input  Hypertension  Avoid thiazides thus stopped home HCTZ  Initiated lisinopril, continue home amlodipine  Nephrology following  Neuropathy -continue home neurontin      Disposition:   Current Living situation: Home  Expected Disposition: TBD  Estimated D/C: 2-3 days    Diet ADULT ORAL NUTRITION SUPPLEMENT; Breakfast, Lunch, Dinner; Standard High Calorie/High Protein Oral Supplement  ADULT DIET; Regular; 1800 ml   DVT Prophylaxis [x] Lovenox, []  Heparin, [] SCDs, [] Ambulation,  [] Eliquis, [] Xarelto, [] Coumadin   Code Status Full Code   Surrogate Decision Maker/ POA      Personally reviewed Lab  visualized brain. AORTIC ARCH: Normal visualized aortic arch.  Normal origins of the brachiocephalic, left common carotid, and left subclavian arteries. RIGHT CAROTID ARTERIES: Tortuous proximal right common carotid artery (CCA). Large amount of calcified plaque of the right common carotid bulb.   Critical (99%) stenosis of the origin of the right internal carotid (ICA) artery.  Normal visualized cervical portion of the right internal carotid artery. Severe (80%) stenosis of the origin of the right external carotid artery (ECA). LEFT CAROTID ARTERIES: Tortuous proximal left common carotid artery (CCA). Large amount of calcified plaque of the left common carotid bulb.   Severe (80%) stenosis of the origin of the left internal carotid (ICA) artery without a hemodynamically significant stenosis.  Normal visualized cervical portion of the left internal carotid artery. Mild (20%) stenosis of the origin of the left external carotid artery (ECA). VERTEBRAL ARTERIES: Normal bilateral vertebral arteries. ___________________________________    Normal CTA Head with contrast. Critical (99%) right carotid stenosis. Severe (80%) left carotid stenosis. Patent vertebral arteries bilaterally. Electronically signed by Obey Tapia    CT Head W/O Contrast    Result Date: 8/28/2024  Head CT INDICATION: Dizziness TECHNIQUE: Multiple 2D axial images obtained through the brain without intravenous contrast.  Radiation dose reduction techniques were used for this study:  All CT scans performed at this facility use one or all of the following: Automated exposure control, adjustment of the mA and/or kVp according to patient's size, iterative reconstruction. COMPARISON: 4/24/2024 FINDINGS: Confluent areas of decreased attenuation within the paravertebral white matter consistent with ischemic white matter changes. No change in encephalomalacia in the left frontal lobe consistent with a chronic infarct. There is no CT evidence of acute

## 2024-09-01 NOTE — PLAN OF CARE
Problem: Discharge Planning  Goal: Discharge to home or other facility with appropriate resources  Outcome: Progressing     Problem: Pain  Goal: Verbalizes/displays adequate comfort level or baseline comfort level  Outcome: Progressing     Problem: ABCDS Injury Assessment  Goal: Absence of physical injury  Outcome: Progressing     Problem: Skin/Tissue Integrity  Goal: Absence of new skin breakdown  Description: 1.  Monitor for areas of redness and/or skin breakdown  2.  Assess vascular access sites hourly  3.  Every 4-6 hours minimum:  Change oxygen saturation probe site  4.  Every 4-6 hours:  If on nasal continuous positive airway pressure, respiratory therapy assess nares and determine need for appliance change or resting period.  Outcome: Progressing     Problem: Safety - Adult  Goal: Free from fall injury  Outcome: Progressing     Problem: Nutrition Deficit:  Goal: Optimize nutritional status  Outcome: Progressing

## 2024-09-01 NOTE — PROGRESS NOTES
Nephrology Progress Note        [address], [address]  [place] [address]  Phone: [phone]  Office Hours: 8:30AM - 4:30PM  Monday - Friday 9/1/2024 7:02 AM  Subjective:   Admit Date: 8/28/2024  PCP: Kayden Gonzalez MD  Interval History:   On room air  Wants pancakes    Diet: ADULT ORAL NUTRITION SUPPLEMENT; Breakfast, Lunch, Dinner; Standard High Calorie/High Protein Oral Supplement  ADULT DIET; Regular; 1800 ml      Data:   Scheduled Meds:   sodium chloride flush  5-40 mL IntraVENous 2 times per day    enoxaparin  40 mg SubCUTAneous Daily    amLODIPine  10 mg Oral Daily    atorvastatin  20 mg Oral Daily    gabapentin  300 mg Oral BID    memantine  5 mg Oral BID    pantoprazole  40 mg Oral Daily    lisinopril  40 mg Oral Daily    magnesium oxide  400 mg Oral Daily    thiamine  100 mg Oral Daily    folic acid  1 mg Oral Daily    zinc sulfate  50 mg Oral Daily    multivitamin  1 tablet Oral Daily    sodium chloride  1 g Oral TID WC    urea  15 g Oral Daily     Continuous Infusions:   sodium chloride       PRN Meds:sodium chloride flush, sodium chloride, potassium chloride **OR** potassium chloride, magnesium sulfate, ondansetron **OR** ondansetron, polyethylene glycol, acetaminophen **OR** acetaminophen, aluminum & magnesium hydroxide-simethicone  I/O last 3 completed shifts:  In: 440 [P.O.:440]  Out: 1025 [Urine:1025]  No intake/output data recorded.    Intake/Output Summary (Last 24 hours) at 9/1/2024 0702  Last data filed at 9/1/2024 0500  Gross per 24 hour   Intake 440 ml   Output 1025 ml   Net -585 ml       CBC:   Recent Labs     08/30/24  0815 08/31/24  1019 09/01/24  0304   WBC 6.6 7.5 6.8   HGB 10.3* 12.9 9.2*    350 278       BMP:    Recent Labs     08/31/24  1508 08/31/24  2320 09/01/24  0304   * 128* 128*   K 3.7 4.5 4.4   CL 94* 95* 94*   CO2 24 23 24   BUN 47* 40* 39*   CREATININE 0.7 0.7 0.7   GLUCOSE 83 96 92   CALCIUM 8.8 8.9 8.8     Hepatic:   Recent Labs

## 2024-09-02 LAB
ANION GAP SERPL CALCULATED.3IONS-SCNC: 11 MMOL/L (ref 7–16)
BASOPHILS ABSOLUTE: 0.1 K/CU MM
BASOPHILS RELATIVE PERCENT: 0.5 % (ref 0–1)
BUN SERPL-MCNC: 54 MG/DL (ref 6–23)
CALCIUM SERPL-MCNC: 8.9 MG/DL (ref 8.3–10.6)
CHLORIDE BLD-SCNC: 97 MMOL/L (ref 99–110)
CO2: 23 MMOL/L (ref 21–32)
CREAT SERPL-MCNC: 1.1 MG/DL (ref 0.6–1.1)
DIFFERENTIAL TYPE: ABNORMAL
EOSINOPHILS ABSOLUTE: 0.1 K/CU MM
EOSINOPHILS RELATIVE PERCENT: 1.1 % (ref 0–3)
GFR, ESTIMATED: 47 ML/MIN/1.73M2
GLUCOSE SERPL-MCNC: 111 MG/DL (ref 70–99)
HCT VFR BLD CALC: 31.4 % (ref 37–47)
HEMOGLOBIN: 10.3 GM/DL (ref 12.5–16)
IMMATURE NEUTROPHIL %: 0.3 % (ref 0–0.43)
LYMPHOCYTES ABSOLUTE: 1.8 K/CU MM
LYMPHOCYTES RELATIVE PERCENT: 19.7 % (ref 24–44)
MAGNESIUM: 2 MG/DL (ref 1.8–2.4)
MCH RBC QN AUTO: 30.5 PG (ref 27–31)
MCHC RBC AUTO-ENTMCNC: 32.8 % (ref 32–36)
MCV RBC AUTO: 92.9 FL (ref 78–100)
MONOCYTES ABSOLUTE: 0.7 K/CU MM
MONOCYTES RELATIVE PERCENT: 7.4 % (ref 0–4)
NEUTROPHILS ABSOLUTE: 6.6 K/CU MM
NEUTROPHILS RELATIVE PERCENT: 71 % (ref 36–66)
NUCLEATED RBC %: 0 %
PDW BLD-RTO: 13 % (ref 11.7–14.9)
PHOSPHORUS: 2.5 MG/DL (ref 2.5–4.9)
PLATELET # BLD: 339 K/CU MM (ref 140–440)
PMV BLD AUTO: 10.8 FL (ref 7.5–11.1)
POTASSIUM SERPL-SCNC: 4.9 MMOL/L (ref 3.5–5.1)
RBC # BLD: 3.38 M/CU MM (ref 4.2–5.4)
REASON FOR REJECTION: NORMAL
REJECTED TEST: NORMAL
SODIUM BLD-SCNC: 131 MMOL/L (ref 135–145)
TOTAL IMMATURE NEUTOROPHIL: 0.03 K/CU MM
TOTAL NUCLEATED RBC: 0 K/CU MM
WBC # BLD: 9.3 K/CU MM (ref 4–10.5)

## 2024-09-02 PROCEDURE — 1200000000 HC SEMI PRIVATE

## 2024-09-02 PROCEDURE — 6370000000 HC RX 637 (ALT 250 FOR IP): Performed by: NURSE PRACTITIONER

## 2024-09-02 PROCEDURE — 84100 ASSAY OF PHOSPHORUS: CPT

## 2024-09-02 PROCEDURE — 6370000000 HC RX 637 (ALT 250 FOR IP): Performed by: STUDENT IN AN ORGANIZED HEALTH CARE EDUCATION/TRAINING PROGRAM

## 2024-09-02 PROCEDURE — 84439 ASSAY OF FREE THYROXINE: CPT

## 2024-09-02 PROCEDURE — 2580000003 HC RX 258: Performed by: STUDENT IN AN ORGANIZED HEALTH CARE EDUCATION/TRAINING PROGRAM

## 2024-09-02 PROCEDURE — 83735 ASSAY OF MAGNESIUM: CPT

## 2024-09-02 PROCEDURE — 99222 1ST HOSP IP/OBS MODERATE 55: CPT | Performed by: NURSE PRACTITIONER

## 2024-09-02 PROCEDURE — 6370000000 HC RX 637 (ALT 250 FOR IP): Performed by: INTERNAL MEDICINE

## 2024-09-02 PROCEDURE — 94761 N-INVAS EAR/PLS OXIMETRY MLT: CPT

## 2024-09-02 PROCEDURE — 6360000002 HC RX W HCPCS: Performed by: STUDENT IN AN ORGANIZED HEALTH CARE EDUCATION/TRAINING PROGRAM

## 2024-09-02 PROCEDURE — 84443 ASSAY THYROID STIM HORMONE: CPT

## 2024-09-02 PROCEDURE — 80048 BASIC METABOLIC PNL TOTAL CA: CPT

## 2024-09-02 PROCEDURE — 99232 SBSQ HOSP IP/OBS MODERATE 35: CPT | Performed by: INTERNAL MEDICINE

## 2024-09-02 PROCEDURE — 36415 COLL VENOUS BLD VENIPUNCTURE: CPT

## 2024-09-02 PROCEDURE — 85025 COMPLETE CBC W/AUTO DIFF WBC: CPT

## 2024-09-02 RX ORDER — ASPIRIN 81 MG/1
81 TABLET, CHEWABLE ORAL DAILY
Status: DISCONTINUED | OUTPATIENT
Start: 2024-09-02 | End: 2024-09-10 | Stop reason: HOSPADM

## 2024-09-02 RX ADMIN — Medication 400 MG: at 08:52

## 2024-09-02 RX ADMIN — ATORVASTATIN CALCIUM 20 MG: 10 TABLET, FILM COATED ORAL at 08:52

## 2024-09-02 RX ADMIN — SODIUM CHLORIDE, PRESERVATIVE FREE 10 ML: 5 INJECTION INTRAVENOUS at 20:26

## 2024-09-02 RX ADMIN — MEMANTINE 5 MG: 10 TABLET ORAL at 20:26

## 2024-09-02 RX ADMIN — Medication 15 G: at 08:52

## 2024-09-02 RX ADMIN — GABAPENTIN 300 MG: 300 CAPSULE ORAL at 20:26

## 2024-09-02 RX ADMIN — Medication 1 TABLET: at 08:52

## 2024-09-02 RX ADMIN — FOLIC ACID 1 MG: 1 TABLET ORAL at 08:52

## 2024-09-02 RX ADMIN — ZINC SULFATE 220 MG (50 MG) CAPSULE 50 MG: CAPSULE at 08:52

## 2024-09-02 RX ADMIN — SODIUM CHLORIDE, PRESERVATIVE FREE 10 ML: 5 INJECTION INTRAVENOUS at 09:30

## 2024-09-02 RX ADMIN — ACETAMINOPHEN 650 MG: 325 TABLET ORAL at 20:27

## 2024-09-02 RX ADMIN — SODIUM CHLORIDE 1 G: 1 TABLET ORAL at 08:52

## 2024-09-02 RX ADMIN — MEMANTINE 5 MG: 10 TABLET ORAL at 08:52

## 2024-09-02 RX ADMIN — ENOXAPARIN SODIUM 40 MG: 100 INJECTION SUBCUTANEOUS at 08:52

## 2024-09-02 RX ADMIN — GABAPENTIN 300 MG: 300 CAPSULE ORAL at 08:52

## 2024-09-02 RX ADMIN — Medication 100 MG: at 08:52

## 2024-09-02 RX ADMIN — PANTOPRAZOLE SODIUM 40 MG: 40 TABLET, DELAYED RELEASE ORAL at 08:52

## 2024-09-02 RX ADMIN — ASPIRIN 81 MG CHEWABLE TABLET 81 MG: 81 TABLET CHEWABLE at 17:02

## 2024-09-02 RX ADMIN — SODIUM CHLORIDE 1 G: 1 TABLET ORAL at 17:01

## 2024-09-02 ASSESSMENT — PAIN DESCRIPTION - DESCRIPTORS
DESCRIPTORS: ACHING

## 2024-09-02 ASSESSMENT — PAIN SCALES - GENERAL
PAINLEVEL_OUTOF10: 0
PAINLEVEL_OUTOF10: 3
PAINLEVEL_OUTOF10: 0
PAINLEVEL_OUTOF10: 3
PAINLEVEL_OUTOF10: 0

## 2024-09-02 ASSESSMENT — PAIN SCALES - WONG BAKER
WONGBAKER_NUMERICALRESPONSE: NO HURT

## 2024-09-02 ASSESSMENT — PAIN - FUNCTIONAL ASSESSMENT
PAIN_FUNCTIONAL_ASSESSMENT: PREVENTS OR INTERFERES SOME ACTIVE ACTIVITIES AND ADLS

## 2024-09-02 ASSESSMENT — PAIN DESCRIPTION - ORIENTATION
ORIENTATION: DISTAL

## 2024-09-02 ASSESSMENT — PAIN DESCRIPTION - LOCATION
LOCATION: BACK

## 2024-09-02 ASSESSMENT — PAIN DESCRIPTION - ONSET
ONSET: AWAKENED FROM SLEEP
ONSET: AWAKENED FROM SLEEP

## 2024-09-02 ASSESSMENT — PAIN DESCRIPTION - FREQUENCY
FREQUENCY: INTERMITTENT
FREQUENCY: INTERMITTENT

## 2024-09-02 ASSESSMENT — PAIN DESCRIPTION - PAIN TYPE
TYPE: CHRONIC PAIN

## 2024-09-02 NOTE — PROGRESS NOTES
Nephrology Progress Note        [address], [address]  [place] [address]  Phone: [phone]  Office Hours: 8:30AM - 4:30PM  Monday - Friday 9/2/2024 7:37 AM  Subjective:   Admit Date: 8/28/2024  PCP: Kayden Gonzalez MD  Interval History:   On room air  Having leg pains        Diet: ADULT ORAL NUTRITION SUPPLEMENT; Breakfast, Lunch, Dinner; Standard High Calorie/High Protein Oral Supplement  ADULT DIET; Regular; 1800 ml      Data:   Scheduled Meds:   [Held by provider] amLODIPine  5 mg Oral Daily    [Held by provider] lisinopril  20 mg Oral Daily    sodium chloride flush  5-40 mL IntraVENous 2 times per day    enoxaparin  40 mg SubCUTAneous Daily    atorvastatin  20 mg Oral Daily    gabapentin  300 mg Oral BID    memantine  5 mg Oral BID    pantoprazole  40 mg Oral Daily    magnesium oxide  400 mg Oral Daily    thiamine  100 mg Oral Daily    folic acid  1 mg Oral Daily    zinc sulfate  50 mg Oral Daily    multivitamin  1 tablet Oral Daily    sodium chloride  1 g Oral TID WC    urea  15 g Oral Daily     Continuous Infusions:   sodium chloride       PRN Meds:sodium chloride flush, sodium chloride, potassium chloride **OR** potassium chloride, magnesium sulfate, ondansetron **OR** ondansetron, polyethylene glycol, acetaminophen **OR** acetaminophen, aluminum & magnesium hydroxide-simethicone  I/O last 3 completed shifts:  In: 1640 [P.O.:1640]  Out: 975 [Urine:975]  No intake/output data recorded.    Intake/Output Summary (Last 24 hours) at 9/2/2024 0737  Last data filed at 9/1/2024 1700  Gross per 24 hour   Intake 1200 ml   Output 700 ml   Net 500 ml       CBC:   Recent Labs     08/30/24  0815 08/31/24  1019 09/01/24  0304   WBC 6.6 7.5 6.8   HGB 10.3* 12.9 9.2*    350 278       BMP:    Recent Labs     08/31/24  2320 09/01/24  0304 09/02/24  0129   * 128* 131*   K 4.5 4.4 4.9   CL 95* 94* 97*   CO2 23 24 23   BUN 40* 39* 54*   CREATININE 0.7 0.7 1.1   GLUCOSE 96 92 111*   CALCIUM

## 2024-09-02 NOTE — CONSULTS
Vascular/Interventional Neurology Service Consult Note  [facility]   Patient Name: Carol Xiong  : 1933        Subjective:   Reason for consult:   Carol Xiong is a 89-year-old female with a past medical history of GERD, carotid stenosis, HLD, HTN, and neuropathy presenting to [facility] on 24 with a one week history of lightheadedness.  She denies feeling like the room was spinning.  She reported feeling \" hot\" and like she may \" pass out.\"  She reported that the air in her house was not working.  Her sodium on presentation was 113.  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  CTA of the head and neck with 99% right ICA stenosis and 80% left ICA stenosis.  The patient has a known history of carotid stenosis.  In  she was seen by Dr. Hayes and a right CEA was recommended for stenosis estimated at 80%  The patient declined endarterectomy at that time based on her age.    Discussed with the patient risk and benefits of stenting of her carotid arteries.  Based on her age she is unsure she would want to proceed with intervention.  Discussed via telephone with the patient's granddaughter (Coleen) options for possible carotid stenting, including risks and benifits  The patient's granddaughter would like to discuss with other family members carotid stenting before a decision is made.     Past Medical History:   Diagnosis Date    GERD (gastroesophageal reflux disease)     H/O echocardiogram 2017    EF 55% Left atrial enlargement. Normal LV systolic. Minimal aoritc stenosis.     H/O echocardiogram 2020    EF 55-60%, Grade I DD, Mild AS.  Mildly dilated left atrium.    Hyperlipidemia     Hypertension     Neuropathy     :   Past Surgical History:   Procedure Laterality Date    APPENDECTOMY      COLON SURGERY      removed polops    HYSTERECTOMY, TOTAL ABDOMINAL (CERVIX REMOVED)      SPINE SURGERY       ARABELLA - CNP, 9/2/2024

## 2024-09-02 NOTE — PROGRESS NOTES
V2.0  Progress Note      Name:  Carol Xiong /Age/Sex: 1933  (89 y.o. female)   MRN & CSN:  [id number] & [id number] Encounter Date/Time: 2024 2:57 PM EDT   Location:  -A PCP: Kayden Gonzalez MD       Hospital Day: 6    Assessment and Plan:   Carol Xiong is a 89 y.o. female who presents with Hyponatremia    Hospital Problems             Last Modified POA    * (Principal) Hyponatremia 2024 Yes    Moderate malnutrition (HCC) (Chronic) 2024 Yes       Plan: hyponatremia pending correction/stability, PT/OT, neurointervention input    Hyponatremia -Presented with Na 113 (from wnl ~3 months prior), improving  Stopped home HCTZ (avoid thiazides)  S/p fluids in ER, sodium studies obtained after this  Fluid restriction to 1800 mL, salt tablets and urea  Nephrology following  Slow correction of Na by no greater than 6-8 mEq in 24 hrs  Recent fall -likely 2/2 above  Correct Na as above  PT/OT  Dizziness with hx of TIA -CTH negative for acute abnormality. CTA head/neck with critical stenosis of right carotid artery and severe stenosis of left carotid artery.  Evaluated by CTS in  for similar findings at which time CEA was recommended however pt did not wish to proceed with this at the time. Discussed this again with her and pt would like more information to determine about whether she would like to pursue intervention  Neurointervention consulted for input  Hypertension  Avoid thiazides thus stopped home HCTZ  Initiated lisinopril, continue home amlodipine  Nephrology following  Neuropathy -continue home neurontin      Disposition:   Current Living situation: Home  Expected Disposition: TBD  Estimated D/C: 1-2 days    Diet ADULT ORAL NUTRITION SUPPLEMENT; Breakfast, Lunch, Dinner; Standard High Calorie/High Protein Oral Supplement  ADULT DIET; Regular; 1800 ml   DVT Prophylaxis [x] Lovenox, []  Heparin, [] SCDs, [] Ambulation,  [] Eliquis, [] Xarelto, [] Coumadin   Code Status

## 2024-09-02 NOTE — FLOWSHEET NOTE
Patient's son Claude called for update, and insisted he should be patient's decision-maker as he is next-of-kin. This RN verified uploaded APS documents which list grand-daughter Coleen as healthcare POA. Claude then spoke with the patient and patient confirms she wants Coleen to retain POA status and any concerns her son has should be addressed with Coleen.

## 2024-09-03 ENCOUNTER — APPOINTMENT (OUTPATIENT)
Dept: NON INVASIVE DIAGNOSTICS | Age: 89
DRG: 641 | End: 2024-09-03
Attending: INTERNAL MEDICINE
Payer: MEDICARE

## 2024-09-03 ENCOUNTER — APPOINTMENT (OUTPATIENT)
Dept: GENERAL RADIOLOGY | Age: 89
DRG: 641 | End: 2024-09-03
Payer: MEDICARE

## 2024-09-03 PROBLEM — K22.81 ESOPHAGEAL POLYP: Status: ACTIVE | Noted: 2024-09-03

## 2024-09-03 PROBLEM — R13.14 PHARYNGOESOPHAGEAL DYSPHAGIA: Status: ACTIVE | Noted: 2024-09-03

## 2024-09-03 LAB
ANION GAP SERPL CALCULATED.3IONS-SCNC: 8 MMOL/L (ref 7–16)
BASOPHILS ABSOLUTE: 0.1 K/CU MM
BASOPHILS RELATIVE PERCENT: 0.6 % (ref 0–1)
BUN SERPL-MCNC: 52 MG/DL (ref 6–23)
CALCIUM SERPL-MCNC: 9 MG/DL (ref 8.3–10.6)
CHLORIDE BLD-SCNC: 101 MMOL/L (ref 99–110)
CO2: 26 MMOL/L (ref 21–32)
CREAT SERPL-MCNC: 1 MG/DL (ref 0.6–1.1)
DIFFERENTIAL TYPE: ABNORMAL
ECHO AO ROOT DIAM: 3 CM
ECHO AO ROOT INDEX: 1.76 CM/M2
ECHO AV AREA PEAK VELOCITY: 2.5 CM2
ECHO AV AREA VTI: 2.3 CM2
ECHO AV AREA/BSA PEAK VELOCITY: 1.5 CM2/M2
ECHO AV AREA/BSA VTI: 1.4 CM2/M2
ECHO AV MEAN GRADIENT: 6 MMHG
ECHO AV MEAN VELOCITY: 1.2 M/S
ECHO AV PEAK GRADIENT: 11 MMHG
ECHO AV PEAK VELOCITY: 1.6 M/S
ECHO AV VELOCITY RATIO: 0.81
ECHO AV VTI: 45.6 CM
ECHO BSA: 1.65 M2
ECHO EST RA PRESSURE: 3 MMHG
ECHO IVC PROX: 1.7 CM
ECHO LA AREA 4C: 20.1 CM2
ECHO LA DIAMETER INDEX: 2.35 CM/M2
ECHO LA DIAMETER: 4 CM
ECHO LA MAJOR AXIS: 6.5 CM
ECHO LA TO AORTIC ROOT RATIO: 1.33
ECHO LA VOL MOD A4C: 51 ML (ref 22–52)
ECHO LA VOLUME INDEX MOD A4C: 30 ML/M2 (ref 16–34)
ECHO LV E' LATERAL VELOCITY: 7 CM/S
ECHO LV E' SEPTAL VELOCITY: 5 CM/S
ECHO LV EDV A4C: 29 ML
ECHO LV EDV INDEX A4C: 17 ML/M2
ECHO LV EF PHYSICIAN: 60 %
ECHO LV EJECTION FRACTION A4C: 54 %
ECHO LV ESV A4C: 13 ML
ECHO LV ESV INDEX A4C: 8 ML/M2
ECHO LV FRACTIONAL SHORTENING: 41 % (ref 28–44)
ECHO LV INTERNAL DIMENSION DIASTOLE INDEX: 2.29 CM/M2
ECHO LV INTERNAL DIMENSION DIASTOLIC: 3.9 CM (ref 3.9–5.3)
ECHO LV INTERNAL DIMENSION SYSTOLIC INDEX: 1.35 CM/M2
ECHO LV INTERNAL DIMENSION SYSTOLIC: 2.3 CM
ECHO LV IVSD: 0.8 CM (ref 0.6–0.9)
ECHO LV MASS 2D: 105.3 G (ref 67–162)
ECHO LV MASS INDEX 2D: 62 G/M2 (ref 43–95)
ECHO LV POSTERIOR WALL DIASTOLIC: 1 CM (ref 0.6–0.9)
ECHO LV RELATIVE WALL THICKNESS RATIO: 0.51
ECHO LVOT AREA: 3.1 CM2
ECHO LVOT AV VTI INDEX: 0.77
ECHO LVOT DIAM: 2 CM
ECHO LVOT MEAN GRADIENT: 4 MMHG
ECHO LVOT PEAK GRADIENT: 7 MMHG
ECHO LVOT PEAK VELOCITY: 1.3 M/S
ECHO LVOT STROKE VOLUME INDEX: 64.5 ML/M2
ECHO LVOT SV: 109.6 ML
ECHO LVOT VTI: 34.9 CM
ECHO MV A VELOCITY: 0.86 M/S
ECHO MV E DECELERATION TIME (DT): 345 MS
ECHO MV E VELOCITY: 0.71 M/S
ECHO MV E/A RATIO: 0.83
ECHO MV E/E' LATERAL: 10.14
ECHO MV E/E' RATIO (AVERAGED): 12.17
ECHO MV E/E' SEPTAL: 14.2
ECHO RIGHT VENTRICULAR SYSTOLIC PRESSURE (RVSP): 38 MMHG
ECHO RV MID DIMENSION: 2.4 CM
ECHO TV REGURGITANT MAX VELOCITY: 2.95 M/S
ECHO TV REGURGITANT PEAK GRADIENT: 35 MMHG
EOSINOPHILS ABSOLUTE: 0.2 K/CU MM
EOSINOPHILS RELATIVE PERCENT: 2.3 % (ref 0–3)
GFR, ESTIMATED: 53 ML/MIN/1.73M2
GLUCOSE SERPL-MCNC: 92 MG/DL (ref 70–99)
HCT VFR BLD CALC: 28.3 % (ref 37–47)
HEMOGLOBIN: 9.4 GM/DL (ref 12.5–16)
IMMATURE NEUTROPHIL %: 0.4 % (ref 0–0.43)
LYMPHOCYTES ABSOLUTE: 1.9 K/CU MM
LYMPHOCYTES RELATIVE PERCENT: 24.7 % (ref 24–44)
MAGNESIUM: 1.7 MG/DL (ref 1.8–2.4)
MCH RBC QN AUTO: 30.6 PG (ref 27–31)
MCHC RBC AUTO-ENTMCNC: 33.2 % (ref 32–36)
MCV RBC AUTO: 92.2 FL (ref 78–100)
MONOCYTES ABSOLUTE: 0.8 K/CU MM
MONOCYTES RELATIVE PERCENT: 10 % (ref 0–4)
NEUTROPHILS ABSOLUTE: 4.8 K/CU MM
NEUTROPHILS RELATIVE PERCENT: 62 % (ref 36–66)
NUCLEATED RBC %: 0 %
PDW BLD-RTO: 12.9 % (ref 11.7–14.9)
PHOSPHORUS: 3 MG/DL (ref 2.5–4.9)
PLATELET # BLD: 309 K/CU MM (ref 140–440)
PMV BLD AUTO: 10.4 FL (ref 7.5–11.1)
POTASSIUM SERPL-SCNC: 3.9 MMOL/L (ref 3.5–5.1)
RBC # BLD: 3.07 M/CU MM (ref 4.2–5.4)
SODIUM BLD-SCNC: 135 MMOL/L (ref 135–145)
T4 FREE SERPL-MCNC: 0.98 NG/DL (ref 0.9–1.8)
TOTAL IMMATURE NEUTOROPHIL: 0.03 K/CU MM
TOTAL NUCLEATED RBC: 0 K/CU MM
TSH SERPL DL<=0.005 MIU/L-ACNC: 6.27 UIU/ML (ref 0.27–4.2)
WBC # BLD: 7.8 K/CU MM (ref 4–10.5)

## 2024-09-03 PROCEDURE — 83735 ASSAY OF MAGNESIUM: CPT

## 2024-09-03 PROCEDURE — 99223 1ST HOSP IP/OBS HIGH 75: CPT | Performed by: INTERNAL MEDICINE

## 2024-09-03 PROCEDURE — 92611 MOTION FLUOROSCOPY/SWALLOW: CPT

## 2024-09-03 PROCEDURE — 84100 ASSAY OF PHOSPHORUS: CPT

## 2024-09-03 PROCEDURE — 6370000000 HC RX 637 (ALT 250 FOR IP): Performed by: NURSE PRACTITIONER

## 2024-09-03 PROCEDURE — 6370000000 HC RX 637 (ALT 250 FOR IP): Performed by: INTERNAL MEDICINE

## 2024-09-03 PROCEDURE — 6370000000 HC RX 637 (ALT 250 FOR IP): Performed by: STUDENT IN AN ORGANIZED HEALTH CARE EDUCATION/TRAINING PROGRAM

## 2024-09-03 PROCEDURE — 99232 SBSQ HOSP IP/OBS MODERATE 35: CPT | Performed by: NURSE PRACTITIONER

## 2024-09-03 PROCEDURE — 74230 X-RAY XM SWLNG FUNCJ C+: CPT

## 2024-09-03 PROCEDURE — 6360000002 HC RX W HCPCS: Performed by: STUDENT IN AN ORGANIZED HEALTH CARE EDUCATION/TRAINING PROGRAM

## 2024-09-03 PROCEDURE — 6360000002 HC RX W HCPCS: Performed by: NURSE PRACTITIONER

## 2024-09-03 PROCEDURE — 2580000003 HC RX 258: Performed by: STUDENT IN AN ORGANIZED HEALTH CARE EDUCATION/TRAINING PROGRAM

## 2024-09-03 PROCEDURE — APPNB45 APP NON BILLABLE 31-45 MINUTES: Performed by: LICENSED PRACTICAL NURSE

## 2024-09-03 PROCEDURE — 80048 BASIC METABOLIC PNL TOTAL CA: CPT

## 2024-09-03 PROCEDURE — 92610 EVALUATE SWALLOWING FUNCTION: CPT

## 2024-09-03 PROCEDURE — 93306 TTE W/DOPPLER COMPLETE: CPT | Performed by: INTERNAL MEDICINE

## 2024-09-03 PROCEDURE — 1200000000 HC SEMI PRIVATE

## 2024-09-03 PROCEDURE — 99233 SBSQ HOSP IP/OBS HIGH 50: CPT | Performed by: INTERNAL MEDICINE

## 2024-09-03 PROCEDURE — 85025 COMPLETE CBC W/AUTO DIFF WBC: CPT

## 2024-09-03 PROCEDURE — 93306 TTE W/DOPPLER COMPLETE: CPT

## 2024-09-03 RX ORDER — KETOROLAC TROMETHAMINE 30 MG/ML
15 INJECTION, SOLUTION INTRAMUSCULAR; INTRAVENOUS ONCE
Status: COMPLETED | OUTPATIENT
Start: 2024-09-03 | End: 2024-09-03

## 2024-09-03 RX ADMIN — PANTOPRAZOLE SODIUM 40 MG: 40 TABLET, DELAYED RELEASE ORAL at 05:02

## 2024-09-03 RX ADMIN — ATORVASTATIN CALCIUM 20 MG: 10 TABLET, FILM COATED ORAL at 08:50

## 2024-09-03 RX ADMIN — ENOXAPARIN SODIUM 40 MG: 100 INJECTION SUBCUTANEOUS at 08:51

## 2024-09-03 RX ADMIN — FOLIC ACID 1 MG: 1 TABLET ORAL at 08:50

## 2024-09-03 RX ADMIN — ZINC SULFATE 220 MG (50 MG) CAPSULE 50 MG: CAPSULE at 08:49

## 2024-09-03 RX ADMIN — Medication 100 MG: at 08:50

## 2024-09-03 RX ADMIN — SODIUM CHLORIDE, PRESERVATIVE FREE 10 ML: 5 INJECTION INTRAVENOUS at 21:05

## 2024-09-03 RX ADMIN — Medication 400 MG: at 08:50

## 2024-09-03 RX ADMIN — GABAPENTIN 300 MG: 300 CAPSULE ORAL at 08:49

## 2024-09-03 RX ADMIN — MEMANTINE 5 MG: 10 TABLET ORAL at 08:50

## 2024-09-03 RX ADMIN — Medication 1 TABLET: at 08:50

## 2024-09-03 RX ADMIN — ASPIRIN 81 MG CHEWABLE TABLET 81 MG: 81 TABLET CHEWABLE at 08:50

## 2024-09-03 RX ADMIN — SODIUM CHLORIDE 1 G: 1 TABLET ORAL at 08:50

## 2024-09-03 RX ADMIN — KETOROLAC TROMETHAMINE 15 MG: 30 INJECTION, SOLUTION INTRAMUSCULAR; INTRAVENOUS at 21:45

## 2024-09-03 RX ADMIN — SODIUM CHLORIDE, PRESERVATIVE FREE 10 ML: 5 INJECTION INTRAVENOUS at 08:49

## 2024-09-03 ASSESSMENT — PAIN SCALES - GENERAL
PAINLEVEL_OUTOF10: 8
PAINLEVEL_OUTOF10: 0
PAINLEVEL_OUTOF10: 8
PAINLEVEL_OUTOF10: 0

## 2024-09-03 ASSESSMENT — PAIN SCALES - WONG BAKER
WONGBAKER_NUMERICALRESPONSE: NO HURT
WONGBAKER_NUMERICALRESPONSE: NO HURT

## 2024-09-03 ASSESSMENT — PAIN DESCRIPTION - DESCRIPTORS: DESCRIPTORS: ACHING

## 2024-09-03 ASSESSMENT — PAIN DESCRIPTION - LOCATION
LOCATION: HEAD
LOCATION: HEAD

## 2024-09-03 NOTE — PROGRESS NOTES
CARDIOLOGY PROGRESS NOTE                                                  Name:  Carol Xiong /Age/Sex: 1933  (89 y.o. female)   MRN & CSN:  [id number] & [id number] Admission Date/Time: 2024  8:43 PM   Location:  -A PCP: Kayden Gonzalez MD         Admit Date:  2024  Hospital Day: 7      SUBJECTIVE:     Seen patient as follow up as consultation for bradycardia         TELEMETRY:  SR        Intake/Output Summary (Last 24 hours) at 9/3/2024 1652  Last data filed at 9/3/2024 1600  Gross per 24 hour   Intake 120 ml   Output 1175 ml   Net -1055 ml       Assessment/Plan:         Asymptomatic bradycardia: At baseline     At this time will observe  Tsh normal   ECHO below  No indication for pacemaker at this time     Carotid artery disease: neurointervention consultation: Patient not interested in any interventions.  Continue with aspirin and statins     Severe hyponatremia: Thiazide diuretics on hold, nephrology follow-up  Hypomagnesemia: Replete magnesium  Essential hypertension:    ? Amlodipine and lisinopril has been on hold  Restart patient on amlodipine 5 mg daily      Hyperlipidemia: Continue with statins Lipitor 20 mg    No further workup  Call us with questions         Left Ventricle: Normal left ventricular systolic function with a visually estimated EF of 60 - 65%. Left ventricle size is normal. Normal wall thickness. Normal wall motion.    Aortic Valve: Calcified aortic valve.    Mitral Valve: Mitral annular calcification.    Tricuspid Valve: Mild regurgitation. The estimated RVSP is 38 mmHg.    Pericardium: No pericardial effusion.            Past medical history:    has a past medical history of GERD (gastroesophageal reflux disease), H/O echocardiogram, H/O echocardiogram, Hyperlipidemia, Hypertension, and Neuropathy.  Past surgical history:   has a past surgical history that includes Spine surgery (); Colon surgery; Hysterectomy, total abdominal; and

## 2024-09-03 NOTE — PROGRESS NOTES
V2.0  Progress Note      Name:  Carol Xiong /Age/Sex: 1933  (89 y.o. female)   MRN & CSN:  [id number] & [id number] Encounter Date/Time: 9/3/2024 2:57 PM EDT   Location:  -A PCP: Kayden Gonzalez MD       Hospital Day: 7    Assessment and Plan:   Carol Xiong is a 89 y.o. female who presents with Hyponatremia    Hospital Problems             Last Modified POA    * (Principal) Hyponatremia 2024 Yes    Moderate malnutrition (HCC) (Chronic) 2024 Yes         Hyponatremia   -Presented with Na 113 (from wnl ~3 months prior), improving  Stopped home HCTZ (avoid thiazides)  S/p fluids in ER, sodium studies obtained after this  Fluid restriction to 1800 mL, salt tablets and urea  Nephrology following  Slow correction of Na by no greater than 6-8 mEq in 24 hrs  -Nephrology recommends 1800 mL/day, and salt tabs 1 g twice daily upon discharge and stopping triamterene hydrochlorothiazide.  Nephrology also recommends outpatient follow-up and is signing off.  Recent fall   -likely 2/2 above  Correct Na as above  PT/OT  Dizziness with hx of TIA, carotid artery disease  -CTH negative for acute abnormality. CTA head/neck with critical stenosis of right carotid artery and severe stenosis of left carotid artery.  Evaluated by CTS in  for similar findings at which time CEA was recommended however pt did not wish to proceed with this at the time. Discussed this again with her and pt would like more information to determine about whether she would like to pursue intervention  -Patient has declined any intervention from interventional neurology.  On aspirin and statin.  Hypertension  Avoid thiazides thus stopped home HCTZ  Nephrology following  -Lisinopril held.  Restart amlodipine.  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  At this time patient is declining EGD. NPO.  mother.  Soc HX:   Social History     Socioeconomic History    Marital status:      Spouse name: None    Number of children: None    Years of education: None    Highest education level: None   Tobacco Use    Smoking status: Never    Smokeless tobacco: Never   Substance and Sexual Activity    Alcohol use: Not Currently    Sexual activity: Not Currently     Social Determinants of Health     Financial Resource Strain: Medium Risk (5/23/2024)    Overall Financial Resource Strain (CARDIA)     Difficulty of Paying Living Expenses: Somewhat hard   Food Insecurity: Food Insecurity Present (8/29/2024)    Hunger Vital Sign     Worried About Running Out of Food in the Last Year: Sometimes true     Ran Out of Food in the Last Year: Sometimes true   Transportation Needs: Unmet Transportation Needs (8/29/2024)    PRAPARE - Transportation     Lack of Transportation (Medical): Yes     Lack of Transportation (Non-Medical): Yes   Physical Activity: Insufficiently Active (2/16/2024)    Exercise Vital Sign     Days of Exercise per Week: 3 days     Minutes of Exercise per Session: 10 min   Stress: No Stress Concern Present (2/22/2024)    [employer] [place] [employer] - Occupational Stress Questionnaire     Feeling of Stress : Not at all    Social Connections ([facility] HRSN)   Housing Stability: Low Risk  (8/29/2024)    Housing Stability Vital Sign     Unable to Pay for Housing in the Last Year: No     Number of Times Moved in the Last Year: 0     Homeless in the Last Year: No       Medications:   Medications:    aspirin  81 mg Oral Daily    [Held by provider] amLODIPine  5 mg Oral Daily    [Held by provider] lisinopril  20 mg Oral Daily    sodium chloride flush  5-40 mL IntraVENous 2 times per day    enoxaparin  40 mg SubCUTAneous Daily    atorvastatin  20 mg Oral Daily    gabapentin  300 mg Oral BID    memantine  5 mg Oral BID    pantoprazole  40 mg Oral Daily    magnesium oxide  400 mg Oral Daily    thiamine  100 mg Oral

## 2024-09-03 NOTE — PROGRESS NOTES
Vascular/Interventional Neurology Progress Note  [facility]  Patient Name: Carol Xiong     : 1933      Subjective:     The patient was seen and examined.  Chart reviewed.  The patient is resting comfortably in bed.  Dr. Mancia discussed with the patient at bedside the risk and benefits of carotid stenting.  The patient states that given her advanced age she does not wish to have the procedure at this time. Will call and discuss with the patient granddaughter, Coleen DUQUE.       Objective:   Scheduled Meds:   aspirin  81 mg Oral Daily    [Held by provider] amLODIPine  5 mg Oral Daily    [Held by provider] lisinopril  20 mg Oral Daily    sodium chloride flush  5-40 mL IntraVENous 2 times per day    enoxaparin  40 mg SubCUTAneous Daily    atorvastatin  20 mg Oral Daily    gabapentin  300 mg Oral BID    memantine  5 mg Oral BID    pantoprazole  40 mg Oral Daily    magnesium oxide  400 mg Oral Daily    thiamine  100 mg Oral Daily    folic acid  1 mg Oral Daily    zinc sulfate  50 mg Oral Daily    multivitamin  1 tablet Oral Daily    sodium chloride  1 g Oral TID WC     Continuous Infusions:   sodium chloride       PRN Meds:.sodium chloride flush, sodium chloride, potassium chloride **OR** potassium chloride, magnesium sulfate, ondansetron **OR** ondansetron, polyethylene glycol, acetaminophen **OR** acetaminophen, aluminum & magnesium hydroxide-simethicone    Vital Signs:  Vitals:    24 0511 24 0600 24 0817 24 0900   BP: (!) 130/40  (!) 111/59    Pulse: (!) 43 (!) 44 (!) 42 62   Resp: 14 14 15 18   Temp: 97.7 °F (36.5 °C)  98.1 °F (36.7 °C)    TempSrc: Oral  Oral    SpO2: 99% 100% 100% 100%   Weight:  57.6 kg (126 lb 15.8 oz)     Height:            General: A&O x 4, NAD, cooperative  HEENT: NC/AT, EOMI, PERRL, mmm, neck supple  Extremities: no edema, no calf tenderness b/l    Neurological Exam:         Mental Status:  A&O to self, location, and year. NAD, speech

## 2024-09-03 NOTE — PROGRESS NOTES
Pt frequently asking when she is able to eat. Pt reached out to Dr. Turner for clarification regarding GI plan following Barium Swallow. Per MD, SLP to reassess FIRST and then diet may be ordered. Pt refused EGD with GI.

## 2024-09-03 NOTE — PROGRESS NOTES
CARDIOLOGY PROGRESS NOTE                                                  Name:  Carol Xiong /Age/Sex: 1933  (89 y.o. female)   MRN & CSN:  [id number] & [id number] Admission Date/Time: 2024  8:43 PM   Location:  -A PCP: Kayden Gonzalez MD         Admit Date:  2024  Hospital Day: 6      SUBJECTIVE:     Seen patient as follow up as consultation for bradycardia     No chest pain.  No shortness of breath  No palpations    TELEMETRY:          Intake/Output Summary (Last 24 hours) at 2024  Last data filed at 2024 1800  Gross per 24 hour   Intake 300 ml   Output 400 ml   Net -100 ml       Assessment/Plan:         Asymptomatic bradycardia:  resolved     At this time will observe  Tsh normal   Obtain echocardiogram Tuesday.  No indication for pacemaker at this time     Carotid artery disease: neurointervention consultation     Severe hyponatremia: Thiazide diuretics on hold, nephrology follow-up  Hypomagnesemia: Replete magnesium  Essential hypertension: Blood pressure is fairly well-controlled, continue with amlodipine 10 mg daily, continue lisinopril 40 mg daily  Hyperlipidemia: Continue with statins Lipitor 20 mg           Past medical history:    has a past medical history of GERD (gastroesophageal reflux disease), H/O echocardiogram, H/O echocardiogram, Hyperlipidemia, Hypertension, and Neuropathy.  Past surgical history:   has a past surgical history that includes Spine surgery (); Colon surgery; Hysterectomy, total abdominal; and Appendectomy.  Social History:   reports that she has never smoked. She has never used smokeless tobacco. She reports that she does not currently use alcohol.  Family history:  family history includes Hypertension in her father and mother.    OBJECTIVE:     BP (!) 146/42   Pulse 60   Temp 97.8 °F (36.6 °C) (Oral)   Resp 15   Ht 1.753 m (5' 9\")   Wt 57.7 kg (127 lb 3.3 oz)   SpO2 100%   BMI 18.78 kg/m²     Intake/Output Summary

## 2024-09-03 NOTE — PROGRESS NOTES
09/03/24 1154   Encounter Summary   Encounter Overview/Reason Follow-up   Service Provided For Patient   Referral/Consult From Patient   Support System Family members;Children   Last Encounter  09/03/24   Complexity of Encounter Low   Begin Time 1040   End Time  1115   Total Time Calculated 35 min   Spiritual/Emotional needs   Type Spiritual Support   Grief, Loss, and Adjustments   Type Adjustment to illness   Advance Care Planning   Type Completed AD/ACP document(s)   Assessment/Intervention/Outcome   Assessment Calm;Coping;Hopeful   Intervention Active listening;Empowerment;Sustaining Presence/Ministry of presence   Outcome Connection/Belonging;Encouraged;Engaged in conversation;Expressed Gratitude;Optimistic;Receptive   Plan and Referrals   Plan/Referrals Continue Support (comment)

## 2024-09-03 NOTE — PROGRESS NOTES
09/03/24 1050   Encounter Summary   Encounter Overview/Reason Follow-up   Service Provided For Patient   Referral/Consult From Nurse;Patient   Support System Family members   Last Encounter  09/03/24   Complexity of Encounter Moderate   Begin Time 1000   End Time  1025   Total Time Calculated 25 min   Spiritual/Emotional needs   Type Spiritual Support   Grief, Loss, and Adjustments   Type Adjustment to illness   Advance Care Planning   Type ACP conversation   Assessment/Intervention/Outcome   Assessment Calm;Concerns with suffering;Coping   Intervention Active listening;Empowerment;Explored/Affirmed feelings, thoughts, concerns;Explored Coping Skills/Resources;Sustaining Presence/Ministry of presence   Outcome Connection/Belonging;Conflict resolved   Plan and Referrals   Plan/Referrals Continue Support (comment);Developed Care Plan (see consult note)     Patient wanted to add her son on the HCPOA. Patient also shared her opinion on the care plan. This  provided emotional support.

## 2024-09-03 NOTE — PROCEDURES
Johnathonte consumed trials of thin liquid via tsp/cup/straw and puree during this visit. Pt presents with evidence of oropharyngeal dysphagia. Oral phase is characterized by repetitive/slow tongue motion during bolus transport and delayed initiation of the pharyngeal swallow with the bolus head in the valleculae. Collection of oral residue noted on the floor of mouth, tongue, and palate. Pharyngeal phase is characterized by reduced laryngeal vestibular closure, diminished pharyngeal stripping wave, minimal duration/distension of PES opening d/t obstruction, and reduced tongue base retraction. Collection of pharyngeal residue noted on BOT, valleculae, posterior pharyngeal wall, and around abnormal structure present in upper esophagus. Pt noted to have a round structure in the upper esophagus obstructing bolus flow and creating increased back-up of residue in the pharynx. SLP spoke with radiologist, Dr. Keith, following completion of the study. 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 Radiologist reports \"Correlation with esophagoscopy should be helpful.\" SLP notified attending of findings.    Penetration midway to the vocal folds noted with trials of thin liquid via cup sip/straw prior to solid trial, majority of which clears. Pt aspirated trial of thin liquid via straw following trial of puree. Pt noted to have reduced pharyngeal clearance d/t obstruction in upper esophagus, resulting in aspiration of material in the pharynx after the swallow. Immediate strong cough response noted, however, material was not visualized clearing the airway.     Recommendations: Recommend Carol Xiong remain NPO at this time as pt is unable to safely tolerate PO diet d/t high risk of aspiration. Recommend consult to GI for further evaluation of esophagus. SLP to follow to monitor pt's readiness to begin a PO diet  pending further intervention.    Consistencies Administered: Pureed;Thin cup;Thin teaspoon;Thin straw       Dysphagia Outcome Severity Scale: Level 1: Severe dysphagia- NPO. Unable to tolerate any PO safely  Penetration-Aspiration Scale (PAS): 7 - Material enters the airway, passes below the vocal folds, and is not ejected from the trachea despite effort    Recommended Diet:  Solid consistency: NPO  Liquid consistency: NPO     Recommendations/Treatment  Requires SLP Intervention: Yes        D/C Recommendations: Ongoing speech therapy is recommended during this hospitalization     Recommended Exercises:    Therapeutic Interventions: Patient/Family education;Therapeutic PO trials with SLP       Education: Images and recommendations were reviewed with Carol Xiong following this exam.   Patient Education: Recommendations/POC  Patient Education Response: Verbalizes understanding;Needs reinforcement    Duration/Frequency of Treatment  Duration of Treatment: LOS or until goals are met  Frequency of Treatment: 3x/week  Safety Devices  Safety Devices in place: Not Applicable  Type of devices: Bed alarm in place  Restraints Initially in Place: No      Goals:    Short Term:     Goal 1: Pt will participate in a modified barium swallow study to further assess pt's oropharyngeal swallow  Goal 2: Pt/caregivers will demonstrate understanding of SLP recommendations/POC       Esophageal Phase  Esophageal Screen: Impaired  Upper Esophageal Screen- Major Contributing Deficits  Major Contributing Deficits: Reduced Cricopharyngeal Opening    Pain      Pain Level: 0  Pain Type: Chronic pain  Pain Location: Back      Therapy Time:   Individual Concurrent Group Co-treatment   Time In 1231         Time Out 1259         Minutes 28                 Myra Velasquez MA, CF-SLP 9/3/2024

## 2024-09-03 NOTE — PROGRESS NOTES
Discussed via telephone with the patient's granddaughter, Coleen DUQUE that Dr. Mancia personally discussed with the patient in detail options for carotid artery stenting, including risk and benefits.  The patient stated that given her advanced age that she declines and would not like to forego any invasive procedures including carotid stenting at this time.  Coleen was agreeable and verbalized understanding.

## 2024-09-03 NOTE — PROGRESS NOTES
Nephrology Progress Note        [address] [facility], [address] [place] [address]  Phone: [phone]  Office Hours: 8:30AM - 4:30PM  Monday - Friday        9/3/2024 6:32 AM  Subjective:   Admit Date: 8/28/2024  PCP: Kayden Gonzalez MD  Interval History:   Doing well today    Diet: ADULT ORAL NUTRITION SUPPLEMENT; Breakfast, Lunch, Dinner; Standard High Calorie/High Protein Oral Supplement  ADULT DIET; Regular; 1800 ml      Data:   Scheduled Meds:   aspirin  81 mg Oral Daily    [Held by provider] amLODIPine  5 mg Oral Daily    [Held by provider] lisinopril  20 mg Oral Daily    sodium chloride flush  5-40 mL IntraVENous 2 times per day    enoxaparin  40 mg SubCUTAneous Daily    atorvastatin  20 mg Oral Daily    gabapentin  300 mg Oral BID    memantine  5 mg Oral BID    pantoprazole  40 mg Oral Daily    magnesium oxide  400 mg Oral Daily    thiamine  100 mg Oral Daily    folic acid  1 mg Oral Daily    zinc sulfate  50 mg Oral Daily    multivitamin  1 tablet Oral Daily    sodium chloride  1 g Oral TID WC     Continuous Infusions:   sodium chloride       PRN Meds:sodium chloride flush, sodium chloride, potassium chloride **OR** potassium chloride, magnesium sulfate, ondansetron **OR** ondansetron, polyethylene glycol, acetaminophen **OR** acetaminophen, aluminum & magnesium hydroxide-simethicone  I/O last 3 completed shifts:  In: 1500 [P.O.:1500]  Out: 1100 [Urine:1100]  I/O this shift:  In: -   Out: 500 [Urine:500]    Intake/Output Summary (Last 24 hours) at 9/3/2024 0632  Last data filed at 9/3/2024 0519  Gross per 24 hour   Intake 300 ml   Output 900 ml   Net -600 ml       CBC:   Recent Labs     09/01/24  0304 09/02/24  0958 09/03/24  0506   WBC 6.8 9.3 7.8   HGB 9.2* 10.3* 9.4*    339 309       BMP:    Recent Labs     09/01/24  0304 09/02/24  0129 09/03/24  0506   * 131* 135   K 4.4 4.9 3.9   CL 94* 97* 101   CO2 24 23 26   BUN 39* 54* 52*   CREATININE 0.7 1.1 1.0   GLUCOSE 92 111* 92

## 2024-09-03 NOTE — PROGRESS NOTES
Pt experiencing choking/coughing during AM medication administration. Dr. Angulo notified. Pt made NPO. SLP consulted

## 2024-09-03 NOTE — PROGRESS NOTES
09/03/24 1154   Encounter Summary   Encounter Overview/Reason Follow-up   Service Provided For Patient   Referral/Consult From Patient   Support System Family members;Children   Last Encounter  09/03/24   Complexity of Encounter Low   Begin Time 1040   End Time  1115   Total Time Calculated 35 min   Spiritual/Emotional needs   Type Spiritual Support   Grief, Loss, and Adjustments   Type Adjustment to illness   Advance Care Planning   Type Completed AD/ACP document(s)   Assessment/Intervention/Outcome   Assessment Calm;Coping;Hopeful   Intervention Active listening;Empowerment;Sustaining Presence/Ministry of presence   Outcome Connection/Belonging;Encouraged;Engaged in conversation;Expressed Gratitude;Optimistic;Receptive   Plan and Referrals   Plan/Referrals Continue Support (comment)     Patient completed new HCPOA.

## 2024-09-03 NOTE — CONSULTS
[facility]             MD Lisa Holliday MD Carol Christensen, APRN-CNP       Vianey Kumar, APRN-CNP             [address] [place] [address]             [phone] fax [phone]      Physician attestation:  I have personally seen and examined the patient independently. I have reviewed the patient's available data,including medical history and recent test results. Reviewed and discussed note as documented by THI.  I agree with the physical exam findings, assessment and plan.     Briefly   81-year-old female with past medical history of GERD, hyperlipidemia, hypertension who presented to the hospital with complaint of hyponatremia and has been currently admitted to the medical ICU and subsequently degraded to stepdown bed.  GI has been consulted due to her ongoing concern with dysphagia and difficulty swallowing.  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  The patient mentions that she has noted her dysphagia worsening since being here in the hospital however even before being admitted she does acknowledge she has been grinding her food to a pastelike mechanical soft consistency.  She mentions she does that due to having poor oral dentition rather than having dysphagia itself.  We did discuss at length with the patient her options including endoscopic evaluation and treatment however she refused.  We then also called her POA ciara who is her granddaughter and discussed with her as well.    Gen: normal mood, alert  ENT: normal TJ  Cardiovascular: RRR, No M/R/G  Respiratory: CTA BL  Gastrointestinal: Soft,NT ND  Genitourinary: no suprapubic tenderness  Musculoskeletal: no scars  Skin:moist  Neuro: alert and oriented x3    My assessment and plan reveals   #1  vertebral arteries. ___________________________________    Normal CTA Head with contrast. Critical (99%) right carotid stenosis. Severe (80%) left carotid stenosis. Patent vertebral arteries bilaterally. Electronically signed by Obey Tapia    CT Head W/O Contrast    Result Date: 8/28/2024  Head CT INDICATION: Dizziness TECHNIQUE: Multiple 2D axial images obtained through the brain without intravenous contrast.  Radiation dose reduction techniques were used for this study:  All CT scans performed at this facility use one or all of the following: Automated exposure control, adjustment of the mA and/or kVp according to patient's size, iterative reconstruction. COMPARISON: 4/24/2024 FINDINGS: Confluent areas of decreased attenuation within the paravertebral white matter consistent with ischemic white matter changes. No change in encephalomalacia in the left frontal lobe consistent with a chronic infarct. There is no CT evidence of acute hemorrhage or infarction. The ventricles are normal in size. There are no extra-axial fluid collections. No masses are seen. The sinuses are clear. There are no bony lesions.     Chronic involutional changes of the brain. No acute intracranial hemorrhage. Electronically signed by Obey Tapia    Recent Results (from the past 24 hour(s))   CBC with Auto Differential    Collection Time: 09/03/24  5:06 AM   Result Value Ref Range    WBC 7.8 4.0 - 10.5 K/CU MM    RBC 3.07 (L) 4.2 - 5.4 M/CU MM    Hemoglobin 9.4 (L) 12.5 - 16.0 GM/DL    Hematocrit 28.3 (L) 37 - 47 %    MCV 92.2 78 - 100 FL    MCH 30.6 27 - 31 PG    MCHC 33.2 32.0 - 36.0 %    RDW 12.9 11.7 - 14.9 %    Platelets 309 140 - 440 K/CU MM    MPV 10.4 7.5 - 11.1 FL    Differential Type AUTOMATED DIFFERENTIAL     Neutrophils % 62.0 36 - 66 %    Lymphocytes % 24.7 24 - 44 %    Monocytes % 10.0 (H) 0 - 4 %    Eosinophils % 2.3 0 - 3 %    Basophils % 0.6 0 - 1 %    Neutrophils Absolute 4.8 K/CU MM    Lymphocytes Absolute 1.9 K/CU

## 2024-09-03 NOTE — ACP (ADVANCE CARE PLANNING)
Advance Care Planning     Advance Care Planning Inpatient Note  [facility] Department    Today's Date: 9/3/2024  Unit: [facility] ICU    Received request from patient.  Upon review of chart and communication with care team, patient's decision making abilities are not in question.. Patient was/were present in the room during visit.    Goals of ACP Conversation:  Discuss advance care planning documents    Health Care Decision Makers:       Primary Decision Maker (Active): Coleen Villar - Manuela - [phone]  Summary:  Completed New Documents    Advance Care Planning Documents (Patient Wishes):  Healthcare Power of /Advance Directive Appointment of Health Care Agent  Living Will/Advance Directive     Assessment:  Patient was in good spirit during the visit. Patient added her son as her alternative.     Interventions:  Provided education on documents for clarity and greater understanding  Assisted in the completion of documents according to patient's wishes at this time    Care Preferences Communicated:   No    Outcomes/Plan:  New advance directive completed.    Electronically signed by HEMANT Canales on 9/3/2024 at 11:57 AM

## 2024-09-03 NOTE — PROGRESS NOTES
ATTEMPT  Attempt at bedside x 2, first attempt  at bedside. Second attempt pt receiving x-ray. PTA to f/u at a later date.   Electronically signed by:    Angela Castro, LIZ  9/3/2024, 1:37 PM

## 2024-09-03 NOTE — PROGRESS NOTES
Neurosurgery NP at bedside to s/w patient. Per patient, right now she does not want to proceed with a surgical procedure.    Carotid stent explained vs Medical management explained.    Pt would like her granddaughter present for discussion and would like Dr. Mancia to come to bedside.   [facility])  No date: Atrial tachycardia (CMS/[facility])  No date: Atrial tachycardia (CMS/[facility])  No date: Atypical atrial flutter (CMS/[facility])  No date: Atypical chest pain  No date: Autonomic nervous system disorder  No date: Broken tooth injury  12/19/2018: CHB (complete heart block) (CMS/[facility])  sophia ablation      Comment:  With concomitant CRT Raymond Kawasaki, MD at                [facility].  Estiven Anderson, DO    Medication Phenir-PE-Sod Sal-

## 2024-09-03 NOTE — PROGRESS NOTES
Speech-Language Pathology Department   Facility/Department: [facility] ICU   CLINICAL BEDSIDE SWALLOW EVALUATION    NAME: Carol Xiong  : 1933  MRN: [id number]    ADMISSION DATE: 2024  ADMITTING DIAGNOSIS: has Essential hypertension; Mixed hyperlipidemia; Gastroesophageal reflux disease without esophagitis; Dorsalgia; Neuropathy; Allergic rhinitis; Anemia; Altered mental status; Bradycardia; Carotid stenosis, right; History of TIA (transient ischemic attack); Stage 3a chronic kidney disease (HCC); Moderate late onset Alzheimer's dementia without behavioral disturbance, psychotic disturbance, mood disturbance, or anxiety (HCC); Fear for personal safety; Severe malnutrition (HCC); Vitamin D deficiency; Weight loss; Hyponatremia; and Moderate malnutrition (HCC) on their problem list.  ONSET DATE: this admission     Recent Chest Xray/CT of Chest: None this admission per chart review    Date of Eval: 9/3/2024  Evaluating Therapist: GODFREY Ronquillo    Impression: Carol Xiong was seen today for a clinical bedside swallow evaluation following admission to [facility] with hyponatremia and generalized weakness. Head CT on 24 reveals \"Chronic involutional changes of the brain. No acute intracranial hemorrhage.\" Relevant medical hx includes HTN. Pt consuming a regular/thin diet prior to episode of coughing with meds this AM.    Carol Xiong was positioned upright in bed requesting breakfast upon SLP entry. Pt agreeable and cooperative throughout visit. Pt appears to be hard of hearing and benefits from repetition as needed. Pt initially states that she always has trouble taking pills consecutively. She denies hx of difficulty swallowing. Throughout PO trials, pt complains of food/drink \"not going down.\" She states she has not experienced this prior to admission. Pt wearing top/bottom dentures throughout visit. Oral mechanism examination is WFL. Vocal quality is WFL.     Carol Xiong consumed  trials of thin liquid via cup/straw, puree, and regular solid during this visit. Pt presents with evidence of oropharyngeal dysphagia characterized by prolonged bolus preparation/mastication, delay in a/p transit, and suspected delay in pharyngeal swallow initiation. Oral clearance appears WFL. Immediate cough response noted following trials of thin liquid via straw x2 after regular solid trial. Pt leaned forward in bed and began to cough persistently. Further PO trials deferred at this time. SLP provided education on results/recommendations following BSE and purpose of MBSS. Pt expressed understanding/agreement. ANGEL Bull notified of MBSS recommendation.     Recommendations: Recommend Carol Xiong participate in a modified barium swallow study to further evaluate pt's oropharyngeal swallow. MBS is planned for this date. SLP to provide safest/least restrictive diet recommendation following this procedure.    Current Diet level:  Current Diet : NPO  Current Liquid Diet : NPO    Primary Complaint  Pt complains of food \"not going down\" and difficulty with pills this AM    Pain:  Pain Assessment  Pain Assessment: None - Denies Pain  Pain Level: 0  Matthews-Baker Pain Rating: No hurt  Patient's Stated Pain Goal: 0 - No pain  Pain Location: Back  Pain Orientation: Distal  Pain Descriptors: Aching  Functional Pain Assessment: Prevents or interferes some active activities and ADLs  Pain Type: Chronic pain  Pain Radiating Towards: na  Pain Frequency: Intermittent  Pain Onset: Awakened from sleep  Non-Pharmaceutical Pain Intervention(s): Rest  Response to Pain Intervention: Patient satisfied  Side Effects: No reported side effects  Multiple Pain Sites: No    Reason for Referral  Carol Xiong was referred for a bedside swallow evaluation to assess the efficiency of her swallow function, identify signs and symptoms of aspiration and make recommendations regarding safe dietary consistencies, effective compensatory

## 2024-09-04 LAB
BASOPHILS ABSOLUTE: 0 K/CU MM
BASOPHILS RELATIVE PERCENT: 0.7 % (ref 0–1)
DIFFERENTIAL TYPE: ABNORMAL
EKG ATRIAL RATE: 48 BPM
EKG DIAGNOSIS: NORMAL
EKG P AXIS: 64 DEGREES
EKG P-R INTERVAL: 204 MS
EKG Q-T INTERVAL: 456 MS
EKG QRS DURATION: 90 MS
EKG QTC CALCULATION (BAZETT): 407 MS
EKG R AXIS: 51 DEGREES
EKG T AXIS: 55 DEGREES
EKG VENTRICULAR RATE: 48 BPM
EOSINOPHILS ABSOLUTE: 0.2 K/CU MM
EOSINOPHILS RELATIVE PERCENT: 3.5 % (ref 0–3)
GLUCOSE BLD-MCNC: 79 MG/DL (ref 70–99)
HCT VFR BLD CALC: 27.6 % (ref 37–47)
HEMOGLOBIN: 9.2 GM/DL (ref 12.5–16)
IMMATURE NEUTROPHIL %: 0.5 % (ref 0–0.43)
LYMPHOCYTES ABSOLUTE: 2 K/CU MM
LYMPHOCYTES RELATIVE PERCENT: 33.5 % (ref 24–44)
MCH RBC QN AUTO: 30.7 PG (ref 27–31)
MCHC RBC AUTO-ENTMCNC: 33.3 % (ref 32–36)
MCV RBC AUTO: 92 FL (ref 78–100)
MONOCYTES ABSOLUTE: 0.6 K/CU MM
MONOCYTES RELATIVE PERCENT: 10.2 % (ref 0–4)
NEUTROPHILS ABSOLUTE: 3.1 K/CU MM
NEUTROPHILS RELATIVE PERCENT: 51.6 % (ref 36–66)
NUCLEATED RBC %: 0 %
PDW BLD-RTO: 12.9 % (ref 11.7–14.9)
PLATELET # BLD: 287 K/CU MM (ref 140–440)
PMV BLD AUTO: 10.6 FL (ref 7.5–11.1)
RBC # BLD: 3 M/CU MM (ref 4.2–5.4)
TOTAL IMMATURE NEUTOROPHIL: 0.03 K/CU MM
TOTAL NUCLEATED RBC: 0 K/CU MM
WBC # BLD: 6 K/CU MM (ref 4–10.5)

## 2024-09-04 PROCEDURE — 2580000003 HC RX 258: Performed by: HOSPITALIST

## 2024-09-04 PROCEDURE — 99231 SBSQ HOSP IP/OBS SF/LOW 25: CPT | Performed by: NURSE PRACTITIONER

## 2024-09-04 PROCEDURE — APPNB45 APP NON BILLABLE 31-45 MINUTES: Performed by: LICENSED PRACTICAL NURSE

## 2024-09-04 PROCEDURE — 97535 SELF CARE MNGMENT TRAINING: CPT

## 2024-09-04 PROCEDURE — 6360000002 HC RX W HCPCS: Performed by: NURSE PRACTITIONER

## 2024-09-04 PROCEDURE — 36415 COLL VENOUS BLD VENIPUNCTURE: CPT

## 2024-09-04 PROCEDURE — APPNB60 APP NON BILLABLE TIME 46-60 MINS: Performed by: LICENSED PRACTICAL NURSE

## 2024-09-04 PROCEDURE — 82962 GLUCOSE BLOOD TEST: CPT

## 2024-09-04 PROCEDURE — 85025 COMPLETE CBC W/AUTO DIFF WBC: CPT

## 2024-09-04 PROCEDURE — 92526 ORAL FUNCTION THERAPY: CPT

## 2024-09-04 PROCEDURE — 97530 THERAPEUTIC ACTIVITIES: CPT

## 2024-09-04 PROCEDURE — 1200000000 HC SEMI PRIVATE

## 2024-09-04 PROCEDURE — 99233 SBSQ HOSP IP/OBS HIGH 50: CPT | Performed by: INTERNAL MEDICINE

## 2024-09-04 PROCEDURE — 2580000003 HC RX 258: Performed by: STUDENT IN AN ORGANIZED HEALTH CARE EDUCATION/TRAINING PROGRAM

## 2024-09-04 PROCEDURE — 94761 N-INVAS EAR/PLS OXIMETRY MLT: CPT

## 2024-09-04 RX ORDER — KETOROLAC TROMETHAMINE 30 MG/ML
30 INJECTION, SOLUTION INTRAMUSCULAR; INTRAVENOUS ONCE
Status: DISCONTINUED | OUTPATIENT
Start: 2024-09-04 | End: 2024-09-04

## 2024-09-04 RX ORDER — KETOROLAC TROMETHAMINE 30 MG/ML
15 INJECTION, SOLUTION INTRAMUSCULAR; INTRAVENOUS ONCE
Status: COMPLETED | OUTPATIENT
Start: 2024-09-04 | End: 2024-09-04

## 2024-09-04 RX ORDER — DEXTROSE MONOHYDRATE AND SODIUM CHLORIDE 5; .45 G/100ML; G/100ML
INJECTION, SOLUTION INTRAVENOUS CONTINUOUS
Status: DISCONTINUED | OUTPATIENT
Start: 2024-09-04 | End: 2024-09-09

## 2024-09-04 RX ADMIN — KETOROLAC TROMETHAMINE 15 MG: 30 INJECTION, SOLUTION INTRAMUSCULAR; INTRAVENOUS at 22:29

## 2024-09-04 RX ADMIN — SODIUM CHLORIDE, PRESERVATIVE FREE 10 ML: 5 INJECTION INTRAVENOUS at 22:00

## 2024-09-04 RX ADMIN — DEXTROSE AND SODIUM CHLORIDE: 5; 450 INJECTION, SOLUTION INTRAVENOUS at 16:48

## 2024-09-04 RX ADMIN — SODIUM CHLORIDE, PRESERVATIVE FREE 10 ML: 5 INJECTION INTRAVENOUS at 18:34

## 2024-09-04 ASSESSMENT — PAIN DESCRIPTION - LOCATION
LOCATION: HEAD
LOCATION: NECK
LOCATION: NECK

## 2024-09-04 ASSESSMENT — PAIN SCALES - GENERAL
PAINLEVEL_OUTOF10: 3
PAINLEVEL_OUTOF10: 8
PAINLEVEL_OUTOF10: 8
PAINLEVEL_OUTOF10: 0
PAINLEVEL_OUTOF10: 0

## 2024-09-04 ASSESSMENT — PAIN DESCRIPTION - ORIENTATION: ORIENTATION: MID

## 2024-09-04 ASSESSMENT — PAIN DESCRIPTION - FREQUENCY: FREQUENCY: INTERMITTENT

## 2024-09-04 ASSESSMENT — PAIN SCALES - WONG BAKER: WONGBAKER_NUMERICALRESPONSE: HURTS A LITTLE BIT

## 2024-09-04 ASSESSMENT — PAIN DESCRIPTION - ONSET: ONSET: SUDDEN

## 2024-09-04 ASSESSMENT — PAIN DESCRIPTION - PAIN TYPE: TYPE: ACUTE PAIN

## 2024-09-04 ASSESSMENT — PAIN DESCRIPTION - DESCRIPTORS: DESCRIPTORS: ACHING

## 2024-09-04 ASSESSMENT — PAIN - FUNCTIONAL ASSESSMENT: PAIN_FUNCTIONAL_ASSESSMENT: ACTIVITIES ARE NOT PREVENTED

## 2024-09-04 NOTE — PROGRESS NOTES
Aline GREERN RN clinical  for [facility] RN students 07-19:00 this date.

## 2024-09-04 NOTE — DISCHARGE INSTR - COC
Continuity of Care Form    Patient Name: Carol Xiong   :  1933  MRN:  [id number]    Admit date:  2024  Discharge date:  09/10/2024    Code Status Order: Full Code   Advance Directives:   Advance Care Flowsheet Documentation             Admitting Physician:  No admitting provider for patient encounter.  PCP: Kayden Gonzalez MD    Discharging Nurse: Selene FITCH  Discharging Hospital Unit/Room#: 2105/2105-A  Discharging Unit Phone Number: [phone]    Emergency Contact:   Extended Emergency Contact Information  Primary Emergency Contact: DayCoronay  Home Phone: [phone]  Relation: Grandchild  Secondary Emergency Contact: Da Xiong  Address: [address] [place] [address] [place]  Relation: Spouse    Past Surgical History:  Past Surgical History:   Procedure Laterality Date    APPENDECTOMY      COLON SURGERY      removed polops    HYSTERECTOMY, TOTAL ABDOMINAL (CERVIX REMOVED)      SPINE SURGERY      scrape calcium off spine, pressing on spine/nerves       Immunization History:   Immunization History   Administered Date(s) Administered    COVID-19, MODERNA BLUE border, Primary or Immunocompromised, (age 12y+), IM, 100 mcg/0.5mL 2021, 2021    Influenza A ([id number]) Vaccine PF IM 2009    Influenza, FLUAD, (age 65 y+), IM, Quadv, 0.5mL 10/13/2020, 2021    Pneumococcal, PPSV23, PNEUMOVAX 23, (age 2y+), SC/IM, 0.5mL 2021       Active Problems:  Patient Active Problem List   Diagnosis Code    Essential hypertension I10    Mixed hyperlipidemia E78.2    Gastroesophageal reflux disease without esophagitis K21.9    Dorsalgia M54.9    Neuropathy G62.9    Allergic rhinitis J30.9    Anemia D64.9    Altered mental status R41.82    Bradycardia R00.1    Carotid stenosis, right I65.21    History of TIA (transient ischemic attack) Z86.73    Stage 3a chronic kidney disease (HCC) N18.31    Moderate late onset Alzheimer's dementia

## 2024-09-04 NOTE — PROGRESS NOTES
[facility] Gastroenterology and Hepatology             MD Lisa Holliday MD Carol Christensen, APRN-CNP       Vianey José, APRN-CNP             [address] [place] [address]             [phone] fax [phone]      Gastroenterology Progress note . 9/4/2024  Reason for consult: Dysphagia, esophageal polyp      Physician attestation:  I have personally seen and examined the patient independently. I have reviewed the patient's available data,including medical history and recent test results. Reviewed and discussed note as documented by THI.  I agree with the physical exam findings, assessment and plan.     Briefly   \Patient was noted to be lying comfortably in bedside.  This morning she did have speech-language pathologist present at bedside and we had a long discussion with the team.  She is doing well with liquid however there is concern of aspiration even with puréed stuff.  I again discussed with the patient herself about the risks of aspiration with her p.o. intake.  We discussed with the speech pathologist that her POA ciara was also reached out and again wants to talk to her grandmother about making a definite discussion with significant amount of time spent by our team over the phone    Gen: normal mood, alert  ENT: normal TJ  Cardiovascular: RRR, No M/R/G  Respiratory: CTA BL  Gastrointestinal: Soft,NT ND  Genitourinary: no suprapubic tenderness  Musculoskeletal: no scars  Skin:moist  Neuro: alert and oriented x3    My assessment and plan reveals   #1 dysphagia.  Concern for possible esophageal web and polypoid lesion.  I had a long conversation with the patient as well as a separate conversation with her power of  ciara who is also her grand daughter.  At this point the patient refuses any endoscopic evaluation and mentions that she would like to continue with conservative management.  We did go over the risks including

## 2024-09-04 NOTE — CARE COORDINATION
(Late Entry) CM spoke to pt's granddaughter Coleen Day (decision maker) regarding PT/OT evaluations. At this time granddaughter wants to bring pt back home.

## 2024-09-04 NOTE — PROGRESS NOTES
year. NAD, speech clear, language fluent, repetition and naming intact, follows commands appropriately     Cranial Nerves:  CN II-XII intact     Sensation: intact LT/temp UE/LE's b/l     Motor: 5/5 UE/LE's b/l, tone and bulk normal, no pronator drift     Reflexes: 2/4 biceps, triceps, brachioradialis, patellar, and achilles bilaterally; flexor plantar responses bilaterally     Coordination:       Tremors-- None       Rapidly alternating movements (HAILE): No dysdiadonchokinesis b/l      Heel-Shin: No dysmetria b/l      Finger-Nose: No dysmetria b/l     Gait/Station: Deferred    Labs:   Recent Labs     09/02/24  0129 09/02/24  0958 09/03/24  0506 09/04/24  0534   WBC  --  9.3 7.8 6.0   *  --  135  --    K 4.9  --  3.9  --    CL 97*  --  101  --    CO2 23  --  26  --    BUN 54*  --  52*  --    CREATININE 1.1  --  1.0  --    GLUCOSE 111*  --  92  --    ,Last TSH Results[unfilled],Last Free T4 Results[unfilled],[unfilled],[unfilled]  Last Liver Function Results:  @LABRCNTIP(ALT:3,AST:3,BILITOTAL:3,BILIDIR:3,ALKPHOS:3)@     Imaging Studies:     CT Head: 4/24/2024  IMPRESSION:  1. No acute findings.  2. 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  3. Mild chronic small vessel ischemic disease in the white matter.  Electronically signed by Shaji Patton MD     CTA:  IMPRESSION:  Normal CTA Head with contrast.  Critical (99%) right carotid stenosis.  Severe (80%) left carotid stenosis.  Patent vertebral arteries bilaterally.  Electronically signed by Obey Tapia    CT head 8/28/2024  IMPRESSION:  Chronic involutional changes of the brain. No acute intracranial  hemorrhage.     Electronically signed by Obey Tapia    Assessment/Plan:      89-year-old female with known history of right carotid stenosis presenting with lightheadedness and hyponatremia.  CTA with worsening ICA stenosis.  R ICA critical stenosis of 99% and L ICA stenosis of 80%     -Neuroimaging as above  -Dr. Mancia discussed risks and benefits of carotid

## 2024-09-04 NOTE — CARE COORDINATION
CM reviewed pt's medical record and discussed in IDR. CM spoke with pt's decision maker, Coleen did not wish to pursue SNF. CM spoke with Coleen again today and explained that pt has declined (per hospitalist/IDR)  and would need PT/OT in a SNF short-term prior to returning home. Coleen has chosen 1). [facility]  2). [facility] as choice of SNF provider. CM gave referral to [facility] via confidential VM. Whiteboard placed for updated PT/OT notes.    9/4/24 2:03 PM--Received return call from [facility]. Pt is accepted pending Precert.    9/4/24 3:35 PM--Received call from [facility]. Pt's insurance requires a $10/day copay for day 1-20. Day 21 and on is a $203.00/day. CM spoke with pt's granddaughter Coleen, and the $10/day copay is fine, but they will need her discharged prior to day 21. CM returned call to [facility] and explained that pt will need to be discharged prior to day 21.    The patient's individualized treatment goals were discussed and they are in agreement with the transitional plan of care. The patient was provided with a choice in provider and understands the freedom of choice list that was provided to them. The patient and/or family verbalize understanding of treatment preferences and quality data associated with providers.      Discharging Nurse, pt will go to [facility]  Call report to [phone]    If pt requires IV antibiotic or narcotics at discharge, fax signed script to  [phone]    Place AVS with completed ZHAO and Discharge Summary in discharge envelope.    IF After hours or on the weekend set up transportation with [facility] @ [phone].

## 2024-09-04 NOTE — PROGRESS NOTES
[facility]  DEPARTMENT OF SPEECH/LANGUAGE PATHOLOGY  DAILY PROGRESS NOTE  Carol Xiong  9/4/2024  [id number]  Dizziness [R42]  Hyponatremia [E87.1]  Elevated blood pressure reading [R03.0]  Allergies   Allergen Reactions    Penicillins     Thiazide-Type Diuretics Other (See Comments)     Recurrent hyponatremia//sodium 113 on at 8/29/24//avoid thiazides please         Pt was seen this date for dysphagia treatment.       IMPRESSION AND RECOMMENDATIONS: Carol Xiong was seen today for a bedside dysphagia tx and ongoing education following admission to [facility] with hyponatremia. Pt underwent a modified barium swallow study with the following results: Penetration midway to the vocal folds noted with trials of thin liquid via cup sip/straw prior to solid trial, majority of which clears. Pt aspirated trial of thin liquid via straw following trial of puree. Pt noted to have reduced pharyngeal clearance d/t obstruction in upper esophagus, resulting in aspiration of material in the pharynx after the swallow. Immediate strong cough response noted, however, material was not visualized clearing the airway. Pt noted to have a round structure in the upper esophagus obstructing bolus flow and creating increased back-up of residue in the pharynx. SLP spoke with radiologist, Dr. Keith, following completion of the study. 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 Radiologist reports \"Correlation with esophagoscopy should be helpful.\" SLP notified attending of findings. SLP recommended pt remain NPO at this time d/t non-functional swallow.    Carol Xiong was positioned upright in bed for today's visit. Pt cooperative throughout visit and requesting to eat. GI team present midway through visit to provide further education on recommendation for endoscopic evaluation of suspected

## 2024-09-04 NOTE — PROGRESS NOTES
RBCUA 1 04/24/2024 12:00 PM    MUCUS RARE 04/24/2024 12:00 PM    TRICHOMONAS NONE SEEN 04/24/2024 12:00 PM    BACTERIA NEGATIVE 04/24/2024 12:00 PM    CLARITYU CLEAR 08/28/2024 11:40 PM    LEUKOCYTESUR NEGATIVE 08/28/2024 11:40 PM    UROBILINOGEN 0.2 08/28/2024 11:40 PM    BILIRUBINUR NEGATIVE 08/28/2024 11:40 PM    BLOODU NEGATIVE 08/28/2024 11:40 PM    GLUCOSEU NEGATIVE 08/28/2024 11:40 PM    KETUA NEGATIVE 08/28/2024 11:40 PM     Urine Cultures: No results found for: \"LABURIN\"  Blood Cultures: No results found for: \"BC\"  No results found for: \"BLOODCULT2\"  Organism: No results found for: \"ORG\"    Imaging/Diagnostics Last 24 Hours   CTA HEAD NECK W CONTRAST    Result Date: 8/28/2024  STUDY:  CTA HEAD AND NECK WITH CONTRAST REASON FOR EXAM:   Dizziness TECHNIQUE:   CT angiography was performed with a multi-detector CT scanner. Data acquisition was obtained from the skull base through the vertex following intravenous administration of IV   .  MIP images were reconstructed from the axial data set.  Post-processing of the angiographic images was performed, with multiplanar reformation and 3D reconstruction. Individualized dose optimization techniques were used for this CT. COMPARISON:   None.  ___________________________________ FINDINGS: Normal bilateral petrous carotid arteries. Peripheral calcified plaque in the cavernous internal carotid arteries without measurable stenosis..  Normal right A1 segments of the anterior cerebral artery.  Normal left A1 segments of the anterior cerebral artery.  Normal intact anterior communicating artery (ACOM).  Normal bilateral A2 segments of the anterior cerebral arteries. Normal right M1 and M2 segments of the middle cerebral arteries, with a normal M1 bifurcation.   Normal left M1 and M2 segments of the middle cerebral arteries, with a normal M1 bifurcation. Normal right posterior communicating artery (PCOM).  Normal left posterior communicating artery (PCOM).   Normal

## 2024-09-04 NOTE — PLAN OF CARE
Problem: Discharge Planning  Goal: Discharge to home or other facility with appropriate resources  Outcome: Progressing     Problem: Pain  Goal: Verbalizes/displays adequate comfort level or baseline comfort level  Outcome: Progressing  Flowsheets (Taken 9/3/2024 2059)  Verbalizes/displays adequate comfort level or baseline comfort level: Assess pain using appropriate pain scale     Problem: ABCDS Injury Assessment  Goal: Absence of physical injury  Outcome: Progressing     Problem: Skin/Tissue Integrity  Goal: Absence of new skin breakdown  Description: 1.  Monitor for areas of redness and/or skin breakdown  2.  Assess vascular access sites hourly  3.  Every 4-6 hours minimum:  Change oxygen saturation probe site  4.  Every 4-6 hours:  If on nasal continuous positive airway pressure, respiratory therapy assess nares and determine need for appliance change or resting period.  Outcome: Progressing     Problem: Safety - Adult  Goal: Free from fall injury  Outcome: Progressing

## 2024-09-04 NOTE — PROGRESS NOTES
Occupational Therapy    Occupational Therapy Treatment Note    Name: Carol Xiong MRN: [id number] :   1933   Date:  2024   Admission Date: 2024 Room:  A     Restrictions/Precautions: General Precautions, Fall Risk     Communication with other providers: RN approved session, co tx with PTA for tolerance    Subjective:  Patient states:  Pt agreeable to therapy session.   Pain:   Location, Type, Intensity (0/10 to 10/10):  denies pain    Objective:    Observation: Pt supine in bed upon arrival.   Objective Measures:  Pt alert and oriented.     Treatment, including education:  Therapeutic Activity Training:   Therapeutic activity training was instructed today.  Cues were given for safety, sequence, UE/LE placement, awareness, and balance.    Activities performed today included bed mobility training, sup-sit, sit-stand, SPT.    Pt supine in bed upon arrival and agreeable to therapy session. Pt noted incontinent episode of bowel and blader upon arrival. Pt doffed undergarment with MAX A and rolled side to side with MIN  Ax2 and completed DEP A pericare. Pt donned new undergarment with MAX A. Pt completed supine to sit with MIN A x A 2 . Pt completed scooting to edge of bed with MIN A with increased time and verbal cues. Pt completed completed sit to stand from edge of bed with WW and MOD A x2 and progressed to MIN A x2 with correction of retropulsion. Pt seated in chair. Pt noted high /57 and  RN aware. Pt seated in chair and completed oral hygiene, face washing and hair brushing.  Pt seated in chair with all needs met and call light in reach.  Chair alarm on.

## 2024-09-04 NOTE — PROGRESS NOTES
Physical Therapy Treatment Note  Name: Carol Xiong MRN: [id number] :   1933   Date:  2024   Admission Date: 2024 Room:  [address] [place] [address]   Restrictions/Precautions: general precautions, fall risk   Communication with other providers:  Pt okay to see for therapy per RN   Subjective:  Patient states:  Agreeable to session.   Pain:   Location, Type, Intensity (0/10 to 10/10):  Denies   Objective:    Observation:  Pt supine in bed upon PTA arrival.   Objective Measures:  Tele, HR 46-61, /65 (101) sitting EOB and 187/54 (92) when retaken.   Treatment, including education/measures:    Therapeutic Activity Training:   Therapeutic activity training was instructed today.  Cues were given for safety, sequence, UE/LE placement, awareness, and balance. Activities performed today included bed mobility training, sup-sit, sit-stand, SPT.    Mobility:  Rolling: To L and to R with Mod A to roll and maintain. Increased time for pericare, depends change, and bedding change.   Sup > sit: Min A at trunk and LEs to get to EOB.   Scooting: Min A at hips to scoot.   STS: Mod A from EOB to RW dt retropulsion, progressing to Min A once pt was able to establish balance.   SPT: Mod progressing to Min A with RW max cues for safety and to ensure chair is behind pt.     Safety:   Pt returned safely to recliner with chair alarm activated, call light in reach, all needs met.   Assessment / Impression:    Pt tolerated OOB activities well this date but does present with balance and functional mobility deficits overall.   Patient's tolerance of treatment:  Good   Adverse Reaction: none  Significant change in status and impact:  none  Barriers to improvement:  none  Plan for Next Session:    Plan to continue OOB activities.   Time in:  09  Time out:  1017  Timed treatment minutes:  40  Total treatment time:  40    Previously filed items:  Social/Functional History  Lives With: Other (comment)  Type of Home: House  Home

## 2024-09-05 ENCOUNTER — ANESTHESIA EVENT (OUTPATIENT)
Dept: ENDOSCOPY | Age: 89
End: 2024-09-05
Payer: MEDICARE

## 2024-09-05 LAB
BASOPHILS ABSOLUTE: 0 K/CU MM
BASOPHILS RELATIVE PERCENT: 0.7 % (ref 0–1)
DIFFERENTIAL TYPE: ABNORMAL
EOSINOPHILS ABSOLUTE: 0.2 K/CU MM
EOSINOPHILS RELATIVE PERCENT: 3.5 % (ref 0–3)
HCT VFR BLD CALC: 28.5 % (ref 37–47)
HEMOGLOBIN: 9.4 GM/DL (ref 12.5–16)
IMMATURE NEUTROPHIL %: 0.2 % (ref 0–0.43)
LYMPHOCYTES ABSOLUTE: 1.5 K/CU MM
LYMPHOCYTES RELATIVE PERCENT: 33.6 % (ref 24–44)
MCH RBC QN AUTO: 30.6 PG (ref 27–31)
MCHC RBC AUTO-ENTMCNC: 33 % (ref 32–36)
MCV RBC AUTO: 92.8 FL (ref 78–100)
MONOCYTES ABSOLUTE: 0.5 K/CU MM
MONOCYTES RELATIVE PERCENT: 10.7 % (ref 0–4)
NEUTROPHILS ABSOLUTE: 2.4 K/CU MM
NEUTROPHILS RELATIVE PERCENT: 51.3 % (ref 36–66)
NUCLEATED RBC %: 0 %
PDW BLD-RTO: 12.7 % (ref 11.7–14.9)
PLATELET # BLD: 334 K/CU MM (ref 140–440)
PMV BLD AUTO: 10.6 FL (ref 7.5–11.1)
RBC # BLD: 3.07 M/CU MM (ref 4.2–5.4)
TOTAL IMMATURE NEUTOROPHIL: 0.01 K/CU MM
TOTAL NUCLEATED RBC: 0 K/CU MM
WBC # BLD: 4.6 K/CU MM (ref 4–10.5)

## 2024-09-05 PROCEDURE — 2580000003 HC RX 258: Performed by: HOSPITALIST

## 2024-09-05 PROCEDURE — 6370000000 HC RX 637 (ALT 250 FOR IP): Performed by: STUDENT IN AN ORGANIZED HEALTH CARE EDUCATION/TRAINING PROGRAM

## 2024-09-05 PROCEDURE — 2580000003 HC RX 258: Performed by: STUDENT IN AN ORGANIZED HEALTH CARE EDUCATION/TRAINING PROGRAM

## 2024-09-05 PROCEDURE — 36415 COLL VENOUS BLD VENIPUNCTURE: CPT

## 2024-09-05 PROCEDURE — 6360000002 HC RX W HCPCS: Performed by: HOSPITALIST

## 2024-09-05 PROCEDURE — APPNB45 APP NON BILLABLE 31-45 MINUTES: Performed by: LICENSED PRACTICAL NURSE

## 2024-09-05 PROCEDURE — 99231 SBSQ HOSP IP/OBS SF/LOW 25: CPT | Performed by: NURSE PRACTITIONER

## 2024-09-05 PROCEDURE — 94761 N-INVAS EAR/PLS OXIMETRY MLT: CPT

## 2024-09-05 PROCEDURE — 1200000000 HC SEMI PRIVATE

## 2024-09-05 PROCEDURE — 99232 SBSQ HOSP IP/OBS MODERATE 35: CPT | Performed by: INTERNAL MEDICINE

## 2024-09-05 PROCEDURE — 85025 COMPLETE CBC W/AUTO DIFF WBC: CPT

## 2024-09-05 RX ORDER — HYDRALAZINE HYDROCHLORIDE 20 MG/ML
5 INJECTION INTRAMUSCULAR; INTRAVENOUS ONCE
Status: DISCONTINUED | OUTPATIENT
Start: 2024-09-05 | End: 2024-09-06 | Stop reason: SDUPTHER

## 2024-09-05 RX ORDER — KETOROLAC TROMETHAMINE 30 MG/ML
15 INJECTION, SOLUTION INTRAMUSCULAR; INTRAVENOUS EVERY 6 HOURS PRN
Status: DISCONTINUED | OUTPATIENT
Start: 2024-09-05 | End: 2024-09-10

## 2024-09-05 RX ADMIN — SODIUM CHLORIDE, PRESERVATIVE FREE 10 ML: 5 INJECTION INTRAVENOUS at 20:21

## 2024-09-05 RX ADMIN — SODIUM CHLORIDE, PRESERVATIVE FREE 10 ML: 5 INJECTION INTRAVENOUS at 10:56

## 2024-09-05 RX ADMIN — DEXTROSE AND SODIUM CHLORIDE: 5; 450 INJECTION, SOLUTION INTRAVENOUS at 19:00

## 2024-09-05 RX ADMIN — KETOROLAC TROMETHAMINE 15 MG: 30 INJECTION, SOLUTION INTRAMUSCULAR; INTRAVENOUS at 18:38

## 2024-09-05 ASSESSMENT — PAIN SCALES - GENERAL
PAINLEVEL_OUTOF10: 8
PAINLEVEL_OUTOF10: 3

## 2024-09-05 ASSESSMENT — PAIN DESCRIPTION - ORIENTATION: ORIENTATION: MID

## 2024-09-05 ASSESSMENT — PAIN DESCRIPTION - LOCATION: LOCATION: NECK;BACK

## 2024-09-05 NOTE — PLAN OF CARE
Problem: Discharge Planning  Goal: Discharge to home or other facility with appropriate resources  9/5/2024 0009 by Curt Nagy, RN  Outcome: Progressing  9/4/2024 1059 by Bandar Salazar  Outcome: Progressing     Problem: Pain  Goal: Verbalizes/displays adequate comfort level or baseline comfort level  9/5/2024 0009 by Curt Nagy, RN  Outcome: Progressing  Flowsheets (Taken 9/4/2024 2000)  Verbalizes/displays adequate comfort level or baseline comfort level: Implement non-pharmacological measures as appropriate and evaluate response  9/4/2024 1059 by Bandar Salazar  Outcome: Progressing     Problem: ABCDS Injury Assessment  Goal: Absence of physical injury  9/4/2024 1059 by Bandar Salazar  Outcome: Progressing     Problem: Skin/Tissue Integrity  Goal: Absence of new skin breakdown  Description: 1.  Monitor for areas of redness and/or skin breakdown  2.  Assess vascular access sites hourly  3.  Every 4-6 hours minimum:  Change oxygen saturation probe site  4.  Every 4-6 hours:  If on nasal continuous positive airway pressure, respiratory therapy assess nares and determine need for appliance change or resting period.  9/4/2024 1059 by Bandar Salazar  Outcome: Progressing     Problem: Safety - Adult  Goal: Free from fall injury  9/4/2024 1059 by Bandar Salazar  Outcome: Progressing     Problem: Nutrition Deficit:  Goal: Optimize nutritional status  9/4/2024 1059 by Bandar Salazar  Outcome: Progressing

## 2024-09-05 NOTE — PROGRESS NOTES
RENAL DOSE ADJUSTMENT MADE PER P/T PROTOCOL    PREVIOUS ORDER:  Ketorolac 30 mg x 1    eGFR = 53     Estimated Creatinine Clearance: 33 mL/min (based on SCr of 1 mg/dL).  Recent Labs     09/02/24  0129 09/02/24  0958 09/03/24  0506 09/04/24  0534   BUN 54*  --  52*  --    CREATININE 1.1  --  1.0  --    PLT  --    < > 309 287    < > = values in this interval not displayed.     NEW RENALLY ADJUSTED ORDER:  Ketorolac 15 mg x1    [facility] [facility]  9/4/2024 10:24 PM

## 2024-09-05 NOTE — PROGRESS NOTES
tablet TAKE 1 TABLET EVERY DAY 7/22/24  Yes Kayden Gonzalez MD   memantine (NAMENDA) 5 MG tablet Take 1 tablet by mouth 2 times daily 6/25/24  Yes Kayden Gonzalez MD   Cholecalciferol (VITAMIN D3) 125 MCG (5000 UT) TABS Take 1 tablet by mouth daily 6/25/24  Yes Kayden Gonzalez MD   vitamin C (ASCORBIC ACID) 500 MG tablet Take 1 tablet by mouth daily   Yes ProviderElliott MD   atorvastatin (LIPITOR) 20 MG tablet TAKE 1 TABLET EVERY DAY 10/23/23  Yes Kayden Gonzalez MD   vitamin B-12 (CYANOCOBALAMIN) 1000 MCG tablet Take 1 tablet by mouth daily 3/30/23  Yes Kayden Gonzalez MD   gabapentin (NEURONTIN) 300 MG capsule Take 1 capsule by mouth 2 times daily for 180 days. Intended supply: 30 days 2/16/24 8/14/24  Kayden Gonzalez MD   aspirin 81 MG EC tablet Take 1 tablet by mouth daily  Patient not taking: Reported on 8/29/2024 3/30/23   Kayden Gonzalez MD       Physical Exam:    General: NAD, sitting back in chair  Eyes:no discharge  HENT: NCAT  Cardiovascular: Regular rate.  Respiratory: Clear to auscultation  Gastrointestinal: Soft, non tender, nondistended  Genitourinary: no suprapubic tenderness  Musculoskeletal: No edema  Skin: warm, dry  Neuro: Alert. No gross deficits  Psych: Mood appropriate.       Past Medical History:   PMHx   Past Medical History:   Diagnosis Date   • GERD (gastroesophageal reflux disease)    • H/O echocardiogram 04/06/2017    EF 55% Left atrial enlargement. Normal LV systolic. Minimal aoritc stenosis.    • H/O echocardiogram 02/04/2020    EF 55-60%, Grade I DD, Mild AS.  Mildly dilated left atrium.   • Hyperlipidemia    • Hypertension    • Neuropathy      PSHX:  has a past surgical history that includes Spine surgery (2013); Colon surgery; Hysterectomy, total abdominal; and Appendectomy.  Allergies:   Allergies   Allergen Reactions   • Penicillins    • Thiazide-Type Diuretics Other (See Comments)     Recurrent hyponatremia//sodium 113 on at 8/29/24//avoid thiazides please     Fam HX:  family  patient's size, iterative reconstruction. COMPARISON: 4/24/2024 FINDINGS: Confluent areas of decreased attenuation within the paravertebral white matter consistent with ischemic white matter changes. No change in encephalomalacia in the left frontal lobe consistent with a chronic infarct. There is no CT evidence of acute hemorrhage or infarction. The ventricles are normal in size. There are no extra-axial fluid collections. No masses are seen. The sinuses are clear. There are no bony lesions.     Chronic involutional changes of the brain. No acute intracranial hemorrhage. Electronically signed by Obey Tapia        Electronically signed by Kev Gonzalez MD on 9/5/2024 at 1:43 PM

## 2024-09-05 NOTE — CARE COORDINATION
Follow up visit with pt. Cm discussed plan/need for short term rehab at SNF upon discharge and that quinn thought [facility] would be best option for pt. Pt asking about how it is paid for as she has utilities and monthly bills that she has to be able to pay. CM explained that ins will cover with just a small copay per day which has been discussed with quinn. Plan remains for [facility] at discharge. Insurance precert is required.    PS message to Dr LIBERTY Gonzalez re; any anticipated discharge date know so that Cm can ensure precert started in a timely manner. Awaiting response.

## 2024-09-05 NOTE — PROGRESS NOTES
Physical Therapy    Patient states:  \"I would rather go home and die instead of lay here,\" \"They keep moving the throat thing to a different day.\"  Nurse Chalino made aware.     PT session attempted: Pt. Declines skilled services at this time. PT will f/u as schedule allows.     Time spent with patient 12 minutes.     Electronically signed by:    Dino Reyes, LIZ  9/5/2024, 10:24 AM       Arm band on/IV intact

## 2024-09-05 NOTE — PROGRESS NOTES
09/05/24 1044   Encounter Summary   Encounter Overview/Reason Follow-up   Service Provided For Patient and family together   Referral/Consult From [facility]   Support System Children;Family members;Spouse   Last Encounter  09/05/24  (Patient said \"The put off my surgery...\"  offered encouragements and prayer support.)   Complexity of Encounter Low   Begin Time 1015   End Time  1030   Total Time Calculated 15 min   Crisis   Type Follow up   Spiritual/Emotional needs   Type Spiritual Support   Grief, Loss, and Adjustments   Type Adjustment to illness   Assessment/Intervention/Outcome   Assessment Concerns with suffering;Anxious   Intervention Active listening;Discussed belief system/Ad[employer] practices/nataly;Empowerment;Explored Coping Skills/Resources;Explored/Affirmed feelings, thoughts, concerns;Prayer (assurance of)/[place]   Outcome Coping;Encouraged;Engaged in conversation;Expressed Gratitude;Less anxious, Less agitated   Plan and Referrals   Plan/Referrals Continue Support (comment)

## 2024-09-05 NOTE — PROGRESS NOTES
[facility] Gastroenterology and Hepatology             MD Lisa Holliday MD Carol Christensen, APRN-CNP       Vianeyramin Kumar, APRN-CNP             [address] [place] [address]             [phone] fax [phone]      Gastroenterology Progress note . 9/5/2024  Reason for consult:  Dysphagia, esophageal polyp     Physician attestation:  Physician attestation:  I have personally seen and examined the patient independently. I have reviewed the patient's available data,including medical history and recent test results. Reviewed and discussed note as documented by THI.  I agree with the physical exam findings, assessment and plan.      Briefly   Patient noted to be lying comfortably in bed.  Discussed that we will plan for EGD with dilation, evaluation of polyp and possible PEG tube placement tomorrow.  She is now agreeable and inquires about the procedure.     Gen: normal mood, alert  ENT: normal TJ  Cardiovascular: RRR, No M/R/G  Respiratory: CTA BL  Gastrointestinal: Soft,NT ND  Genitourinary: no suprapubic tenderness  Musculoskeletal: no scars  Skin:moist  Neuro: alert and oriented x3     My assessment and plan reveals   #1 dysphagia.  Concern for possible esophageal web and polypoid lesion.  I had a long conversation with the patient as well as a separate conversation with her power of  ciara who is also her grand daughter.  At this point the patient refuses any endoscopic evaluation and mentions that she would like to continue with conservative management.  We did go over the risks including aspiration as well as caloric deficit however she understands those risks.  I also updated ciara who mentions she will have a discussion with her grandmother and then get back to us.  We will continue to monitor conservatively.  Discussed with SLP.  They are currently recommending n.p.o. due to her significant aspiration risk.  This was again  relayed to the patient and the patient's POA who have now agreed for endoscopic evaluation and possible PEG tube placement due to her significant aspiration risk.  We will plan for EGD and PEG tube placement.     #2 esophageal web.  Will possibly need dilation, will assess at the time of EGD.     3.  Polypoid pedunculated esophageal lesion.  Will need endoscopic evaluation and biopsy.     My documented MDM is a substantive portion and Majority of the supervisory visit.       Comment: Please note this report has been produced using speech recognition software and may contain errors related to that system including errors in grammar, punctuation, and spelling, as well as words and phrases that may be inappropriate. If there are any questions or concerns please feel free to contact the dictating provider for clarification.       Interval H/P    Patient was examined at bedside.  She is noted to be laying comfortably in bed.  Again we discussed with her the plan for undergoing EGD with PEG tube placement tomorrow.    CC today: No new complaints      ROS: Per history of present illness. All other systems were reviewed and were negative.      Physical Exam  Blood pressure (!) 173/58, pulse (!) 41, temperature 98.2 °F (36.8 °C), temperature source Oral, resp. rate 11, height 1.753 m (5' 9\"), weight 57.6 kg (126 lb 15.8 oz), SpO2 100%.  Constitutional: Patient is in no distress.   Eyes: Pupils equal, round.  No icterus.  HENT: Hard of hearing  Pulmonary: No accessory muscle use. No localizing pulmonary findings.     Cardiovascular:  no JVD.   Gastrointestinal: Bowel sounds are present in all four quadrants. Abdomen is soft, nontender, nondistended. Rectal examination deferred.   Skin: No jaundice, spider angiomas, or palmar erythema. No purpura.  Neuro: Awake,alert, and oriented x3. No gross focal neurologic deficits.       Chart and labs reviewed.    IMPRESSION/RECOMMENDATIONS:  1) dysphagia to solids, concern for

## 2024-09-05 NOTE — ANESTHESIA PRE PROCEDURE
Department of Anesthesiology  Preprocedure Note       Name:  Carol Xiong   Age:  89 y.o.  :  1933                                          MRN:  [id number]         Date:  2024      Surgeon: Surgeon(s):  Lisa Turner MD    Procedure: Procedure(s):  ESOPHAGOGASTRODUODENOSCOPY PERCUTANEOUS ENDOSCOPIC GASTROSTOMY TUBE PLACEMENT    Medications prior to admission:   Prior to Admission medications    Medication Sig Start Date End Date Taking? Authorizing Provider   omeprazole (PRILOSEC) 20 MG delayed release capsule TAKE 1 CAPSULE EVERY DAY 24  Yes Kayden Gonzalez MD   benazepril (LOTENSIN) 40 MG tablet TAKE 1 TABLET EVERY DAY 24  Yes Kayden Gonzalez MD   amLODIPine (NORVASC) 10 MG tablet TAKE 1 TABLET EVERY DAY 24  Yes Kayden Gonzalez MD   memantine (NAMENDA) 5 MG tablet Take 1 tablet by mouth 2 times daily 24  Yes Kayden Gonzalez MD   Cholecalciferol (VITAMIN D3) 125 MCG (5000 UT) TABS Take 1 tablet by mouth daily 24  Yes Kayden Gonzalez MD   vitamin C (ASCORBIC ACID) 500 MG tablet Take 1 tablet by mouth daily   Yes ProviderElliott MD   atorvastatin (LIPITOR) 20 MG tablet TAKE 1 TABLET EVERY DAY 10/23/23  Yes Kayden Gonzalez MD   vitamin B-12 (CYANOCOBALAMIN) 1000 MCG tablet Take 1 tablet by mouth daily 3/30/23  Yes Kayden Gonzalez MD   gabapentin (NEURONTIN) 300 MG capsule Take 1 capsule by mouth 2 times daily for 180 days. Intended supply: 30 days 24  Kayden Gonzalez MD   aspirin 81 MG EC tablet Take 1 tablet by mouth daily  Patient not taking: Reported on 2024 3/30/23   Kayden Gonzalez MD       Current medications:    Current Facility-Administered Medications   Medication Dose Route Frequency Provider Last Rate Last Admin    dextrose 5 % and 0.45 % sodium chloride infusion   IntraVENous Continuous Kev Gonzalez MD 75 mL/hr at 24 1648 New Bag at 24 1648    aspirin chewable tablet 81 mg  81 mg Oral Daily Divya Carlos APRN - YULISA   81 mg at

## 2024-09-05 NOTE — PROGRESS NOTES
Vascular/Interventional Neurology Progress Note  [facility]  Patient Name: Carol Xiong     : 1933      Subjective:     The patient was seen and examined.  Patient sitting in bed.  She denies any neurological changes.  She states she wants to eat.  Plan for EGD and PEG tube placement tomorrow with GI.         Objective:   Scheduled Meds:   aspirin  81 mg Oral Daily    amLODIPine  5 mg Oral Daily    [Held by provider] lisinopril  20 mg Oral Daily    sodium chloride flush  5-40 mL IntraVENous 2 times per day    [Held by provider] enoxaparin  40 mg SubCUTAneous Daily    atorvastatin  20 mg Oral Daily    gabapentin  300 mg Oral BID    memantine  5 mg Oral BID    pantoprazole  40 mg Oral Daily    magnesium oxide  400 mg Oral Daily    thiamine  100 mg Oral Daily    folic acid  1 mg Oral Daily    zinc sulfate  50 mg Oral Daily    multivitamin  1 tablet Oral Daily    sodium chloride  1 g Oral TID WC     Continuous Infusions:   dextrose 5 % and 0.45 % NaCl 75 mL/hr at 24 1648    sodium chloride       PRN Meds:.sodium chloride flush, sodium chloride, potassium chloride **OR** potassium chloride, magnesium sulfate, ondansetron **OR** ondansetron, polyethylene glycol, acetaminophen **OR** acetaminophen, aluminum & magnesium hydroxide-simethicone    Vital Signs:  Vitals:    24 1900 24 2000 24 0000 24 0400   BP:  (!) 158/108 (!) 158/51 (!) 173/58   Pulse: (!) 38 (!) 48 (!) 42 (!) 41   Resp: 14 13 12 11   Temp:  98.3 °F (36.8 °C) 98 °F (36.7 °C) 98.2 °F (36.8 °C)   TempSrc:  Oral Oral Oral   SpO2: 100% 100% 99% 100%   Weight:       Height:            General: A&O x 4, NAD, cooperative  HEENT: NC/AT, EOMI, PERRL, mmm, neck supple  Extremities: no edema, no calf tenderness b/l    Neurological Exam:         Mental Status:  A&O to self, location, and year. NAD, speech clear, language fluent, repetition and naming intact, follows commands appropriately     Cranial  time.    -Continue home Aspirin 81 mg and Lipitor for stroke prevention  -Hyponatremia, follow up nephrology.   -Dysphagia, GI following.  Plan for EGD and PEG tube tomorrow.     Pertinent images discussed/reviewed with Dr. Mancia who has fully participated in the care of this patient and agrees with plan.         Electronically signed by ARABELLA Cervantes CNP on 9/5/2024 at 9:57 AM   9/5/2024

## 2024-09-06 ENCOUNTER — ANESTHESIA (OUTPATIENT)
Dept: ENDOSCOPY | Age: 89
End: 2024-09-06
Payer: MEDICARE

## 2024-09-06 LAB
BASOPHILS ABSOLUTE: 0 K/CU MM
BASOPHILS RELATIVE PERCENT: 0.5 % (ref 0–1)
DIFFERENTIAL TYPE: ABNORMAL
EOSINOPHILS ABSOLUTE: 0.2 K/CU MM
EOSINOPHILS RELATIVE PERCENT: 4.1 % (ref 0–3)
HCT VFR BLD CALC: 26.3 % (ref 37–47)
HEMOGLOBIN: 8.7 GM/DL (ref 12.5–16)
IMMATURE NEUTROPHIL %: 0.2 % (ref 0–0.43)
LYMPHOCYTES ABSOLUTE: 1.6 K/CU MM
LYMPHOCYTES RELATIVE PERCENT: 35.7 % (ref 24–44)
MCH RBC QN AUTO: 29.9 PG (ref 27–31)
MCHC RBC AUTO-ENTMCNC: 33.1 % (ref 32–36)
MCV RBC AUTO: 90.4 FL (ref 78–100)
MONOCYTES ABSOLUTE: 0.5 K/CU MM
MONOCYTES RELATIVE PERCENT: 11.8 % (ref 0–4)
NEUTROPHILS ABSOLUTE: 2.1 K/CU MM
NEUTROPHILS RELATIVE PERCENT: 47.7 % (ref 36–66)
NUCLEATED RBC %: 0 %
PDW BLD-RTO: 12.8 % (ref 11.7–14.9)
PLATELET # BLD: 299 K/CU MM (ref 140–440)
PMV BLD AUTO: 10.3 FL (ref 7.5–11.1)
RBC # BLD: 2.91 M/CU MM (ref 4.2–5.4)
TOTAL IMMATURE NEUTOROPHIL: 0.01 K/CU MM
TOTAL NUCLEATED RBC: 0 K/CU MM
WBC # BLD: 4.3 K/CU MM (ref 4–10.5)

## 2024-09-06 PROCEDURE — 2580000003 HC RX 258: Performed by: STUDENT IN AN ORGANIZED HEALTH CARE EDUCATION/TRAINING PROGRAM

## 2024-09-06 PROCEDURE — 6360000002 HC RX W HCPCS: Performed by: NURSE ANESTHETIST, CERTIFIED REGISTERED

## 2024-09-06 PROCEDURE — 88305 TISSUE EXAM BY PATHOLOGIST: CPT

## 2024-09-06 PROCEDURE — 2580000003 HC RX 258: Performed by: NURSE ANESTHETIST, CERTIFIED REGISTERED

## 2024-09-06 PROCEDURE — 97535 SELF CARE MNGMENT TRAINING: CPT

## 2024-09-06 PROCEDURE — 2709999900 HC NON-CHARGEABLE SUPPLY: Performed by: INTERNAL MEDICINE

## 2024-09-06 PROCEDURE — 3700000000 HC ANESTHESIA ATTENDED CARE: Performed by: INTERNAL MEDICINE

## 2024-09-06 PROCEDURE — 36415 COLL VENOUS BLD VENIPUNCTURE: CPT

## 2024-09-06 PROCEDURE — 43239 EGD BIOPSY SINGLE/MULTIPLE: CPT | Performed by: INTERNAL MEDICINE

## 2024-09-06 PROCEDURE — 3609013300 HC EGD TUBE PLACEMENT: Performed by: INTERNAL MEDICINE

## 2024-09-06 PROCEDURE — 6360000002 HC RX W HCPCS: Performed by: HOSPITALIST

## 2024-09-06 PROCEDURE — 85025 COMPLETE CBC W/AUTO DIFF WBC: CPT

## 2024-09-06 PROCEDURE — C1726 CATH, BAL DIL, NON-VASCULAR: HCPCS | Performed by: INTERNAL MEDICINE

## 2024-09-06 PROCEDURE — 97530 THERAPEUTIC ACTIVITIES: CPT

## 2024-09-06 PROCEDURE — 94761 N-INVAS EAR/PLS OXIMETRY MLT: CPT

## 2024-09-06 PROCEDURE — 6360000002 HC RX W HCPCS: Performed by: NURSE PRACTITIONER

## 2024-09-06 PROCEDURE — 43249 ESOPH EGD DILATION <30 MM: CPT | Performed by: INTERNAL MEDICINE

## 2024-09-06 PROCEDURE — 2500000003 HC RX 250 WO HCPCS: Performed by: NURSE ANESTHETIST, CERTIFIED REGISTERED

## 2024-09-06 PROCEDURE — 88342 IMHCHEM/IMCYTCHM 1ST ANTB: CPT

## 2024-09-06 PROCEDURE — 1200000000 HC SEMI PRIVATE

## 2024-09-06 PROCEDURE — 43246 EGD PLACE GASTROSTOMY TUBE: CPT | Performed by: INTERNAL MEDICINE

## 2024-09-06 PROCEDURE — 3700000001 HC ADD 15 MINUTES (ANESTHESIA): Performed by: INTERNAL MEDICINE

## 2024-09-06 RX ORDER — HYDRALAZINE HYDROCHLORIDE 20 MG/ML
10 INJECTION INTRAMUSCULAR; INTRAVENOUS EVERY 4 HOURS PRN
Status: DISCONTINUED | OUTPATIENT
Start: 2024-09-06 | End: 2024-09-10

## 2024-09-06 RX ORDER — LIDOCAINE HYDROCHLORIDE 20 MG/ML
INJECTION, SOLUTION EPIDURAL; INFILTRATION; INTRACAUDAL; PERINEURAL PRN
Status: DISCONTINUED | OUTPATIENT
Start: 2024-09-06 | End: 2024-09-06 | Stop reason: SDUPTHER

## 2024-09-06 RX ORDER — MORPHINE SULFATE 2 MG/ML
1 INJECTION, SOLUTION INTRAMUSCULAR; INTRAVENOUS ONCE
Status: COMPLETED | OUTPATIENT
Start: 2024-09-06 | End: 2024-09-06

## 2024-09-06 RX ORDER — LABETALOL HYDROCHLORIDE 5 MG/ML
INJECTION, SOLUTION INTRAVENOUS PRN
Status: DISCONTINUED | OUTPATIENT
Start: 2024-09-06 | End: 2024-09-06 | Stop reason: SDUPTHER

## 2024-09-06 RX ORDER — LABETALOL HYDROCHLORIDE 5 MG/ML
10 INJECTION, SOLUTION INTRAVENOUS EVERY 4 HOURS PRN
Status: DISCONTINUED | OUTPATIENT
Start: 2024-09-06 | End: 2024-09-06

## 2024-09-06 RX ORDER — PROPOFOL 10 MG/ML
INJECTION, EMULSION INTRAVENOUS PRN
Status: DISCONTINUED | OUTPATIENT
Start: 2024-09-06 | End: 2024-09-06 | Stop reason: SDUPTHER

## 2024-09-06 RX ORDER — CEFAZOLIN SODIUM 1 G/3ML
INJECTION, POWDER, FOR SOLUTION INTRAMUSCULAR; INTRAVENOUS PRN
Status: DISCONTINUED | OUTPATIENT
Start: 2024-09-06 | End: 2024-09-06 | Stop reason: SDUPTHER

## 2024-09-06 RX ORDER — SODIUM CHLORIDE 9 MG/ML
INJECTION, SOLUTION INTRAVENOUS CONTINUOUS PRN
Status: DISCONTINUED | OUTPATIENT
Start: 2024-09-06 | End: 2024-09-06 | Stop reason: SDUPTHER

## 2024-09-06 RX ADMIN — LABETALOL HYDROCHLORIDE 2.5 MG: 5 INJECTION, SOLUTION INTRAVENOUS at 15:55

## 2024-09-06 RX ADMIN — MORPHINE SULFATE 1 MG: 2 INJECTION, SOLUTION INTRAMUSCULAR; INTRAVENOUS at 22:43

## 2024-09-06 RX ADMIN — HYDRALAZINE HYDROCHLORIDE 10 MG: 20 INJECTION INTRAMUSCULAR; INTRAVENOUS at 19:18

## 2024-09-06 RX ADMIN — LIDOCAINE HYDROCHLORIDE 50 MG: 20 INJECTION, SOLUTION EPIDURAL; INFILTRATION; INTRACAUDAL; PERINEURAL at 15:48

## 2024-09-06 RX ADMIN — HYDRALAZINE HYDROCHLORIDE 10 MG: 20 INJECTION INTRAMUSCULAR; INTRAVENOUS at 15:06

## 2024-09-06 RX ADMIN — KETOROLAC TROMETHAMINE 15 MG: 30 INJECTION, SOLUTION INTRAMUSCULAR; INTRAVENOUS at 19:18

## 2024-09-06 RX ADMIN — PROPOFOL 250 MG: 10 INJECTION, EMULSION INTRAVENOUS at 15:48

## 2024-09-06 RX ADMIN — SODIUM CHLORIDE: 9 INJECTION, SOLUTION INTRAVENOUS at 15:43

## 2024-09-06 RX ADMIN — SODIUM CHLORIDE 75 ML/HR: 9 INJECTION, SOLUTION INTRAVENOUS at 07:08

## 2024-09-06 RX ADMIN — SODIUM CHLORIDE, PRESERVATIVE FREE 10 ML: 5 INJECTION INTRAVENOUS at 20:44

## 2024-09-06 RX ADMIN — CEFAZOLIN 1 G: 1 INJECTION, POWDER, FOR SOLUTION INTRAMUSCULAR; INTRAVENOUS; PARENTERAL at 15:44

## 2024-09-06 RX ADMIN — KETOROLAC TROMETHAMINE 15 MG: 30 INJECTION, SOLUTION INTRAMUSCULAR; INTRAVENOUS at 00:51

## 2024-09-06 ASSESSMENT — PAIN DESCRIPTION - FREQUENCY
FREQUENCY: CONTINUOUS
FREQUENCY: INTERMITTENT
FREQUENCY: CONTINUOUS

## 2024-09-06 ASSESSMENT — PAIN DESCRIPTION - ORIENTATION
ORIENTATION: RIGHT;LEFT;MID
ORIENTATION: RIGHT;LEFT
ORIENTATION: LOWER;MID;ANTERIOR;POSTERIOR
ORIENTATION: MID;LOWER

## 2024-09-06 ASSESSMENT — PAIN DESCRIPTION - DESCRIPTORS
DESCRIPTORS: ACHING;NAGGING
DESCRIPTORS: ACHING;SORE
DESCRIPTORS: ACHING;DISCOMFORT;SORE
DESCRIPTORS: ACHING;DISCOMFORT

## 2024-09-06 ASSESSMENT — PAIN SCALES - GENERAL
PAINLEVEL_OUTOF10: 8
PAINLEVEL_OUTOF10: 8
PAINLEVEL_OUTOF10: 0
PAINLEVEL_OUTOF10: 0
PAINLEVEL_OUTOF10: 6
PAINLEVEL_OUTOF10: 8

## 2024-09-06 ASSESSMENT — PAIN - FUNCTIONAL ASSESSMENT
PAIN_FUNCTIONAL_ASSESSMENT: PREVENTS OR INTERFERES SOME ACTIVE ACTIVITIES AND ADLS

## 2024-09-06 ASSESSMENT — PAIN DESCRIPTION - ONSET
ONSET: GRADUAL
ONSET: ON-GOING
ONSET: ON-GOING

## 2024-09-06 ASSESSMENT — PAIN DESCRIPTION - LOCATION
LOCATION: GENERALIZED;ABDOMEN;THROAT
LOCATION: BACK;ABDOMEN
LOCATION: ABDOMEN;GENERALIZED;THROAT
LOCATION: BACK

## 2024-09-06 ASSESSMENT — PAIN DESCRIPTION - PAIN TYPE
TYPE: SURGICAL PAIN;ACUTE PAIN
TYPE: SURGICAL PAIN;CHRONIC PAIN
TYPE: ACUTE PAIN;SURGICAL PAIN

## 2024-09-06 NOTE — PROGRESS NOTES
Occupational Therapy Treatment Note    Name: Carol Xiong MRN: [id number] :   1933   Date:  2024   Admission Date: 2024 Room:  A     Restrictions/Precautions: General Precautions, Fall Risk     Communication with other providers: Per chart review and Nurse patient is appropriate for therapeutic intervention.       Subjective:  Patient states:  Agreeable to OT. \"I would like to get out of this bed/\"  Pain:   Location, Type, Intensity (0/10 to 10/10):  deny    Objective:    Observation: In semi vilchis's position upon arrival   Objective Measures:  Alert and oriented    Treatment, including education:  Therapeutic Activity Training:   Therapeutic activity training was instructed today.  Cues were given for safety, sequence, UE/LE placement, awareness, and balance.    Bed mobility:Max A for all aspects of bed mobility  Sitting balance:F due to retropulsion, require 1 UE support >80%  Sit/stand transfers:Mod A from bed utilizing RW, Max A from chair utilizing RW; completed 5 reps with static standing balance training ~4 mins in total with emphasis on safety with hand/body and walker positioning to improve G safety awareness carryover.   Functional Mobility: Mod A SPT with therapist, Max bed<>chair utilizing RW    Activities performed today included bed mobility training, transfers, functional mobility  to increase strength, activity tolerance to facilitate IND c ADL tasks, func transfers / mobility with G safety awareness carryover    Therapeutic Exercise:  BUE strengthening ex targeting utilizing orange theraband shoulder press, chest press, shoulder abd/add, shoulder horiz abd/add, concentric arm circles clockwise/counter clockwise, bicep/triceps curls x10 reps. Demo SBA/CGA +verbal/visual cues for pace and corrected techs. All therapeutic intervention performed c emphasis on BUE strengthening and endurance to  increase strength for functional tasks / transfers.      Assessment /

## 2024-09-06 NOTE — PROGRESS NOTES
[facility]  DEPARTMENT OF SPEECH/LANGUAGE PATHOLOGY  ATTEMPT NOTE  Carol Xiong  9/6/2024  [phone]    Per chart review, pt NPO for EGD and PEG placement today. SLP to follow-up at a later date/time as appropriate following these procedures.     Myra Velasquez MA, CF-SLP 9/6/2024

## 2024-09-06 NOTE — CARE COORDINATION
Discharging Nurse; Upon discharge pt will be going to [facility], report to be called to [phone], ask for fax number to send AVS when calling report.  Set up transportation with [employer] [phone].  Notify family       Precert initiated via [employer]: APPROVED  [employer] Auth ID [id number]  SNF  09/06/2024 09/08/2024-09/11/2024  Approved  Electronic PAS completed

## 2024-09-06 NOTE — PROGRESS NOTES
0859 - Dr. Agnulo notified of pt elevated BP. Orders for labetalol placed by MD.     0930 - Pt bradycardic. Dr. Angulo notified. Verbal order to not administer labetalol. Order for hydralazine 10mg for SBP >180 PRN placed by MD.     0945 - BP reheck /48. Hydralazine held at this time.

## 2024-09-06 NOTE — CARE COORDINATION
Spoke with Dr KOLTON Gonzalez and he anticipates that pt will be ready for discharge to SNF Sun 9/8 or Mon 9/9. Message sent to A Won LANIERW/luz ALEJO to inquire as to whether she is able to initiate precert today.  Cm contacted Anu at [facility] admissions with update re; pt having peg placed today per MD and likely ready for discharge Sunday or Monday. Follow up phone call to pt's [facility] to update re; the above. Albert also briefly discussed possible benefits of possible passport referral for pt. [facility] states that she was just getting ready to refer to [employer] for passport when pt admitted. CM advised [facility] that Cm will leave information on passport in pt's room for her to review and when closer to discharge from SNF to call and ask [employer] to come out and do assessment for possible passport.

## 2024-09-06 NOTE — ANESTHESIA PRE PROCEDURE
Department of Anesthesiology  Preprocedure Note       Name:  Carol Xiong   Age:  89 y.o.  :  1933                                          MRN:  [id number]         Date:  2024      Surgeon: Surgeon(s):  Lisa Turner MD    Procedure: Procedure(s):  ESOPHAGOGASTRODUODENOSCOPY PERCUTANEOUS ENDOSCOPIC GASTROSTOMY TUBE PLACEMENT    Medications prior to admission:   Prior to Admission medications    Medication Sig Start Date End Date Taking? Authorizing Provider   omeprazole (PRILOSEC) 20 MG delayed release capsule TAKE 1 CAPSULE EVERY DAY 24  Yes Kayden Gonzalez MD   benazepril (LOTENSIN) 40 MG tablet TAKE 1 TABLET EVERY DAY 24  Yes Kayden Gonzalez MD   amLODIPine (NORVASC) 10 MG tablet TAKE 1 TABLET EVERY DAY 24  Yes Kayden Gonzalez MD   memantine (NAMENDA) 5 MG tablet Take 1 tablet by mouth 2 times daily 24  Yes Kayden Gonzalez MD   Cholecalciferol (VITAMIN D3) 125 MCG (5000 UT) TABS Take 1 tablet by mouth daily 24  Yes Kayden Gonzalez MD   vitamin C (ASCORBIC ACID) 500 MG tablet Take 1 tablet by mouth daily   Yes ProviderElliott MD   atorvastatin (LIPITOR) 20 MG tablet TAKE 1 TABLET EVERY DAY 10/23/23  Yes Kayden Gonzalez MD   vitamin B-12 (CYANOCOBALAMIN) 1000 MCG tablet Take 1 tablet by mouth daily 3/30/23  Yes Kayden Gonzalez MD   gabapentin (NEURONTIN) 300 MG capsule Take 1 capsule by mouth 2 times daily for 180 days. Intended supply: 30 days 24  Kayden Gonzalez MD   aspirin 81 MG EC tablet Take 1 tablet by mouth daily  Patient not taking: Reported on 2024 3/30/23   Kayden Gonzalez MD       Current medications:    Current Facility-Administered Medications   Medication Dose Route Frequency Provider Last Rate Last Admin    hydrALAZINE (APRESOLINE) injection 10 mg  10 mg IntraVENous Q4H PRN Kev Gonzalez MD   10 mg at 24 1506    ketorolac (TORADOL) injection 15 mg  15 mg IntraVENous Q6H PRN Kev Gonzalez MD   15 mg at 24 0051    dextrose 5 % and

## 2024-09-06 NOTE — PROGRESS NOTES
Comprehensive Nutrition Assessment    Type and Reason for Visit:  Reassess    Nutrition Recommendations/Plan:   Continue diet started after NPO     Malnutrition Assessment:  Malnutrition Status:  Moderate malnutrition (08/29/24 0910)    Context:  Social/Environmental Circumstances     Findings of the 6 clinical characteristics of malnutrition:  Energy Intake:  Less than 75% estimated energy requirements for 3 months or longer  Weight Loss:  Mild weight loss (specify amount and time period)     Body Fat Loss:  Severe body fat loss Triceps   Muscle Mass Loss:   (moderate) Clavicles (pectoralis & deltoids), Thigh (quadraceps), Hand (interosseous)  Fluid Accumulation:  No significant fluid accumulation     Strength:  Not Performed    Nutrition Assessment:    Pt is consuming 100% of her meals and is at low risk at this time.    Nutrition Related Findings:    +zinc, thiamine, MV, folic acid; Na 117 Wound Type: None       Current Nutrition Intake & Therapies:    Average Meal Intake: %  Average Supplements Intake: None Ordered  Diet NPO    Anthropometric Measures:  Height: 175.3 cm (5' 9\")  Ideal Body Weight (IBW): 145 lbs (66 kg)    Admission Body Weight: 55.8 kg (123 lb 0.3 oz)  Current Body Weight: 55.8 kg (123 lb 0.3 oz),   IBW. Weight Source: Stated  Current BMI (kg/m2): 18.2  Usual Body Weight: 62.6 kg (138 lb 0.1 oz) (7/27/23)  % Weight Change (Calculated): -10.9  Weight Adjustment For: No Adjustment                 BMI Categories: Underweight (BMI less than 22) age over 65    Estimated Daily Nutrient Needs:  Energy Requirements Based On: Kcal/kg  Weight Used for Energy Requirements: Ideal  Energy (kcal/day): 3072-5314  (25-30kcal/kg)  Weight Used for Protein Requirements: Ideal  Protein (g/day): 66-79 (1.0-1.2g/kg)  Method Used for Fluid Requirements: 1 ml/kcal  Fluid (ml/day): 1800    Nutrition Diagnosis:   Moderate malnutrition, In context of social or environmental circumstances related to inadequate

## 2024-09-06 NOTE — BRIEF OP NOTE
Brief Postoperative Note      Patient: Carol Xiong  YOB: 1933  MRN: [id number]    Date of Procedure: 9/6/2024    Pre-Op Diagnosis Codes:      * Malnutrition, unspecified type (HCC) [E46]     * Dysphagia, unspecified type [R13.10]    Post-Op Diagnosis:  see below       Procedure(s):  ESOPHAGOGASTRODUODENOSCOPY PERCUTANEOUS ENDOSCOPIC GASTROSTOMY TUBE PLACEMENT EGD BALLOON DILATION 12MM-15MM BALLOON DILATED TO 15MM UPPER AND LOWER ESOPHAGUS EGD BIOPSY    Surgeon(s):  Lisa Turner MD    Assistant:  * No surgical staff found *    Anesthesia: Monitor Anesthesia Care    Estimated Blood Loss (mL): Minimal    Complications: None    Specimens:   ID Type Source Tests Collected by Time Destination   A : GASTRIC BIOPSIES ANTRUM AND BODY FOR GASTRITIS, H PYLORI - FORCEPS Tissue Stomach SURGICAL PATHOLOGY Lisa Turner MD 9/6/2024 1556        Implants:  * No implants in log *      Drains:   External Urinary Catheter (Active)   Site Assessment Intact 09/06/24 0847   Placement Replaced 09/05/24 0530   Securement Method Securing device (Describe) 09/06/24 0847   Catheter Care Catheter/Wick replaced;Suction Canister/Tubing changed 09/06/24 0847   Perineal Care Yes 09/06/24 0847   Suction 40 mmgHg continuous 09/06/24 0847   Urine Color Yellow 09/06/24 0847   Urine Appearance Clear 09/06/24 0847   Urine Odor Malodorous 09/05/24 0530   Output (mL) 400 mL 09/06/24 0847       Findings:  Infection Present At Time Of Surgery (PATOS) (choose all levels that have infection present):  No infection present      Impression:         -  Low-grade of narrowing Schatzki ring.  Dilated.          -  Gastritis, characterized by erythema, adherent blood and friability.             Biopsied.          -  Normal duodenal bulb and second portion of the duodenum.          -  An externally removable PEG placement was successfully completed.     Recommendation:         -  Patient has a contact number available for emergencies.  The signs

## 2024-09-06 NOTE — ANESTHESIA POSTPROCEDURE EVALUATION
Department of Anesthesiology  Postprocedure Note    Patient: Carol Xiong  MRN: [id number]  YOB: 1933  Date of evaluation: 9/6/2024    Procedure Summary       Date: 09/06/24 Room / Location: [place] [address] / [facility]    Anesthesia Start: 1540 Anesthesia Stop: 1624    Procedure: ESOPHAGOGASTRODUODENOSCOPY PERCUTANEOUS ENDOSCOPIC GASTROSTOMY TUBE PLACEMENT EGD BALLOON DILATION 12MM-15MM BALLOON DILATED TO 15MM UPPER AND LOWER ESOPHAGUS EGD BIOPSY Diagnosis:       Malnutrition, unspecified type (HCC)      Dysphagia, unspecified type      (Malnutrition, unspecified type (HCC) [E46])      (Dysphagia, unspecified type [R13.10])    Surgeons: Lisa Turner MD Responsible Provider: Avila Tejeda MD    Anesthesia Type: MAC ASA Status: 3            Anesthesia Type: No value filed.    Angela Phase I:  10    Angela Phase II:  10    Anesthesia Post Evaluation    Patient location during evaluation: bedside  Patient participation: complete - patient participated  Level of consciousness: awake and alert  Pain score: 0  Airway patency: patent  Nausea & Vomiting: no nausea and no vomiting  Cardiovascular status: hemodynamically stable  Respiratory status: acceptable, room air, spontaneous ventilation and nonlabored ventilation  Hydration status: euvolemic  Pain management: adequate        No notable events documented.

## 2024-09-06 NOTE — PROGRESS NOTES
through the brain without intravenous contrast.  Radiation dose reduction techniques were used for this study:  All CT scans performed at this facility use one or all of the following: Automated exposure control, adjustment of the mA and/or kVp according to patient's size, iterative reconstruction. COMPARISON: 4/24/2024 FINDINGS: Confluent areas of decreased attenuation within the paravertebral white matter consistent with ischemic white matter changes. No change in encephalomalacia in the left frontal lobe consistent with a chronic infarct. There is no CT evidence of acute hemorrhage or infarction. The ventricles are normal in size. There are no extra-axial fluid collections. No masses are seen. The sinuses are clear. There are no bony lesions.     Chronic involutional changes of the brain. No acute intracranial hemorrhage. Electronically signed by Obey Tapia        Electronically signed by Kev Gonzalez MD on 9/6/2024 at 12:08 PM

## 2024-09-07 LAB
ANION GAP SERPL CALCULATED.3IONS-SCNC: 11 MMOL/L (ref 9–17)
BASOPHILS # BLD: 0.03 K/UL
BASOPHILS NFR BLD: 1 % (ref 0–1)
BUN SERPL-MCNC: 19 MG/DL (ref 7–20)
CALCIUM SERPL-MCNC: 8.8 MG/DL (ref 8.3–10.6)
CHLORIDE SERPL-SCNC: 106 MMOL/L (ref 99–110)
CO2 SERPL-SCNC: 20 MMOL/L (ref 21–32)
CREAT SERPL-MCNC: 0.8 MG/DL (ref 0.6–1.2)
EOSINOPHIL # BLD: 0.07 K/UL
EOSINOPHILS RELATIVE PERCENT: 1 % (ref 0–3)
ERYTHROCYTE [DISTWIDTH] IN BLOOD BY AUTOMATED COUNT: 12.7 % (ref 11.7–14.9)
GFR, ESTIMATED: 70 ML/MIN/1.73M2
GLUCOSE SERPL-MCNC: 81 MG/DL (ref 74–99)
HCT VFR BLD AUTO: 26 % (ref 37–47)
HGB BLD-MCNC: 8.7 G/DL (ref 12.5–16)
IMM GRANULOCYTES # BLD AUTO: 0.05 K/UL
IMM GRANULOCYTES NFR BLD: 1 %
LYMPHOCYTES NFR BLD: 1.35 K/UL
LYMPHOCYTES RELATIVE PERCENT: 20 % (ref 24–44)
MAGNESIUM SERPL-MCNC: 1.6 MG/DL (ref 1.8–2.4)
MCH RBC QN AUTO: 30.4 PG (ref 27–31)
MCHC RBC AUTO-ENTMCNC: 33.5 G/DL (ref 32–36)
MCV RBC AUTO: 90.9 FL (ref 78–100)
MONOCYTES NFR BLD: 0.5 K/UL
MONOCYTES NFR BLD: 8 % (ref 0–4)
NEUTROPHILS NFR BLD: 70 % (ref 36–66)
NEUTS SEG NFR BLD: 4.62 K/UL
PLATELET # BLD AUTO: 301 K/UL (ref 140–440)
PLATELET ESTIMATE: NORMAL
PLATELETS.RETICULATED NFR BLD AUTO: 3.6 % (ref 1.1–10.3)
PMV BLD AUTO: 9.8 FL (ref 7.5–11.1)
POTASSIUM SERPL-SCNC: 4.4 MMOL/L (ref 3.5–5.1)
RBC # BLD AUTO: 2.86 M/UL (ref 4.2–5.4)
RBC # BLD: NORMAL 10*6/UL
SODIUM SERPL-SCNC: 137 MMOL/L (ref 136–145)
WBC # BLD: NORMAL 10*3/UL
WBC OTHER # BLD: 6.6 K/UL (ref 4–10.5)

## 2024-09-07 PROCEDURE — 36415 COLL VENOUS BLD VENIPUNCTURE: CPT

## 2024-09-07 PROCEDURE — 2580000003 HC RX 258: Performed by: HOSPITALIST

## 2024-09-07 PROCEDURE — APPNB45 APP NON BILLABLE 31-45 MINUTES: Performed by: LICENSED PRACTICAL NURSE

## 2024-09-07 PROCEDURE — 6360000002 HC RX W HCPCS: Performed by: INTERNAL MEDICINE

## 2024-09-07 PROCEDURE — 6370000000 HC RX 637 (ALT 250 FOR IP): Performed by: STUDENT IN AN ORGANIZED HEALTH CARE EDUCATION/TRAINING PROGRAM

## 2024-09-07 PROCEDURE — 80048 BASIC METABOLIC PNL TOTAL CA: CPT

## 2024-09-07 PROCEDURE — 94761 N-INVAS EAR/PLS OXIMETRY MLT: CPT

## 2024-09-07 PROCEDURE — 1200000000 HC SEMI PRIVATE

## 2024-09-07 PROCEDURE — 6370000000 HC RX 637 (ALT 250 FOR IP): Performed by: INTERNAL MEDICINE

## 2024-09-07 PROCEDURE — 2580000003 HC RX 258: Performed by: STUDENT IN AN ORGANIZED HEALTH CARE EDUCATION/TRAINING PROGRAM

## 2024-09-07 PROCEDURE — 83735 ASSAY OF MAGNESIUM: CPT

## 2024-09-07 PROCEDURE — 85025 COMPLETE CBC W/AUTO DIFF WBC: CPT

## 2024-09-07 PROCEDURE — 6360000002 HC RX W HCPCS: Performed by: HOSPITALIST

## 2024-09-07 PROCEDURE — 6360000002 HC RX W HCPCS: Performed by: STUDENT IN AN ORGANIZED HEALTH CARE EDUCATION/TRAINING PROGRAM

## 2024-09-07 PROCEDURE — 97110 THERAPEUTIC EXERCISES: CPT

## 2024-09-07 PROCEDURE — 99232 SBSQ HOSP IP/OBS MODERATE 35: CPT | Performed by: INTERNAL MEDICINE

## 2024-09-07 PROCEDURE — 97530 THERAPEUTIC ACTIVITIES: CPT

## 2024-09-07 RX ORDER — MAGNESIUM SULFATE 4 G/50ML
4000 INJECTION INTRAVENOUS ONCE
Status: COMPLETED | OUTPATIENT
Start: 2024-09-07 | End: 2024-09-07

## 2024-09-07 RX ADMIN — SODIUM CHLORIDE, PRESERVATIVE FREE 10 ML: 5 INJECTION INTRAVENOUS at 09:00

## 2024-09-07 RX ADMIN — MEMANTINE 5 MG: 10 TABLET ORAL at 21:00

## 2024-09-07 RX ADMIN — GABAPENTIN 300 MG: 300 CAPSULE ORAL at 21:00

## 2024-09-07 RX ADMIN — SODIUM CHLORIDE 1 G: 1 TABLET ORAL at 18:06

## 2024-09-07 RX ADMIN — SODIUM CHLORIDE 1 G: 1 TABLET ORAL at 14:20

## 2024-09-07 RX ADMIN — LISINOPRIL 20 MG: 20 TABLET ORAL at 14:20

## 2024-09-07 RX ADMIN — DEXTROSE AND SODIUM CHLORIDE: 5; 450 INJECTION, SOLUTION INTRAVENOUS at 19:43

## 2024-09-07 RX ADMIN — HYDRALAZINE HYDROCHLORIDE 10 MG: 20 INJECTION INTRAMUSCULAR; INTRAVENOUS at 18:06

## 2024-09-07 RX ADMIN — DEXTROSE AND SODIUM CHLORIDE: 5; 450 INJECTION, SOLUTION INTRAVENOUS at 04:06

## 2024-09-07 RX ADMIN — MAGNESIUM SULFATE HEPTAHYDRATE 4000 MG: 80 INJECTION, SOLUTION INTRAVENOUS at 14:18

## 2024-09-07 RX ADMIN — SODIUM CHLORIDE, PRESERVATIVE FREE 10 ML: 5 INJECTION INTRAVENOUS at 21:00

## 2024-09-07 RX ADMIN — KETOROLAC TROMETHAMINE 15 MG: 30 INJECTION, SOLUTION INTRAMUSCULAR; INTRAVENOUS at 19:39

## 2024-09-07 RX ADMIN — ENOXAPARIN SODIUM 40 MG: 100 INJECTION SUBCUTANEOUS at 09:00

## 2024-09-07 ASSESSMENT — PAIN DESCRIPTION - DESCRIPTORS
DESCRIPTORS: ACHING

## 2024-09-07 ASSESSMENT — PAIN SCALES - WONG BAKER: WONGBAKER_NUMERICALRESPONSE: HURTS A LITTLE BIT

## 2024-09-07 ASSESSMENT — PAIN SCALES - GENERAL
PAINLEVEL_OUTOF10: 8
PAINLEVEL_OUTOF10: 4
PAINLEVEL_OUTOF10: 0

## 2024-09-07 ASSESSMENT — PAIN DESCRIPTION - LOCATION
LOCATION: GENERALIZED
LOCATION: BACK
LOCATION: ABDOMEN

## 2024-09-07 ASSESSMENT — PAIN DESCRIPTION - ONSET: ONSET: ON-GOING

## 2024-09-07 ASSESSMENT — PAIN DESCRIPTION - FREQUENCY: FREQUENCY: CONTINUOUS

## 2024-09-07 ASSESSMENT — PAIN - FUNCTIONAL ASSESSMENT
PAIN_FUNCTIONAL_ASSESSMENT: PREVENTS OR INTERFERES SOME ACTIVE ACTIVITIES AND ADLS
PAIN_FUNCTIONAL_ASSESSMENT: PREVENTS OR INTERFERES WITH MANY ACTIVE NOT PASSIVE ACTIVITIES
PAIN_FUNCTIONAL_ASSESSMENT: PREVENTS OR INTERFERES WITH MANY ACTIVE NOT PASSIVE ACTIVITIES

## 2024-09-07 ASSESSMENT — PAIN DESCRIPTION - ORIENTATION
ORIENTATION: RIGHT
ORIENTATION: LOWER
ORIENTATION: OTHER (COMMENT)

## 2024-09-07 ASSESSMENT — PAIN DESCRIPTION - PAIN TYPE
TYPE: ACUTE PAIN
TYPE: CHRONIC PAIN

## 2024-09-07 NOTE — PROGRESS NOTES
Comprehensive Nutrition Assessment    Type and Reason for Visit:  Reassess, Consult (TF order/manage)    Nutrition Recommendations/Plan:   Start tube feeding with jevity 1.5 (standard with fiber) 10 ml/hr with initial goal rate 45 ml/hr  Adjust free water flushes when off IV fluid and as per nephrology fluid restriction  Will continue to follow up during stay     Malnutrition Assessment:  Malnutrition Status:  Moderate malnutrition (08/29/24 0910)    Context:  Social/Environmental Circumstances     Findings of the 6 clinical characteristics of malnutrition:  Energy Intake:  Less than 75% estimated energy requirements for 3 months or longer  Weight Loss:  Mild weight loss (specify amount and time period)     Body Fat Loss:  Severe body fat loss Triceps   Muscle Mass Loss:   (moderate) Clavicles (pectoralis & deltoids), Thigh (quadraceps), Hand (interosseous)  Fluid Accumulation:  No significant fluid accumulation     Strength:  Not Performed    Nutrition Assessment:    Patient now with new PEG due to need for NPO status with dysphagia. Plan for tube feeding to start today. Standard with fiber (jevity 1.5) with initial goal 45 ml/hr will provide 1620 calories, 68 g protein to meet minimum estimated needs. Tube feeding will provide 820 ml free water. Currently receiving IV fluids and also has been on 1800 ml fluid restriction as per nephrology with hyponatremia. Will need additional free water when not on IV, additional 800 ml free water per day to provide at least 1 ml/demetris and stay within fluid restriction. Will follow at high nutrition risk at this time.    Nutrition Related Findings:    SLP following-rec NPO Wound Type: None       Current Nutrition Intake & Therapies:    Average Meal Intake: NPO  Average Supplements Intake: NPO      Anthropometric Measures:  Height: 175.3 cm (5' 9\")  Ideal Body Weight (IBW): 145 lbs (66 kg)    Admission Body Weight: 55.8 kg (123 lb 0.3 oz)  Current Body Weight: 55.8 kg (123 lb

## 2024-09-07 NOTE — PROGRESS NOTES
[facility] Gastroenterology and Hepat[facility]             MD Lisa Holliday MD Carol Christensen, APRN-CNP       Vianey Kumar, APRN-CNP             [address] [place] [address]             [phone] fax [phone]      Gastroenterology Progress note . 9/7/2024  Reason for consult:   Dysphagia    Physician attestation:  I have personally seen and examined the patient independently. I have reviewed the patient's available data,including medical history and recent test results. Reviewed and discussed note as documented by THI.  I agree with the physical exam findings, assessment and plan.     Briefly   Patient noted to be sitting comfortably.  Underwent EGD with PEG tube placement with me yesterday mentions she feels little bit of sore throat otherwise unremarkable.  PEG tube bumper is at 3 to 3.5 cm flush against freely moving and rotating.  No leakage or discharge.  Discussed with nurse.    Gen: normal mood, alert  ENT: normal TJ  Cardiovascular: RRR, No M/R/G  Respiratory: CTA BL  Gastrointestinal: Soft,NT ND  PEG tube bumper is at 3 to 3.5 cm flush against freely moving and rotating.  No leakage or discharge.   Genitourinary: no suprapubic tenderness  Musculoskeletal: no scars  Skin:moist  Neuro: alert and oriented x3    My assessment and plan reveals   #1 dysphagia.  Status post EGD with esophageal rings noted status post dilated.  PEG tube placed.  I discussed with the nurse that her diet will remain n.p.o. because of concern for aspiration.  Can have SLP to evaluate her for bedside swallowing on Monday.  No polyp noted on EGD.    2.  Aspiration risk.  Status post PEG tube placement.  Okay to start tube feeds from GI perspective    #3 gastritis.  PPI.  Await biopsy result    My documented MDM is a substantive portion and Majority of the supervisory visit.  Thank you for the consult, GI will sign off.  Please page or reconsult if any questions or

## 2024-09-07 NOTE — PROGRESS NOTES
Patient will have a swallow evaluation on Monday. Tube feed started. Patient seems to be tolerating it well.

## 2024-09-07 NOTE — PROGRESS NOTES
[place]2.0    [facility] [place]      Name:  Carol Xiong /Age/Sex: 1933  (89 y.o. female)   MRN & CSN:  [id number] & [id number] Encounter Date/Time: 2024 7:39 AM EDT   Location:  -A PCP: Kayden Gonzalez MD     Attending:Cindi Yusuf*       Hospital Day: 11    Assessment and Recommendations   Carol Xiong is a 89 y.o. female with pmh of H/O TIA, HTN, Neuropathy and amemia who presents with Hyponatremia      Plan:   Dysphagia  Schatzki ring  MBSS: Pedunculated esophageal polyp at the level of esophageal web; laryngeal penetration and tracheal aspiration.   --GI was consulted and patient underwent an EGD with dilatation given low grade narrowing of the Schatzki ring and G tube placement.   --Maintain as NPO until Monday and have SLP re-evaluate  --Continue D5 fluids until TF can be started. Dietitian consulted, will appreciate recs  --Aspiration precautions    Hyponatremia   Presented with Na 113 (from wnl ~3 months prior)  --Stopped home HCTZ (avoid thiazides), patient is s/p fluids in ER,   --Nephrology on board, appreciate recs, 1800 mL/day, and salt tabs 1 g twice daily upon discharge and stopping triamterene hydrochlorothiazide. Now signed off  --Na currently 137    Dizziness with hx of TIA, carotid artery disease  CTH negative for acute abnormality. CTA head/neck with critical stenosis of right carotid artery and severe stenosis of left carotid artery.  --Evaluated by CTS in  for similar findings at which time CEA was recommended however pt did not wish to proceed with this at the time. Discussed this again with her and pt wanted more information to determine about whether she would like to pursue intervention.  --after consideration, patient has declined any intervention from interventional neurology.    --Continue aspirin and statin.    Recent fall   Likely 2/2 above  --PT/OT    Hypertension  Avoid thiazides and diuretics given hyponatremia thus stopped home

## 2024-09-07 NOTE — PROGRESS NOTES
Physical Therapy    Physical Therapy Treatment Note  Name: Carol Xiong MRN: [id number] :   1933   Date:  2024   Admission Date: 2024 Room:  A   Restrictions/Precautions:          general precautions, fall risk   Communication with other providers:  per nurse ok to tx  Subjective:  Patient states:  agreeable to sitting at EOB for ex.  Pain:   Location, Type, Intensity (0/10 to 10/10):  pt reports legs are very sore but did not rate  Objective:    Observation:  alert and oriented  Objective Measures:  BP sup 168/47, HR 47, in sitting 173/57 , HR 52  Treatment, including education/measures:  Sup<=>sit with HOB up mod assist and cues needing increased time and effort.  Pt was able to sit with right UE support on rail and sba x 20 minutes an was able to work on trunk stretches with deep breathing and laqs/aps reps as  tolerated( 5-7 reps x 4 sets) pt reports it felt good to sit up but declined att at standing or getting up in chair.  Scoot to HOB with tube feed on hold, pt using UE and LE min assist and cues.  Safety  Patient left safely in the bed, with call light/phone in reach with alarm applied. Gait belt was used for transfers and gait.   Assessment / Impression:      Patient's tolerance of treatment:  good   Adverse Reaction: na  Significant change in status and impact:  na  Barriers to improvement:  strength and safety   Plan for Next Session:    Cont. POC                Time in:  1700  Time out:  1725  Timed treatment minutes:  25  Total treatment time:  25    Previously filed items:  Social/Functional History  Lives With: Other (comment)  Type of Home: House  Home Layout: One level  Home Access: Level entry        Short Term Goals  Time Frame for Short Term Goals: 1 week  Short Term Goal 1: pt to complete all bed mobility mod I  Short Term Goal 2: pt to complete all STS transfers to/from bed, commode, and chair CGA  Short Term Goal 3: pt to ambulate 25' with LRAD  CGA    Electronically signed by:    Jennifer Goldsmith PTA  9/7/2024, 8:15 AM

## 2024-09-08 LAB
ANION GAP SERPL CALCULATED.3IONS-SCNC: 9 MMOL/L (ref 9–17)
BASOPHILS # BLD: 0.02 K/UL
BASOPHILS NFR BLD: 0 % (ref 0–1)
BUN SERPL-MCNC: 17 MG/DL (ref 7–20)
CALCIUM SERPL-MCNC: 8.8 MG/DL (ref 8.3–10.6)
CHLORIDE SERPL-SCNC: 107 MMOL/L (ref 99–110)
CO2 SERPL-SCNC: 20 MMOL/L (ref 21–32)
CREAT SERPL-MCNC: 0.7 MG/DL (ref 0.6–1.2)
EOSINOPHIL # BLD: 0.08 K/UL
EOSINOPHILS RELATIVE PERCENT: 2 % (ref 0–3)
ERYTHROCYTE [DISTWIDTH] IN BLOOD BY AUTOMATED COUNT: 12.7 % (ref 11.7–14.9)
GFR, ESTIMATED: 78 ML/MIN/1.73M2
GLUCOSE SERPL-MCNC: 124 MG/DL (ref 74–99)
HCT VFR BLD AUTO: 26.1 % (ref 37–47)
HGB BLD-MCNC: 8.6 G/DL (ref 12.5–16)
IMM GRANULOCYTES # BLD AUTO: 0.02 K/UL
IMM GRANULOCYTES NFR BLD: 0 %
LYMPHOCYTES NFR BLD: 1.14 K/UL
LYMPHOCYTES RELATIVE PERCENT: 22 % (ref 24–44)
MAGNESIUM SERPL-MCNC: 2.4 MG/DL (ref 1.8–2.4)
MCH RBC QN AUTO: 30.1 PG (ref 27–31)
MCHC RBC AUTO-ENTMCNC: 33 G/DL (ref 32–36)
MCV RBC AUTO: 91.3 FL (ref 78–100)
MONOCYTES NFR BLD: 0.49 K/UL
MONOCYTES NFR BLD: 9 % (ref 0–4)
NEUTROPHILS NFR BLD: 67 % (ref 36–66)
NEUTS SEG NFR BLD: 3.48 K/UL
PLATELET # BLD AUTO: 267 K/UL (ref 140–440)
PMV BLD AUTO: 10.3 FL (ref 7.5–11.1)
POTASSIUM SERPL-SCNC: 4 MMOL/L (ref 3.5–5.1)
RBC # BLD AUTO: 2.86 M/UL (ref 4.2–5.4)
SODIUM SERPL-SCNC: 135 MMOL/L (ref 136–145)
WBC OTHER # BLD: 5.2 K/UL (ref 4–10.5)

## 2024-09-08 PROCEDURE — 2580000003 HC RX 258: Performed by: STUDENT IN AN ORGANIZED HEALTH CARE EDUCATION/TRAINING PROGRAM

## 2024-09-08 PROCEDURE — 6370000000 HC RX 637 (ALT 250 FOR IP): Performed by: STUDENT IN AN ORGANIZED HEALTH CARE EDUCATION/TRAINING PROGRAM

## 2024-09-08 PROCEDURE — 2580000003 HC RX 258: Performed by: HOSPITALIST

## 2024-09-08 PROCEDURE — 94761 N-INVAS EAR/PLS OXIMETRY MLT: CPT

## 2024-09-08 PROCEDURE — 6360000002 HC RX W HCPCS: Performed by: INTERNAL MEDICINE

## 2024-09-08 PROCEDURE — 6360000002 HC RX W HCPCS: Performed by: NURSE PRACTITIONER

## 2024-09-08 PROCEDURE — 80048 BASIC METABOLIC PNL TOTAL CA: CPT

## 2024-09-08 PROCEDURE — 6360000002 HC RX W HCPCS: Performed by: HOSPITALIST

## 2024-09-08 PROCEDURE — 85025 COMPLETE CBC W/AUTO DIFF WBC: CPT

## 2024-09-08 PROCEDURE — 36415 COLL VENOUS BLD VENIPUNCTURE: CPT

## 2024-09-08 PROCEDURE — 2580000003 HC RX 258: Performed by: NURSE PRACTITIONER

## 2024-09-08 PROCEDURE — 6370000000 HC RX 637 (ALT 250 FOR IP): Performed by: INTERNAL MEDICINE

## 2024-09-08 PROCEDURE — 97530 THERAPEUTIC ACTIVITIES: CPT

## 2024-09-08 PROCEDURE — 83735 ASSAY OF MAGNESIUM: CPT

## 2024-09-08 PROCEDURE — 1200000000 HC SEMI PRIVATE

## 2024-09-08 PROCEDURE — 6370000000 HC RX 637 (ALT 250 FOR IP): Performed by: NURSE PRACTITIONER

## 2024-09-08 PROCEDURE — 97110 THERAPEUTIC EXERCISES: CPT

## 2024-09-08 RX ORDER — LIDOCAINE 4 G/G
1 PATCH TOPICAL DAILY
Status: DISCONTINUED | OUTPATIENT
Start: 2024-09-08 | End: 2024-09-10 | Stop reason: HOSPADM

## 2024-09-08 RX ORDER — PANTOPRAZOLE SODIUM 40 MG/10ML
40 INJECTION, POWDER, LYOPHILIZED, FOR SOLUTION INTRAVENOUS DAILY
Status: DISCONTINUED | OUTPATIENT
Start: 2024-09-08 | End: 2024-09-08

## 2024-09-08 RX ADMIN — ASPIRIN 81 MG CHEWABLE TABLET 81 MG: 81 TABLET CHEWABLE at 08:28

## 2024-09-08 RX ADMIN — ENOXAPARIN SODIUM 40 MG: 100 INJECTION SUBCUTANEOUS at 08:29

## 2024-09-08 RX ADMIN — FOLIC ACID 1 MG: 1 TABLET ORAL at 08:29

## 2024-09-08 RX ADMIN — ZINC SULFATE 220 MG (50 MG) CAPSULE 50 MG: CAPSULE at 08:28

## 2024-09-08 RX ADMIN — PANTOPRAZOLE SODIUM 40 MG: 40 INJECTION, POWDER, FOR SOLUTION INTRAVENOUS at 08:29

## 2024-09-08 RX ADMIN — Medication 400 MG: at 08:29

## 2024-09-08 RX ADMIN — SODIUM CHLORIDE, PRESERVATIVE FREE 10 ML: 5 INJECTION INTRAVENOUS at 08:30

## 2024-09-08 RX ADMIN — Medication 100 MG: at 08:28

## 2024-09-08 RX ADMIN — GABAPENTIN 300 MG: 300 CAPSULE ORAL at 22:26

## 2024-09-08 RX ADMIN — SODIUM CHLORIDE 1 G: 1 TABLET ORAL at 16:58

## 2024-09-08 RX ADMIN — GABAPENTIN 300 MG: 300 CAPSULE ORAL at 08:28

## 2024-09-08 RX ADMIN — ATORVASTATIN CALCIUM 20 MG: 10 TABLET, FILM COATED ORAL at 08:28

## 2024-09-08 RX ADMIN — MEMANTINE 5 MG: 10 TABLET ORAL at 22:26

## 2024-09-08 RX ADMIN — KETOROLAC TROMETHAMINE 15 MG: 30 INJECTION, SOLUTION INTRAMUSCULAR; INTRAVENOUS at 01:20

## 2024-09-08 RX ADMIN — SODIUM CHLORIDE 1 G: 1 TABLET ORAL at 12:40

## 2024-09-08 RX ADMIN — SODIUM CHLORIDE 1 G: 1 TABLET ORAL at 08:29

## 2024-09-08 RX ADMIN — MEMANTINE 5 MG: 10 TABLET ORAL at 08:28

## 2024-09-08 RX ADMIN — DEXTROSE AND SODIUM CHLORIDE: 5; 450 INJECTION, SOLUTION INTRAVENOUS at 08:42

## 2024-09-08 RX ADMIN — HYDRALAZINE HYDROCHLORIDE 10 MG: 20 INJECTION INTRAMUSCULAR; INTRAVENOUS at 16:29

## 2024-09-08 RX ADMIN — LISINOPRIL 20 MG: 20 TABLET ORAL at 08:28

## 2024-09-08 RX ADMIN — SODIUM CHLORIDE, PRESERVATIVE FREE 10 ML: 5 INJECTION INTRAVENOUS at 22:27

## 2024-09-08 ASSESSMENT — PAIN - FUNCTIONAL ASSESSMENT: PAIN_FUNCTIONAL_ASSESSMENT: PREVENTS OR INTERFERES WITH MANY ACTIVE NOT PASSIVE ACTIVITIES

## 2024-09-08 ASSESSMENT — PAIN DESCRIPTION - LOCATION: LOCATION: BACK;FOOT;NECK

## 2024-09-08 ASSESSMENT — PAIN DESCRIPTION - FREQUENCY: FREQUENCY: CONTINUOUS

## 2024-09-08 ASSESSMENT — PAIN DESCRIPTION - ONSET: ONSET: ON-GOING

## 2024-09-08 ASSESSMENT — PAIN DESCRIPTION - DESCRIPTORS: DESCRIPTORS: ACHING;DISCOMFORT;NAGGING

## 2024-09-08 ASSESSMENT — PAIN DESCRIPTION - ORIENTATION: ORIENTATION: RIGHT;LEFT;MID;LOWER

## 2024-09-08 ASSESSMENT — PAIN SCALES - GENERAL
PAINLEVEL_OUTOF10: 8
PAINLEVEL_OUTOF10: 0

## 2024-09-08 ASSESSMENT — PAIN DESCRIPTION - PAIN TYPE: TYPE: CHRONIC PAIN

## 2024-09-08 NOTE — FLOWSHEET NOTE
Patient transferred to room 4112 via bed with belongings.  [place]/[phone] notified via phone, voices understanding.

## 2024-09-08 NOTE — PROGRESS NOTES
4 Eyes Skin Assessment     NAME:  Carol Xiong  YOB: 1933  MEDICAL RECORD NUMBER:  [id number]    The patient is being assessed for  Transfer to New Unit    I agree that at least one RN has performed a thorough Head to Toe Skin Assessment on the patient. ALL assessment sites listed below have been assessed.      Areas assessed by both nurses:    Head, Face, Ears, Shoulders, Back, Chest, Arms, Elbows, Hands, Sacrum. Buttock, Coccyx, Ischium, Legs. Feet and Heels, and Under Medical Devices         Does the Patient have a Wound? No noted wound(s)       Daron Prevention initiated by RN: No  Wound Care Orders initiated by RN: No    Pressure Injury (Stage 3,4, Unstageable, DTI, NWPT, and Complex wounds) if present, place Wound referral order by RN under : No    New Ostomies, if present place, Ostomy referral order under : No     Nurse 1 eSignature: Electronically signed by Annmarie Hargrove LPN on 9/8/24 at 5:13 PM EDT    **SHARE this note so that the co-signing nurse can place an eSignature**    Nurse 2 eSignature: Electronically signed by Olivia Yun RN on 9/9824 at 8:10 PM EDT

## 2024-09-08 NOTE — PROGRESS NOTES
V2.0    [facility] [place]      Name:  Carol Xiong /Age/Sex: 1933  (89 y.o. female)   MRN & CSN:  [id number] & [id number] Encounter Date/Time: 2024 7:39 AM EDT   Location:  -A PCP: Kayden Gonzalez MD     Attending:Cindi Yusuf*       Hospital Day: 12    Assessment and Recommendations   Carol Xiong is a 89 y.o. female with pmh of H/O TIA, HTN, Neuropathy and amemia who presents with Hyponatremia    Plan:   Dysphagia  Schatzki ring  MBSS: Pedunculated esophageal polyp at the level of esophageal web; laryngeal penetration and tracheal aspiration.   --GI was consulted and patient underwent an EGD with dilatation given low grade narrowing of the Schatzki ring and G tube placement.   --Maintain as NPO until Monday and have SLP re-evaluate  --Hold D5 fluids now that TF has been started. Dietitian consulted, appreciate recs  --Aspiration precautions    Hyponatremia - resolved  Presented with Na 113 (from wnl ~3 months prior)  --Stopped home HCTZ (avoid thiazides), patient is s/p fluids in ER,   --Nephrology on board, appreciate recs, 1800 mL/day, and salt tabs 1 g twice daily upon discharge and stopping triamterene hydrochlorothiazide. Now signed off  --Na currently 135    Dizziness with hx of TIA, carotid artery disease  CTH negative for acute abnormality. CTA head/neck with critical stenosis of right carotid artery and severe stenosis of left carotid artery.  --Evaluated by CTS in  for similar findings at which time CEA was recommended however pt did not wish to proceed with this at the time. Discussed this again with her and pt wanted more information to determine about whether she would like to pursue intervention.  --After consideration, patient has declined any intervention from interventional neurology.    --Continue aspirin and statin.    Recent fall   Likely 2/2 above  --PT/OT    Hypertension  Avoid thiazides and diuretics given hyponatremia thus stopped home

## 2024-09-08 NOTE — PLAN OF CARE
Problem: Discharge Planning  Goal: Discharge to home or other facility with appropriate resources  Outcome: Progressing     Problem: Pain  Goal: Verbalizes/displays adequate comfort level or baseline comfort level  Outcome: Progressing     Problem: ABCDS Injury Assessment  Goal: Absence of physical injury  Outcome: Progressing     Problem: Skin/Tissue Integrity  Goal: Absence of new skin breakdown  Description: 1.  Monitor for areas of redness and/or skin breakdown  2.  Assess vascular access sites hourly  3.  Every 4-6 hours minimum:  Change oxygen saturation probe site  4.  Every 4-6 hours:  If on nasal continuous positive airway pressure, respiratory therapy assess nares and determine need for appliance change or resting period.  Outcome: Progressing     Problem: Safety - Adult  Goal: Free from fall injury  Outcome: Progressing     Problem: Nutrition Deficit:  Goal: Optimize nutritional status  Outcome: Progressing  Flowsheets (Taken 9/7/2024 1347 by Raisa Mario, RD, LD)  Nutrient intake appropriate for improving, restoring, or maintaining nutritional needs: Recommend, monitor, and adjust tube feedings and TPN/PPN based on assessed needs

## 2024-09-09 LAB
ANION GAP SERPL CALCULATED.3IONS-SCNC: 9 MMOL/L (ref 9–17)
BUN SERPL-MCNC: 19 MG/DL (ref 7–20)
CALCIUM SERPL-MCNC: 8.1 MG/DL (ref 8.3–10.6)
CHLORIDE SERPL-SCNC: 108 MMOL/L (ref 99–110)
CO2 SERPL-SCNC: 19 MMOL/L (ref 21–32)
CREAT SERPL-MCNC: 0.8 MG/DL (ref 0.6–1.2)
ERYTHROCYTE [DISTWIDTH] IN BLOOD BY AUTOMATED COUNT: 12.9 % (ref 11.7–14.9)
GFR, ESTIMATED: 69 ML/MIN/1.73M2
GLUCOSE SERPL-MCNC: 116 MG/DL (ref 74–99)
HCT VFR BLD AUTO: 23.8 % (ref 37–47)
HGB BLD-MCNC: 7.9 G/DL (ref 12.5–16)
MCH RBC QN AUTO: 30.6 PG (ref 27–31)
MCHC RBC AUTO-ENTMCNC: 33.2 G/DL (ref 32–36)
MCV RBC AUTO: 92.2 FL (ref 78–100)
PLATELET # BLD AUTO: 247 K/UL (ref 140–440)
PMV BLD AUTO: 10 FL (ref 7.5–11.1)
POTASSIUM SERPL-SCNC: 4 MMOL/L (ref 3.5–5.1)
RBC # BLD AUTO: 2.58 M/UL (ref 4.2–5.4)
SODIUM SERPL-SCNC: 136 MMOL/L (ref 136–145)
WBC OTHER # BLD: 9.2 K/UL (ref 4–10.5)

## 2024-09-09 PROCEDURE — 94761 N-INVAS EAR/PLS OXIMETRY MLT: CPT

## 2024-09-09 PROCEDURE — 80048 BASIC METABOLIC PNL TOTAL CA: CPT

## 2024-09-09 PROCEDURE — 36415 COLL VENOUS BLD VENIPUNCTURE: CPT

## 2024-09-09 PROCEDURE — 6370000000 HC RX 637 (ALT 250 FOR IP): Performed by: STUDENT IN AN ORGANIZED HEALTH CARE EDUCATION/TRAINING PROGRAM

## 2024-09-09 PROCEDURE — 6360000002 HC RX W HCPCS: Performed by: HOSPITALIST

## 2024-09-09 PROCEDURE — 6370000000 HC RX 637 (ALT 250 FOR IP): Performed by: INTERNAL MEDICINE

## 2024-09-09 PROCEDURE — 97530 THERAPEUTIC ACTIVITIES: CPT

## 2024-09-09 PROCEDURE — 1200000000 HC SEMI PRIVATE

## 2024-09-09 PROCEDURE — 6360000002 HC RX W HCPCS: Performed by: STUDENT IN AN ORGANIZED HEALTH CARE EDUCATION/TRAINING PROGRAM

## 2024-09-09 PROCEDURE — 6370000000 HC RX 637 (ALT 250 FOR IP): Performed by: NURSE PRACTITIONER

## 2024-09-09 PROCEDURE — 6360000002 HC RX W HCPCS: Performed by: INTERNAL MEDICINE

## 2024-09-09 PROCEDURE — 92526 ORAL FUNCTION THERAPY: CPT

## 2024-09-09 PROCEDURE — 97535 SELF CARE MNGMENT TRAINING: CPT

## 2024-09-09 PROCEDURE — 85027 COMPLETE CBC AUTOMATED: CPT

## 2024-09-09 RX ADMIN — MEMANTINE 5 MG: 10 TABLET ORAL at 12:38

## 2024-09-09 RX ADMIN — ZINC SULFATE 220 MG (50 MG) CAPSULE 50 MG: CAPSULE at 12:38

## 2024-09-09 RX ADMIN — ONDANSETRON 4 MG: 2 INJECTION INTRAMUSCULAR; INTRAVENOUS at 21:52

## 2024-09-09 RX ADMIN — GABAPENTIN 300 MG: 300 CAPSULE ORAL at 21:56

## 2024-09-09 RX ADMIN — ASPIRIN 81 MG CHEWABLE TABLET 81 MG: 81 TABLET CHEWABLE at 12:38

## 2024-09-09 RX ADMIN — SODIUM CHLORIDE 1 G: 1 TABLET ORAL at 12:38

## 2024-09-09 RX ADMIN — SODIUM CHLORIDE 1 G: 1 TABLET ORAL at 18:20

## 2024-09-09 RX ADMIN — GABAPENTIN 300 MG: 300 CAPSULE ORAL at 12:38

## 2024-09-09 RX ADMIN — Medication 100 MG: at 12:38

## 2024-09-09 RX ADMIN — ATORVASTATIN CALCIUM 20 MG: 10 TABLET, FILM COATED ORAL at 12:38

## 2024-09-09 RX ADMIN — Medication 400 MG: at 12:38

## 2024-09-09 RX ADMIN — ONDANSETRON 4 MG: 2 INJECTION INTRAMUSCULAR; INTRAVENOUS at 06:31

## 2024-09-09 RX ADMIN — FOLIC ACID 1 MG: 1 TABLET ORAL at 12:38

## 2024-09-09 RX ADMIN — KETOROLAC TROMETHAMINE 15 MG: 30 INJECTION, SOLUTION INTRAMUSCULAR; INTRAVENOUS at 02:52

## 2024-09-09 RX ADMIN — KETOROLAC TROMETHAMINE 15 MG: 30 INJECTION, SOLUTION INTRAMUSCULAR; INTRAVENOUS at 19:32

## 2024-09-09 RX ADMIN — ENOXAPARIN SODIUM 40 MG: 100 INJECTION SUBCUTANEOUS at 12:38

## 2024-09-09 RX ADMIN — LISINOPRIL 20 MG: 20 TABLET ORAL at 12:38

## 2024-09-09 RX ADMIN — MEMANTINE 5 MG: 10 TABLET ORAL at 21:56

## 2024-09-09 RX ADMIN — Medication 1 TABLET: at 12:38

## 2024-09-09 ASSESSMENT — PAIN SCALES - GENERAL
PAINLEVEL_OUTOF10: 0
PAINLEVEL_OUTOF10: 0
PAINLEVEL_OUTOF10: 6
PAINLEVEL_OUTOF10: 8

## 2024-09-09 ASSESSMENT — PAIN DESCRIPTION - FREQUENCY
FREQUENCY: INTERMITTENT
FREQUENCY: INTERMITTENT

## 2024-09-09 ASSESSMENT — PAIN DESCRIPTION - DESCRIPTORS
DESCRIPTORS: ACHING
DESCRIPTORS: ACHING

## 2024-09-09 ASSESSMENT — PAIN DESCRIPTION - ONSET
ONSET: GRADUAL
ONSET: GRADUAL

## 2024-09-09 ASSESSMENT — PAIN DESCRIPTION - PAIN TYPE
TYPE: ACUTE PAIN
TYPE: CHRONIC PAIN

## 2024-09-09 ASSESSMENT — PAIN DESCRIPTION - LOCATION
LOCATION: NECK
LOCATION: ABDOMEN

## 2024-09-09 ASSESSMENT — PAIN DESCRIPTION - ORIENTATION
ORIENTATION: MID;LOWER
ORIENTATION: MID;RIGHT;LEFT

## 2024-09-09 ASSESSMENT — PAIN - FUNCTIONAL ASSESSMENT
PAIN_FUNCTIONAL_ASSESSMENT: PREVENTS OR INTERFERES SOME ACTIVE ACTIVITIES AND ADLS
PAIN_FUNCTIONAL_ASSESSMENT: ACTIVITIES ARE NOT PREVENTED

## 2024-09-09 NOTE — PROGRESS NOTES
Pt complaining of nausea and cramping. Np notified and order to hold the tube feed. Tube feed was running at 30ml/h. Zofran administered to pt. Will continue to monitor

## 2024-09-09 NOTE — PROGRESS NOTES
[facility]  DEPARTMENT OF SPEECH/LANGUAGE PATHOLOGY  DAILY PROGRESS NOTE  Carol Xiong  9/9/2024  [id number]  Dizziness [R42]  Hyponatremia [E87.1]  Elevated blood pressure reading [R03.0]  Allergies   Allergen Reactions    Penicillins     Thiazide-Type Diuretics Other (See Comments)     Recurrent hyponatremia//sodium 113 on at 8/29/24//avoid thiazides please         Pt was seen this date for dysphagia treatment.       IMPRESSION AND RECOMMENDATIONS: Carol Xiong was seen today for a bedside dysphagia tx and re-eval following admission to [facility] with hyponatremia. Pt underwent EGD and PEG placement on 9/6/24. EGD reveal \"low-grade of narrowing Schatzki ring. Dilated...Gastritis, characterized by erythema, adherent blood and friability. Biopsied.\" No polyp was identified. ANGEL Morton reports pt complaining of nausea and vomiting this AM and tube feed was stopped.     Carol Xiong was positioned upright in a chair for the current visit. Pt agreeable and cooperative throughout. Pt complains of nausea and stomach cramps. She states she would like to eats. Pt reports she was eating a ground diet prior to admission. Carol Xiong consumed trials of ice chips, thin liquid via straw, puree, and softened solid during this visit. Pt presents with evidence of oropharyngeal dysphagia characterized by prolonged bolus preparation/mastication, delay in a/p transit, and questionable timeliness of the pharyngeal swallow response. Oral acceptance and oral clearance are WFL. No overt s/sx of penetration/aspiration noted. Pt denies sensation of food/liquid feeling \"stuck\" in her throat reported during previous visit. Pt states her throat feels sore. SLP provided education of results/recommendations following this visit. Pt expressed understanding and agreement, may benefit from review.     Recommend Carol Xiong consume a minced and moist diet and thin liquids with medications

## 2024-09-09 NOTE — CARE COORDINATION
Reviewed chart, discussed in IDR, plan to send pt to [facility] tomorrow if medically ready. Prior auth good till 9/11.  Updated pt and agreeable with plan.  JORGE left for Anu at [facility].

## 2024-09-09 NOTE — PROGRESS NOTES
Occupational Therapy    Occupational Therapy Treatment Note     Name: Carol Xiong MRN: [id number] :             1933   Date:  2024   Admission Date: 2024 Room:  -A      Restrictions/Precautions: General Precautions, Fall Risk      Communication with other providers: RN approved session, co tx with PTA for tolerance     Subjective:  Patient states:  Pt agreeable to therapy session.   Pain:    Location, Type, Intensity (0/10 to 10/10):  denies pain     Objective:    Observation: Pt supine in bed upon arrival.   Objective Measures:  Pt alert and oriented.      Treatment, including education:  Therapeutic Activity Training:   Therapeutic activity training was instructed today.  Cues were given for safety, sequence, UE/LE placement, awareness, and balance.    Activities performed today included bed mobility training, sup-sit, sit-stand, SPT.     Pt supine in bed upon arrival and agreeable to therapy session.  Pt completed supine to sit with MOD A. Pt completed scooting to edge of bed with MOD A with increased time and verbal cues. Pt completed sit to stand from edge of bed with WW and MOD A x1 and completed SPT from edge of bed to chair with MOD to MAX A x1. Pt seated in chair.     Self Care Training:   Cues were given for safety, sequence, UE/LE placement, visual cues, and balance.    Activities performed today included UB/LB dressing tasks, toileting, hand hygiene at sink    Pt supine in bed and donned socks with MAX A. Pt seated in chair and completed oral hygiene, face washing and hair brushing.  Pt seated in chair with all needs met and call light in reach.  Chair alarm on.

## 2024-09-09 NOTE — PROGRESS NOTES
V2.0    [facility] [place] Progress Note      Name:  Carol Xiong /Age/Sex: 1933  (89 y.o. female)   MRN & CSN:  [id number] & [id number] Encounter Date/Time: 2024 7:39 AM EDT   Location:  [facility]/4112-A PCP: Kayden Gonzalez MD     Attending:Cindi Yusuf*       Hospital Day: 13    Assessment and Recommendations   Carol Xiong is a 89 y.o. female with pmh of H/O TIA, HTN, Neuropathy and amemia who presents with Hyponatremia    Plan:   Dysphagia  Schatzki ring  MBSS: Pedunculated esophageal polyp at the level of esophageal web; laryngeal penetration and tracheal aspiration.   --GI was consulted and patient underwent an EGD with dilatation given low grade narrowing of the Schatzki ring and G tube placement.   --Maintain as NPO, SLP re-evaluating today, hopefully can restart diet  --TF has been held overnight d/t cramps and discomfort. Dietitian on board, will determine need for TF given restarting of oral feeds. Discussed with nursing to obtain a calorie count  --Aspiration precautions    Hyponatremia - resolved  Presented with Na 113 (from wnl ~3 months prior)  --Stopped home HCTZ (avoid thiazides), patient is s/p fluids in ER,   --Nephrology on board, appreciate recs, 1800 mL/day, and salt tabs 1 g twice daily upon discharge and stopping triamterene hydrochlorothiazide. Now signed off  --Na currently 135    Dizziness with hx of TIA, carotid artery disease  CTH negative for acute abnormality. CTA head/neck with critical stenosis of right carotid artery and severe stenosis of left carotid artery.  --Evaluated by CTS in  for similar findings at which time CEA was recommended however pt did not wish to proceed with this at the time. Discussed this again with her and pt wanted more information to determine about whether she would like to pursue intervention.  --After consideration, patient has declined any intervention from interventional neurology.    --Continue aspirin and statin.    Recent

## 2024-09-10 VITALS
OXYGEN SATURATION: 94 % | HEIGHT: 69 IN | WEIGHT: 129.63 LBS | HEART RATE: 58 BPM | BODY MASS INDEX: 19.2 KG/M2 | TEMPERATURE: 99.1 F | DIASTOLIC BLOOD PRESSURE: 53 MMHG | RESPIRATION RATE: 16 BRPM | SYSTOLIC BLOOD PRESSURE: 163 MMHG

## 2024-09-10 LAB
ANION GAP SERPL CALCULATED.3IONS-SCNC: 10 MMOL/L (ref 9–17)
BUN SERPL-MCNC: 25 MG/DL (ref 7–20)
CALCIUM SERPL-MCNC: 8.6 MG/DL (ref 8.3–10.6)
CHLORIDE SERPL-SCNC: 104 MMOL/L (ref 99–110)
CO2 SERPL-SCNC: 21 MMOL/L (ref 21–32)
CREAT SERPL-MCNC: 0.9 MG/DL (ref 0.6–1.2)
ERYTHROCYTE [DISTWIDTH] IN BLOOD BY AUTOMATED COUNT: 12.7 % (ref 11.7–14.9)
GFR, ESTIMATED: 60 ML/MIN/1.73M2
GLUCOSE SERPL-MCNC: 126 MG/DL (ref 74–99)
HCT VFR BLD AUTO: 27.3 % (ref 37–47)
HGB BLD-MCNC: 8.6 G/DL (ref 12.5–16)
MCH RBC QN AUTO: 30.1 PG (ref 27–31)
MCHC RBC AUTO-ENTMCNC: 31.5 G/DL (ref 32–36)
MCV RBC AUTO: 95.5 FL (ref 78–100)
PLATELET # BLD AUTO: 251 K/UL (ref 140–440)
PMV BLD AUTO: 11.5 FL (ref 7.5–11.1)
POTASSIUM SERPL-SCNC: 4.2 MMOL/L (ref 3.5–5.1)
RBC # BLD AUTO: 2.86 M/UL (ref 4.2–5.4)
SODIUM SERPL-SCNC: 135 MMOL/L (ref 136–145)
SURGICAL PATHOLOGY REPORT: NORMAL
WBC OTHER # BLD: 14.4 K/UL (ref 4–10.5)

## 2024-09-10 PROCEDURE — 85027 COMPLETE CBC AUTOMATED: CPT

## 2024-09-10 PROCEDURE — 36415 COLL VENOUS BLD VENIPUNCTURE: CPT

## 2024-09-10 PROCEDURE — 92526 ORAL FUNCTION THERAPY: CPT

## 2024-09-10 PROCEDURE — 94761 N-INVAS EAR/PLS OXIMETRY MLT: CPT

## 2024-09-10 PROCEDURE — 6360000002 HC RX W HCPCS: Performed by: STUDENT IN AN ORGANIZED HEALTH CARE EDUCATION/TRAINING PROGRAM

## 2024-09-10 PROCEDURE — 51798 US URINE CAPACITY MEASURE: CPT

## 2024-09-10 PROCEDURE — 2580000003 HC RX 258: Performed by: NURSE PRACTITIONER

## 2024-09-10 PROCEDURE — 80048 BASIC METABOLIC PNL TOTAL CA: CPT

## 2024-09-10 PROCEDURE — 6360000002 HC RX W HCPCS: Performed by: NURSE PRACTITIONER

## 2024-09-10 RX ORDER — AMLODIPINE BESYLATE 5 MG/1
5 TABLET ORAL DAILY
Qty: 30 TABLET | Refills: 0 | Status: SHIPPED | OUTPATIENT
Start: 2024-09-10 | End: 2024-10-10

## 2024-09-10 RX ORDER — ASPIRIN 81 MG/1
81 TABLET ORAL DAILY
Qty: 90 TABLET | Refills: 0 | Status: SHIPPED | OUTPATIENT
Start: 2024-09-10

## 2024-09-10 RX ORDER — CLOPIDOGREL BISULFATE 75 MG/1
75 TABLET ORAL DAILY
Qty: 21 TABLET | Refills: 0 | Status: SHIPPED | OUTPATIENT
Start: 2024-09-10 | End: 2024-10-01

## 2024-09-10 RX ORDER — PROMETHAZINE HYDROCHLORIDE 25 MG/ML
6.25 INJECTION, SOLUTION INTRAMUSCULAR; INTRAVENOUS
Status: COMPLETED | OUTPATIENT
Start: 2024-09-10 | End: 2024-09-10

## 2024-09-10 RX ORDER — SODIUM CHLORIDE 1 G/1
1 TABLET ORAL
Qty: 90 TABLET | Refills: 3 | Status: SHIPPED | OUTPATIENT
Start: 2024-09-10

## 2024-09-10 RX ORDER — FOLIC ACID 1 MG/1
1 TABLET ORAL DAILY
Qty: 30 TABLET | Refills: 3 | Status: SHIPPED | OUTPATIENT
Start: 2024-09-10

## 2024-09-10 RX ORDER — LANOLIN ALCOHOL/MO/W.PET/CERES
100 CREAM (GRAM) TOPICAL DAILY
Qty: 30 TABLET | Refills: 3 | Status: SHIPPED | OUTPATIENT
Start: 2024-09-10

## 2024-09-10 RX ORDER — ONDANSETRON 4 MG/1
4 TABLET, ORALLY DISINTEGRATING ORAL 3 TIMES DAILY PRN
Qty: 21 TABLET | Refills: 0 | Status: SHIPPED | OUTPATIENT
Start: 2024-09-10

## 2024-09-10 RX ADMIN — ONDANSETRON 4 MG: 2 INJECTION INTRAMUSCULAR; INTRAVENOUS at 10:55

## 2024-09-10 RX ADMIN — PANTOPRAZOLE SODIUM 40 MG: 40 INJECTION, POWDER, FOR SOLUTION INTRAVENOUS at 10:55

## 2024-09-10 RX ADMIN — PROMETHAZINE HYDROCHLORIDE 6.25 MG: 25 INJECTION INTRAMUSCULAR; INTRAVENOUS at 13:12

## 2024-09-10 NOTE — CARE COORDINATION
Pt discharged to [facility], set up with [facility] for 1600.   Notified pt's nurse JORGE Morton left for Anu at [facility], grand daughter Coleen and son Claude.

## 2024-09-10 NOTE — PROGRESS NOTES
Physical Therapy      Attempted tx. Pt states she is suppose to leave today and currently in too much pain to even eat. Pt defers getting into chair    Electronically signed by:    Jose Armando Ji, PTA  9/10/2024, 2:30 PM

## 2024-09-10 NOTE — PLAN OF CARE
Problem: Discharge Planning  Goal: Discharge to home or other facility with appropriate resources  Outcome: Completed     Problem: Pain  Goal: Verbalizes/displays adequate comfort level or baseline comfort level  Outcome: Completed     Problem: ABCDS Injury Assessment  Goal: Absence of physical injury  Outcome: Completed     Problem: Skin/Tissue Integrity  Goal: Absence of new skin breakdown  Description: 1.  Monitor for areas of redness and/or skin breakdown  2.  Assess vascular access sites hourly  3.  Every 4-6 hours minimum:  Change oxygen saturation probe site  4.  Every 4-6 hours:  If on nasal continuous positive airway pressure, respiratory therapy assess nares and determine need for appliance change or resting period.  Outcome: Completed     Problem: Safety - Adult  Goal: Free from fall injury  Outcome: Completed     Problem: Nutrition Deficit:  Goal: Optimize nutritional status  Outcome: Completed  Flowsheets (Taken 9/10/2024 1128 by Nathaly Guerrero, RD, LD)  Nutrient intake appropriate for improving, restoring, or maintaining nutritional needs:   Assess nutritional status and recommend course of action   Monitor oral intake, labs, and treatment plans   Recommend appropriate diets, oral nutritional supplements, and vitamin/mineral supplements

## 2024-09-10 NOTE — PROGRESS NOTES
[employer] message sent to NP Naegele. Patient had 1x episode of emesis, medium amount, food particle. Zofran 4mg IV given. Patient also complaining of unresolved back pain.       Electronically signed by Milton Diaz RN on 9/9/2024 at 10:40 PM

## 2024-09-10 NOTE — DISCHARGE SUMMARY
tablet  Commonly known as: LOTENSIN  TAKE 1 TABLET EVERY DAY     gabapentin 300 MG capsule  Commonly known as: NEURONTIN  Take 1 capsule by mouth 2 times daily for 180 days. Intended supply: 30 days     memantine 5 MG tablet  Commonly known as: NAMENDA  Take 1 tablet by mouth 2 times daily     omeprazole 20 MG delayed release capsule  Commonly known as: PRILOSEC  TAKE 1 CAPSULE EVERY DAY     vitamin B-12 1000 MCG tablet  Commonly known as: CYANOCOBALAMIN  Take 1 tablet by mouth daily     vitamin C 500 MG tablet  Commonly known as: ASCORBIC ACID     Vitamin D3 125 MCG (5000 UT) Tabs  Take 1 tablet by mouth daily            STOP taking these medications      triamterene-hydroCHLOROthiazide 37.5-25 MG per tablet  Commonly known as: MAXZIDE-25            ASK your doctor about these medications      * aspirin 81 MG EC tablet  Take 1 tablet by mouth daily           * This list has 1 medication(s) that are the same as other medications prescribed for you. Read the directions carefully, and ask your doctor or other care provider to review them with you.                   Where to Get Your Medications        These medications were sent to [facility] - [place], [place] - [address] - P [phone] - F [phone]  [address], [facility] [address]      Phone: [phone]   amLODIPine 5 MG tablet  aspirin 81 MG EC tablet  clopidogrel 75 MG tablet  folic acid 1 MG tablet  ondansetron 4 MG disintegrating tablet  sodium chloride 1 g tablet  thiamine 100 MG tablet         Objective Findings at Discharge:       BMP/CBC  Recent Labs     09/08/24  0113 09/09/24  0418 09/10/24  0945   * 136 135*   K 4.0 4.0 4.2    108 104   CO2 20* 19* 21   BUN 17 19 25*   CREATININE 0.7 0.8 0.9   WBC 5.2 9.2 14.4*   HCT 26.1* 23.8* 27.3*    247 251       IMAGING:      Additional Information: Patient seen and examined day of discharge. For more information regarding patient's care please contact  [facility] medical records [phone]    Discharge Time of 35 minutes    Electronically signed by José Luis Joseph MD on 9/10/2024 at 2:14 PM

## 2024-09-10 NOTE — PROGRESS NOTES
(g/day): 66-79 (1.0-1.2g/kg)  Method Used for Fluid Requirements: 1 ml/kcal  Fluid (ml/day): 1800    Nutrition Diagnosis:   Moderate malnutrition, In context of social or environmental circumstances related to inadequate protein-energy intake as evidenced by moderate muscle loss, poor intake prior to admission    Nutrition Interventions:   Food and/or Nutrient Delivery: Discontinue Tube Feeding, Continue Current Diet, Start Oral Nutrition Supplement  Nutrition Education/Counseling: Survival skills/brief education completed (adequate intakes/protein)  Coordination of Nutrition Care: Speech Therapy, Continue to monitor while inpatient, Coordination of Care  Plan of Care discussed with: pt    Goals:  Previous Goal Met: Progressing toward Goal(s)  Goals: Meet at least 75% of estimated needs, PO intake 50% or greater, by next RD assessment       Nutrition Monitoring and Evaluation:   Behavioral-Environmental Outcomes: None Identified  Food/Nutrient Intake Outcomes: Enteral Nutrition Intake/Tolerance  Physical Signs/Symptoms Outcomes: Biochemical Data, Skin, Weight, GI Status, Chewing or Swallowing    Discharge Planning:    Continue Oral Nutrition Supplement     Nathaly Guerrero, FORREST, LD Reviewed  Loretta Fernandez Dietetic Intern  Contact: [phone]

## 2024-09-10 NOTE — PROGRESS NOTES
[facility]  DEPARTMENT OF SPEECH/LANGUAGE PATHOLOGY  DAILY PROGRESS NOTE  Carol Xiong  9/10/2024  [id number]  Dizziness [R42]  Hyponatremia [E87.1]  Elevated blood pressure reading [R03.0]  Allergies   Allergen Reactions    Penicillins     Thiazide-Type Diuretics Other (See Comments)     Recurrent hyponatremia//sodium 113 on at 8/29/24//avoid thiazides please         Pt was seen this date for dysphagia treatment.       IMPRESSION AND RECOMMENDATIONS: Carol Xiong was seen today for a bedside dysphagia tx and diet tolerance monitoring following admission to [facility] with hyponatremia. Pt underwent EGD and PEG placement on 9/6/24. Per chart review, pt had episode of emesis last night. Carol Xiong was positioned upright in a chair for the current visit. Pt agreeable and cooperative throughout. Pt complains of stomach pain and reports she got sick last night. Pt reports nausea is improved this AM. Pt additionally reports back/neck pain at night that was present prior to admission. Pt states she is satisfied with the minced and moist/thin liquid oral diet initiated yesterday. Pt reports no trouble swallowing.     Carol Xiong consumed trials of thin liquid via straw, puree, and regular solid during this visit. Pt presents with evidence of oropharyngeal dysphagia characterized by prolonged bolus preparation/mastication, delay in a/p transit, and reduced oral clearance with regular solid. Liquid wash effective in clearing residue. No overt s/sx of penetration/aspiration noted with any consistency.  SLP provided education of recommendations/POC following this visit. Pt expressed understanding and agreement, may benefit from review.     Recommend Carol Xiong consume a minced and moist diet and thin liquids with medications crushed in puree as able provided use of strict aspiration precautions. SLP to follow to monitor diet tolerance and perform ongoing education.

## 2024-09-26 ENCOUNTER — TELEPHONE (OUTPATIENT)
Dept: GASTROENTEROLOGY | Age: 89
End: 2024-09-26

## 2024-09-27 ENCOUNTER — TELEPHONE (OUTPATIENT)
Dept: GASTROENTEROLOGY | Age: 89
End: 2024-09-27

## 2024-09-27 NOTE — TELEPHONE ENCOUNTER
Pt granddaughter called in with questions about pt g tube. She states it is red and inflamed, it has an odor and is leaking a brown creamy fluid. I recommended pt be taken to the ED to check for infection. She verbalized understanding

## 2024-09-30 ENCOUNTER — TELEPHONE (OUTPATIENT)
Dept: INTERNAL MEDICINE CLINIC | Age: 89
End: 2024-09-30

## 2024-09-30 ENCOUNTER — CARE COORDINATION (OUTPATIENT)
Dept: CASE MANAGEMENT | Age: 89
End: 2024-09-30

## 2024-09-30 DIAGNOSIS — K94.23 PEG TUBE MALFUNCTION (HCC): Primary | ICD-10-CM

## 2024-09-30 NOTE — TELEPHONE ENCOUNTER
Patient called stating that the G tube is leaking that hospital put in . She stated that the hospital will not fix the G tube. She was wondering id PCP can demand the hospital to fix her G-tube for her. Patient would like a call back.

## 2024-09-30 NOTE — CARE COORDINATION
Care Transitions Note    Initial Call - Call within 2 business days of discharge: Yes    Attempted to reach patient for transitions of care follow up. Unable to reach patient.    Outreach Attempts:   Unable to leave message.     Patient: Carol Xiong    Patient : 1933   MRN: [id number]    Reason for Admission: hyponatremia, nausea, anemia, AMS  Discharge Date: 9/10/24  RURS: Readmission Risk Score: 15.8    Last Discharge Facility       Date Complaint Diagnosis Description Type Department Provider    24 Dizziness; Fatigue Hyponatremia ... ED to Hosp-Admission (Discharged) (ADMITTED) YONG NaylaN José Luis Joseph MD; Cordell Woods, ...            Was this an external facility discharge? Yes. Discharge Date: . Facility Name: [facility]     Follow Up Appointment:   Patient does not have a follow up appointment scheduled at time of call.  UTR  Future Appointments         Provider Specialty Dept Phone    10/23/2024 11:00 AM Lisa Turner MD Gastroenterology [phone]            Plan for follow-up call in 2-5 days     MARY ALICE Burdick, RN   [facility]/ [facility] Acute Care Coordinator  [phone]

## 2024-09-30 NOTE — TELEPHONE ENCOUNTER
Informed Patient of PCP's Message. She wants to hear from Dr. Turner first then she will call the office back about a follow up appt.

## 2024-10-01 ENCOUNTER — CLINICAL DOCUMENTATION (OUTPATIENT)
Dept: GASTROENTEROLOGY | Age: 89
End: 2024-10-01

## 2024-10-01 ENCOUNTER — HOSPITAL ENCOUNTER (EMERGENCY)
Age: 89
Discharge: HOME | End: 2024-10-01
Attending: EMERGENCY MEDICINE
Payer: MEDICARE

## 2024-10-01 ENCOUNTER — APPOINTMENT (OUTPATIENT)
Dept: CT IMAGING | Age: 89
End: 2024-10-01
Payer: MEDICARE

## 2024-10-01 VITALS
DIASTOLIC BLOOD PRESSURE: 50 MMHG | TEMPERATURE: 98.5 F | RESPIRATION RATE: 16 BRPM | SYSTOLIC BLOOD PRESSURE: 139 MMHG | BODY MASS INDEX: 19.05 KG/M2 | WEIGHT: 129 LBS | HEART RATE: 79 BPM | OXYGEN SATURATION: 99 %

## 2024-10-01 DIAGNOSIS — K94.23 LEAKING PERCUTANEOUS ENDOSCOPIC GASTROSTOMY (PEG) TUBE (HCC): Primary | ICD-10-CM

## 2024-10-01 DIAGNOSIS — T85.848A PAIN AROUND PERCUTANEOUS ENDOSCOPIC GASTROSTOMY (PEG) TUBE SITE, INITIAL ENCOUNTER: Primary | ICD-10-CM

## 2024-10-01 LAB
ALBUMIN SERPL-MCNC: 2.3 G/DL (ref 3.4–5)
ALBUMIN/GLOB SERPL: 1.3 {RATIO} (ref 1.1–2.2)
ALP SERPL-CCNC: 86 U/L (ref 40–129)
ALT SERPL-CCNC: 9 U/L (ref 10–40)
ANION GAP SERPL CALCULATED.3IONS-SCNC: 9 MMOL/L (ref 9–17)
AST SERPL-CCNC: 25 U/L (ref 15–37)
BASOPHILS # BLD: 0.03 K/UL
BASOPHILS NFR BLD: 1 % (ref 0–1)
BILIRUB SERPL-MCNC: 0.3 MG/DL (ref 0–1)
BUN SERPL-MCNC: 11 MG/DL (ref 7–20)
CALCIUM SERPL-MCNC: 8.1 MG/DL (ref 8.3–10.6)
CHLORIDE SERPL-SCNC: 108 MMOL/L (ref 99–110)
CO2 SERPL-SCNC: 22 MMOL/L (ref 21–32)
CREAT SERPL-MCNC: 0.7 MG/DL (ref 0.6–1.2)
EOSINOPHIL # BLD: 0.13 K/UL
EOSINOPHILS RELATIVE PERCENT: 3 % (ref 0–3)
ERYTHROCYTE [DISTWIDTH] IN BLOOD BY AUTOMATED COUNT: 15.4 % (ref 11.7–14.9)
GFR, ESTIMATED: 74 ML/MIN/1.73M2
GLUCOSE SERPL-MCNC: 113 MG/DL (ref 74–99)
HCT VFR BLD AUTO: 32.9 % (ref 37–47)
HGB BLD-MCNC: 9.4 G/DL (ref 12.5–16)
IMM GRANULOCYTES # BLD AUTO: 0.01 K/UL
IMM GRANULOCYTES NFR BLD: 0 %
LACTATE BLDV-SCNC: 1.7 MMOL/L (ref 0.4–2)
LIPASE SERPL-CCNC: 23 U/L (ref 13–60)
LYMPHOCYTES NFR BLD: 1.36 K/UL
LYMPHOCYTES RELATIVE PERCENT: 29 % (ref 24–44)
MCH RBC QN AUTO: 29.9 PG (ref 27–31)
MCHC RBC AUTO-ENTMCNC: 28.6 G/DL (ref 32–36)
MCV RBC AUTO: 104.8 FL (ref 78–100)
MONOCYTES NFR BLD: 0.4 K/UL
MONOCYTES NFR BLD: 9 % (ref 0–4)
NEUTROPHILS NFR BLD: 58 % (ref 36–66)
NEUTS SEG NFR BLD: 2.69 K/UL
PLATELET # BLD AUTO: 229 K/UL (ref 140–440)
PMV BLD AUTO: 9.4 FL (ref 7.5–11.1)
POTASSIUM SERPL-SCNC: 4.2 MMOL/L (ref 3.5–5.1)
PROT SERPL-MCNC: 4.1 G/DL (ref 6.4–8.2)
RBC # BLD AUTO: 3.14 M/UL (ref 4.2–5.4)
SODIUM SERPL-SCNC: 138 MMOL/L (ref 136–145)
WBC OTHER # BLD: 4.6 K/UL (ref 4–10.5)

## 2024-10-01 PROCEDURE — 96375 TX/PRO/DX INJ NEW DRUG ADDON: CPT

## 2024-10-01 PROCEDURE — APPNB45 APP NON BILLABLE 31-45 MINUTES: Performed by: LICENSED PRACTICAL NURSE

## 2024-10-01 PROCEDURE — 80053 COMPREHEN METABOLIC PANEL: CPT

## 2024-10-01 PROCEDURE — 99222 1ST HOSP IP/OBS MODERATE 55: CPT | Performed by: INTERNAL MEDICINE

## 2024-10-01 PROCEDURE — 83690 ASSAY OF LIPASE: CPT

## 2024-10-01 PROCEDURE — 6360000002 HC RX W HCPCS: Performed by: EMERGENCY MEDICINE

## 2024-10-01 PROCEDURE — 99285 EMERGENCY DEPT VISIT HI MDM: CPT

## 2024-10-01 PROCEDURE — 96374 THER/PROPH/DIAG INJ IV PUSH: CPT

## 2024-10-01 PROCEDURE — 74177 CT ABD & PELVIS W/CONTRAST: CPT

## 2024-10-01 PROCEDURE — 6370000000 HC RX 637 (ALT 250 FOR IP): Performed by: EMERGENCY MEDICINE

## 2024-10-01 PROCEDURE — 2500000003 HC RX 250 WO HCPCS: Performed by: EMERGENCY MEDICINE

## 2024-10-01 PROCEDURE — 83605 ASSAY OF LACTIC ACID: CPT

## 2024-10-01 PROCEDURE — 6360000004 HC RX CONTRAST MEDICATION: Performed by: EMERGENCY MEDICINE

## 2024-10-01 PROCEDURE — 85025 COMPLETE CBC W/AUTO DIFF WBC: CPT

## 2024-10-01 RX ORDER — ONDANSETRON 2 MG/ML
4 INJECTION INTRAMUSCULAR; INTRAVENOUS ONCE
Status: COMPLETED | OUTPATIENT
Start: 2024-10-01 | End: 2024-10-01

## 2024-10-01 RX ORDER — ACETAMINOPHEN 500 MG
1000 TABLET ORAL ONCE
Status: COMPLETED | OUTPATIENT
Start: 2024-10-01 | End: 2024-10-01

## 2024-10-01 RX ORDER — PANTOPRAZOLE SODIUM 40 MG/10ML
40 INJECTION, POWDER, LYOPHILIZED, FOR SOLUTION INTRAVENOUS ONCE
Status: COMPLETED | OUTPATIENT
Start: 2024-10-01 | End: 2024-10-01

## 2024-10-01 RX ORDER — KETOROLAC TROMETHAMINE 15 MG/ML
15 INJECTION, SOLUTION INTRAMUSCULAR; INTRAVENOUS ONCE
Status: COMPLETED | OUTPATIENT
Start: 2024-10-01 | End: 2024-10-01

## 2024-10-01 RX ORDER — IOPAMIDOL 755 MG/ML
65 INJECTION, SOLUTION INTRAVASCULAR
Status: COMPLETED | OUTPATIENT
Start: 2024-10-01 | End: 2024-10-01

## 2024-10-01 RX ORDER — FAMOTIDINE 10 MG/ML
20 INJECTION, SOLUTION INTRAVENOUS ONCE
Status: COMPLETED | OUTPATIENT
Start: 2024-10-01 | End: 2024-10-01

## 2024-10-01 RX ADMIN — PANTOPRAZOLE SODIUM 40 MG: 40 INJECTION, POWDER, FOR SOLUTION INTRAVENOUS at 14:38

## 2024-10-01 RX ADMIN — ONDANSETRON 4 MG: 2 INJECTION INTRAMUSCULAR; INTRAVENOUS at 14:38

## 2024-10-01 RX ADMIN — FAMOTIDINE 20 MG: 10 INJECTION, SOLUTION INTRAVENOUS at 14:38

## 2024-10-01 RX ADMIN — IOPAMIDOL 65 ML: 755 INJECTION, SOLUTION INTRAVENOUS at 16:12

## 2024-10-01 RX ADMIN — KETOROLAC TROMETHAMINE 15 MG: 15 INJECTION, SOLUTION INTRAMUSCULAR; INTRAVENOUS at 14:38

## 2024-10-01 RX ADMIN — ACETAMINOPHEN 1000 MG: 500 TABLET ORAL at 18:16

## 2024-10-01 ASSESSMENT — PAIN - FUNCTIONAL ASSESSMENT
PAIN_FUNCTIONAL_ASSESSMENT: NONE - DENIES PAIN
PAIN_FUNCTIONAL_ASSESSMENT: NONE - DENIES PAIN

## 2024-10-01 ASSESSMENT — LIFESTYLE VARIABLES
HOW OFTEN DO YOU HAVE A DRINK CONTAINING ALCOHOL: NEVER
HOW MANY STANDARD DRINKS CONTAINING ALCOHOL DO YOU HAVE ON A TYPICAL DAY: PATIENT DOES NOT DRINK

## 2024-10-01 NOTE — ED NOTES
Spoke with granddaughter and updated on discharge and FU. Voices understanding. Pt made aware of ETA. Dinner ordered per request.

## 2024-10-01 NOTE — ED PROVIDER NOTES
Further MDM and disposition:   Assessment:   Pain around PEG tube site.  Plan:   PEG tube is noted within the stomach on CT  Given tylenol per per patient request for pain  Discharge to follow up as outpatient and return for new or worsening symptoms.    Final diagnoses:   Pain around percutaneous endoscopic gastrostomy (PEG) tube site, initial encounter     New Prescriptions    No medications on file         Condition: condition: good  Dispo: Discharge     This transcription was electronically signed. It was dictated by use of voice recognition software and electronically transcribed. The transcription may contain errors not detected in proofreading.         Jacqui Cannon DO  10/02/24 4149    
     Systolic BP Percentile --       Diastolic BP Percentile --       Pulse 10/01/24 1146 76      Respirations 10/01/24 1149 14      Temp 10/01/24 1146 98.5 °F (36.9 °C)      Temp Source 10/01/24 1146 Oral      SpO2 10/01/24 1149 97 %      Weight --       Height --       Head Circumference --       Peak Flow --       Pain Score --       Pain Loc --       Pain Education --       Exclude from Growth Chart --        My pulse ox interpretation is - normal    General appearance:  No acute distress.   Skin:  Warm. Dry.   Eye:  Extraocular movements intact.     Ears, nose, mouth and throat:  Oral mucosa moist   Neck:  Trachea midline.   Extremity:  No swelling.  Normal ROM     Heart:  Regular rate and rhythm, normal S1 & S2, no extra heart sounds.    Perfusion:  intact  Respiratory:  Lungs clear to auscultation bilaterally.  Respirations nonlabored.     Abdominal:  Normal bowel sounds.  Soft.  Nontender to my palpation, no signs of cellulitis pus drainage around the G-tube location,, G-tube intact.  Non distended.  Back:  No CVA tenderness to palpation     Neurological:  Alert and oriented times 3.  No focal neuro deficits.             Psychiatric:  Appropriate    I have reviewed and interpreted all of the currently available lab results from this visit (if applicable):  Results for orders placed or performed during the hospital encounter of 10/01/24   CBC with Diff   Result Value Ref Range    WBC 4.6 4.0 - 10.5 k/uL    RBC 3.14 (L) 4.20 - 5.40 m/uL    Hemoglobin 9.4 (L) 12.5 - 16.0 g/dL    Hematocrit 32.9 (L) 37.0 - 47.0 %    .8 (H) 78.0 - 100.0 fL    MCH 29.9 27.0 - 31.0 pg    MCHC 28.6 (L) 32.0 - 36.0 g/dL    RDW 15.4 (H) 11.7 - 14.9 %    Platelets 229 140 - 440 k/uL    MPV 9.4 7.5 - 11.1 fL    Neutrophils % 58 36 - 66 %    Lymphocytes % 29 24 - 44 %    Monocytes % 9 (H) 0 - 4 %    Eosinophils % 3 0 - 3 %    Basophils % 1 0 - 1 %    Immature Granulocytes % 0 0 %    Neutrophils Absolute 2.69 k/uL    Lymphocytes

## 2024-10-01 NOTE — ED NOTES
1810 called [facility] for BLS unit to transport patient back to private residence. Spoke with Anu at dispatch. ETA for  scheduled for 2000.

## 2024-10-01 NOTE — CONSULTS
[facility]             MD Lisa Holliday MD Carol Christensen, APRN-CNP       Vianey Kumar, APRN-CNP             [address] [place] [address]             [phone] fax [phone]      Physician attestation:  I have personally seen and examined the patient independently. I have reviewed the patient's available data,including medical history and recent test results. Reviewed and discussed note as documented by THI.  I agree with the physical exam findings, assessment and plan.     Briefly   89-year-old female with past medical history of hyperlipidemia, Alzheimer's, hypertension, neuropathy, malnutrition who presented to the hospital with concern for G-tube issues.  She was recently admitted in the hospital and was seen by me for concerns for malnutrition and dysphagia.  She underwent EGD and PEG tube placement with me on 9/6/2024.  There was some concern of G-tube leakage, redness and pain.  Patient is a poor historian mentions that she has not been using her G-tube however it seems feeds might have been given through it.  She mentions she would like the G-tube to be out.  I discussed with her that ideally I would like to wait for 6 weeks for the tract to mature prior to pulling it out.    Gen: normal mood, alert  ENT: normal TJ  Cardiovascular: RRR, No M/R/G  Respiratory: CTA BL  Gastrointestinal: Soft,NT ND  Genitourinary: no suprapubic tenderness  Musculoskeletal: no scars  Skin:moist  Neuro: alert and oriented x3    My assessment and plan reveals   #1 possible G-tube issues.  G-tube noted to be in good position with bumper at 3.  CT scan Personally reviewed by me and noted to have PEG tube in good position.  I will recommend a good zinc oxide paste around the PEG tube area to provide some comfort.  I did educate the patient and her nursing team to hold off on pulling of the PEG tube as we will give her 2 more

## 2024-10-02 ENCOUNTER — CARE COORDINATION (OUTPATIENT)
Dept: CASE MANAGEMENT | Age: 89
End: 2024-10-02

## 2024-10-02 NOTE — CARE COORDINATION
Care Transitions Note    Initial Call - Call within 2 business days of discharge: Yes    Attempted to reach patient for transitions of care follow up. Unable to reach patient.    Outreach Attempts:   Multiple attempts to contact patient, family, Coleen at phone numbers on file.     Patient: Carol Xiong    Patient : 1933   MRN: [id number]    Reason for Admission: gtube pain, hyponatremia, confusion, fall.   Discharge Date: 10/1/24  RURS: Readmission Risk Score: 15.8    Last Discharge Facility       Date Complaint Diagnosis Description Type Department Provider    10/1/24 G Tube Complications Pain around percutaneous endoscopic gastrostomy (PEG) tube site, initial encounter ED (DISCHARGE) [facility] ED Jacqui Cannon, DO; Vianey Connors...            Was this an external facility discharge? Yes. Discharge Date: . Facility Name: [facility]    Follow Up Appointment:   Patient does not have a follow up appointment scheduled at time of call.  UTR  Future Appointments         Provider Specialty Dept Phone    10/23/2024 11:00 AM Lisa Turner MD Gastroenterology [phone]            Manager to determine      MARY ALICE Burdick, RN   [facility] Acute Care Coordinator  [phone]

## 2024-10-03 ENCOUNTER — TELEPHONE (OUTPATIENT)
Dept: INTERNAL MEDICINE CLINIC | Age: 89
End: 2024-10-03

## 2024-10-03 NOTE — TELEPHONE ENCOUNTER
----- Message from Dr. Kayden Gonzalez MD sent at 10/2/2024  9:17 AM EDT -----  Patient was in the emergency room.  She does not have a follow-up appointment with me.  Check if she needs a follow-up appointment  ----- Message -----  From: Jacqui Cannon DO  Sent: 10/2/2024   8:25 AM EDT  To: Kayden Gonzalez MD

## 2024-10-10 ENCOUNTER — CARE COORDINATION (OUTPATIENT)
Dept: CARE COORDINATION | Age: 89
End: 2024-10-10

## 2024-10-10 NOTE — CARE COORDINATION
ACM received PAC handoff referral for Care Management. Patient was admitted IP at [facility] from 8/28/24-9/10/24 for hyponatremia Na 113 at presentation. Nephrology recommended 1800 mL/day water restriction and added salt tabs 1 g BID upon discharge. During this hospitalization patient was noted to have dysphagia and underwent an EGD with PEG tube placement on 9/6/24. Patient had difficulty tolerating tube feed. SLP advanced diet to dysphagia - minced & moist. Patient had lightheadedness with hx of TIA & CAD. CTS recommended CEA but patient declined. Recommended to F/U with Neurology OP. Recent falls PT/OT recommended SNF. Patient was discharged to [facility] and returned home with [facility] on 9/27/24.     ACM placed call to patient's granddaughter & active MPOA, Coleen, for enrollment outreach. Coleen states patient was confused in the hospital with hyponatremia so they activated her as HCDM. Coleen would like this to stay active as patient is confused at times. ACM explained Care Management program and ACM role. Coleen is agreeable to enrollment at this time. Coleen states patient is doing ok since getting home from SNF, but states her back and legs are bothering her since she is \"basically bed ridden\" at this time. Coleen is requesting an order for a hospital bed. Coleen states patient has PCP Hospital FU OV tomorrow and she will send a note with the patient requesting hospital bed. ACM will notify PCP/LPN for awareness as Coleen will not be attending appointment with patient. Patient gets around via wheelchair at this time.     Coleen unsure if patient was put on any new medications and states she was sent home from the SNF with a box of medications. ACM encouraged Coleen to send all medications that patient is taking to PCP OV tomorrow. Coleen verbalized understanding. Coleen states patient is active with [facility] and they are doing home PT and also has nurse visits. Coleen is unsure of the frequency.

## 2024-10-11 ENCOUNTER — OFFICE VISIT (OUTPATIENT)
Dept: INTERNAL MEDICINE CLINIC | Age: 89
End: 2024-10-11

## 2024-10-11 VITALS
WEIGHT: 130 LBS | DIASTOLIC BLOOD PRESSURE: 88 MMHG | BODY MASS INDEX: 19.26 KG/M2 | HEIGHT: 69 IN | SYSTOLIC BLOOD PRESSURE: 138 MMHG

## 2024-10-11 DIAGNOSIS — R63.4 WEIGHT LOSS: ICD-10-CM

## 2024-10-11 DIAGNOSIS — R13.14 PHARYNGOESOPHAGEAL DYSPHAGIA: ICD-10-CM

## 2024-10-11 DIAGNOSIS — G62.9 NEUROPATHY: ICD-10-CM

## 2024-10-11 DIAGNOSIS — T85.848D PAIN AROUND PERCUTANEOUS ENDOSCOPIC GASTROSTOMY (PEG) TUBE SITE, SUBSEQUENT ENCOUNTER: ICD-10-CM

## 2024-10-11 DIAGNOSIS — M54.9 DORSALGIA: ICD-10-CM

## 2024-10-11 DIAGNOSIS — E55.9 VITAMIN D DEFICIENCY: ICD-10-CM

## 2024-10-11 DIAGNOSIS — G30.1 MODERATE LATE ONSET ALZHEIMER'S DEMENTIA WITHOUT BEHAVIORAL DISTURBANCE, PSYCHOTIC DISTURBANCE, MOOD DISTURBANCE, OR ANXIETY (HCC): ICD-10-CM

## 2024-10-11 DIAGNOSIS — Z86.73 HISTORY OF TIA (TRANSIENT ISCHEMIC ATTACK): ICD-10-CM

## 2024-10-11 DIAGNOSIS — D64.9 ANEMIA, UNSPECIFIED TYPE: ICD-10-CM

## 2024-10-11 DIAGNOSIS — E78.2 MIXED HYPERLIPIDEMIA: ICD-10-CM

## 2024-10-11 DIAGNOSIS — N18.31 STAGE 3A CHRONIC KIDNEY DISEASE (HCC): ICD-10-CM

## 2024-10-11 DIAGNOSIS — F02.B0 MODERATE LATE ONSET ALZHEIMER'S DEMENTIA WITHOUT BEHAVIORAL DISTURBANCE, PSYCHOTIC DISTURBANCE, MOOD DISTURBANCE, OR ANXIETY (HCC): ICD-10-CM

## 2024-10-11 DIAGNOSIS — M17.0 ARTHRITIS OF BOTH KNEES: ICD-10-CM

## 2024-10-11 DIAGNOSIS — I10 ESSENTIAL HYPERTENSION: Primary | ICD-10-CM

## 2024-10-11 DIAGNOSIS — K21.9 GASTROESOPHAGEAL REFLUX DISEASE WITHOUT ESOPHAGITIS: ICD-10-CM

## 2024-10-11 DIAGNOSIS — R53.1 GENERALIZED WEAKNESS: ICD-10-CM

## 2024-10-11 RX ORDER — LISINOPRIL 40 MG/1
40 TABLET ORAL DAILY
COMMUNITY

## 2024-10-11 NOTE — PROGRESS NOTES
her room when she needed them etc.   .    Patient claims that she has generalized aches and pain all over and is extremely weak.  She is taking Tylenol PRN.  She is totally depended on her ADLs and her granddaughter is taking care of her at home..  She wants a hospital bed and bedside commode for ADLs..    Doing OK otherwise  Denies CP or SOB.  No fever.  Denies any abdominal pain except pain at the site of PEG tube  Appetite is poor but improving.  Bowels moving Ok.  No urinary symptoms.  Complains of mild chronic low back pain and pain in her legs but takes Tylenol as needed and it helps her back pain. .    She was using a walker for her ambulation but recently she cannot walk and is totally bedridden.  She is taking Neurontin and Tylenol for pain and paresthesia of her legs     PDMP reviewed, no signs of drug abuse.  Hearing is poor.  Vision Ok with glasses.  Denies  any significant skin lesions.  Denies any significant depression or anxiety.  Her memory is poor but stable.     No other complaints.  Labs from 10/1/2024 were reviewed and explained to the patient.         Past Medical History:    Patient Active Problem List   Diagnosis    Essential hypertension    Mixed hyperlipidemia    Gastroesophageal reflux disease without esophagitis    Dorsalgia    Neuropathy    Allergic rhinitis    Anemia    Altered mental status    Bradycardia    Carotid stenosis, right    History of TIA (transient ischemic attack)    Stage 3a chronic kidney disease (HCC)    Moderate late onset Alzheimer's dementia without behavioral disturbance, psychotic disturbance, mood disturbance, or anxiety (HCC)    Fear for personal safety    Severe malnutrition (HCC)    Vitamin D deficiency    Weight loss    Hyponatremia    Moderate malnutrition (HCC)    Pharyngoesophageal dysphagia    Esophageal polyp    Pain around PEG tube site        Past Surgical History:        Procedure Laterality Date    APPENDECTOMY      COLON SURGERY      removed yanci

## 2024-10-16 ENCOUNTER — CARE COORDINATION (OUTPATIENT)
Dept: CARE COORDINATION | Age: 89
End: 2024-10-16

## 2024-10-16 NOTE — CARE COORDINATION
Ambulatory Care Coordination Note     10/16/2024 2:43 PM     Patient Current Location:  Home: [address] [place], [place] [id number]     ACM contacted the caregiver by telephone. Verified name and  with caregiver as identifiers.         ACM: Chela Dutton RN     Challenges to be reviewed by the provider   Additional needs identified to be addressed with provider No  none         Method of communication with provider: none.    Has the patient been seen in the ED since your last call? no    Care Summary Note: ACM placed call to patient's granddaughter and active HCDM, Coleen, today for follow-up. Coleen reports patient is doing ok and denies any symptoms or concerns to report to provider at this time.     ACM reviewed AVS from 10/11/24 PCP OV with Coleen. Hospital bed and bedside commode were ordered. Coleen states both items were delivered yesterday. Coleen states she's not sure if an updated med list was given to patient at OV, she would have to look through her discharge paperwork and is limited on time right now. Coleen is agreeable to completing med rec at next outreach.     Coleen states patient is active with [facility] and receives PT and nursing services twice weekly. Nursing staff takes vitals at home visits and Coleen reports they have been WNL.     SDOH & CC Protocol assessments updated. See below. Patient is now fully dependent on granddaughter for all ADL's except for feeding herself. Coleen reports patient is bed bound and they have difficulty getting her out of bed. Coleen gives patient bed baths. ACM stressed importance of frequent position changes at least every 2 hours to prevent skin breakdown. Coleen MCNEAL. Coleen & her spouse and their children live in the patient's home.     Coleen asks ACM about [employer] and getting patient connected with this resource. Coleen is agreeable to SW referral. ACM advised Coleen that SW and ACM have worked with patient in the past and

## 2024-10-18 ENCOUNTER — CARE COORDINATION (OUTPATIENT)
Dept: CARE COORDINATION | Age: 89
End: 2024-10-18

## 2024-10-18 NOTE — CARE COORDINATION
Maryjane           Identified Needs:  Referrals to community resources for additional support       Social Work Plan:  SW will provide information on community resources, provide support and follow up    Next Steps: SW will make next outreach to Pt's granddaughter on 10/25 to follow up regarding email sent.     Method of Communication With Provider (if appropriate): Chart Routing

## 2024-10-23 ENCOUNTER — OFFICE VISIT (OUTPATIENT)
Dept: GASTROENTEROLOGY | Age: 89
End: 2024-10-23
Payer: MEDICARE

## 2024-10-23 VITALS
RESPIRATION RATE: 16 BRPM | HEIGHT: 69 IN | HEART RATE: 64 BPM | DIASTOLIC BLOOD PRESSURE: 62 MMHG | BODY MASS INDEX: 19.2 KG/M2 | SYSTOLIC BLOOD PRESSURE: 128 MMHG | OXYGEN SATURATION: 98 %

## 2024-10-23 DIAGNOSIS — Z93.1 S/P PERCUTANEOUS ENDOSCOPIC GASTROSTOMY (PEG) TUBE PLACEMENT (HCC): Primary | ICD-10-CM

## 2024-10-23 DIAGNOSIS — K94.23 LEAKING PERCUTANEOUS ENDOSCOPIC GASTROSTOMY (PEG) TUBE (HCC): ICD-10-CM

## 2024-10-23 PROCEDURE — 1090F PRES/ABSN URINE INCON ASSESS: CPT | Performed by: INTERNAL MEDICINE

## 2024-10-23 PROCEDURE — G8427 DOCREV CUR MEDS BY ELIG CLIN: HCPCS | Performed by: INTERNAL MEDICINE

## 2024-10-23 PROCEDURE — 1036F TOBACCO NON-USER: CPT | Performed by: INTERNAL MEDICINE

## 2024-10-23 PROCEDURE — G8484 FLU IMMUNIZE NO ADMIN: HCPCS | Performed by: INTERNAL MEDICINE

## 2024-10-23 PROCEDURE — 1123F ACP DISCUSS/DSCN MKR DOCD: CPT | Performed by: INTERNAL MEDICINE

## 2024-10-23 PROCEDURE — 1159F MED LIST DOCD IN RCRD: CPT | Performed by: INTERNAL MEDICINE

## 2024-10-23 PROCEDURE — 99214 OFFICE O/P EST MOD 30 MIN: CPT | Performed by: INTERNAL MEDICINE

## 2024-10-23 PROCEDURE — G8420 CALC BMI NORM PARAMETERS: HCPCS | Performed by: INTERNAL MEDICINE

## 2024-10-23 NOTE — PROGRESS NOTES
(PRILOSEC) 20 MG delayed release capsule, TAKE 1 CAPSULE EVERY DAY, Disp: 90 capsule, Rfl: 3    memantine (NAMENDA) 5 MG tablet, Take 1 tablet by mouth 2 times daily, Disp: 180 tablet, Rfl: 1    Cholecalciferol (VITAMIN D3) 125 MCG (5000 UT) TABS, Take 1 tablet by mouth daily, Disp: 90 tablet, Rfl: 1    vitamin C (ASCORBIC ACID) 500 MG tablet, Take 1 tablet by mouth daily, Disp: , Rfl:     atorvastatin (LIPITOR) 20 MG tablet, TAKE 1 TABLET EVERY DAY, Disp: 90 tablet, Rfl: 10    vitamin B-12 (CYANOCOBALAMIN) 1000 MCG tablet, Take 1 tablet by mouth daily, Disp: 90 tablet, Rfl: 1    thiamine 100 MG tablet, Take 1 tablet by mouth daily (Patient not taking: Reported on 10/11/2024), Disp: 30 tablet, Rfl: 3    clopidogrel (PLAVIX) 75 MG tablet, Take 1 tablet by mouth daily for 21 days, Disp: 21 tablet, Rfl: 0    amLODIPine (NORVASC) 5 MG tablet, Take 1 tablet by mouth daily, Disp: 30 tablet, Rfl: 0    ondansetron (ZOFRAN-ODT) 4 MG disintegrating tablet, Take 1 tablet by mouth 3 times daily as needed for Nausea or Vomiting (Patient not taking: Reported on 10/11/2024), Disp: 21 tablet, Rfl: 0    benazepril (LOTENSIN) 40 MG tablet, TAKE 1 TABLET EVERY DAY (Patient not taking: Reported on 10/11/2024), Disp: 90 tablet, Rfl: 3    gabapentin (NEURONTIN) 300 MG capsule, Take 1 capsule by mouth 2 times daily for 180 days. Intended supply: 30 days, Disp: 180 capsule, Rfl: 1    aspirin 81 MG EC tablet, Take 1 tablet by mouth daily (Patient not taking: Reported on 10/11/2024), Disp: 90 tablet, Rfl: 1     Allergies:  Penicillins and Thiazide-type diuretics     Objective:    Blood pressure 128/62, pulse 64, resp. rate 16, height 1.753 m (5' 9\"), SpO2 98%.  Exam done in the presence of chaperone Myra  General appearance: alert, cooperative, no distress, appears stated age  Head: Normocephalic, without obvious abnormality, atraumatic  Eyes: conjunctivae/corneas clear.   Nose: Nares normal. No discharge  Throat: Lips, mucosa, and tongue

## 2024-10-25 ENCOUNTER — CARE COORDINATION (OUTPATIENT)
Dept: CARE COORDINATION | Age: 89
End: 2024-10-25

## 2024-10-25 NOTE — CARE COORDINATION
Patient Current Location: Home: [address] [place] [id number]     Phone call to Pt's granddaughter, Coleen, emergency contact to follow up. Coleen reported she has not been able to get through to anyone at [facility]. TONY provided contact name / number for LTC supervisor, Lilliana.    Coleen confirmed she received email with list of private pay options for HHA.    Pt's granddaughter Pt is doing well and denied any other questions / concerns.       TONY plan of care: TONY will follow up with Pt's granddaughter regarding support services on 11/8.

## 2024-10-29 ENCOUNTER — CARE COORDINATION (OUTPATIENT)
Dept: CARE COORDINATION | Age: 89
End: 2024-10-29

## 2024-10-29 DIAGNOSIS — E43 SEVERE MALNUTRITION (HCC): Chronic | ICD-10-CM

## 2024-10-29 DIAGNOSIS — F02.B0 MODERATE LATE ONSET ALZHEIMER'S DEMENTIA WITHOUT BEHAVIORAL DISTURBANCE, PSYCHOTIC DISTURBANCE, MOOD DISTURBANCE, OR ANXIETY (HCC): ICD-10-CM

## 2024-10-29 DIAGNOSIS — G30.1 MODERATE LATE ONSET ALZHEIMER'S DEMENTIA WITHOUT BEHAVIORAL DISTURBANCE, PSYCHOTIC DISTURBANCE, MOOD DISTURBANCE, OR ANXIETY (HCC): ICD-10-CM

## 2024-10-29 DIAGNOSIS — F40.9 FEAR FOR PERSONAL SAFETY: ICD-10-CM

## 2024-10-29 DIAGNOSIS — N18.31 STAGE 3A CHRONIC KIDNEY DISEASE (HCC): Primary | ICD-10-CM

## 2024-10-29 RX ORDER — TRIAMTERENE AND HYDROCHLOROTHIAZIDE 37.5; 25 MG/1; MG/1
1 TABLET ORAL DAILY
COMMUNITY

## 2024-10-29 NOTE — CARE COORDINATION
Ambulatory Care Coordination Note     10/29/2024 3:42 PM     Patient Current Location:  Home: [address] [place], [place] [id number]     ACM contacted the caregiver by telephone. Verified name and  with caregiver as identifiers.         ACM: Chela Dutton RN     Challenges to be reviewed by the provider   Additional needs identified to be addressed with provider Yes  medications-Refills needed on gabapentin, lisinopril, sodium chloride, thiamine, vitamin B12  DME-Caregiver is requesting an order for a lift to assist with getting patient from bed to wheelchair.          Method of communication with provider: chart routing.    Has the patient been seen in the ED since your last call? no    Care Summary Note: ACM placed call to patient's granddaughter/HCDM, Coleen, today for follow-up. Coleen reports patient is doing well and denies any symptoms or concerns to report to provider at this time.     Patient has GI appointment tomorrow to have PEG tube pulled. She had to wait a week to have tube removed due to taking Plavix. Coleen confirms this medication has been held and patient does have transportation to appointment tomorrow. Coleen reports patient has been eating well and denies any choking.     Coleen reports patient continues to have PT/OT from [facility] and seems to be improving a bit. Patient is now able to sit herself up in the bed. Standing is still a struggle. Coleen is requesting an order for a lift as she reports getting patient from bed to wheelchair is a struggle for her. [facility] will notify PCP of this request.     Coleen reports [facility] has been taking vitals and states vital signs have been WNL as far as she knows. BP at 10/23/24 GI OV was 128/62. Coleen confirms patient does have a BP cuff at home. [facility] reviewed HTN zone tool with Coleen and will request CCSS mail a copy to patient's home address.     Coleen reports she has a pre-packaged pill box that came with the patient from SNF. Coleen

## 2024-10-30 ENCOUNTER — OFFICE VISIT (OUTPATIENT)
Dept: GASTROENTEROLOGY | Age: 89
End: 2024-10-30
Payer: MEDICARE

## 2024-10-30 VITALS
HEIGHT: 69 IN | DIASTOLIC BLOOD PRESSURE: 82 MMHG | BODY MASS INDEX: 19.19 KG/M2 | HEART RATE: 76 BPM | SYSTOLIC BLOOD PRESSURE: 140 MMHG | OXYGEN SATURATION: 98 % | RESPIRATION RATE: 15 BRPM

## 2024-10-30 DIAGNOSIS — K94.23 LEAKING PERCUTANEOUS ENDOSCOPIC GASTROSTOMY (PEG) TUBE (HCC): ICD-10-CM

## 2024-10-30 DIAGNOSIS — Z93.1 S/P PERCUTANEOUS ENDOSCOPIC GASTROSTOMY (PEG) TUBE PLACEMENT (HCC): Primary | ICD-10-CM

## 2024-10-30 PROCEDURE — 99213 OFFICE O/P EST LOW 20 MIN: CPT | Performed by: INTERNAL MEDICINE

## 2024-10-30 PROCEDURE — G8420 CALC BMI NORM PARAMETERS: HCPCS | Performed by: INTERNAL MEDICINE

## 2024-10-30 PROCEDURE — 1036F TOBACCO NON-USER: CPT | Performed by: INTERNAL MEDICINE

## 2024-10-30 PROCEDURE — 1090F PRES/ABSN URINE INCON ASSESS: CPT | Performed by: INTERNAL MEDICINE

## 2024-10-30 PROCEDURE — G8484 FLU IMMUNIZE NO ADMIN: HCPCS | Performed by: INTERNAL MEDICINE

## 2024-10-30 PROCEDURE — 1159F MED LIST DOCD IN RCRD: CPT | Performed by: INTERNAL MEDICINE

## 2024-10-30 PROCEDURE — G8427 DOCREV CUR MEDS BY ELIG CLIN: HCPCS | Performed by: INTERNAL MEDICINE

## 2024-10-30 PROCEDURE — 1123F ACP DISCUSS/DSCN MKR DOCD: CPT | Performed by: INTERNAL MEDICINE

## 2024-10-30 NOTE — PROGRESS NOTES
[facility] Gastroenterology and Hepatology             MD Lisa Holliday MD Carol Christensen, APRN-CNP       Vianey Kumar, APRN-CNP             [address] [place] [address]             [phone] fax [phone]        Gastroenterology Clinic Consultation    Lisa Turner MD  Encounter Date: 10/30/24     CC: Follow-up (peg tube pull/)       No referring provider defined for this encounter.     History obtained from: patient, family, medical records     Subjective:       Carol Xiong is an 89 y.o. female with past medical history of hyperlipidemia, Alzheimer's, hypertension, neuropathy, malnutrition who presents for Follow-up (peg tube pull/)  10/30/2024  Patient presents today for PEG tube pull out.  She was here last week and she is mention she had not been using the PEG tube.  At that time she was continuing on her Plavix and so decision was made to hold off PEG tube pull out until she had been off Plavix for at least 5 to 7 days.  PEG tube removed personally by me.     10/23/2024  Patient was recently admitted in the hospital and was seen by me for concerns for malnutrition and dysphagia.  When she was admitted in September she had concern for possible esophageal web and polypoid lesion on imaging.  She was evaluated by SLP and had a significant aspiration risk and the plan was for EGD with PEG tube placement. She underwent EGD and PEG tube placement with me on 9/6/2024.  She is a poor historian mentions that she has not been using G-tube significantly and wants it removed.  This was initially communicated to us on 10/1/2024 however at that time we had recommended holding off for 2 more weeks to allow the tract to mature making it about 6 weeks since her original inception.  We also had recommended zinc oxide paste for erythema around the PEG tube site.     Patient Active Problem List   Diagnosis    Essential hypertension    Mixed

## 2024-11-05 ENCOUNTER — TELEPHONE (OUTPATIENT)
Dept: INTERNAL MEDICINE CLINIC | Age: 89
End: 2024-11-05

## 2024-11-05 NOTE — TELEPHONE ENCOUNTER
Initiated a prior auth for lisinopril and cover my meds and it's not needed-called [facility] and they have no messages that anyone called us regarding the medications-will close encounter

## 2024-11-05 NOTE — TELEPHONE ENCOUNTER
[facility] called stating that they need Prior auth for patients Lisinipril . They stated another medication but it was not medication list . Call [phone]

## 2024-11-07 DIAGNOSIS — E78.2 MIXED HYPERLIPIDEMIA: ICD-10-CM

## 2024-11-07 RX ORDER — ATORVASTATIN CALCIUM 20 MG/1
TABLET, FILM COATED ORAL
Qty: 90 TABLET | Refills: 3 | Status: SHIPPED | OUTPATIENT
Start: 2024-11-07

## 2024-11-08 ENCOUNTER — CARE COORDINATION (OUTPATIENT)
Dept: CARE COORDINATION | Age: 89
End: 2024-11-08

## 2024-11-08 DIAGNOSIS — G62.9 NEUROPATHY: ICD-10-CM

## 2024-11-08 DIAGNOSIS — E87.1 HYPONATREMIA: Primary | ICD-10-CM

## 2024-11-08 DIAGNOSIS — I10 ESSENTIAL HYPERTENSION: ICD-10-CM

## 2024-11-08 RX ORDER — LANOLIN ALCOHOL/MO/W.PET/CERES
100 CREAM (GRAM) TOPICAL DAILY
Qty: 30 TABLET | Refills: 3 | Status: SHIPPED | OUTPATIENT
Start: 2024-11-08

## 2024-11-08 RX ORDER — LANOLIN ALCOHOL/MO/W.PET/CERES
1000 CREAM (GRAM) TOPICAL DAILY
Qty: 90 TABLET | Refills: 1 | Status: SHIPPED | OUTPATIENT
Start: 2024-11-08

## 2024-11-08 RX ORDER — GABAPENTIN 300 MG/1
300 CAPSULE ORAL 2 TIMES DAILY
Qty: 14 CAPSULE | Refills: 0 | Status: SHIPPED | OUTPATIENT
Start: 2024-11-08 | End: 2024-11-15

## 2024-11-08 RX ORDER — SODIUM CHLORIDE 1 G/1
1 TABLET ORAL
Qty: 270 TABLET | Refills: 1 | Status: SHIPPED | OUTPATIENT
Start: 2024-11-08

## 2024-11-08 RX ORDER — GABAPENTIN 300 MG/1
300 CAPSULE ORAL 2 TIMES DAILY
Qty: 180 CAPSULE | Refills: 1 | Status: SHIPPED | OUTPATIENT
Start: 2024-11-08 | End: 2024-11-08 | Stop reason: SDUPTHER

## 2024-11-08 RX ORDER — LISINOPRIL 40 MG/1
40 TABLET ORAL DAILY
Qty: 90 TABLET | Refills: 1 | Status: SHIPPED | OUTPATIENT
Start: 2024-11-08

## 2024-11-08 NOTE — CARE COORDINATION
ACM placed call to patient's granddaughter Coleen and LVM stating 1 medication has been sent to [facility] on [address] and should be reading this evening for pickup. Other medications were sent to [facility] mail order pharmacy.

## 2024-11-08 NOTE — CARE COORDINATION
AC placed call to Pam to check on status of Rx sent by PCP today. Davide confirms they received one request today for 14-day supply of gabapentin and are working on filling it now.

## 2024-11-08 NOTE — CARE COORDINATION
Patient Current Location: Home: [address] [place] [address]     Phone call to Pt's granddaughter, Coleen who reported she is trying to get Pt finances in order prior to looking into private pay options for HHA. Has not had a chance to call Lilliana with [facility] to discuss finances.    Medication never came, TONY sent staff message to Chela BRADLEY for follow up.    No longer needs fidel lift - PT and OT is helping and Pt is now getting up on her own    TONY plan of care: TONY will make next outreach on 11/19 regarding HHA and reassess needs.

## 2024-11-08 NOTE — CARE COORDINATION
ACM received notification from [facility] today that patient has not yet received refills of medications that were requested during 10/29/24 call.     ACM placed call to patient's mail order pharmacy: [facility] to inquire on status of refills for gabapentin, lisinopril, sodium chloride, thiamine, & vitamin B12.     ACM spoke with pharmacy tye Borja who states there is no active Rx for gabapentin. Only refill request they are actively working on is for sodium chloride.     Once refill/Rx request is received, patient can call in and request priority shipment for $9.99 as turnaround time for processing/shipment is 7-10 business days. Provider could also send a short fill to a local pharmacy for pickup.

## 2024-11-08 NOTE — CARE COORDINATION
Ambulatory Care Coordination Note     2024 10:21 AM     Patient Current Location:  Home: [address] [place], [place] [id number]     ACM contacted the family by telephone. Verified name and  with family as identifiers.         ACM: Chela Dutton RN     Challenges to be reviewed by the provider   Additional needs identified to be addressed with provider Yes  medications-Patient has not received refills of gabapentin, lisinopril, sodium chloride, thiamine or vitamin B12 that were requested on 10/29/24. [facility]. Patient is also c/o frequent urination. Patient denies dysuria, urgency or abdominal pain.         Method of communication with provider: chart routing.    Utilization: Patient has not had any utilization since our last call.     Care Summary Note: ACM placed call to patient's granddaughter & NICOLAS Horne. Coleen confirmed patient has not yet received medication that was requested during previous outreach: gabapentin, lisinopril, sodium chloride, thiamine, & vitamin B12. Coleen states she can get to [facility] if short order Rx is sent there until mail order pharmacy can send additional refills.     Coleen states patient has also been c/o frequent urination. Patient denies dysuria, urgency, incomplete bladder emptying & abdominal pain.     ACM will notify PCP of need for medications and concern with frequent urination.     Offered patient enrollment in the Remote Patient Monitoring (RPM) program for in-home monitoring: Yes, but did not enroll at this time: already monitoring with home equipment.     Assessments Completed:   General Assessment    Do you have any symptoms that are causing concern?: Yes  Progression since Onset: Gradually Worsening  Reported Symptoms: Other (Comment: Urine frequency)          Medications Reviewed:   Completed during a previous call     Advance Care Planning:   Reviewed

## 2024-11-13 DIAGNOSIS — E87.1 HYPONATREMIA: ICD-10-CM

## 2024-11-13 RX ORDER — SODIUM CHLORIDE 1 G/1
1 TABLET ORAL
Qty: 270 TABLET | Refills: 1 | Status: SHIPPED | OUTPATIENT
Start: 2024-11-13

## 2024-11-15 ENCOUNTER — CARE COORDINATION (OUTPATIENT)
Dept: CARE COORDINATION | Age: 89
End: 2024-11-15

## 2024-11-15 NOTE — CARE COORDINATION
Ambulatory Care Coordination Note     11/15/2024 3:58 PM     CaregiverColeen  outreach attempt by this ACM today to perform care management follow up . ACM was unable to reach the caregiverColeen  by telephone today;   left voice message requesting a return phone call to this ACM.     ACM: Chela Dutton RN     Care Summary Note: ACM attempted to contact patient's granddaughter & active HCDMColeen, today for follow-up. ACM will outreach caregiver at a later date.     PCP/Specialist follow up:   Future Appointments         Provider Specialty Dept Phone    1/13/2025 8:40 AM Kayden Gonzalez MD Internal Medicine [phone]            Follow Up:   Plan for next ACM outreach in approximately 1 week to complete:  -status of lift equipment  -status of medication refills  -assess needs

## 2024-11-19 ENCOUNTER — CARE COORDINATION (OUTPATIENT)
Dept: CARE COORDINATION | Age: 89
End: 2024-11-19

## 2024-11-19 NOTE — CARE COORDINATION
Patient Current Location: Home: [address] [place] [address]     Phone call to Pt's granddaughter, emergency contact / POA, Coleen to reassess needs.   OT and PT are helping, able to stand on her own and so close to getting her on the toilet, they are confident she will be able to use the toilet on her own with some support for balance.     No further needs identified. SW discussed plan to resolve from SW services, but encouraged Pt's granddaughter to reach out with any future questions / concerns.     SW plan of care: SW will resolve from SW services.

## 2024-11-20 DIAGNOSIS — G62.9 NEUROPATHY: ICD-10-CM

## 2024-11-20 RX ORDER — GABAPENTIN 300 MG/1
300 CAPSULE ORAL 2 TIMES DAILY
Qty: 180 CAPSULE | Refills: 0 | Status: SHIPPED | OUTPATIENT
Start: 2024-11-20 | End: 2025-05-19

## 2024-11-25 ENCOUNTER — CARE COORDINATION (OUTPATIENT)
Dept: CARE COORDINATION | Age: 88
End: 2024-11-25

## 2024-11-25 DIAGNOSIS — R32 URINARY INCONTINENCE, UNSPECIFIED TYPE: Primary | ICD-10-CM

## 2024-11-25 RX ORDER — UNDERPADS 23" X 36"
EACH MISCELLANEOUS
Qty: 100 EACH | Refills: 0 | Status: SHIPPED | OUTPATIENT
Start: 2024-11-25

## 2024-11-25 NOTE — TELEPHONE ENCOUNTER
Patient called needing incontinent supplies sent to her her pharmacy or insurance . She stated that she does not have money to pay for it. She stated that she needs the bags that she puts on in the bed. Patient stated that she needs a script for this.

## 2024-11-25 NOTE — CARE COORDINATION
Ambulatory Care Coordination Note     2024 2:49 PM     Patient Current Location:  Home: [address] [place] [address]     ACM contacted the caregiver by telephone. Verified name and  with caregiver as identifiers.         ACM: Chela Dutton RN     Challenges to be reviewed by the provider   Additional needs identified to be addressed with provider No  none         Method of communication with provider: none.    Utilization: Patient has not had any utilization since our last call.     Care Summary Note: ACM placed call to patient's granddaughter/MPOA, Coleen, today for follow-up. Coleen reports patient is doing pretty good, stating she is now able to get up on her own and has been slowly getting to the restroom on her own a couple times as well, but still needs help. Coleen denies patient has any symptoms or concerns to report to provider at this time.     Coleen states she has not heard anything about order for [employer] lift that was requested during a previous outreach. Coleen states she doesn't really need it anymore since patient has gained quite a bit of strength in her lefts. HHC & PT are still coming to the home and working with patient.     Coleen reports medication refills were received.       Offered patient enrollment in the Remote Patient Monitoring (RPM) program for in-home monitoring: Yes, but did not enroll at this time: already monitoring with home equipment.     Assessments Completed:   General Assessment    Do you have any symptoms that are causing concern?: No          Medications Reviewed:   Patient denies any changes with medications and reports taking all medications as prescribed.    Advance Care Planning:   Reviewed during previous call      Care Planning:   Not completed during this call    PCP/Specialist follow up:   Future Appointments         Provider Specialty Dept Phone    2025 8:40 AM Kayden Gonzalez MD Internal Medicine [phone]            Follow

## 2024-12-10 ENCOUNTER — APPOINTMENT (OUTPATIENT)
Dept: CT IMAGING | Age: 88
DRG: 304 | End: 2024-12-10
Payer: MEDICARE

## 2024-12-10 ENCOUNTER — HOSPITAL ENCOUNTER (INPATIENT)
Age: 88
LOS: 2 days | Discharge: NURSING, REHAB OR LONG-TERM CARE | DRG: 304 | End: 2024-12-13
Attending: STUDENT IN AN ORGANIZED HEALTH CARE EDUCATION/TRAINING PROGRAM | Admitting: HOSPITALIST
Payer: MEDICARE

## 2024-12-10 ENCOUNTER — APPOINTMENT (OUTPATIENT)
Dept: GENERAL RADIOLOGY | Age: 88
DRG: 304 | End: 2024-12-10
Payer: MEDICARE

## 2024-12-10 DIAGNOSIS — R94.31 ABNORMAL ELECTROCARDIOGRAPHY: ICD-10-CM

## 2024-12-10 DIAGNOSIS — R79.89 ELEVATED TROPONIN: ICD-10-CM

## 2024-12-10 DIAGNOSIS — R29.90 STROKE-LIKE SYMPTOMS: Primary | ICD-10-CM

## 2024-12-10 DIAGNOSIS — I10 ESSENTIAL HYPERTENSION: ICD-10-CM

## 2024-12-10 PROBLEM — G45.9 TIA (TRANSIENT ISCHEMIC ATTACK): Status: ACTIVE | Noted: 2024-12-10

## 2024-12-10 LAB
ALBUMIN SERPL-MCNC: 3 G/DL (ref 3.4–5)
ALBUMIN/GLOB SERPL: 1.5 {RATIO} (ref 1.1–2.2)
ALP SERPL-CCNC: 96 U/L (ref 40–129)
ALT SERPL-CCNC: 9 U/L (ref 10–40)
AMPHET UR QL SCN: NEGATIVE
ANION GAP SERPL CALCULATED.3IONS-SCNC: 8 MMOL/L (ref 9–17)
AST SERPL-CCNC: 24 U/L (ref 15–37)
BACTERIA URNS QL MICRO: ABNORMAL
BARBITURATES UR QL SCN: NEGATIVE
BASOPHILS # BLD: 0.01 K/UL
BASOPHILS NFR BLD: 0 % (ref 0–1)
BENZODIAZ UR QL: NEGATIVE
BILIRUB SERPL-MCNC: 0.9 MG/DL (ref 0–1)
BILIRUB UR QL STRIP: NEGATIVE
BNP SERPL-MCNC: 1899 PG/ML (ref 0–450)
BUN SERPL-MCNC: 18 MG/DL (ref 7–20)
CALCIUM SERPL-MCNC: 9 MG/DL (ref 8.3–10.6)
CANNABINOIDS UR QL SCN: NEGATIVE
CHARACTER UR: ABNORMAL
CHLORIDE SERPL-SCNC: 106 MMOL/L (ref 99–110)
CHP ED QC CHECK: YES
CLARITY UR: CLEAR
CO2 SERPL-SCNC: 30 MMOL/L (ref 21–32)
COCAINE UR QL SCN: NEGATIVE
COLOR UR: YELLOW
CREAT SERPL-MCNC: 0.6 MG/DL (ref 0.6–1.2)
EOSINOPHIL # BLD: 0.01 K/UL
EOSINOPHILS RELATIVE PERCENT: 0 % (ref 0–3)
EPI CELLS #/AREA URNS HPF: <1 /HPF
ERYTHROCYTE [DISTWIDTH] IN BLOOD BY AUTOMATED COUNT: 13.8 % (ref 11.7–14.9)
ETHANOLAMINE SERPL-MCNC: <10 MG/DL (ref 0–0.08)
FENTANYL UR QL: NEGATIVE
GFR, ESTIMATED: >90 ML/MIN/1.73M2
GLUCOSE BLD-MCNC: 107 MG/DL
GLUCOSE BLD-MCNC: 107 MG/DL (ref 74–99)
GLUCOSE SERPL-MCNC: 102 MG/DL (ref 74–99)
GLUCOSE UR STRIP-MCNC: NEGATIVE MG/DL
HCT VFR BLD AUTO: 38.2 % (ref 37–47)
HGB BLD-MCNC: 12.2 G/DL (ref 12.5–16)
HGB UR QL STRIP.AUTO: ABNORMAL
IMM GRANULOCYTES # BLD AUTO: 0.02 K/UL
IMM GRANULOCYTES NFR BLD: 0 %
INR PPP: 1
KETONES UR STRIP-MCNC: NEGATIVE MG/DL
LEUKOCYTE ESTERASE UR QL STRIP: NEGATIVE
LYMPHOCYTES NFR BLD: 0.82 K/UL
LYMPHOCYTES RELATIVE PERCENT: 15 % (ref 24–44)
MAGNESIUM SERPL-MCNC: 1.5 MG/DL (ref 1.8–2.4)
MCH RBC QN AUTO: 30.4 PG (ref 27–31)
MCHC RBC AUTO-ENTMCNC: 31.9 G/DL (ref 32–36)
MCV RBC AUTO: 95.3 FL (ref 78–100)
MONOCYTES NFR BLD: 0.22 K/UL
MONOCYTES NFR BLD: 4 % (ref 0–4)
NEUTROPHILS NFR BLD: 81 % (ref 36–66)
NEUTS SEG NFR BLD: 4.52 K/UL
NITRITE UR QL STRIP: NEGATIVE
OPIATES UR QL SCN: NEGATIVE
OXYCODONE UR QL SCN: NEGATIVE
PH UR STRIP: 8 [PH] (ref 5–8)
PLATELET # BLD AUTO: 210 K/UL (ref 140–440)
PMV BLD AUTO: 11.6 FL (ref 7.5–11.1)
POTASSIUM SERPL-SCNC: 3.5 MMOL/L (ref 3.5–5.1)
PROT SERPL-MCNC: 4.9 G/DL (ref 6.4–8.2)
PROT UR STRIP-MCNC: 100 MG/DL
PROTHROMBIN TIME: 13.2 SEC (ref 11.7–14.5)
RBC # BLD AUTO: 4.01 M/UL (ref 4.2–5.4)
RBC #/AREA URNS HPF: 8 /HPF (ref 0–2)
SODIUM SERPL-SCNC: 144 MMOL/L (ref 136–145)
SP GR UR STRIP: 1.01 (ref 1–1.03)
TEST INFORMATION: NORMAL
TRICHOMONAS #/AREA URNS HPF: ABNORMAL /[HPF]
TROPONIN I SERPL HS-MCNC: 72 NG/L (ref 0–14)
TROPONIN I SERPL HS-MCNC: 83 NG/L (ref 0–14)
TROPONIN I SERPL HS-MCNC: 99 NG/L (ref 0–14)
UROBILINOGEN UR STRIP-ACNC: 1 EU/DL (ref 0–1)
WBC #/AREA URNS HPF: 2 /HPF (ref 0–5)
WBC OTHER # BLD: 5.6 K/UL (ref 4–10.5)

## 2024-12-10 PROCEDURE — 84484 ASSAY OF TROPONIN QUANT: CPT

## 2024-12-10 PROCEDURE — 6360000004 HC RX CONTRAST MEDICATION: Performed by: STUDENT IN AN ORGANIZED HEALTH CARE EDUCATION/TRAINING PROGRAM

## 2024-12-10 PROCEDURE — 93005 ELECTROCARDIOGRAM TRACING: CPT | Performed by: STUDENT IN AN ORGANIZED HEALTH CARE EDUCATION/TRAINING PROGRAM

## 2024-12-10 PROCEDURE — 99285 EMERGENCY DEPT VISIT HI MDM: CPT

## 2024-12-10 PROCEDURE — G0378 HOSPITAL OBSERVATION PER HR: HCPCS

## 2024-12-10 PROCEDURE — 71045 X-RAY EXAM CHEST 1 VIEW: CPT

## 2024-12-10 PROCEDURE — 2580000003 HC RX 258: Performed by: STUDENT IN AN ORGANIZED HEALTH CARE EDUCATION/TRAINING PROGRAM

## 2024-12-10 PROCEDURE — 92610 EVALUATE SWALLOWING FUNCTION: CPT

## 2024-12-10 PROCEDURE — 81001 URINALYSIS AUTO W/SCOPE: CPT

## 2024-12-10 PROCEDURE — 99223 1ST HOSP IP/OBS HIGH 75: CPT | Performed by: INTERNAL MEDICINE

## 2024-12-10 PROCEDURE — 96372 THER/PROPH/DIAG INJ SC/IM: CPT

## 2024-12-10 PROCEDURE — 6360000002 HC RX W HCPCS: Performed by: STUDENT IN AN ORGANIZED HEALTH CARE EDUCATION/TRAINING PROGRAM

## 2024-12-10 PROCEDURE — 80053 COMPREHEN METABOLIC PANEL: CPT

## 2024-12-10 PROCEDURE — 82962 GLUCOSE BLOOD TEST: CPT

## 2024-12-10 PROCEDURE — 94761 N-INVAS EAR/PLS OXIMETRY MLT: CPT

## 2024-12-10 PROCEDURE — 70450 CT HEAD/BRAIN W/O DYE: CPT

## 2024-12-10 PROCEDURE — 36415 COLL VENOUS BLD VENIPUNCTURE: CPT

## 2024-12-10 PROCEDURE — 6370000000 HC RX 637 (ALT 250 FOR IP): Performed by: STUDENT IN AN ORGANIZED HEALTH CARE EDUCATION/TRAINING PROGRAM

## 2024-12-10 PROCEDURE — 83735 ASSAY OF MAGNESIUM: CPT

## 2024-12-10 PROCEDURE — 83880 ASSAY OF NATRIURETIC PEPTIDE: CPT

## 2024-12-10 PROCEDURE — 70498 CT ANGIOGRAPHY NECK: CPT

## 2024-12-10 PROCEDURE — 80307 DRUG TEST PRSMV CHEM ANLYZR: CPT

## 2024-12-10 PROCEDURE — G0480 DRUG TEST DEF 1-7 CLASSES: HCPCS

## 2024-12-10 PROCEDURE — 85025 COMPLETE CBC W/AUTO DIFF WBC: CPT

## 2024-12-10 PROCEDURE — 85610 PROTHROMBIN TIME: CPT

## 2024-12-10 RX ORDER — MAGNESIUM SULFATE IN WATER 40 MG/ML
2000 INJECTION, SOLUTION INTRAVENOUS PRN
Status: DISCONTINUED | OUTPATIENT
Start: 2024-12-10 | End: 2024-12-13 | Stop reason: HOSPADM

## 2024-12-10 RX ORDER — ASPIRIN 81 MG/1
324 TABLET, CHEWABLE ORAL ONCE
Status: COMPLETED | OUTPATIENT
Start: 2024-12-10 | End: 2024-12-10

## 2024-12-10 RX ORDER — ASPIRIN 81 MG/1
81 TABLET, CHEWABLE ORAL DAILY
Status: DISCONTINUED | OUTPATIENT
Start: 2024-12-10 | End: 2024-12-10

## 2024-12-10 RX ORDER — ONDANSETRON 2 MG/ML
4 INJECTION INTRAMUSCULAR; INTRAVENOUS EVERY 6 HOURS PRN
Status: DISCONTINUED | OUTPATIENT
Start: 2024-12-10 | End: 2024-12-13 | Stop reason: HOSPADM

## 2024-12-10 RX ORDER — SENNOSIDES 8.6 MG
1300 CAPSULE ORAL 2 TIMES DAILY
COMMUNITY

## 2024-12-10 RX ORDER — AMLODIPINE BESYLATE 5 MG/1
5 TABLET ORAL DAILY
Status: DISCONTINUED | OUTPATIENT
Start: 2024-12-10 | End: 2024-12-13

## 2024-12-10 RX ORDER — CLOPIDOGREL BISULFATE 75 MG/1
75 TABLET ORAL DAILY
Status: DISCONTINUED | OUTPATIENT
Start: 2024-12-10 | End: 2024-12-13 | Stop reason: HOSPADM

## 2024-12-10 RX ORDER — LANOLIN ALCOHOL/MO/W.PET/CERES
100 CREAM (GRAM) TOPICAL DAILY
Status: DISCONTINUED | OUTPATIENT
Start: 2024-12-10 | End: 2024-12-13 | Stop reason: HOSPADM

## 2024-12-10 RX ORDER — SODIUM CHLORIDE 0.9 % (FLUSH) 0.9 %
5-40 SYRINGE (ML) INJECTION PRN
Status: DISCONTINUED | OUTPATIENT
Start: 2024-12-10 | End: 2024-12-13 | Stop reason: HOSPADM

## 2024-12-10 RX ORDER — POTASSIUM CHLORIDE 1500 MG/1
40 TABLET, EXTENDED RELEASE ORAL PRN
Status: DISCONTINUED | OUTPATIENT
Start: 2024-12-10 | End: 2024-12-13 | Stop reason: HOSPADM

## 2024-12-10 RX ORDER — ACETAMINOPHEN 325 MG/1
650 TABLET ORAL EVERY 6 HOURS PRN
Status: DISCONTINUED | OUTPATIENT
Start: 2024-12-10 | End: 2024-12-13 | Stop reason: HOSPADM

## 2024-12-10 RX ORDER — POTASSIUM CHLORIDE 7.45 MG/ML
10 INJECTION INTRAVENOUS PRN
Status: DISCONTINUED | OUTPATIENT
Start: 2024-12-10 | End: 2024-12-13 | Stop reason: HOSPADM

## 2024-12-10 RX ORDER — IOPAMIDOL 755 MG/ML
80 INJECTION, SOLUTION INTRAVASCULAR
Status: COMPLETED | OUTPATIENT
Start: 2024-12-10 | End: 2024-12-10

## 2024-12-10 RX ORDER — ENOXAPARIN SODIUM 100 MG/ML
40 INJECTION SUBCUTANEOUS DAILY
Status: DISCONTINUED | OUTPATIENT
Start: 2024-12-10 | End: 2024-12-13 | Stop reason: HOSPADM

## 2024-12-10 RX ORDER — POLYETHYLENE GLYCOL 3350 17 G/17G
17 POWDER, FOR SOLUTION ORAL DAILY PRN
Status: DISCONTINUED | OUTPATIENT
Start: 2024-12-10 | End: 2024-12-13 | Stop reason: HOSPADM

## 2024-12-10 RX ORDER — ATORVASTATIN CALCIUM 40 MG/1
40 TABLET, FILM COATED ORAL NIGHTLY
Status: DISCONTINUED | OUTPATIENT
Start: 2024-12-10 | End: 2024-12-13 | Stop reason: HOSPADM

## 2024-12-10 RX ORDER — SODIUM CHLORIDE, SODIUM LACTATE, POTASSIUM CHLORIDE, CALCIUM CHLORIDE 600; 310; 30; 20 MG/100ML; MG/100ML; MG/100ML; MG/100ML
INJECTION, SOLUTION INTRAVENOUS CONTINUOUS
Status: DISPENSED | OUTPATIENT
Start: 2024-12-10 | End: 2024-12-10

## 2024-12-10 RX ORDER — LISINOPRIL 20 MG/1
40 TABLET ORAL DAILY
Status: DISCONTINUED | OUTPATIENT
Start: 2024-12-10 | End: 2024-12-13 | Stop reason: HOSPADM

## 2024-12-10 RX ORDER — SODIUM CHLORIDE 9 MG/ML
INJECTION, SOLUTION INTRAVENOUS PRN
Status: DISCONTINUED | OUTPATIENT
Start: 2024-12-10 | End: 2024-12-13 | Stop reason: HOSPADM

## 2024-12-10 RX ORDER — SODIUM CHLORIDE 0.9 % (FLUSH) 0.9 %
5-40 SYRINGE (ML) INJECTION EVERY 12 HOURS SCHEDULED
Status: DISCONTINUED | OUTPATIENT
Start: 2024-12-10 | End: 2024-12-13 | Stop reason: HOSPADM

## 2024-12-10 RX ORDER — ONDANSETRON 4 MG/1
4 TABLET, ORALLY DISINTEGRATING ORAL EVERY 8 HOURS PRN
Status: DISCONTINUED | OUTPATIENT
Start: 2024-12-10 | End: 2024-12-13 | Stop reason: HOSPADM

## 2024-12-10 RX ORDER — ACETAMINOPHEN 650 MG/1
650 SUPPOSITORY RECTAL EVERY 6 HOURS PRN
Status: DISCONTINUED | OUTPATIENT
Start: 2024-12-10 | End: 2024-12-13 | Stop reason: HOSPADM

## 2024-12-10 RX ADMIN — IOPAMIDOL 80 ML: 755 INJECTION, SOLUTION INTRAVENOUS at 10:04

## 2024-12-10 RX ADMIN — LISINOPRIL 40 MG: 20 TABLET ORAL at 18:15

## 2024-12-10 RX ADMIN — ENOXAPARIN SODIUM 40 MG: 100 INJECTION SUBCUTANEOUS at 16:47

## 2024-12-10 RX ADMIN — ASPIRIN 324 MG: 81 TABLET, CHEWABLE ORAL at 11:21

## 2024-12-10 RX ADMIN — Medication 100 MG: at 18:15

## 2024-12-10 RX ADMIN — AMLODIPINE BESYLATE 5 MG: 5 TABLET ORAL at 18:15

## 2024-12-10 RX ADMIN — CLOPIDOGREL BISULFATE 75 MG: 75 TABLET ORAL at 18:15

## 2024-12-10 RX ADMIN — SODIUM CHLORIDE, POTASSIUM CHLORIDE, SODIUM LACTATE AND CALCIUM CHLORIDE: 600; 310; 30; 20 INJECTION, SOLUTION INTRAVENOUS at 16:47

## 2024-12-10 RX ADMIN — SODIUM CHLORIDE, PRESERVATIVE FREE 10 ML: 5 INJECTION INTRAVENOUS at 23:23

## 2024-12-10 RX ADMIN — ATORVASTATIN CALCIUM 40 MG: 40 TABLET, FILM COATED ORAL at 23:23

## 2024-12-10 ASSESSMENT — PAIN SCALES - GENERAL
PAINLEVEL_OUTOF10: 8
PAINLEVEL_OUTOF10: 6

## 2024-12-10 ASSESSMENT — PAIN DESCRIPTION - LOCATION
LOCATION: HEAD
LOCATION: HEAD

## 2024-12-10 ASSESSMENT — PAIN DESCRIPTION - DESCRIPTORS
DESCRIPTORS: ACHING
DESCRIPTORS: ACHING

## 2024-12-10 ASSESSMENT — PAIN - FUNCTIONAL ASSESSMENT: PAIN_FUNCTIONAL_ASSESSMENT: 0-10

## 2024-12-10 NOTE — PROGRESS NOTES
Pt is confused at this time and can not answer questions correctly will pass along in report to get with granddaughter for admission and mri screening

## 2024-12-10 NOTE — ED PROVIDER NOTES
Emergency Department Encounter        Pt Name: Carol Xiong  MRN: [id number]  Birthdate 2/20/1933  Date of evaluation: 12/10/2024  ED Physician: Michael Orantes MD    CHIEF COMPLAINT     Triage Chief Complaint:   Altered Mental Status (Confusion, slurred speech, vision changes, and hypertension since 9am per EMS. GCS 15 on arrival to the ED, pt states she has had a hard time seeing the tv screen for a week, pt states she does not feel confused. Pts BP on arrival to the ED was 232/103)      HISTORY OF PRESENT ILLNESS & REVIEW OF SYSTEMS     History obtained from the patient and staff and EMS.    Carol Xiong is a 89 y.o. female who presents to the emergency department for evaluation of strokelike symptoms.  Reportedly last known well was 9 PM last night.  EMS reports slurred speech HTN, confusion and blurry vision.  Patient says she is actually had blurry vision for past couple weeks.  Denies any falls or trauma.  Patient denies any pain.  Denies any alcohol or drugs.  Patient is a poor historian.        Patient denies any new Headache, Fever, Chills, Cough, Chest pain, Shortness of breath, Abdominal pain, Nausea, Vomiting, Diarrhea, Constipation, and Leg swelling.    The patient has no other acute complaints at this time.  Review of systems as above.          PAST MED/SURG/SOCIAL/FAM HISTORY & ALLERGY & MEDICATIONS     Past Medical History:   Diagnosis Date    Abnormal echocardiogram 09/03/2024    See Epic notes    GERD (gastroesophageal reflux disease)     H/O echocardiogram 04/06/2017    EF 55% Left atrial enlargement. Normal LV systolic. Minimal aoritc stenosis.     H/O echocardiogram 02/04/2020    EF 55-60%, Grade I DD, Mild AS.  Mildly dilated left atrium.    Hyperlipidemia     Hypertension     Neuropathy      Patient Active Problem List   Diagnosis Code    Essential hypertension I10    Mixed hyperlipidemia E78.2    Gastroesophageal reflux disease without esophagitis K21.9    Dorsalgia M54.9     100 each, Rfl: 0    lisinopril (PRINIVIL;ZESTRIL) 40 MG tablet, Take 1 tablet by mouth daily (Patient not taking: Reported on 12/10/2024), Disp: 90 tablet, Rfl: 1    thiamine 100 MG tablet, Take 1 tablet by mouth daily (Patient not taking: Reported on 12/10/2024), Disp: 30 tablet, Rfl: 3    atorvastatin (LIPITOR) 20 MG tablet, TAKE 1 TABLET EVERY DAY (Patient not taking: Reported on 12/10/2024), Disp: 90 tablet, Rfl: 3    triamterene-hydroCHLOROthiazide (MAXZIDE-25) 37.5-25 MG per tablet, Take 1 tablet by mouth daily (Patient not taking: Reported on 12/10/2024), Disp: , Rfl:     folic acid (FOLVITE) 1 MG tablet, Take 1 tablet by mouth daily (Patient not taking: Reported on 12/10/2024), Disp: 30 tablet, Rfl: 3    clopidogrel (PLAVIX) 75 MG tablet, Take 1 tablet by mouth daily for 21 days, Disp: 21 tablet, Rfl: 0    amLODIPine (NORVASC) 5 MG tablet, Take 1 tablet by mouth daily, Disp: 30 tablet, Rfl: 0    ondansetron (ZOFRAN-ODT) 4 MG disintegrating tablet, Take 1 tablet by mouth 3 times daily as needed for Nausea or Vomiting (Patient not taking: Reported on 10/11/2024), Disp: 21 tablet, Rfl: 0    benazepril (LOTENSIN) 40 MG tablet, TAKE 1 TABLET EVERY DAY (Patient not taking: Reported on 12/10/2024), Disp: 90 tablet, Rfl: 3    vitamin C (ASCORBIC ACID) 500 MG tablet, Take 1 tablet by mouth daily (Patient not taking: Reported on 12/10/2024), Disp: , Rfl:   Previous Medications    ACETAMINOPHEN (ARTHRITIS PAIN RELIEVER) 650 MG EXTENDED RELEASE TABLET    Take 2 tablets by mouth 2 times daily    AMLODIPINE (NORVASC) 5 MG TABLET    Take 1 tablet by mouth daily    ASPIRIN 81 MG EC TABLET    Take 1 tablet by mouth daily    ATORVASTATIN (LIPITOR) 20 MG TABLET    TAKE 1 TABLET EVERY DAY    BENAZEPRIL (LOTENSIN) 40 MG TABLET    TAKE 1 TABLET EVERY DAY    CHOLECALCIFEROL (VITAMIN D3) 125 MCG (5000 UT) TABS    Take 1 tablet by mouth daily    CLOPIDOGREL (PLAVIX) 75 MG TABLET    Take 1 tablet by mouth daily for 21 days    FOLIC

## 2024-12-10 NOTE — ED NOTES
Medication History  [facility]    Patient Name: Carol Xiong 2/20/1933     Medication history has been completed by: Ludy Stover [facility]    Source(s) of information: patient' family via phone and insurance claims     Primary Care Physician: Kayden Gonzalez MD     Pharmacy: [facility]    Allergies as of 12/10/2024 - Fully Reviewed 12/10/2024   Allergen Reaction Noted    Penicillins  01/06/2020    Thiazide-type diuretics Other (See Comments) 08/29/2024        Prior to Admission medications    Medication Sig Start Date End Date Taking? Authorizing Provider   acetaminophen (ARTHRITIS PAIN RELIEVER) 650 MG extended release tablet Take 2 tablets by mouth 2 times daily   Yes Provider, MD Elliott   gabapentin (NEURONTIN) 300 MG capsule Take 1 capsule by mouth 2 times daily for 180 days. 11/20/24 5/19/25 Yes Kayden Gonzalez MD   sodium chloride 1 g tablet Take 1 tablet by mouth 3 times daily (with meals) 11/13/24  Yes Kayden Gonzalez MD   vitamin B-12 (CYANOCOBALAMIN) 1000 MCG tablet Take 1 tablet by mouth daily 11/8/24  Yes Kayden Gonzalez MD   aspirin 81 MG EC tablet Take 1 tablet by mouth 2 times daily 9/10/24  Yes José Luis Joseph MD   omeprazole (PRILOSEC) 20 MG delayed release capsule TAKE 1 CAPSULE EVERY DAY 8/5/24  Yes Kayden Gonzalez MD   memantine (NAMENDA) 5 MG tablet Take 1 tablet by mouth 2 times daily 6/25/24  Yes Kayden Gonzalez MD   Cholecalciferol (VITAMIN D3) 125 MCG (5000 UT) TABS Take 1 tablet by mouth daily 6/25/24  Yes Kayden Gonzalez MD   Incontinence Supply Disposable (INCONTINENCE BRIEF LARGE) MISC Incontinence XL Briefs 100 11/25/24   Kayden Gonzalez MD   lisinopril (PRINIVIL;ZESTRIL) 40 MG tablet Take 1 tablet by mouth daily  Patient not taking: Reported on 12/10/2024 11/8/24   Kayden Gonzalez MD   thiamine 100 MG tablet Take 1 tablet by mouth daily  Patient not taking: Reported on 12/10/2024 11/8/24   Kayden Gonzalez MD   atorvastatin (LIPITOR) 20 MG tablet TAKE 1 TABLET EVERY

## 2024-12-10 NOTE — ED TRIAGE NOTES
Pt to the ED via [facility] as a stroke alert with slurred speech, hypertension, confusion, and vision changes with a last known well of 9pm last night. Patient to the CT scanner on arrival to the ED. Patient initially was confused but was re-directed and was cooperative for the scan to be started

## 2024-12-10 NOTE — PROGRESS NOTES
4 Eyes Skin Assessment     NAME:  Carol Xiong  YOB: 1933  MEDICAL RECORD NUMBER:  [id number]    The patient is being assessed for  Admission    I agree that at least one RN has performed a thorough Head to Toe Skin Assessment on the patient. ALL assessment sites listed below have been assessed.      Areas assessed by both nurses:    Head, Face, Ears, Shoulders, Back, Chest, Arms, Elbows, Hands, Sacrum. Buttock, Coccyx, Ischium, Legs. Feet and Heels, and Under Medical Devices         Does the Patient have a Wound? Yes wound(s) were present on assessment. LDA wound assessment was Initiated and completed by RN scattered ecchymosis       Daron Prevention initiated by RN: Yes  Wound Care Orders initiated by RN: Yes    Pressure Injury (Stage 3,4, Unstageable, DTI, NWPT, and Complex wounds) if present, place Wound referral order by RN under : Yes    New Ostomies, if present place, Ostomy referral order under : No     Nurse 1 eSignature: Electronically signed by Michele José RN on 12/10/24 at 5:38 PM EST    **SHARE this note so that the co-signing nurse can place an eSignature**    Nurse 2 eSignature: Electronically signed by Lashaun Jay RN on 12/11/24 at 4:13 AM EST

## 2024-12-10 NOTE — ED NOTES
ED TO INPATIENT SBAR HANDOFF    Patient Name: Carol Xiong   :  1933  89 y.o.   Preferred Name    Family/Caregiver Present no   Restraints no   C-SSRS: Risk of Suicide: No Risk  Sitter no   Sepsis Risk Score        Situation  Chief Complaint   Patient presents with    Altered Mental Status     Confusion, slurred speech, vision changes, and hypertension since 9am per EMS. GCS 15 on arrival to the ED, pt states she has had a hard time seeing the tv screen for a week, pt states she does not feel confused. Pts BP on arrival to the ED was 232/103     Brief Description of Patient's Condition: pt to the ED as a stroke alert with the last known well as 24 at 9pm. Medics state the patient had confusion, slurred speech, vision changes, and hypertension since 9am. However, while this RN was triaging the patient, the patient stated she has had trouble seeing her tv for a week, patient is alert and oriented, patient has clear speech, and the patient has chronic hypertension. MRI checklist was unable to be completed due to the patient being alert and oriented, but unable to recall certain portions of her medical history. Divya with MRI aware  Mental Status: oriented, alert, coherent, and logical  Arrived from: home    Imaging:   XR CHEST PORTABLE   Final Result   No acute findings in the chest. Other findings as noted.         Electronically signed by Dwight Cain      CTA HEAD NECK W CONTRAST   Final Result      No flow-limiting stenosis, aneurysm, or dissection.      Electronically signed by Gurpreet Charles      CT HEAD WO CONTRAST   Final Result   1. No CT evidence of acute intracranial abnormality.   2. 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   3. Cerebral atrophy.   4. No significant change.      Electronically signed by Dwight Cain      MRI BRAIN WO CONTRAST    (Results Pending)     Abnormal labs:   Abnormal Labs Reviewed   CBC WITH AUTO DIFFERENTIAL - Abnormal; Notable for the following  components:       Result Value    RBC 4.01 (*)     Hemoglobin 12.2 (*)     MCHC 31.9 (*)     MPV 11.6 (*)     Neutrophils % 81 (*)     Lymphocytes % 15 (*)     All other components within normal limits   COMPREHENSIVE METABOLIC PANEL W/ REFLEX TO MG FOR LOW K - Abnormal; Notable for the following components:    Anion Gap 8 (*)     Glucose 102 (*)     Total Protein 4.9 (*)     Albumin 3.0 (*)     ALT 9 (*)     All other components within normal limits   TROPONIN - Abnormal; Notable for the following components:    Troponin, High Sensitivity 99 (*)     All other components within normal limits   TROPONIN - Abnormal; Notable for the following components:    Troponin, High Sensitivity 83 (*)     All other components within normal limits   BRAIN NATRIURETIC PEPTIDE - Abnormal; Notable for the following components:    NT Pro-BNP 1,899 (*)     All other components within normal limits   MAGNESIUM - Abnormal; Notable for the following components:    Magnesium 1.5 (*)     All other components within normal limits   URINALYSIS WITH REFLEX TO CULTURE - Abnormal; Notable for the following components:    Urine Hgb SMALL (*)     Protein,  (*)     All other components within normal limits   MICROSCOPIC URINALYSIS - Abnormal; Notable for the following components:    RBC, UA 8 (*)     Bacteria, UA RARE (*)     Other Observations UA Culture not indicated. (*)     All other components within normal limits   POCT GLUCOSE - Abnormal; Notable for the following components:    POC Glucose 107 (*)     All other components within normal limits        Background  History:   Past Medical History:   Diagnosis Date    Abnormal echocardiogram 09/03/2024    See Epic notes    GERD (gastroesophageal reflux disease)     H/O echocardiogram 04/06/2017    EF 55% Left atrial enlargement. Normal LV systolic. Minimal aoritc stenosis.     H/O echocardiogram 02/04/2020    EF 55-60%, Grade I DD, Mild AS.  Mildly dilated left atrium.    Hyperlipidemia

## 2024-12-10 NOTE — PROGRESS NOTES
Facility/Department: [facility] EMERGENCY DEPARTMENT   CLINICAL BEDSIDE SWALLOW EVALUATION    NAME: Carol Xiong  : 1933  MRN: [id number]    ADMISSION DATE: 12/10/2024  ADMITTING DIAGNOSIS: has Essential hypertension; Mixed hyperlipidemia; Gastroesophageal reflux disease without esophagitis; Dorsalgia; Neuropathy; Allergic rhinitis; Anemia; Altered mental status; Bradycardia; Carotid stenosis, right; History of TIA (transient ischemic attack); Stage 3a chronic kidney disease (HCC); Moderate late onset Alzheimer's dementia without behavioral disturbance, psychotic disturbance, mood disturbance, or anxiety (HCC); Fear for personal safety; Severe malnutrition (HCC); Vitamin D deficiency; Weight loss; Hyponatremia; Moderate malnutrition (HCC); Pharyngoesophageal dysphagia; Esophageal polyp; Pain around PEG tube site; and TIA (transient ischemic attack) on their problem list.  ONSET DATE: this admission     Recent Chest Xray/CT of Chest: Chest xray on 12/10/24 reveals \"No acute findings in the chest. Other findings as noted.\"    Date of Eval: 12/10/2024  Evaluating Therapist: GODFREY Ronquillo    Impression: Carol Xiong was seen today for a clinical bedside swallow evaluation following admission to [facility] with TIA. Head CT on 12/10/24 reveals \"1. No CT evidence of acute intracranial abnormality. 2. 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 3. Cerebral atrophy.\" MRI is pending. Relevant medical hx includes HTN, GERD, hyperlipidemia, and neuropathy. Pt seen during previous admission and underwent PEG placement d/t suspected esophageal polyp. Pt consuming a minced and moist/thin liquid diet prior to d/c on 9/10/24. Pt reports PEG has since been removed.     Carol Xiong was positioned upright in bed for the current visit. Pt agreeable and cooperative. Pt hard of hearing. Pt reports her granddaughter cooks for her at home and she eats soft foods d/t  difficulty chewing. Pt with top and bottom dentures. Oral mechanism examination reveals reduced labial rate and reduced lingual strength/rate/coordination. No gross facial asymmetry noted. Vocal quality is WFL. Informal speech-language screen was performed during this visit. Pt oriented to self, place, city, and date (month/year). Speech intelligibility is WFL. She appears somewhat confused and noted to perseverate on phrases/ideas. Unclear if this is a deviation from baseline. Pt benefits from redirection to task and repetition of verbal cues as needed. Pt demonstrates functional speech-language skills for navigation of the acute care environment. May benefit from ongoing evaluation/treatment at next level of care if mentation is determined to be deviation from baseline.    Carol Xiong consumed trials of thin liquid via cup/straw, puree, soft solid, and regular solid during this visit. Pt presents with evidence oral dysphagia characterized by prolonged/disorganized bolus preparation/mastication, slow a/p transit, and reduced oral clearance. Oral residue noted with soft/regular solid. Pt IND requested liquid wash. Pharyngeal swallow initiation appears timely with no overt s/sx of penetration/aspiration with any consistency. Burp noted x2 following initial trials of thin liquid. SLP provided education on results/recommendations. Pt reports difficulty chewing meat and is agreeable to softened diet texture.    Recommendations: Recommend Carol Xiong consume a minced and moist diet and thin liquids with medications in puree provided use of strict aspiration precautions. Cut or crush large pills as able. SLP to follow to monitor diet tolerance and provide education.     Current Diet level:  Current Diet : Regular  Current Liquid Diet : Thin    Primary Complaint  Pt complains of difficulty chewing    Pain:  Pain Assessment  Pain Assessment: 0-10  Pain Level: 8  Patient's Stated Pain Goal: 0 - No pain  Pain

## 2024-12-10 NOTE — H&P
V2.0  History and Physical      Name:  Carol Xiong /Age/Sex: 1933  (89 y.o. female)   MRN & CSN:  [id number] & [id number] Encounter Date/Time: 12/10/2024 1:58 PM EST   Location:  [place] [address] PCP: Kayden Gonzalez MD       Hospital Day: 1    Assessment and Plan:   Carol Xiong is a 89 y.o. female  who presents with TIA (transient ischemic attack)    Hospital Problems             Last Modified POA    * (Principal) TIA (transient ischemic attack) 12/10/2024 Yes       Assessment:  Acute onset double vision.  Normal ROM with no secondary muscle dyskinesias.  Normal accommodation and eye reflexes.  TIA rule out.  Admitted under observation CT head is negative.  NIH is 0 at the moment.  Last known well was 9 PM.  Other symptoms include slurred speech.  On my assessment NIH scale is 0 now.  On Ed assessment -NIH was 3.  MRI of the brain is pending.  On Plavix.  Blood pressure is elevated.  Neurology to be consulted  Significantly elevated troponin.  Repeat troponin to be done.  Cardiology has been consulted.  Likely prophylaxis can n.p.o. at midnight  Alzheimer's dementia  Chronic kidney disease stage IIIb  Benign essential hypertension continue home medication  Hyperlipidemia statin  History of TIA in the past  Peripheral neuropathy on gabapentin at home  Chronic GERD on PPI at home history of vitamin D deficiency       Medical Decision Making:  The following items were considered in medical decision making:  Discussion of patient care with other providers  Reviewed clinical lab tests if any  Reviewed radiology tests if any  Reviewed other diagnostic tests/interventions  Independent review of radiologic images if any  Microbiology cultures and other micro tests if any    Estimated time spent for medical decision-making encompassing complexity of the case, history taking, medication review, physical examination, communication with family, RN, , discussion with specialists, and ancillary

## 2024-12-11 ENCOUNTER — APPOINTMENT (OUTPATIENT)
Dept: MRI IMAGING | Age: 88
DRG: 304 | End: 2024-12-11
Payer: MEDICARE

## 2024-12-11 ENCOUNTER — CARE COORDINATION (OUTPATIENT)
Dept: CARE COORDINATION | Age: 88
End: 2024-12-11

## 2024-12-11 PROBLEM — R29.90 STROKE-LIKE SYMPTOMS: Status: ACTIVE | Noted: 2024-12-11

## 2024-12-11 PROBLEM — G93.40 ACUTE ENCEPHALOPATHY: Status: ACTIVE | Noted: 2024-12-11

## 2024-12-11 LAB
AMMONIA PLAS-SCNC: 19 UMOL/L (ref 11–51)
ANION GAP SERPL CALCULATED.3IONS-SCNC: 11 MMOL/L (ref 9–17)
BUN SERPL-MCNC: 16 MG/DL (ref 7–20)
CALCIUM SERPL-MCNC: 8.7 MG/DL (ref 8.3–10.6)
CHLORIDE SERPL-SCNC: 103 MMOL/L (ref 99–110)
CHOLEST SERPL-MCNC: 145 MG/DL (ref 125–199)
CO2 SERPL-SCNC: 26 MMOL/L (ref 21–32)
CREAT SERPL-MCNC: 0.6 MG/DL (ref 0.6–1.2)
EKG ATRIAL RATE: 89 BPM
EKG DIAGNOSIS: NORMAL
EKG P AXIS: 68 DEGREES
EKG P-R INTERVAL: 182 MS
EKG Q-T INTERVAL: 372 MS
EKG QRS DURATION: 86 MS
EKG QTC CALCULATION (BAZETT): 452 MS
EKG R AXIS: 59 DEGREES
EKG T AXIS: 34 DEGREES
EKG VENTRICULAR RATE: 89 BPM
ERYTHROCYTE [DISTWIDTH] IN BLOOD BY AUTOMATED COUNT: 14 % (ref 11.7–14.9)
FOLATE SERPL-MCNC: 19.2 NG/ML (ref 4.8–24.2)
GFR, ESTIMATED: >90 ML/MIN/1.73M2
GLUCOSE SERPL-MCNC: 90 MG/DL (ref 74–99)
HCT VFR BLD AUTO: 37.5 % (ref 37–47)
HDLC SERPL-MCNC: 81 MG/DL
HGB BLD-MCNC: 11.9 G/DL (ref 12.5–16)
LDLC SERPL CALC-MCNC: 49 MG/DL
MAGNESIUM SERPL-MCNC: 1.5 MG/DL (ref 1.8–2.4)
MCH RBC QN AUTO: 30.4 PG (ref 27–31)
MCHC RBC AUTO-ENTMCNC: 31.7 G/DL (ref 32–36)
MCV RBC AUTO: 95.9 FL (ref 78–100)
PA ADP PRP-ACNC: 328 PRU
PHOSPHATE SERPL-MCNC: 2.5 MG/DL (ref 2.5–4.9)
PLATELET # BLD AUTO: 207 K/UL (ref 140–440)
PLT FUNCTION ASPIRIN: 471 ARU
PMV BLD AUTO: 11.7 FL (ref 7.5–11.1)
POTASSIUM SERPL-SCNC: 3.2 MMOL/L (ref 3.5–5.1)
RBC # BLD AUTO: 3.91 M/UL (ref 4.2–5.4)
SODIUM SERPL-SCNC: 140 MMOL/L (ref 136–145)
TRIGL SERPL-MCNC: 77 MG/DL
TROPONIN I SERPL HS-MCNC: 56 NG/L (ref 0–14)
TSH SERPL DL<=0.05 MIU/L-ACNC: 3.11 UIU/ML (ref 0.27–4.2)
VIT B12 SERPL-MCNC: 808 PG/ML (ref 211–911)
WBC OTHER # BLD: 6.5 K/UL (ref 4–10.5)

## 2024-12-11 PROCEDURE — 82746 ASSAY OF FOLIC ACID SERUM: CPT

## 2024-12-11 PROCEDURE — 6370000000 HC RX 637 (ALT 250 FOR IP): Performed by: STUDENT IN AN ORGANIZED HEALTH CARE EDUCATION/TRAINING PROGRAM

## 2024-12-11 PROCEDURE — 97166 OT EVAL MOD COMPLEX 45 MIN: CPT

## 2024-12-11 PROCEDURE — 6370000000 HC RX 637 (ALT 250 FOR IP): Performed by: PHYSICIAN ASSISTANT

## 2024-12-11 PROCEDURE — 85576 BLOOD PLATELET AGGREGATION: CPT

## 2024-12-11 PROCEDURE — G0378 HOSPITAL OBSERVATION PER HR: HCPCS

## 2024-12-11 PROCEDURE — 6360000002 HC RX W HCPCS: Performed by: STUDENT IN AN ORGANIZED HEALTH CARE EDUCATION/TRAINING PROGRAM

## 2024-12-11 PROCEDURE — 84484 ASSAY OF TROPONIN QUANT: CPT

## 2024-12-11 PROCEDURE — 99223 1ST HOSP IP/OBS HIGH 75: CPT | Performed by: PSYCHIATRY & NEUROLOGY

## 2024-12-11 PROCEDURE — 84443 ASSAY THYROID STIM HORMONE: CPT

## 2024-12-11 PROCEDURE — 96366 THER/PROPH/DIAG IV INF ADDON: CPT

## 2024-12-11 PROCEDURE — 97162 PT EVAL MOD COMPLEX 30 MIN: CPT

## 2024-12-11 PROCEDURE — 99232 SBSQ HOSP IP/OBS MODERATE 35: CPT | Performed by: INTERNAL MEDICINE

## 2024-12-11 PROCEDURE — 36415 COLL VENOUS BLD VENIPUNCTURE: CPT

## 2024-12-11 PROCEDURE — 80048 BASIC METABOLIC PNL TOTAL CA: CPT

## 2024-12-11 PROCEDURE — 80061 LIPID PANEL: CPT

## 2024-12-11 PROCEDURE — 96372 THER/PROPH/DIAG INJ SC/IM: CPT

## 2024-12-11 PROCEDURE — 96365 THER/PROPH/DIAG IV INF INIT: CPT

## 2024-12-11 PROCEDURE — 70551 MRI BRAIN STEM W/O DYE: CPT

## 2024-12-11 PROCEDURE — 83735 ASSAY OF MAGNESIUM: CPT

## 2024-12-11 PROCEDURE — APPNB15 APP NON BILLABLE TIME 0-15 MINS

## 2024-12-11 PROCEDURE — 97535 SELF CARE MNGMENT TRAINING: CPT

## 2024-12-11 PROCEDURE — 94761 N-INVAS EAR/PLS OXIMETRY MLT: CPT

## 2024-12-11 PROCEDURE — 82140 ASSAY OF AMMONIA: CPT

## 2024-12-11 PROCEDURE — 97530 THERAPEUTIC ACTIVITIES: CPT

## 2024-12-11 PROCEDURE — 1200000000 HC SEMI PRIVATE

## 2024-12-11 PROCEDURE — 82607 VITAMIN B-12: CPT

## 2024-12-11 PROCEDURE — 6360000002 HC RX W HCPCS: Performed by: PHYSICIAN ASSISTANT

## 2024-12-11 PROCEDURE — 2580000003 HC RX 258: Performed by: STUDENT IN AN ORGANIZED HEALTH CARE EDUCATION/TRAINING PROGRAM

## 2024-12-11 PROCEDURE — 84100 ASSAY OF PHOSPHORUS: CPT

## 2024-12-11 PROCEDURE — 93010 ELECTROCARDIOGRAM REPORT: CPT | Performed by: INTERNAL MEDICINE

## 2024-12-11 PROCEDURE — 85027 COMPLETE CBC AUTOMATED: CPT

## 2024-12-11 RX ORDER — ASPIRIN 81 MG/1
81 TABLET, CHEWABLE ORAL DAILY
Status: DISCONTINUED | OUTPATIENT
Start: 2024-12-11 | End: 2024-12-13 | Stop reason: HOSPADM

## 2024-12-11 RX ORDER — MAGNESIUM SULFATE IN WATER 40 MG/ML
2000 INJECTION, SOLUTION INTRAVENOUS ONCE
Status: COMPLETED | OUTPATIENT
Start: 2024-12-11 | End: 2024-12-11

## 2024-12-11 RX ADMIN — LISINOPRIL 40 MG: 20 TABLET ORAL at 08:47

## 2024-12-11 RX ADMIN — SODIUM CHLORIDE, PRESERVATIVE FREE 10 ML: 5 INJECTION INTRAVENOUS at 20:01

## 2024-12-11 RX ADMIN — AMLODIPINE BESYLATE 5 MG: 5 TABLET ORAL at 08:48

## 2024-12-11 RX ADMIN — ASPIRIN 81 MG: 81 TABLET, CHEWABLE ORAL at 14:26

## 2024-12-11 RX ADMIN — MAGNESIUM SULFATE HEPTAHYDRATE 2000 MG: 40 INJECTION, SOLUTION INTRAVENOUS at 08:59

## 2024-12-11 RX ADMIN — POTASSIUM BICARBONATE 40 MEQ: 782 TABLET, EFFERVESCENT ORAL at 08:48

## 2024-12-11 RX ADMIN — ATORVASTATIN CALCIUM 40 MG: 40 TABLET, FILM COATED ORAL at 20:01

## 2024-12-11 RX ADMIN — ENOXAPARIN SODIUM 40 MG: 100 INJECTION SUBCUTANEOUS at 08:48

## 2024-12-11 RX ADMIN — SODIUM CHLORIDE, PRESERVATIVE FREE 10 ML: 5 INJECTION INTRAVENOUS at 08:48

## 2024-12-11 RX ADMIN — Medication 100 MG: at 08:48

## 2024-12-11 RX ADMIN — CLOPIDOGREL BISULFATE 75 MG: 75 TABLET ORAL at 08:50

## 2024-12-11 ASSESSMENT — PAIN SCALES - GENERAL: PAINLEVEL_OUTOF10: 0

## 2024-12-11 NOTE — PROGRESS NOTES
V2.0  [facility] Hospitalist Progress Note      Name:  Carol Xiong /Age/Sex: 1933  (89 y.o. female)   MRN & CSN:  [id number] & [id number] Encounter Date/Time: 2024 7:29 AM EST    Location:  [address] [place] [address]A PCP: Kayden Gonzalez MD       Hospital Day: 2    Assessment and Plan:   Carol Xiong is a 89 y.o. female who presents with TIA (transient ischemic attack)      Stroke-like symptoms - r/o CVA/TIA  - presented with double vision, episode of 30 min of dysarthria/aphasia per granddaughter   - CT head with no acute process, chronic cortical infarction in the left frontal lobe  - CTA head and neck with no LVO or high-grade stenosis  - continue neuro checks  - NPO until bedside swallow eval   - PT/OT/SLP   - monitor on telemetry   -Continue Plavix, statin  - permissive hypertension <220/120  - MRI brain ordered   - neurology consulted, appreciate recs    Acute metabolic encephalopathy  -Patient normally alert and oriented x 3-4 per granddaughter.  Alert to self only this morning.  No formal diagnosis of dementia, but has had memory deficits when ill in the past.  -UA negative  -Labs overall reassuring  -check ammonia, TSH, B12/folate  -Delirium precautions  -Await MRI brain    Hypertensive urgency  -BP up to 244/106 in ED, significantly improved this a.m.  -Continue home amlodipine, lisinopril    Hypokalemia   Hypomagnesemia   - K+ 3.2, Mg 1.5  -Replacement ordered, recheck in a.m.    Elevated troponin   - trop 83, 72  -EKG with nonspecific ST changes  -Denies chest pain  -Cardiology recommending stress test however patient would need sitter for several hours which apparently we are unable to provide    CKDIII  -Creatinine near baseline    HLD  - continue statin     TIA  - continue DAPT, statin     This patient was discussed with Dr. Gonzalez. He was agreeable with the assessment and plan as dictated above.     Current Living situation: home  Expected Disposition: ?Same  Estimated D/C: 1-2 days    Diet  occlusion or flow-limiting stenosis. No aneurysm. POSTERIOR CEREBRAL AND BASILAR: No occlusion or flow-limiting stenosis. No aneurysm.     No flow-limiting stenosis, aneurysm, or dissection. Electronically signed by Gurpreet Charles    CT HEAD WO CONTRAST    Result Date: 12/10/2024  Head CT INDICATION: Stroke symptoms TECHNIQUE: Multiple 2D axial images obtained through the brain without intravenous contrast.  Radiation dose reduction techniques were used for this study:  All CT scans performed at this facility use one or all of the following: Automated exposure control, adjustment of the mA and/or kVp according to patient's size, iterative reconstruction. COMPARISON: CT head without contrast, 8/28/2024. FINDINGS: There is an area of encephalomalacia in the left frontal lobe consistent with a chronic cortical infarction. There is mild diffuse cerebral atrophy with concomitant ventriculomegaly. There is no CT evidence of acute hemorrhage or infarction. The ventricles are normal in size. There are no extra-axial fluid collections. No masses are seen. The sinuses are clear. There are no bony lesions. There is calcific vascular disease of the intracranial portion of both internal carotid arteries and the basilar artery.     1. No CT evidence of acute intracranial abnormality. 2. 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 3. Cerebral atrophy. 4. No significant change. Electronically signed by Dwight Cain      Electronically signed by Jenn Saldana PA-C on 12/11/2024 at 1:36 PM

## 2024-12-11 NOTE — CARE COORDINATION
ACM outreach deferred at this time as patient is admitted IP at [facility] since 12/10/24. ACM will outreach patient once she has been discharged home.

## 2024-12-11 NOTE — PROGRESS NOTES
[facility]  DEPARTMENT OF SPEECH/LANGUAGE PATHOLOGY  ATTEMPT NOTE  Virginia SUKHDEEP Tyrese  12/11/2024  [phone]    SLP attempted to see Carol Xiong for dysphagia tx. Pt seen yesterday for BSE and SLP recommended a minced and moist/thin liquid diet. ANGEL Osman reports pt is tolerating PO but very distractible. Pt positioned upright in bed with minced and moist meal tray. Pt took one bite before transport arrived to take pt to MRI. SLP will re-attempt at a later date/time.    Myra Velasquez MA, CF-SLP 12/11/2024

## 2024-12-11 NOTE — PROGRESS NOTES
Occupational Therapy  [facility] ACUTE CARE OCCUPATIONAL THERAPY EVALUATION  Carol Xiong, 2/20/1933, 3008/3008-A, 12/11/2024    Discharge Recommendation: Facility for moderate post-acute rehabilitation, anticipate 1-2 hours per day and 5 days per week.    History  [place]:  The primary encounter diagnosis was Stroke-like symptoms. Diagnoses of Elevated troponin and Abnormal electrocardiography were also pertinent to this visit.  Patient  has a past medical history of Abnormal echocardiogram, GERD (gastroesophageal reflux disease), H/O echocardiogram, H/O echocardiogram, Hyperlipidemia, Hypertension, and Neuropathy.  Patient  has a past surgical history that includes Spine surgery (2013); Colon surgery; Hysterectomy, total abdominal; Appendectomy; and Upper gastrointestinal endoscopy (N/A, 9/6/2024).    Subjective:  Patient comments: \"Turn me loose!\".    Pain:  Did not rate.    Communication with other providers: Nurse okayed session, wound care in during session, co-eval with PT Safia.  Restrictions: General Precautions, Fall Risk, aspiration precautions     Home Setup/Prior level of function   Lives With: Granddaughter and great grandson (9 y.o.)  Type of Home: House  Home Layout: One level  Home Access: Level entry  Bathroom Shower/Tub: Tub/Shower unit (Sponge bathes  Bathroom Toilet: Standard  Bathroom Accessibility: Accessible  Home Equipment: Walker, rolling  ADL Assistance: Independent  Homemaking Assistance: Needs assistance  Homemaking Responsibilities: Yes  Ambulation Assistance: Independent (mod I with RW)  Transfer Assistance: Independent  Active : No  Occupation: Retired    The above information was pulled from a recent encounter, unable to verify with patient d/t current cognitive status.     Examination of body systems (includes body structures/functions, activity/participation limitations):  Observation:  Semi-fowlers in bed upon arrival, agreeable to therapy   Vision:       Mobility:  Supine to sit: Mayra (to advance BLE OOB, w/ increased time/cues)   Sit to stand: maxA x2 (from EOB to RW x2 performed, from EOB to therapist, x2 performed)   Stand to sit: maxA (for safe eccentric control to EOB)   Functional Mobility: Deferred d/t cognition     Stand Pivot Transfer: maxA x2 (from EOB to reclining chair)      Balance:   Sitting balance: Fair-   Standing balance: Poor     Pt educated on role of therapy, POC, safety awareness, importance of OOB activity    Treatment:  Self Care Training:   Cues were given for safety, sequence, UE/LE placement, visual cues, and balance.    Activities performed today included LB dressing tasks, toileting    Safety: Patient left seated in reclining chair with alarm, call light within reach, RN notified, gait belt used.    Assessment:  Pt is a 89 y/o female admitted from home for TIA. Pt's baseline status is unknown, d/t cognition. Pt currently presents w/ deficits relating to ADLs, IADLs, UE strength/ROM, functional activity tolerance, cognition, safety awareness, and functional mobility. Pt would benefit from continued acute care OT services and upon discharge would benefit from moderate post-acute rehabilitation, anticipate 1-2 hours per day and 5 days per week.\    Complexity: Moderate   Prognosis: Good, no significant barriers to participation at this time.   Occupational Therapy Plan  Times Per Week: 3x+  Times Per Day: Once a day  Current Treatment Recommendations: ROM, Cognitive reorientation, Strengthening, Balance training, Pain management, Self-Care / ADL, Functional mobility training, Safety education & training, Home management training, Endurance training, Patient/Caregiver education & training, Equipment evaluation, education, & procurement, Positioning, Cognitive/Perceptual training, Co-Treatment, Coordination training, Neuromuscular re-education       Goals: To be achieved prior to discharge  Goal 1: Pt will perform UE ADLs SBA w/ verbal

## 2024-12-11 NOTE — PLAN OF CARE
A stress test was ordered for this patient. Due to the patient having altered mental status and confusion this will be on hold. Per the nurse and charge nurse nobody was able to travel down with the patient for the testing. Per Dr. Elder we will hold the stress test and will reevaluate tomorrow.     Lolis Calderón [facility]

## 2024-12-11 NOTE — PROGRESS NOTES
[facility] [place] [address]  Phone: [phone]    Fax [phone]                  Enoc Elder MD, [facility]       Yandel Mclean MD, [facility]  Vinay iLnares MD, [facility]    MD Janet Quiles MD Tariq Rizvi, MD Bilal Alam, MD Dr. Waseem Sajjad MD Melissa Kellis, APRKOLTON Obrien, APRKOLTON Lau, APRKOLTON Syed, APRN  Pedrito Gonsales PA-C    CARDIOLOGY  NOTE      Name:  Carol Xiong /Age/Sex: 1933  (89 y.o. female)   MRN & CSN:  [id number] & [id number] Admission Date/Time: 12/10/2024  9:42 AM   Location:  [address] [place] [address] PCP: Kayden Gonzalez MD       Hospital Day: 2    - Cardiology consult is for: Elevated troponin      ASSESSMENT/ PLAN:  Elevated troponin  -Non-ACS trend, likely demand ischemia in setting of below  -Patient was unable to undergo stress test today due to altered mental status.  -Can consider upon improvement of mental status  -Echo in September showing preserved ejection fraction without wall motion normalities  -Would benefit from from CODE STATUS discussion prior to further ischemic workup  Uncontrolled hypertension  -Permissive hypertension  -Systolic pressure currently 150s  -Lisinopril 40 mg daily and Norvasc 5 mg daily  CVA-like symptoms  -Patient had double vision.  -CT head showing chronic left frontal lobe infarct  -MRI brain pending  -Neurology following  -Dual antiplatelet therapy with aspirin and Plavix.  -Lipitor 40 mg daily  Altered mental status with underlying Alzheimer's disease  -Patient currently only alert to self.  Apparently more alert and oriented at baseline          Subjective:  Virginia is a 89 y.o.year old     Patient is disoriented at this time.  Not reliable historian.    Patient mentioning folders in front of her however no folders were present.  No active chest pain or shortness of breath at this time      Objective:

## 2024-12-11 NOTE — WOUND CARE
[facility]ound Ostomy Continence Nurse  Consult Note       Carol Xiong  AGE: 89 y.o.   GENDER: female  : 1933  TODAY'S DATE:  2024    Subjective:     Reason for Evaluation and Assessment: wound assessment       Carol Xiong is a 89 y.o. female referred by:   [x] Physician  [] Nursing  [] Other:     Wound Identification:  Wound Type: pressure  Contributing Factors: chronic pressure, decreased mobility, malnutrition, incontinence of stool, and poor hygiene        PAST MEDICAL HISTORY        Diagnosis Date    Abnormal echocardiogram 2024    See Epic notes    GERD (gastroesophageal reflux disease)     H/O echocardiogram 2017    EF 55% Left atrial enlargement. Normal LV systolic. Minimal aoritc stenosis.     H/O echocardiogram 2020    EF 55-60%, Grade I DD, Mild AS.  Mildly dilated left atrium.    Hyperlipidemia     Hypertension     Neuropathy        PAST SURGICAL HISTORY    Past Surgical History:   Procedure Laterality Date    APPENDECTOMY      COLON SURGERY      removed polops    HYSTERECTOMY, TOTAL ABDOMINAL (CERVIX REMOVED)      SPINE SURGERY      scrape calcium off spine, pressing on spine/nerves    UPPER GASTROINTESTINAL ENDOSCOPY N/A 2024    ESOPHAGOGASTRODUODENOSCOPY PERCUTANEOUS ENDOSCOPIC GASTROSTOMY TUBE PLACEMENT EGD BALLOON DILATION 12MM-15MM BALLOON DILATED TO 15MM UPPER AND LOWER ESOPHAGUS EGD BIOPSY performed by Lisa Turner MD at [facility] ENDOSCOPY       FAMILY HISTORY    Family History   Problem Relation Age of Onset    Hypertension Mother     Hypertension Father        SOCIAL HISTORY    Social History     Tobacco Use    Smoking status: Never    Smokeless tobacco: Never   Substance Use Topics    Alcohol use: Not Currently       ALLERGIES    Allergies   Allergen Reactions    Penicillins     Thiazide-Type Diuretics Other (See Comments)     Recurrent hyponatremia//sodium 113 on at 24//avoid thiazides please       MEDICATIONS    No current      Plan of Care: Wound 12/11/24 Coccyx-Dressing/Treatment: Silicone border    Alert and agreeable to wound assessment. Small non blanchable reddened area noted at coccyx, covered with silicone border. Leigha care given, incontinent of small liquid BM. In process of moving to chair per PT. No other areas noted.     Specialty Bed Required : atmos air to bed   [x] Low Air Loss   [x] Pressure Redistribution  [] Fluid Immersion  [] Bariatric  [] Total Pressure Relief  [] Other:     Discharge Plan:  Placement for patient upon discharge: to be determined   Hospice Care: not at this time   Patient appropriate for Outpatient Wound Care Center: not at this time     Patient/Caregiver Teaching:  Level of patient/caregiver understanding able to:   No evidence of understanding noted        Electronically signed by Teetee Benavides RN,  on 12/11/2024 at 10:48 AM

## 2024-12-11 NOTE — PROGRESS NOTES
I did NOT see the patient. The patient was discussed with the THI. I was available for questions and consultation as needed.       Speech improved, but continues to have confusion. Checking MRI brain. Neuro consult.  Elevated BP may have been a cause.

## 2024-12-11 NOTE — CONSULTS
Neurology Service Consult Note  [facility]   Patient Name: Carol Xiong  : 1933        Subjective:   Reason for consult: TIA  89 y.o. female with history of HLD, HTN, neuropathy  presenting to [facility] with slurred speech, HTN emergency, vision changes and confusion. /103 on admission with readings as high as 246/97. Patient reported having a difficult time seeing the TV screen for a week.     On chart review noted patient has been evaluated by Dr. Mancia, neuro intervention for severe bilateral ICA stenosis (99% right, 80% left) 2024. He did have further discussion with the neuro intervention team and was offered carotid artery stenting but declined as she was not interested in pursuing invasive procedures given her advanced age.     Chart was reviewed in detail, patient was seen and assessed. She is up in chair at bedside. Difficult to obtain accurate history as patient has difficulty providing it. When asked where she is she tells me \"I'm at the co-op\" and tells me about being at work. Asked the patient if she felt she was at work today she tells me no and then says I'm \"here\" but is unable to tell me she is at the hospital. She believes it is 2004. When asked questions she starts to talk about work and being at the co-op again, she needs frequent redirection and even with that struggles to answer questions. She has a difficult time following commands however not due to deficit but rather difficulty understanding what the command is. She is able to identify an ink pen but this also takes some time. Patient tells me that her vision is \"ok\" and then tells me \"I'm seeing two of everything\". It is not clear how long this has persisted. No family at bedside to help with  history. Unclear if patient has been taking home meds as prescribed.     Past Medical History:   Diagnosis Date    Abnormal echocardiogram 2024    See Epic notes    GERD

## 2024-12-11 NOTE — CONSULTS
[facility] ACUTE CARE PHYSICAL THERAPY EVALUATION  Carol Xiong, 2/20/1933, 3008/3008-A, 12/11/2024    History  [place]:  The primary encounter diagnosis was Stroke-like symptoms. Diagnoses of Elevated troponin and Abnormal electrocardiography were also pertinent to this visit.  Patient  has a past medical history of Abnormal echocardiogram, GERD (gastroesophageal reflux disease), H/O echocardiogram, H/O echocardiogram, Hyperlipidemia, Hypertension, and Neuropathy.  Patient  has a past surgical history that includes Spine surgery (2013); Colon surgery; Hysterectomy, total abdominal; Appendectomy; and Upper gastrointestinal endoscopy (N/A, 9/6/2024).    Recommendation: Facility for moderate post-acute rehabilitation, anticipate 1-2 hours per day and 5 days per week.    Subjective:  Patient states: \"Now just let me go I have to walk to the bathroom\".    Pain:  denies.    Communication with other providers:  RN approval for therapy session, co-eval with ELIZABETH Alejandro  Restrictions: general precautions, falls, aspiration precautions    Home Setup/Prior level of function   Lives With: Granddaughter and great grandson (9 y.o.)  Type of Home: House  Home Layout: One level  Home Access: Level entry  Bathroom Shower/Tub: Tub/Shower unit (Sponge bathes  Bathroom Toilet: Standard  Bathroom Accessibility: Accessible  Home Equipment: Walker, rolling  ADL Assistance: Independent  Homemaking Assistance: Needs assistance  Homemaking Responsibilities: Yes  Ambulation Assistance: Independent (mod I with RW)  Transfer Assistance: Independent  Active : No  Occupation: Retired     The above information was pulled from a recent encounter, unable to verify with patient d/t current cognitive status.     Examination of body systems (includes body structures/functions, activity/participation limitations):  Observation:  Supine in bed upon arrival, agreeable to therapy  Vision:  glasses  Hearing:  [place]  Cardiopulmonary:   Statement Selected

## 2024-12-12 LAB
ANION GAP SERPL CALCULATED.3IONS-SCNC: 12 MMOL/L (ref 9–17)
BUN SERPL-MCNC: 14 MG/DL (ref 7–20)
CALCIUM SERPL-MCNC: 8.7 MG/DL (ref 8.3–10.6)
CHLORIDE SERPL-SCNC: 104 MMOL/L (ref 99–110)
CO2 SERPL-SCNC: 23 MMOL/L (ref 21–32)
CREAT SERPL-MCNC: 0.5 MG/DL (ref 0.6–1.2)
ERYTHROCYTE [DISTWIDTH] IN BLOOD BY AUTOMATED COUNT: 13.9 % (ref 11.7–14.9)
GFR, ESTIMATED: >90 ML/MIN/1.73M2
GLUCOSE SERPL-MCNC: 83 MG/DL (ref 74–99)
HCT VFR BLD AUTO: 37 % (ref 37–47)
HGB BLD-MCNC: 11.2 G/DL (ref 12.5–16)
MAGNESIUM SERPL-MCNC: 2 MG/DL (ref 1.8–2.4)
MCH RBC QN AUTO: 30.7 PG (ref 27–31)
MCHC RBC AUTO-ENTMCNC: 30.3 G/DL (ref 32–36)
MCV RBC AUTO: 101.4 FL (ref 78–100)
PHOSPHATE SERPL-MCNC: 2.3 MG/DL (ref 2.5–4.9)
PLATELET # BLD AUTO: 167 K/UL (ref 140–440)
PMV BLD AUTO: 11.7 FL (ref 7.5–11.1)
POTASSIUM SERPL-SCNC: 3.4 MMOL/L (ref 3.5–5.1)
RBC # BLD AUTO: 3.65 M/UL (ref 4.2–5.4)
SODIUM SERPL-SCNC: 140 MMOL/L (ref 136–145)
WBC OTHER # BLD: 4.8 K/UL (ref 4–10.5)

## 2024-12-12 PROCEDURE — 83735 ASSAY OF MAGNESIUM: CPT

## 2024-12-12 PROCEDURE — 6370000000 HC RX 637 (ALT 250 FOR IP): Performed by: PHYSICIAN ASSISTANT

## 2024-12-12 PROCEDURE — 2580000003 HC RX 258: Performed by: STUDENT IN AN ORGANIZED HEALTH CARE EDUCATION/TRAINING PROGRAM

## 2024-12-12 PROCEDURE — APPNB15 APP NON BILLABLE TIME 0-15 MINS

## 2024-12-12 PROCEDURE — 84100 ASSAY OF PHOSPHORUS: CPT

## 2024-12-12 PROCEDURE — 99232 SBSQ HOSP IP/OBS MODERATE 35: CPT | Performed by: INTERNAL MEDICINE

## 2024-12-12 PROCEDURE — 6370000000 HC RX 637 (ALT 250 FOR IP): Performed by: STUDENT IN AN ORGANIZED HEALTH CARE EDUCATION/TRAINING PROGRAM

## 2024-12-12 PROCEDURE — 36415 COLL VENOUS BLD VENIPUNCTURE: CPT

## 2024-12-12 PROCEDURE — 1200000000 HC SEMI PRIVATE

## 2024-12-12 PROCEDURE — 94761 N-INVAS EAR/PLS OXIMETRY MLT: CPT

## 2024-12-12 PROCEDURE — 99233 SBSQ HOSP IP/OBS HIGH 50: CPT | Performed by: NURSE PRACTITIONER

## 2024-12-12 PROCEDURE — 85027 COMPLETE CBC AUTOMATED: CPT

## 2024-12-12 PROCEDURE — 92526 ORAL FUNCTION THERAPY: CPT

## 2024-12-12 PROCEDURE — 6360000002 HC RX W HCPCS: Performed by: STUDENT IN AN ORGANIZED HEALTH CARE EDUCATION/TRAINING PROGRAM

## 2024-12-12 PROCEDURE — 80048 BASIC METABOLIC PNL TOTAL CA: CPT

## 2024-12-12 RX ORDER — POTASSIUM CHLORIDE 1500 MG/1
20 TABLET, EXTENDED RELEASE ORAL ONCE
Status: COMPLETED | OUTPATIENT
Start: 2024-12-12 | End: 2024-12-12

## 2024-12-12 RX ADMIN — AMLODIPINE BESYLATE 5 MG: 5 TABLET ORAL at 08:57

## 2024-12-12 RX ADMIN — ASPIRIN 81 MG: 81 TABLET, CHEWABLE ORAL at 08:57

## 2024-12-12 RX ADMIN — Medication 100 MG: at 08:57

## 2024-12-12 RX ADMIN — CLOPIDOGREL BISULFATE 75 MG: 75 TABLET ORAL at 08:57

## 2024-12-12 RX ADMIN — ENOXAPARIN SODIUM 40 MG: 100 INJECTION SUBCUTANEOUS at 08:57

## 2024-12-12 RX ADMIN — POTASSIUM CHLORIDE 20 MEQ: 1500 TABLET, EXTENDED RELEASE ORAL at 14:21

## 2024-12-12 RX ADMIN — ATORVASTATIN CALCIUM 40 MG: 40 TABLET, FILM COATED ORAL at 21:15

## 2024-12-12 RX ADMIN — SODIUM CHLORIDE, PRESERVATIVE FREE 10 ML: 5 INJECTION INTRAVENOUS at 08:57

## 2024-12-12 RX ADMIN — LISINOPRIL 40 MG: 20 TABLET ORAL at 08:57

## 2024-12-12 RX ADMIN — SODIUM CHLORIDE, PRESERVATIVE FREE 10 ML: 5 INJECTION INTRAVENOUS at 21:15

## 2024-12-12 NOTE — PROGRESS NOTES
[facility]  DEPARTMENT OF SPEECH/LANGUAGE PATHOLOGY  DAILY PROGRESS NOTE  Carol Xiong  12/12/2024  [id number]  TIA (transient ischemic attack) [G45.9]  Elevated troponin [R79.89]  Stroke-like symptoms [R29.90]  Abnormal electrocardiography [R94.31]  Acute encephalopathy [G93.40]  Allergies   Allergen Reactions    Penicillins     Thiazide-Type Diuretics Other (See Comments)     Recurrent hyponatremia//sodium 113 on at 8/29/24//avoid thiazides please         Pt was seen this date for dysphagia treatment.       IMPRESSION AND RECOMMENDATIONS: Carol Xiong was seen today for dysphagia tx and ongoing evaluation following admission to [facility] with TIA. Pt seen for BSE on 12/10/24, which recommended a minced and moist diet and thin liquids. ANGEL Putnam reports pt consuming less than 25% of meals and very distractible. Carol Xiong was positioned upright in bed for the current visit. Pt agreeable to PO trials, but confused. She states that she is returning the work next week and asks if they are \"on strike.\" SLP provided intermittent assistance with feeding d/t distractibility.    Carol Xiong consumed trials of thin liquid via straw, puree, soft solid, and regular solid during this visit. Pt presents with evidence of oral dysphagia characterized by incoordinated oral acceptance, prolonged/disorganized bolus preparation/mastication, oral holding, and reduced oral clearance. Pt with significantly prolonged mastication on soft/regular solid consistency and requires verbal prompts to swallow and perform liquid wash. Pt expectorated initial trial of ze cracker in applesauce, unsure of reason. No overt s/sx of penetration/aspiration noted with any consistency. Pt noted to burp intermittently. SLP provided education to pt and ANGEL Putnam regarding recommendations and POC. Pt with limited understanding.     Recommend Carol Xiong consume a pureed diet and thin liquids with

## 2024-12-12 NOTE — PROGRESS NOTES
front of her however no folders were present.  No active chest pain or shortness of breath at this time      Objective: Temperature:  Current - Temp: 98.1 °F (36.7 °C); Max - Temp  Av.4 °F (36.3 °C)  Min: 96.8 °F (36 °C)  Max: 98.1 °F (36.7 °C)    Respiratory Rate : Resp  Av.5  Min: 14  Max: 15    Pulse Range: Pulse  Av  Min: 81  Max: 87    Blood Presuure Range:  Systolic (24hrs), Av , Min:105 , Max:181   ; Diastolic (24hrs), Av, Min:89, Max:103      Pulse ox Range: SpO2  Av.3 %  Min: 92 %  Max: 95 %    24hr I & O:    Intake/Output Summary (Last 24 hours) at 2024 1217  Last data filed at 2024 0600  Gross per 24 hour   Intake --   Output 400 ml   Net -400 ml         BP (!) 179/92   Pulse 81   Temp 98.1 °F (36.7 °C) (Oral)   Resp 15   Ht 1.753 m (5' 9\")   Wt 59 kg (130 lb)   SpO2 93%   BMI 19.20 kg/m²         TELEMETRY: Sinus      has a past medical history of Abnormal echocardiogram, GERD (gastroesophageal reflux disease), H/O echocardiogram, H/O echocardiogram, Hyperlipidemia, Hypertension, and Neuropathy.   has a past surgical history that includes Spine surgery (); Colon surgery; Hysterectomy, total abdominal; Appendectomy; and Upper gastrointestinal endoscopy (N/A, 2024).    Physical Exam  Constitutional:       Appearance: She is not ill-appearing.   HENT:      Mouth/Throat:      Comments: Pupils equal and round  Cardiovascular:      Rate and Rhythm: Normal rate and regular rhythm.   Pulmonary:      Effort: Pulmonary effort is normal.      Breath sounds: Normal breath sounds.   Abdominal:      Palpations: Abdomen is soft.   Musculoskeletal:      Right lower leg: Edema present.      Left lower leg: Edema present.   Skin:     General: Skin is warm.      Capillary Refill: Capillary refill takes less than 2 seconds.   Neurological:      Mental Status: She is alert. She is disoriented.      Comments: Visual and auditory hallucinations as patient is talking to  \"assistant\".  No one else in the room besides being patient          Medications:    potassium chloride  20 mEq Oral Once    aspirin  81 mg Oral Daily    atorvastatin  40 mg Oral Nightly    sodium chloride flush  5-40 mL IntraVENous 2 times per day    enoxaparin  40 mg SubCUTAneous Daily    clopidogrel  75 mg Oral Daily    amLODIPine  5 mg Oral Daily    lisinopril  40 mg Oral Daily    thiamine  100 mg Oral Daily      sodium chloride       sodium chloride flush, sodium chloride, potassium chloride **OR** potassium alternative oral replacement **OR** potassium chloride, magnesium sulfate, ondansetron **OR** ondansetron, polyethylene glycol, acetaminophen **OR** acetaminophen    Lab Data:  CBC:   Recent Labs     12/10/24  1017 24  0201 24  0740   WBC 5.6 6.5 4.8   HGB 12.2* 11.9* 11.2*   HCT 38.2 37.5 37.0   MCV 95.3 95.9 101.4*    207 167     BMP:   Recent Labs     12/10/24  1017 24  0201 24  0740    140 140   K 3.5 3.2* 3.4*    103 104   CO2 30 26 23   PHOS  --  2.5 2.3*   BUN 18 16 14   CREATININE 0.6 0.6 0.5*     LIVER PROFILE:   Recent Labs     12/10/24  1017   AST 24   ALT 9*   BILITOT 0.9   ALKPHOS 96     PT/INR:   Recent Labs     12/10/24  1017   PROTIME 13.2   INR 1.0     APTT: No results for input(s): \"APTT\" in the last 72 hours.  BNP:  No results for input(s): \"BNP\" in the last 72 hours.  TROPONIN: No results for input(s): \"TROPONINT\" in the last 72 hours.  Labs, consult, tests reviewed          Pedrito Gonsales PA-C 2024 12:17 PM     I have seen ,spoken to  and examined this patient personally, independently of the THI. I have reviewed the hospital care given to date and reviewed all pertinent labs and imaging.I have spoken with patient, nursing staff and provided written and verbal instructions .The above note has been reviewed     CARDIOLOGY ATTENDING ADDENDUM    HPI:  I have reviewed the above HPI  And agree with above     Pulse Range: Pulse  Av

## 2024-12-12 NOTE — PLAN OF CARE
Problem: Discharge Planning  Goal: Discharge to home or other facility with appropriate resources  12/12/2024 0029 by Sean Maruqez RN  Outcome: Progressing  Flowsheets (Taken 12/11/2024 2000)  Discharge to home or other facility with appropriate resources:   Identify barriers to discharge with patient and caregiver   Arrange for needed discharge resources and transportation as appropriate   Identify discharge learning needs (meds, wound care, etc)  12/11/2024 1312 by Jacqui Barreto RN  Outcome: Progressing     Problem: Safety - Adult  Goal: Free from fall injury  12/12/2024 0029 by Sean Marquez RN  Outcome: Progressing  12/11/2024 1312 by Jacqui Barreto RN  Outcome: Progressing     Problem: Confusion  Goal: Confusion, delirium, dementia, or psychosis is improved or at baseline  Description: INTERVENTIONS:  1. Assess for possible contributors to thought disturbance, including medications, impaired vision or hearing, underlying metabolic abnormalities, dehydration, psychiatric diagnoses, and notify attending LIP  2. [place] high risk fall precautions, as indicated  3. Provide frequent short contacts to provide reality reorientation, refocusing and direction  4. Decrease environmental stimuli, including noise as appropriate  5. Monitor and intervene to maintain adequate nutrition, hydration, elimination, sleep and activity  6. If unable to ensure safety without constant attention obtain sitter and review sitter guidelines with assigned personnel  7. Initiate Psychosocial CNS and Spiritual Care consult, as indicated  12/12/2024 0029 by Sean Marquez RN  Outcome: Progressing  Flowsheets (Taken 12/11/2024 2000)  Effect of thought disturbance (confusion, delirium, dementia, or psychosis) are managed with adequate functional status:   Assess for contributors to thought disturbance, including medications, impaired vision or hearing, underlying metabolic abnormalities, dehydration, psychiatric

## 2024-12-12 NOTE — CARE COORDINATION
Pt is confused.  Pt's granddaughter Coleen is in the computer as primary contact.  LSW called Coleen and has to leave a message asking for a return call.      9:15 AM  LSW received a call from pt ROS Horne .  LSW spoke with her about PT/OT recs for SNF.  Coleen requested [facility].   LSW called Anu with [facility] and gave referral.  Pt will need precert.

## 2024-12-12 NOTE — PROGRESS NOTES
Potassium 3.4 (L) 3.5 - 5.1 mmol/L    Chloride 104 99 - 110 mmol/L    CO2 23 21 - 32 mmol/L    Anion Gap 12 9 - 17 mmol/L    Glucose 83 74 - 99 mg/dL    BUN 14 7 - 20 mg/dL    Creatinine 0.5 (L) 0.6 - 1.2 mg/dL    Est, Glom Filt Rate >90 >60 mL/min/1.73m2    Calcium 8.7 8.3 - 10.6 mg/dL   Magnesium    Collection Time: 12/12/24  7:40 AM   Result Value Ref Range    Magnesium 2.0 1.8 - 2.4 mg/dL   Phosphorus    Collection Time: 12/12/24  7:40 AM   Result Value Ref Range    Phosphorus 2.3 (L) 2.5 - 4.9 mg/dL        Imaging/Diagnostics Last 24 Hours   XR CHEST PORTABLE    Result Date: 12/10/2024  Chest X-ray INDICATION: Stroke symptoms COMPARISON: Portable chest, 4/24/2024. TECHNIQUE: AP/PA view of the chest was obtained. FINDINGS: The lungs are clear. There are no infiltrates or effusions. There are benign calcified hilar lymph nodes bilaterally. The heart size is normal. There is calcific vascular disease of the thoracic aorta. The bony thorax is intact.      No acute findings in the chest. Other findings as noted. Electronically signed by Dwight Cain    CTA HEAD NECK W CONTRAST    Result Date: 12/10/2024  CTA CODE NEURO HEAD AND NECK W CONT COMPARISON: None available HISTORY: Stroke Symptoms TECHNIQUE: Dedicated contrast enhanced CT of the arteries of the head and neck was performed with axial images following a timed vascular bolus of 100 mL of Isovue-370.  Coronal and sagittal reformats are provided.  3-D volume rendered images and/or maximum intensity projection images were generated.  Radiation dose reduction techniques were used for this study. Our CT scanners use one or all of the following: Automated exposure control, adjustment of the mA and/or kV according to patient size, iterative reconstruction. All stenosis percentages reference the distal internal carotid artery, as per NASCET criteria. FINDINGS: GREAT VESSELS: The visualized aortic arch is patent. RIGHT ICA: Less than 50% by NASCET criteria at the

## 2024-12-12 NOTE — PROGRESS NOTES
Comprehensive Nutrition Assessment    Type and Reason for Visit:  Initial (BMI low for age over 65)    Nutrition Recommendations/Plan:   Continue diet consistency as per speech therapy, minced and moist  Will offer oral nutrition supplements with meals, standard and frozen   Assist/supervise meals to encourage intake  Please document all PO intakes in I/O    Will continue to follow up during stay     Malnutrition Assessment:  Malnutrition Status:  Insufficient data (12/12/24 1026)    Context:  Social/Environmental Circumstances       Nutrition Assessment:    Admit with altered mental status, eval for TIA. Currently on minced and moist diet as per speech therapy. Ate some of breakfast meal today but less than 50%. Patient very confused/distracted on visit. Noted hx of PEG placed in September and then removed in October. PEG initially place due to NPO status with dysphagia but then able to resume diet. Will follow at high nutrition risk at this time with concern for adequate intake. Will provide oral nutrition supplements during stay.    Nutrition Related Findings:    in bed but confused,  hx HTN, GERD, dementia     dry, flaky skin on legs     meds noted thiamine, KCl    plan for SNF on discharge Wound Type: Pressure Injury, Stage I (coccyx)       Current Nutrition Intake & Therapies:    Average Meal Intake: Unable to assess  Average Supplements Intake: None Ordered  ADULT DIET; Dysphagia - Minced and Moist    Anthropometric Measures:  Height: 175.3 cm (5' 9\")  Ideal Body Weight (IBW): 145 lbs (66 kg)    Admission Body Weight: 59 kg (130 lb 1.1 oz)  Current Body Weight: 59 kg (130 lb 1.1 oz), 89.7 % IBW. Weight Source: Not specified  Current BMI (kg/m2): 19.2  Usual Body Weight: 55.8 kg (123 lb 0.3 oz) (8/29/24)     % Weight Change (Calculated): 5.7  Weight Adjustment For: No Adjustment                 BMI Categories: Underweight (BMI less than 22) age over 65    Estimated Daily Nutrient Needs:  Energy Requirements  Based On: Kcal/kg  Weight Used for Energy Requirements: Current  Energy (kcal/day): 1042-4860 (25-30 demetris/kg)  Weight Used for Protein Requirements: Current  Protein (g/day): 71 (1.2 g/kg)  Method Used for Fluid Requirements: 1 ml/kcal  Fluid (ml/day): 9722-6806    Nutrition Diagnosis:   Predicted inadequate energy intake related to cognitive or neurological impairment, swallowing difficulty as evidenced by other (limited po data)    Nutrition Interventions:   Food and/or Nutrient Delivery: Continue Current Diet, Start Oral Nutrition Supplement  Nutrition Education/Counseling: Education/Counseling not appropriate  Coordination of Nutrition Care: Continue to monitor while inpatient, Feeding Assistance/Environment Change, Speech Therapy  Plan of Care discussed with: n/a    Goals:  Goals: PO intake 50% or greater, by next RD assessment  Type of Goal: New goal  Previous Goal Met: New Goal    Nutrition Monitoring and Evaluation:   Behavioral-Environmental Outcomes: None Identified  Food/Nutrient Intake Outcomes: Diet Advancement/Tolerance, Food and Nutrient Intake, Supplement Intake  Physical Signs/Symptoms Outcomes: Biochemical Data, Skin, Nutrition Focused Physical Findings, Weight, Chewing or Swallowing, Meal Time Behavior    Discharge Planning:    Continue Oral Nutrition Supplement, Continue current diet     Raisa Mario, FORREST, LD  Contact: [phone]

## 2024-12-12 NOTE — PROGRESS NOTES
Neurology Service Progress Note  [facility]   Patient Name: Carol Xiong  : 1933        Subjective:   Reason for consult: TIA  Chart was reviewed in detail, patient was seen and assessed.  She is awake, resting in bed.  She remains confused.  She is alert to self only.  Has a difficult time processing information.  Attempted to review MRI imaging with the patient and explained that she does not have any evidence of stroke and she continuously tells me that she needs to schedule her MRI to be done outpatient. Unable to redirect her during exam.  Patient is complaining of double vision today.  Do have concern for underlying dementia process given the patient's exam.  Discussed with IM who shares that granddaughter reports that the patient is still relatively independent, paying her own bills.  Have not been able to get any collateral history by family personally.  Given current cognitive deficits would have concerned about the patient returning home independently.    Past Medical History:   Diagnosis Date    Abnormal echocardiogram 2024    See Epic notes    GERD (gastroesophageal reflux disease)     H/O echocardiogram 2017    EF 55% Left atrial enlargement. Normal LV systolic. Minimal aoritc stenosis.     H/O echocardiogram 2020    EF 55-60%, Grade I DD, Mild AS.  Mildly dilated left atrium.    Hyperlipidemia     Hypertension     Neuropathy     :   Past Surgical History:   Procedure Laterality Date    APPENDECTOMY      COLON SURGERY      removed polops    HYSTERECTOMY, TOTAL ABDOMINAL (CERVIX REMOVED)      SPINE SURGERY      scrape calcium off spine, pressing on spine/nerves    UPPER GASTROINTESTINAL ENDOSCOPY N/A 2024    ESOPHAGOGASTRODUODENOSCOPY PERCUTANEOUS ENDOSCOPIC GASTROSTOMY TUBE PLACEMENT EGD BALLOON DILATION 12MM-15MM BALLOON DILATED TO 15MM UPPER AND LOWER ESOPHAGUS EGD BIOPSY performed by Lisa Turner MD at [facility] ENDOSCOPY  dissection.    Mri brain w/o contrast:  IMPRESSION:  1. No acute intracranial process.  2. Chronic small vessel ischemic disease.  3. Atrophy.  4. Additional findings as above.    All imaging was personally reviewed    ASSESSMENT/PLAN:   Patient continues to report double vision.  She continues to remain oriented to self only today.  MRI brain has been completed with no evidence of acute stroke which would answer to double vision.  Recommend ophthalmology exam at discharge.  Suspect underlying dementia process based on exam.  Double vision secondary to HTN emergency superimposed on underlying diminished cognitive reserve.  No evidence of acute stroke on MRI imaging  Neuro imaging as above  CT head with no acute findings, does not chronic left frontal lobe stroke  CTA head and neck with no significant stenosis or LVO  MRI brain as above, no acute stroke  Continue home dose DAPT, atorvastatin 40 mg  Will obtain verify now aspirin and Plavix  LDL 49, patient is at goal of <70 for stroke prevention  PT/OT/ST as recommended  Do not feel neuro intervention consult is warranted, they have evaluated patient in recent past and she declined intervention  No further recommendations, we will sign off at this time.  Please contact us with any new concerns    Follow up: No outpatient neurology follow-up needed    Patient was discussed with attending neurologist MARI Guidry    > 50 minutes of time spent included chart review, obtaining history, patient examination, developing plan of care, and documentation.      Thank you for allowing us to participate in the care of your patient.  If there are any questions regarding evaluation please feel free to contact us.     Arabella Saldana, ARABELLA - YULISA, 12/12/2024

## 2024-12-12 NOTE — PROGRESS NOTES
I did NOT see the patient. The patient was discussed with the THI. I was available for questions and consultation as needed.       Still some mild confusion. Trending down troponins. Outpatient stress.

## 2024-12-13 VITALS
BODY MASS INDEX: 19.26 KG/M2 | SYSTOLIC BLOOD PRESSURE: 177 MMHG | HEART RATE: 61 BPM | HEIGHT: 69 IN | WEIGHT: 130 LBS | TEMPERATURE: 98.3 F | DIASTOLIC BLOOD PRESSURE: 73 MMHG | RESPIRATION RATE: 15 BRPM | OXYGEN SATURATION: 99 %

## 2024-12-13 LAB
ANION GAP SERPL CALCULATED.3IONS-SCNC: 9 MMOL/L (ref 9–17)
BUN SERPL-MCNC: 11 MG/DL (ref 7–20)
CALCIUM SERPL-MCNC: 8.7 MG/DL (ref 8.3–10.6)
CHLORIDE SERPL-SCNC: 103 MMOL/L (ref 99–110)
CO2 SERPL-SCNC: 28 MMOL/L (ref 21–32)
CREAT SERPL-MCNC: 0.6 MG/DL (ref 0.6–1.2)
ERYTHROCYTE [DISTWIDTH] IN BLOOD BY AUTOMATED COUNT: 13.8 % (ref 11.7–14.9)
GFR, ESTIMATED: >90 ML/MIN/1.73M2
GLUCOSE SERPL-MCNC: 99 MG/DL (ref 74–99)
HCT VFR BLD AUTO: 35.3 % (ref 37–47)
HGB BLD-MCNC: 11.4 G/DL (ref 12.5–16)
MCH RBC QN AUTO: 30.6 PG (ref 27–31)
MCHC RBC AUTO-ENTMCNC: 32.3 G/DL (ref 32–36)
MCV RBC AUTO: 94.9 FL (ref 78–100)
PLATELET # BLD AUTO: 196 K/UL (ref 140–440)
PMV BLD AUTO: 11.4 FL (ref 7.5–11.1)
POTASSIUM SERPL-SCNC: 3.1 MMOL/L (ref 3.5–5.1)
RBC # BLD AUTO: 3.72 M/UL (ref 4.2–5.4)
SODIUM SERPL-SCNC: 140 MMOL/L (ref 136–145)
WBC OTHER # BLD: 6 K/UL (ref 4–10.5)

## 2024-12-13 PROCEDURE — 6370000000 HC RX 637 (ALT 250 FOR IP): Performed by: PHYSICIAN ASSISTANT

## 2024-12-13 PROCEDURE — 80048 BASIC METABOLIC PNL TOTAL CA: CPT

## 2024-12-13 PROCEDURE — 36415 COLL VENOUS BLD VENIPUNCTURE: CPT

## 2024-12-13 PROCEDURE — 97530 THERAPEUTIC ACTIVITIES: CPT

## 2024-12-13 PROCEDURE — 94761 N-INVAS EAR/PLS OXIMETRY MLT: CPT

## 2024-12-13 PROCEDURE — 2580000003 HC RX 258: Performed by: STUDENT IN AN ORGANIZED HEALTH CARE EDUCATION/TRAINING PROGRAM

## 2024-12-13 PROCEDURE — 85027 COMPLETE CBC AUTOMATED: CPT

## 2024-12-13 PROCEDURE — 97535 SELF CARE MNGMENT TRAINING: CPT

## 2024-12-13 PROCEDURE — 6370000000 HC RX 637 (ALT 250 FOR IP): Performed by: STUDENT IN AN ORGANIZED HEALTH CARE EDUCATION/TRAINING PROGRAM

## 2024-12-13 PROCEDURE — 6360000002 HC RX W HCPCS: Performed by: STUDENT IN AN ORGANIZED HEALTH CARE EDUCATION/TRAINING PROGRAM

## 2024-12-13 RX ORDER — POTASSIUM CHLORIDE 1500 MG/1
20 TABLET, EXTENDED RELEASE ORAL DAILY
Qty: 5 TABLET | Refills: 0
Start: 2024-12-13 | End: 2024-12-18

## 2024-12-13 RX ORDER — AMLODIPINE BESYLATE 10 MG/1
10 TABLET ORAL DAILY
Status: DISCONTINUED | OUTPATIENT
Start: 2024-12-14 | End: 2024-12-13 | Stop reason: HOSPADM

## 2024-12-13 RX ORDER — POTASSIUM CHLORIDE 1500 MG/1
40 TABLET, EXTENDED RELEASE ORAL ONCE
Status: DISCONTINUED | OUTPATIENT
Start: 2024-12-13 | End: 2024-12-13

## 2024-12-13 RX ORDER — AMLODIPINE BESYLATE 5 MG/1
5 TABLET ORAL ONCE
Status: COMPLETED | OUTPATIENT
Start: 2024-12-13 | End: 2024-12-13

## 2024-12-13 RX ORDER — HYDRALAZINE HYDROCHLORIDE 20 MG/ML
10 INJECTION INTRAMUSCULAR; INTRAVENOUS EVERY 6 HOURS PRN
Status: DISCONTINUED | OUTPATIENT
Start: 2024-12-13 | End: 2024-12-13 | Stop reason: HOSPADM

## 2024-12-13 RX ORDER — AMLODIPINE BESYLATE 5 MG/1
10 TABLET ORAL DAILY
Qty: 60 TABLET | Refills: 0
Start: 2024-12-13 | End: 2025-01-12

## 2024-12-13 RX ADMIN — Medication 100 MG: at 08:54

## 2024-12-13 RX ADMIN — SODIUM CHLORIDE, PRESERVATIVE FREE 10 ML: 5 INJECTION INTRAVENOUS at 08:54

## 2024-12-13 RX ADMIN — ENOXAPARIN SODIUM 40 MG: 100 INJECTION SUBCUTANEOUS at 08:54

## 2024-12-13 RX ADMIN — ASPIRIN 81 MG: 81 TABLET, CHEWABLE ORAL at 08:54

## 2024-12-13 RX ADMIN — AMLODIPINE BESYLATE 5 MG: 5 TABLET ORAL at 08:54

## 2024-12-13 RX ADMIN — CLOPIDOGREL BISULFATE 75 MG: 75 TABLET ORAL at 08:54

## 2024-12-13 RX ADMIN — POTASSIUM BICARBONATE 40 MEQ: 782 TABLET, EFFERVESCENT ORAL at 06:52

## 2024-12-13 RX ADMIN — AMLODIPINE BESYLATE 5 MG: 5 TABLET ORAL at 12:47

## 2024-12-13 RX ADMIN — LISINOPRIL 40 MG: 20 TABLET ORAL at 08:53

## 2024-12-13 NOTE — PROGRESS NOTES
Occupational Therapy    Occupational Therapy Treatment Note    Name: Carol Xiong MRN: [id number] :   1933   Date:  2024   Admission Date: 12/10/2024 Room:  3008/3008-A     Restrictions/Precautions: General Precautions, Fall Risk, aspiration precautions     Communication with other providers: RN approved session. Co tx with PTA    Subjective:  Patient states:  Pt agreeable to therapy session.   Pain:   Pt denies pain    Objective:    Observation: Pt sitting in chair and noted incontinent    Objective Measures:  Pt alert and oriented     Treatment, including education:  Self Care Training:   Cues were given for safety, sequence, UE/LE placement, visual cues, and balance.    Activities performed today included UB/LB dressing tasks, toileting, hand hygiene at sink    Pt seated in chair upon arrival. Pt completed sit to stand from armed chair with WW and MAX A x2 with limited ability to achieve upright posture. Pt completed 2 trials with limited ability tolerate upright posture. Pt completed SPT from chair to bed with MAX A x2. Pt supine in bed with MOD A x2 and completed DEP A pericare.   Pt doffed and donned gown with MAX A.  Pt supine in bed with all needs met and call light in reach bed alarm on.    Assessment / Impression:    Patient's tolerance of treatment: Well  Adverse Reaction: None  Significant change in status and impact: Improved from initial evaluation  Barriers to improvement: None noted      Plan for Next Session:    Continue OT POC    Time in:  1019  Time out:  1037  Timed treatment minutes:  18  Total treatment time:  18      Electronically signed by:      VANDA Colby

## 2024-12-13 NOTE — DISCHARGE SUMMARY
capsule by mouth 2 times daily for 180 days.     Incontinence Brief Large Misc  Incontinence XL Briefs 100     memantine 5 MG tablet  Commonly known as: NAMENDA  Take 1 tablet by mouth 2 times daily     omeprazole 20 MG delayed release capsule  Commonly known as: PRILOSEC  TAKE 1 CAPSULE EVERY DAY     vitamin B-12 1000 MCG tablet  Commonly known as: CYANOCOBALAMIN  Take 1 tablet by mouth daily     vitamin D3 125 MCG (5000 UT) Tabs tablet  Commonly known as: CHOLECALCIFEROL  Take 1 tablet by mouth daily            STOP taking these medications      benazepril 40 MG tablet  Commonly known as: LOTENSIN     clopidogrel 75 MG tablet  Commonly known as: Plavix     ondansetron 4 MG disintegrating tablet  Commonly known as: ZOFRAN-ODT     sodium chloride 1 g tablet     thiamine 100 MG tablet     triamterene-hydroCHLOROthiazide 37.5-25 MG per tablet  Commonly known as: MAXZIDE-25               Where to Get Your Medications        Information about where to get these medications is not yet available    Ask your nurse or doctor about these medications  amLODIPine 5 MG tablet        Objective Findings at Discharge:   BP (!) 159/62   Pulse 62   Temp (!) 96.7 °F (35.9 °C) (Oral)   Resp 16   Ht 1.753 m (5' 9\")   Wt 59 kg (130 lb)   SpO2 96%   BMI 19.20 kg/m²       Physical Exam:   General: NAD, frail  Eyes: EOMI  ENT: neck supple  Cardiovascular: Regular rate.  Respiratory: Clear to auscultation bilaterally  Gastrointestinal: Abdomen soft, non tender  Genitourinary: no suprapubic tenderness  Musculoskeletal: No edema  Skin: warm, dry  Neuro: Alert and oriented x3-4. No focal deficits  Psych: Mood appropriate.         Labs and Imaging   MRI BRAIN WO CONTRAST    Result Date: 12/11/2024  MRI of the brain INDICATION: Slurred speech TECHNIQUE: Standard MRI sequences were obtained through the brain in multiple planes without IV contrast COMPARISON: MRI brain from 1/18/2022. CT head from 12/10/2024. FINDINGS: There is prominence  BLOODU NEGATIVE 08/28/2024 11:40 PM    GLUCOSEU NEGATIVE 12/10/2024 12:20 PM    KETUA NEGATIVE 12/10/2024 12:20 PM     Urine Cultures: No results found for: \"LABURIN\"  Blood Cultures: No results found for: \"BC\"  No results found for: \"BLOODCULT2\"  Organism: No results found for: \"ORG\"    Time Spent Discharging patient 35 minutes    Electronically signed by Jenn Saldana PA-C on 12/13/2024 at 2:24 PM

## 2024-12-13 NOTE — PLAN OF CARE
Problem: Discharge Planning  Goal: Discharge to home or other facility with appropriate resources  12/13/2024 1041 by Michele José RN  Outcome: Progressing  12/12/2024 2306 by Tigre Solano RN  Outcome: Progressing     Problem: Safety - Adult  Goal: Free from fall injury  12/13/2024 1041 by Michele José RN  Outcome: Progressing  12/12/2024 2306 by Tigre Solano RN  Outcome: Progressing     Problem: Confusion  Goal: Confusion, delirium, dementia, or psychosis is improved or at baseline  Description: INTERVENTIONS:  1. Assess for possible contributors to thought disturbance, including medications, impaired vision or hearing, underlying metabolic abnormalities, dehydration, psychiatric diagnoses, and notify attending LIP  2. [place] high risk fall precautions, as indicated  3. Provide frequent short contacts to provide reality reorientation, refocusing and direction  4. Decrease environmental stimuli, including noise as appropriate  5. Monitor and intervene to maintain adequate nutrition, hydration, elimination, sleep and activity  6. If unable to ensure safety without constant attention obtain sitter and review sitter guidelines with assigned personnel  7. Initiate Psychosocial CNS and Spiritual Care consult, as indicated  12/13/2024 1041 by Michele José RN  Outcome: Progressing  12/12/2024 2306 by Tigre Solano RN  Outcome: Progressing     Problem: Skin/Tissue Integrity  Goal: Absence of new skin breakdown  Description: 1.  Monitor for areas of redness and/or skin breakdown  2.  Assess vascular access sites hourly  3.  Every 4-6 hours minimum:  Change oxygen saturation probe site  4.  Every 4-6 hours:  If on nasal continuous positive airway pressure, respiratory therapy assess nares and determine need for appliance change or resting period.  12/13/2024 1041 by Michele José RN  Outcome: Progressing  12/12/2024 2306 by Tigre Solano RN  Outcome: Progressing     Problem:  Nutrition Deficit:  Goal: Optimize nutritional status  12/13/2024 1041 by Michele José, RN  Outcome: Progressing  12/12/2024 2306 by Tigre Solano, RN  Outcome: Progressing

## 2024-12-13 NOTE — PROGRESS NOTES
V2.0  [facility] Hospitalist Progress Note      Name:  Carol Xiong /Age/Sex: 1933  (89 y.o. female)   MRN & CSN:  [id number] & [id number] Encounter Date/Time: 2024 7:29 AM EST    Location:  [address] [place] [address]-A PCP: Kayden Gonzalez MD       Hospital Day: 4    Assessment and Plan:   Carol Xiong is a 89 y.o. female who presents with double vision, confusion.     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 Has prior history of carotid stenosis per chart review.   - continue neuro checks  -PT/OT recommended SNF   - SLP recommended minced and moist  - monitor on telemetry   - MRI brain with no acute process  - neurology consulted, suspect symptoms related to hypertensive emergency. Recommended to continue home DAPT and statin.     Acute metabolic encephalopathy - resolved  History of dementia  -Patient normally alert and oriented x 3-4 per granddaughter, however chart mentions history of dementia. Back to baseline this morning.  -UA negative  -Labs overall reassuring  - MRI brain no acute process  - ?hypertensive encephalopathy, mental status improving  -Delirium precautions    Hypertensive emergency - resolved  -BP up to 244/106 in ED, significantly improved  - was not taking meds at home  -Continue amlodipine, lisinopril  - BP still high, increased Norvasc. PRN hydralazine ordered.     Hypokalemia   Hypomagnesemia - resolved   - K+ 3.4  -Replacement ordered, recheck in a.m.    Elevated troponin   - trop 83, 72  -EKG with nonspecific ST changes  -Denies chest pain  -Cardiology planning for stress test     CKDIII  -Creatinine near baseline    HLD  - continue statin     History of TIA  - continue DAPT, statin     This patient was discussed with Dr. Gonzalez. He was agreeable with the assessment and

## 2024-12-13 NOTE — PLAN OF CARE
Problem: Discharge Planning  Goal: Discharge to home or other facility with appropriate resources  12/13/2024 1433 by Michele José RN  Outcome: Adequate for Discharge  12/13/2024 1041 by Michele José RN  Outcome: Progressing     Problem: Safety - Adult  Goal: Free from fall injury  12/13/2024 1433 by Michele José RN  Outcome: Adequate for Discharge  12/13/2024 1041 by Michele José RN  Outcome: Progressing     Problem: Confusion  Goal: Confusion, delirium, dementia, or psychosis is improved or at baseline  Description: INTERVENTIONS:  1. Assess for possible contributors to thought disturbance, including medications, impaired vision or hearing, underlying metabolic abnormalities, dehydration, psychiatric diagnoses, and notify attending LIP  2. [place] high risk fall precautions, as indicated  3. Provide frequent short contacts to provide reality reorientation, refocusing and direction  4. Decrease environmental stimuli, including noise as appropriate  5. Monitor and intervene to maintain adequate nutrition, hydration, elimination, sleep and activity  6. If unable to ensure safety without constant attention obtain sitter and review sitter guidelines with assigned personnel  7. Initiate Psychosocial CNS and Spiritual Care consult, as indicated  12/13/2024 1433 by Michele José RN  Outcome: Adequate for Discharge  12/13/2024 1041 by Michele José RN  Outcome: Progressing     Problem: Skin/Tissue Integrity  Goal: Absence of new skin breakdown  Description: 1.  Monitor for areas of redness and/or skin breakdown  2.  Assess vascular access sites hourly  3.  Every 4-6 hours minimum:  Change oxygen saturation probe site  4.  Every 4-6 hours:  If on nasal continuous positive airway pressure, respiratory therapy assess nares and determine need for appliance change or resting period.  12/13/2024 1433 by Michele José RN  Outcome: Adequate for Discharge  12/13/2024 1041 by Michele José

## 2024-12-13 NOTE — CARE COORDINATION
KEVIN received message from Anu with [facility] and they can take pt once stable and once we have precert.  Tasha BROWN to start precert process.

## 2024-12-13 NOTE — PLAN OF CARE
Problem: Discharge Planning  Goal: Discharge to home or other facility with appropriate resources  Outcome: Progressing     Problem: Safety - Adult  Goal: Free from fall injury  Outcome: Progressing     Problem: Confusion  Goal: Confusion, delirium, dementia, or psychosis is improved or at baseline  Description: INTERVENTIONS:  1. Assess for possible contributors to thought disturbance, including medications, impaired vision or hearing, underlying metabolic abnormalities, dehydration, psychiatric diagnoses, and notify attending LIP  2. [place] high risk fall precautions, as indicated  3. Provide frequent short contacts to provide reality reorientation, refocusing and direction  4. Decrease environmental stimuli, including noise as appropriate  5. Monitor and intervene to maintain adequate nutrition, hydration, elimination, sleep and activity  6. If unable to ensure safety without constant attention obtain sitter and review sitter guidelines with assigned personnel  7. Initiate Psychosocial CNS and Spiritual Care consult, as indicated  Outcome: Progressing     Problem: Skin/Tissue Integrity  Goal: Absence of new skin breakdown  Description: 1.  Monitor for areas of redness and/or skin breakdown  2.  Assess vascular access sites hourly  3.  Every 4-6 hours minimum:  Change oxygen saturation probe site  4.  Every 4-6 hours:  If on nasal continuous positive airway pressure, respiratory therapy assess nares and determine need for appliance change or resting period.  Outcome: Progressing     Problem: Nutrition Deficit:  Goal: Optimize nutritional status  12/12/2024 2306 by Tigre Solano, RN  Outcome: Progressing  12/12/2024 1037 by Raisa Mario RD, LD  Flowsheets (Taken 12/12/2024 1037)  Nutrient intake appropriate for improving, restoring, or maintaining nutritional needs:   Assess nutritional status and recommend course of action   Monitor oral intake, labs, and treatment plans   Recommend appropriate

## 2024-12-13 NOTE — CARE COORDINATION
Pt approved to go to [place].  Needs PASS.  Packet started.  LSW ent a PS to the NP and informed her of the approval.      CM will need ZHAO to be completed by RN and doctor. If pt is discharged after hours please complete the following.... Call report to [phone]     Place copy of AVS with both ZHAO and any written Rx for pain and anxiety in the packet.  Set up transportation with [facility] [phone] and call family.

## 2024-12-13 NOTE — DISCHARGE INSTR - COC
Continuity of Care Form    Patient Name: Carol Xiong   :  1933  MRN:  [id number]    Admit date:  12/10/2024  Discharge date:  24    Code Status Order: Full Code   Advance Directives:   Advance Care Flowsheet Documentation             Admitting Physician:  Kev Gonzalez MD  PCP: Kayden Gonzalez MD    Discharging Nurse: Michele ORTIZ  Discharging Hospital Unit/Room#: 3008/3008-A  Discharging Unit Phone Number: [phone]    Emergency Contact:   Extended Emergency Contact Information  Primary Emergency Contact: Coleen Villar  Home Phone: [phone]  Relation: Grandchild  Secondary Emergency Contact: Da Xiong  Address: [address] [place] [address]  Home Phone: [phone]  Relation: Spouse    Past Surgical History:  Past Surgical History:   Procedure Laterality Date    APPENDECTOMY      COLON SURGERY      removed polops    HYSTERECTOMY, TOTAL ABDOMINAL (CERVIX REMOVED)      SPINE SURGERY      scrape calcium off spine, pressing on spine/nerves    UPPER GASTROINTESTINAL ENDOSCOPY N/A 2024    ESOPHAGOGASTRODUODENOSCOPY PERCUTANEOUS ENDOSCOPIC GASTROSTOMY TUBE PLACEMENT EGD BALLOON DILATION 12MM-15MM BALLOON DILATED TO 15MM UPPER AND LOWER ESOPHAGUS EGD BIOPSY performed by Lisa Turner MD at [facility] ENDOSCOPY       Immunization History:   Immunization History   Administered Date(s) Administered    COVID-19, MODERNA BLUE border, Primary or Immunocompromised, (age 12y+), IM, 100 mcg/0.5mL 2021, 2021    Influenza A ([id number]) Vaccine PF IM 2009    Influenza, FLUAD, (age 65 y+), IM, Quadv, 0.5mL 10/13/2020, 2021    Pneumococcal, PPSV23, PNEUMOVAX 23, (age 2y+), SC/IM, 0.5mL 2021       Active Problems:  Patient Active Problem List   Diagnosis Code    Essential hypertension I10    Mixed hyperlipidemia E78.2    Gastroesophageal reflux disease without esophagitis K21.9    Dorsalgia M54.9    Neuropathy G62.9    Allergic rhinitis

## 2024-12-13 NOTE — PROGRESS NOTES
Physical Therapy Treatment Note  Name: Carol Xiong MRN: [id number] :   1933   Date:  2024   Admission Date: 12/10/2024 Room:  [address] [place] [address]   Restrictions/Precautions: general precautions, falls, aspiration precautions   Communication with other providers:  Pt okay to see for therapy per RN   Subjective:  Patient states:  Agreeable to session.   Pain:   Location, Type, Intensity (0/10 to 10/10):  Denies   Objective:    Observation:  Pt sitting up in recliner, pt reports having had large BM in recliner.   Objective Measures:  Tele, stable  Treatment, including education/measures:    Therapeutic Activity Training:   Therapeutic activity training was instructed today.  Cues were given for safety, sequence, UE/LE placement, awareness, and balance. Activities performed today included bed mobility training, sup-sit, sit-stand, SPT.    Mobility:  STS: Max A x 2 arm-in-am recliner x 2 reps. Pt unable to right posture to level appropriate for pericare and extended stand. Pt with B knee stiffness preventing placement of feet at appropriate angle for enhanced mechanics.   SPT: Squat-pivot transfer from recliner > EOB with Max A x 2 arm-in-arm assist.   Sit > sup: Max A x 2 to return to supine.   Rolling: Min A to roll and to maintain roll.   Boost to HOB: DEP x 2     Safety:   Pt returned safely to recliner with chair alarm activated, call light in reach, all needs met.   Assessment / Impression:    Pt tolerated OOB activities well this date put pt is limited in functional mobility by strength and ROM.   Patient's tolerance of treatment:  Good   Adverse Reaction: none  Significant change in status and impact:  none  Barriers to improvement:  none  Plan for Next Session:    Plan to continue OOB activities.   Time in:  1019  Time out:  1036  Timed treatment minutes:  17  Total treatment time:  17    Previously filed items:           Short Term Goals  Time Frame for Short Term Goals: 1 week  Short Term Goal

## 2024-12-13 NOTE — PROGRESS NOTES
I did NOT see the patient. The patient was discussed with the THI. I was available for questions and consultation as needed.     Adjusting BP meds. Plan for SNF.

## 2024-12-13 NOTE — CARE COORDINATION
Precert has been initiated and APPROVED   [id number]  [facility]  12/13/2024 12/13/2024-12/17/2024  Approved    Electronic PAS completed

## 2024-12-17 ENCOUNTER — TELEPHONE (OUTPATIENT)
Dept: CARDIOLOGY CLINIC | Age: 88
End: 2024-12-17

## 2024-12-17 NOTE — TELEPHONE ENCOUNTER
Unable to lm to [facility] fu w/adrián 4-6 weeks 12/17 dh

## 2024-12-20 ENCOUNTER — CARE COORDINATION (OUTPATIENT)
Dept: CARE COORDINATION | Age: 88
End: 2024-12-20

## 2024-12-20 NOTE — CARE COORDINATION
Care Management outreach deferred at this time. Patient was discharged from [facility] to [facility] on 12/13/24. ACM will outreach patient/granddaughter once PAC handoff is received.

## 2024-12-22 DIAGNOSIS — G30.1 MODERATE LATE ONSET ALZHEIMER'S DEMENTIA WITHOUT BEHAVIORAL DISTURBANCE, PSYCHOTIC DISTURBANCE, MOOD DISTURBANCE, OR ANXIETY (HCC): ICD-10-CM

## 2024-12-22 DIAGNOSIS — F02.B0 MODERATE LATE ONSET ALZHEIMER'S DEMENTIA WITHOUT BEHAVIORAL DISTURBANCE, PSYCHOTIC DISTURBANCE, MOOD DISTURBANCE, OR ANXIETY (HCC): ICD-10-CM

## 2024-12-23 RX ORDER — MEMANTINE HYDROCHLORIDE 5 MG/1
5 TABLET ORAL 2 TIMES DAILY
Qty: 180 TABLET | Refills: 3 | Status: SHIPPED | OUTPATIENT
Start: 2024-12-23

## 2025-01-03 ENCOUNTER — CARE COORDINATION (OUTPATIENT)
Dept: CASE MANAGEMENT | Age: 89
End: 2025-01-03

## 2025-01-03 NOTE — CARE COORDINATION
feeding tube and now this is removed. She is able to swallow some pills and ather pills are crushed and put in water. Unable to review meds as she is out of town and coming back today. Reviewed upcoming appt. She did not know about HFU with PCP. I texted her time and date with no identifiers. Agreeable to calls. Left my contact information.      Post Acute Care Manager reviewed red flags with family. The family was given an opportunity to ask questions; all questions answered at this time.. The family verbalized understanding.   Were discharge instructions available to patient? Yes.   Reviewed appropriate site of care based on symptoms and resources available to patient including: PCP. The family agrees to contact the primary care provider and/or specialist office for questions related to their healthcare.      Advance Care Planning:   Does patient have an Advance Directive: reviewed and current.    Medication Reconciliation:  Medication reconciliation was not performed during this call, medlist not available.     Remote Patient Monitoring:  Offered patient enrollment in the Remote Patient Monitoring (RPM) program for in-home monitoring: Deferred at this time because ACM to review; will discuss at next outreach.    Assessments:  Discharge Assessment.     Follow Up Appointment:   Discussed follow up appointments. Patient has hospital follow up appointment scheduled within 14 days of discharge.   Future Appointments         Provider Specialty Dept Phone    1/13/2025 8:40 AM Kayden Gonzalez MD Internal Medicine [phone]            Post Acute Care Manager provided contact information.  Patient referred back to [facility] Chela Dutton for continued management. based on severity of symptoms and risk factors.  Plan for next call: referral to ambulatory care manager-MARY ALICE Chavez, RN   [facility] Post Acute Care Coordinator  [phone]

## 2025-01-13 ENCOUNTER — CARE COORDINATION (OUTPATIENT)
Dept: CARE COORDINATION | Age: 89
End: 2025-01-13

## 2025-01-13 NOTE — CARE COORDINATION
Ambulatory Care Coordination Note     1/13/2025 2:01 PM     Family, Granddaughter/MPOA Coleen  outreach attempt by this ACM today to perform care management follow up . ACM was unable to reach the familyColeen  by telephone today;   left voice message requesting a return phone call to this ACM.     ACM: Chela Dutton, RN     Care Summary Note: ACM attempted to contact patient's granddaughter/MPOA Coleen, today for CM follow-up. ACM will outreach Coleen at a later date.     PCP/Specialist follow up:   Future Appointments         Provider Specialty Dept Phone    1/27/2025 9:20 AM Kayden Gonzalez MD Internal Medicine [phone]            Follow Up:   Plan for next ACM outreach in approximately 1 week to complete:  - disease specific assessments  - Status of HHC since discharged from SNF

## 2025-01-20 ENCOUNTER — CARE COORDINATION (OUTPATIENT)
Dept: CARE COORDINATION | Age: 89
End: 2025-01-20

## 2025-01-20 NOTE — CARE COORDINATION
Ambulatory Care Coordination Note     1/20/2025 12:46 PM     FamilyColeen MPOA  outreach attempt by this ACM today to perform care management follow up . ACM was unable to reach the Coleen treviño  by telephone today;   left voice message requesting a return phone call to this ACM.     ACM: Chela Dutton RN     Care Summary Note: ACM attempted to contact patient's granddaughter/MPOA Coleen for CM follow-up. ACM will make final attempt to contact Coleen at a later date.     PCP/Specialist follow up:   Future Appointments         Provider Specialty Dept Phone    1/27/2025 9:20 AM Kayden Gonzalez MD Internal Medicine [phone]            Follow Up:   Plan for next AC outreach in approximately 1 week to complete:  - disease specific assessments  - follow-up appointment with PCP 1/27/25  - assess needs/readiness for CM graduation  - status of [facility] s/p SNF discharge

## 2025-01-22 ENCOUNTER — HOSPITAL ENCOUNTER (INPATIENT)
Age: 89
LOS: 8 days | Discharge: NURSING, REHAB OR LONG-TERM CARE | DRG: 291 | End: 2025-01-30
Attending: EMERGENCY MEDICINE | Admitting: STUDENT IN AN ORGANIZED HEALTH CARE EDUCATION/TRAINING PROGRAM
Payer: MEDICARE

## 2025-01-22 ENCOUNTER — APPOINTMENT (OUTPATIENT)
Dept: CT IMAGING | Age: 89
DRG: 291 | End: 2025-01-22
Payer: MEDICARE

## 2025-01-22 ENCOUNTER — APPOINTMENT (OUTPATIENT)
Dept: GENERAL RADIOLOGY | Age: 89
DRG: 291 | End: 2025-01-22
Payer: MEDICARE

## 2025-01-22 DIAGNOSIS — I16.0 HYPERTENSIVE URGENCY: Primary | ICD-10-CM

## 2025-01-22 DIAGNOSIS — I26.94 MULTIPLE SUBSEGMENTAL PULMONARY EMBOLI WITHOUT ACUTE COR PULMONALE (HCC): ICD-10-CM

## 2025-01-22 DIAGNOSIS — R26.2 AMBULATORY DYSFUNCTION: ICD-10-CM

## 2025-01-22 DIAGNOSIS — R51.9 ACUTE NONINTRACTABLE HEADACHE, UNSPECIFIED HEADACHE TYPE: ICD-10-CM

## 2025-01-22 DIAGNOSIS — I26.99 ACUTE PULMONARY EMBOLISM, UNSPECIFIED PULMONARY EMBOLISM TYPE, UNSPECIFIED WHETHER ACUTE COR PULMONALE PRESENT (HCC): ICD-10-CM

## 2025-01-22 DIAGNOSIS — R53.1 GENERALIZED WEAKNESS: ICD-10-CM

## 2025-01-22 LAB
ALBUMIN SERPL-MCNC: 3.1 G/DL (ref 3.4–5)
ALBUMIN/GLOB SERPL: 1.4 {RATIO} (ref 1.1–2.2)
ALP SERPL-CCNC: 121 U/L (ref 40–129)
ALT SERPL-CCNC: 10 U/L (ref 10–40)
ANION GAP SERPL CALCULATED.3IONS-SCNC: 13 MMOL/L (ref 9–17)
AST SERPL-CCNC: 27 U/L (ref 15–37)
BASOPHILS # BLD: 0.04 K/UL
BASOPHILS NFR BLD: 1 % (ref 0–1)
BILIRUB SERPL-MCNC: 0.3 MG/DL (ref 0–1)
BNP SERPL-MCNC: 1506 PG/ML (ref 0–450)
BUN SERPL-MCNC: 13 MG/DL (ref 7–20)
CALCIUM SERPL-MCNC: 8.6 MG/DL (ref 8.3–10.6)
CHLORIDE SERPL-SCNC: 106 MMOL/L (ref 99–110)
CO2 SERPL-SCNC: 23 MMOL/L (ref 21–32)
CREAT SERPL-MCNC: 0.6 MG/DL (ref 0.6–1.2)
EOSINOPHIL # BLD: 0.14 K/UL
EOSINOPHILS RELATIVE PERCENT: 2 % (ref 0–3)
ERYTHROCYTE [DISTWIDTH] IN BLOOD BY AUTOMATED COUNT: 14 % (ref 11.7–14.9)
GFR, ESTIMATED: >90 ML/MIN/1.73M2
GLUCOSE SERPL-MCNC: 97 MG/DL (ref 74–99)
HCT VFR BLD AUTO: 36.8 % (ref 37–47)
HGB BLD-MCNC: 11 G/DL (ref 12.5–16)
IMM GRANULOCYTES # BLD AUTO: 0.02 K/UL
IMM GRANULOCYTES NFR BLD: 0 %
LYMPHOCYTES NFR BLD: 1.3 K/UL
LYMPHOCYTES RELATIVE PERCENT: 21 % (ref 24–44)
MAGNESIUM SERPL-MCNC: 1.8 MG/DL (ref 1.8–2.4)
MCH RBC QN AUTO: 30.3 PG (ref 27–31)
MCHC RBC AUTO-ENTMCNC: 29.9 G/DL (ref 32–36)
MCV RBC AUTO: 101.4 FL (ref 78–100)
MONOCYTES NFR BLD: 0.48 K/UL
MONOCYTES NFR BLD: 8 % (ref 0–4)
NEUTROPHILS NFR BLD: 68 % (ref 36–66)
NEUTS SEG NFR BLD: 4.28 K/UL
PLATELET # BLD AUTO: 259 K/UL (ref 140–440)
PMV BLD AUTO: 10 FL (ref 7.5–11.1)
POTASSIUM SERPL-SCNC: 3.8 MMOL/L (ref 3.5–5.1)
PROT SERPL-MCNC: 5.3 G/DL (ref 6.4–8.2)
RBC # BLD AUTO: 3.63 M/UL (ref 4.2–5.4)
SODIUM SERPL-SCNC: 142 MMOL/L (ref 136–145)
TROPONIN I SERPL HS-MCNC: 19 NG/L (ref 0–14)
TROPONIN I SERPL HS-MCNC: 27 NG/L (ref 0–14)
WBC OTHER # BLD: 6.3 K/UL (ref 4–10.5)

## 2025-01-22 PROCEDURE — 6370000000 HC RX 637 (ALT 250 FOR IP): Performed by: EMERGENCY MEDICINE

## 2025-01-22 PROCEDURE — 96374 THER/PROPH/DIAG INJ IV PUSH: CPT

## 2025-01-22 PROCEDURE — 2580000003 HC RX 258: Performed by: EMERGENCY MEDICINE

## 2025-01-22 PROCEDURE — 6360000002 HC RX W HCPCS: Performed by: EMERGENCY MEDICINE

## 2025-01-22 PROCEDURE — 71045 X-RAY EXAM CHEST 1 VIEW: CPT

## 2025-01-22 PROCEDURE — 36415 COLL VENOUS BLD VENIPUNCTURE: CPT

## 2025-01-22 PROCEDURE — 94761 N-INVAS EAR/PLS OXIMETRY MLT: CPT

## 2025-01-22 PROCEDURE — 96375 TX/PRO/DX INJ NEW DRUG ADDON: CPT

## 2025-01-22 PROCEDURE — 2060000000 HC ICU INTERMEDIATE R&B

## 2025-01-22 PROCEDURE — 80053 COMPREHEN METABOLIC PANEL: CPT

## 2025-01-22 PROCEDURE — 85025 COMPLETE CBC W/AUTO DIFF WBC: CPT

## 2025-01-22 PROCEDURE — 93005 ELECTROCARDIOGRAM TRACING: CPT | Performed by: EMERGENCY MEDICINE

## 2025-01-22 PROCEDURE — 83735 ASSAY OF MAGNESIUM: CPT

## 2025-01-22 PROCEDURE — 70450 CT HEAD/BRAIN W/O DYE: CPT

## 2025-01-22 PROCEDURE — 84484 ASSAY OF TROPONIN QUANT: CPT

## 2025-01-22 PROCEDURE — 99285 EMERGENCY DEPT VISIT HI MDM: CPT

## 2025-01-22 PROCEDURE — 83880 ASSAY OF NATRIURETIC PEPTIDE: CPT

## 2025-01-22 RX ORDER — FUROSEMIDE 10 MG/ML
40 INJECTION INTRAMUSCULAR; INTRAVENOUS ONCE
Status: COMPLETED | OUTPATIENT
Start: 2025-01-22 | End: 2025-01-22

## 2025-01-22 RX ADMIN — SODIUM CHLORIDE 5 MG/HR: 9 INJECTION, SOLUTION INTRAVENOUS at 18:37

## 2025-01-22 RX ADMIN — POTASSIUM BICARBONATE 30 MEQ: 782 TABLET, EFFERVESCENT ORAL at 19:43

## 2025-01-22 RX ADMIN — FUROSEMIDE 40 MG: 10 INJECTION, SOLUTION INTRAMUSCULAR; INTRAVENOUS at 19:42

## 2025-01-22 ASSESSMENT — PAIN - FUNCTIONAL ASSESSMENT: PAIN_FUNCTIONAL_ASSESSMENT: 0-10

## 2025-01-22 ASSESSMENT — PAIN DESCRIPTION - LOCATION: LOCATION: HEAD

## 2025-01-22 ASSESSMENT — PAIN SCALES - GENERAL: PAINLEVEL_OUTOF10: 3

## 2025-01-22 NOTE — ED NOTES
12 lead EKG per my interpretation:  Ventricular rate 72 bpm, GA interval 172 ms, QRS duration 82 ms, QT/QTc 392/429 ms.  Normal sinus rhythm.  Wandering baseline, decreased voltage QRS complex in lead III.  No STEMI criteria     Ken Coe DO  01/22/25 [id number]

## 2025-01-22 NOTE — ED PROVIDER NOTES
Triage Chief Complaint:   Hypertension and Headache    [place]:  Carol Xiong is a 89 y.o. female that presents with complaints of worsening generalized weakness, leg swelling, headache and high blood pressure.  Patient reports that for the past 3 weeks she has been back at home after a stay in a rehab facility \"until my insurance ran out\".  Patient reports that she has been working with home physical therapy twice a week and having her granddaughter who lives with her manage her medications.  Today patient was unable to participate in physical therapy because of her leg weakness and blood pressure was checked and it was markedly elevated this prompted call to the squad.  Patient does report a headache.  No chest pain.  Occasional shortness of breath which is not present at this time.  Patient does report that leg issues have been ongoing for approximately 1 year.  Patient has been adherent to her home antihypertensive regimen including today.    Patient does have some underlying dementia limiting history some.    ROS:  Limited as above.    Past Medical History:   Diagnosis Date    Abnormal echocardiogram 09/03/2024    See Epic notes    GERD (gastroesophageal reflux disease)     H/O echocardiogram 04/06/2017    EF 55% Left atrial enlargement. Normal LV systolic. Minimal aoritc stenosis.     H/O echocardiogram 02/04/2020    EF 55-60%, Grade I DD, Mild AS.  Mildly dilated left atrium.    Hyperlipidemia     Hypertension     Neuropathy      Past Surgical History:   Procedure Laterality Date    APPENDECTOMY      COLON SURGERY      removed polops    HYSTERECTOMY, TOTAL ABDOMINAL (CERVIX REMOVED)      SPINE SURGERY  2013    scrape calcium off spine, pressing on spine/nerves    UPPER GASTROINTESTINAL ENDOSCOPY N/A 9/6/2024    ESOPHAGOGASTRODUODENOSCOPY PERCUTANEOUS ENDOSCOPIC GASTROSTOMY TUBE PLACEMENT EGD BALLOON DILATION 12MM-15MM BALLOON DILATED TO 15MM UPPER AND LOWER ESOPHAGUS EGD BIOPSY performed by Lisa Turner,

## 2025-01-23 LAB
ALBUMIN SERPL-MCNC: 2.7 G/DL (ref 3.4–5)
ALBUMIN/GLOB SERPL: 1.1 {RATIO} (ref 1.1–2.2)
ALP SERPL-CCNC: 107 U/L (ref 40–129)
ALT SERPL-CCNC: 6 U/L (ref 10–40)
ANION GAP SERPL CALCULATED.3IONS-SCNC: 9 MMOL/L (ref 9–17)
AST SERPL-CCNC: 18 U/L (ref 15–37)
BASOPHILS # BLD: 0.02 K/UL
BASOPHILS NFR BLD: 0 % (ref 0–1)
BILIRUB SERPL-MCNC: 0.3 MG/DL (ref 0–1)
BUN SERPL-MCNC: 13 MG/DL (ref 7–20)
CALCIUM SERPL-MCNC: 8.5 MG/DL (ref 8.3–10.6)
CHLORIDE SERPL-SCNC: 105 MMOL/L (ref 99–110)
CO2 SERPL-SCNC: 29 MMOL/L (ref 21–32)
CREAT SERPL-MCNC: 0.6 MG/DL (ref 0.6–1.2)
EKG ATRIAL RATE: 72 BPM
EKG DIAGNOSIS: NORMAL
EKG P AXIS: 90 DEGREES
EKG P-R INTERVAL: 172 MS
EKG Q-T INTERVAL: 392 MS
EKG QRS DURATION: 82 MS
EKG QTC CALCULATION (BAZETT): 429 MS
EKG R AXIS: 39 DEGREES
EKG T AXIS: 49 DEGREES
EKG VENTRICULAR RATE: 72 BPM
EOSINOPHIL # BLD: 0.12 K/UL
EOSINOPHILS RELATIVE PERCENT: 2 % (ref 0–3)
ERYTHROCYTE [DISTWIDTH] IN BLOOD BY AUTOMATED COUNT: 13.9 % (ref 11.7–14.9)
GFR, ESTIMATED: >90 ML/MIN/1.73M2
GLUCOSE SERPL-MCNC: 83 MG/DL (ref 74–99)
HCT VFR BLD AUTO: 30.2 % (ref 37–47)
HGB BLD-MCNC: 9.7 G/DL (ref 12.5–16)
IMM GRANULOCYTES # BLD AUTO: 0.02 K/UL
IMM GRANULOCYTES NFR BLD: 0 %
INR PPP: 0.9
LYMPHOCYTES NFR BLD: 1.84 K/UL
LYMPHOCYTES RELATIVE PERCENT: 35 % (ref 24–44)
MCH RBC QN AUTO: 30.5 PG (ref 27–31)
MCHC RBC AUTO-ENTMCNC: 32.1 G/DL (ref 32–36)
MCV RBC AUTO: 95 FL (ref 78–100)
MONOCYTES NFR BLD: 0.39 K/UL
MONOCYTES NFR BLD: 7 % (ref 0–4)
NEUTROPHILS NFR BLD: 55 % (ref 36–66)
NEUTS SEG NFR BLD: 2.95 K/UL
PLATELET # BLD AUTO: 263 K/UL (ref 140–440)
PMV BLD AUTO: 10.5 FL (ref 7.5–11.1)
POTASSIUM SERPL-SCNC: 3.3 MMOL/L (ref 3.5–5.1)
PROT SERPL-MCNC: 5.2 G/DL (ref 6.4–8.2)
PROTHROMBIN TIME: 12.6 SEC (ref 11.7–14.5)
RBC # BLD AUTO: 3.18 M/UL (ref 4.2–5.4)
SODIUM SERPL-SCNC: 142 MMOL/L (ref 136–145)
WBC OTHER # BLD: 5.3 K/UL (ref 4–10.5)

## 2025-01-23 PROCEDURE — 2060000000 HC ICU INTERMEDIATE R&B

## 2025-01-23 PROCEDURE — 2580000003 HC RX 258: Performed by: EMERGENCY MEDICINE

## 2025-01-23 PROCEDURE — 94761 N-INVAS EAR/PLS OXIMETRY MLT: CPT

## 2025-01-23 PROCEDURE — 97530 THERAPEUTIC ACTIVITIES: CPT

## 2025-01-23 PROCEDURE — 51701 INSERT BLADDER CATHETER: CPT

## 2025-01-23 PROCEDURE — 6370000000 HC RX 637 (ALT 250 FOR IP): Performed by: STUDENT IN AN ORGANIZED HEALTH CARE EDUCATION/TRAINING PROGRAM

## 2025-01-23 PROCEDURE — APPNB15 APP NON BILLABLE TIME 0-15 MINS

## 2025-01-23 PROCEDURE — 97166 OT EVAL MOD COMPLEX 45 MIN: CPT

## 2025-01-23 PROCEDURE — 36415 COLL VENOUS BLD VENIPUNCTURE: CPT

## 2025-01-23 PROCEDURE — 83735 ASSAY OF MAGNESIUM: CPT

## 2025-01-23 PROCEDURE — 85025 COMPLETE CBC W/AUTO DIFF WBC: CPT

## 2025-01-23 PROCEDURE — 6370000000 HC RX 637 (ALT 250 FOR IP): Performed by: INTERNAL MEDICINE

## 2025-01-23 PROCEDURE — 51798 US URINE CAPACITY MEASURE: CPT

## 2025-01-23 PROCEDURE — 6360000002 HC RX W HCPCS: Performed by: STUDENT IN AN ORGANIZED HEALTH CARE EDUCATION/TRAINING PROGRAM

## 2025-01-23 PROCEDURE — 6360000002 HC RX W HCPCS: Performed by: EMERGENCY MEDICINE

## 2025-01-23 PROCEDURE — 80053 COMPREHEN METABOLIC PANEL: CPT

## 2025-01-23 PROCEDURE — 93010 ELECTROCARDIOGRAM REPORT: CPT | Performed by: INTERNAL MEDICINE

## 2025-01-23 PROCEDURE — 2500000003 HC RX 250 WO HCPCS: Performed by: STUDENT IN AN ORGANIZED HEALTH CARE EDUCATION/TRAINING PROGRAM

## 2025-01-23 PROCEDURE — 99223 1ST HOSP IP/OBS HIGH 75: CPT | Performed by: INTERNAL MEDICINE

## 2025-01-23 PROCEDURE — 97162 PT EVAL MOD COMPLEX 30 MIN: CPT

## 2025-01-23 PROCEDURE — 85610 PROTHROMBIN TIME: CPT

## 2025-01-23 PROCEDURE — 97112 NEUROMUSCULAR REEDUCATION: CPT

## 2025-01-23 RX ORDER — ATORVASTATIN CALCIUM 10 MG/1
20 TABLET, FILM COATED ORAL NIGHTLY
Status: DISCONTINUED | OUTPATIENT
Start: 2025-01-23 | End: 2025-01-30 | Stop reason: HOSPADM

## 2025-01-23 RX ORDER — ACETAMINOPHEN 325 MG/1
650 TABLET ORAL EVERY 6 HOURS PRN
Status: DISCONTINUED | OUTPATIENT
Start: 2025-01-23 | End: 2025-01-30 | Stop reason: HOSPADM

## 2025-01-23 RX ORDER — MEMANTINE HYDROCHLORIDE 10 MG/1
5 TABLET ORAL 2 TIMES DAILY
Status: DISCONTINUED | OUTPATIENT
Start: 2025-01-23 | End: 2025-01-23

## 2025-01-23 RX ORDER — PANTOPRAZOLE SODIUM 40 MG/1
40 TABLET, DELAYED RELEASE ORAL
Status: DISCONTINUED | OUTPATIENT
Start: 2025-01-23 | End: 2025-01-30 | Stop reason: HOSPADM

## 2025-01-23 RX ORDER — GABAPENTIN 300 MG/1
300 CAPSULE ORAL 2 TIMES DAILY
Status: DISCONTINUED | OUTPATIENT
Start: 2025-01-23 | End: 2025-01-23

## 2025-01-23 RX ORDER — LISINOPRIL 20 MG/1
40 TABLET ORAL DAILY
Status: DISCONTINUED | OUTPATIENT
Start: 2025-01-23 | End: 2025-01-26

## 2025-01-23 RX ORDER — AMLODIPINE BESYLATE 10 MG/1
10 TABLET ORAL DAILY
Status: DISCONTINUED | OUTPATIENT
Start: 2025-01-23 | End: 2025-01-26

## 2025-01-23 RX ORDER — SODIUM CHLORIDE 0.9 % (FLUSH) 0.9 %
5-40 SYRINGE (ML) INJECTION EVERY 12 HOURS SCHEDULED
Status: DISCONTINUED | OUTPATIENT
Start: 2025-01-23 | End: 2025-01-30 | Stop reason: HOSPADM

## 2025-01-23 RX ORDER — VITAMIN B COMPLEX
5000 TABLET ORAL DAILY
Status: DISCONTINUED | OUTPATIENT
Start: 2025-01-23 | End: 2025-01-30 | Stop reason: HOSPADM

## 2025-01-23 RX ORDER — SODIUM CHLORIDE 0.9 % (FLUSH) 0.9 %
5-40 SYRINGE (ML) INJECTION PRN
Status: DISCONTINUED | OUTPATIENT
Start: 2025-01-23 | End: 2025-01-30 | Stop reason: HOSPADM

## 2025-01-23 RX ORDER — ACETAMINOPHEN 650 MG/1
650 SUPPOSITORY RECTAL EVERY 6 HOURS PRN
Status: DISCONTINUED | OUTPATIENT
Start: 2025-01-23 | End: 2025-01-30 | Stop reason: HOSPADM

## 2025-01-23 RX ORDER — FUROSEMIDE 10 MG/ML
40 INJECTION INTRAMUSCULAR; INTRAVENOUS 2 TIMES DAILY
Status: DISCONTINUED | OUTPATIENT
Start: 2025-01-23 | End: 2025-01-27

## 2025-01-23 RX ORDER — SODIUM CHLORIDE 9 MG/ML
INJECTION, SOLUTION INTRAVENOUS PRN
Status: DISCONTINUED | OUTPATIENT
Start: 2025-01-23 | End: 2025-01-30 | Stop reason: HOSPADM

## 2025-01-23 RX ORDER — ACETAMINOPHEN 325 MG/1
650 TABLET ORAL EVERY 4 HOURS PRN
Status: DISCONTINUED | OUTPATIENT
Start: 2025-01-23 | End: 2025-01-30 | Stop reason: HOSPADM

## 2025-01-23 RX ORDER — ENOXAPARIN SODIUM 100 MG/ML
40 INJECTION SUBCUTANEOUS EVERY EVENING
Status: DISCONTINUED | OUTPATIENT
Start: 2025-01-23 | End: 2025-01-26

## 2025-01-23 RX ORDER — POTASSIUM CHLORIDE 7.45 MG/ML
10 INJECTION INTRAVENOUS PRN
Status: ACTIVE | OUTPATIENT
Start: 2025-01-23 | End: 2025-01-28

## 2025-01-23 RX ORDER — ONDANSETRON 4 MG/1
4 TABLET, ORALLY DISINTEGRATING ORAL EVERY 8 HOURS PRN
Status: DISCONTINUED | OUTPATIENT
Start: 2025-01-23 | End: 2025-01-30 | Stop reason: HOSPADM

## 2025-01-23 RX ORDER — ASPIRIN 81 MG/1
81 TABLET, CHEWABLE ORAL DAILY
Status: DISCONTINUED | OUTPATIENT
Start: 2025-01-23 | End: 2025-01-30 | Stop reason: HOSPADM

## 2025-01-23 RX ORDER — POLYETHYLENE GLYCOL 3350 17 G/17G
17 POWDER, FOR SOLUTION ORAL DAILY PRN
Status: DISCONTINUED | OUTPATIENT
Start: 2025-01-23 | End: 2025-01-30 | Stop reason: HOSPADM

## 2025-01-23 RX ORDER — MEMANTINE HYDROCHLORIDE 10 MG/1
5 TABLET ORAL 2 TIMES DAILY
Status: DISCONTINUED | OUTPATIENT
Start: 2025-01-23 | End: 2025-01-30 | Stop reason: HOSPADM

## 2025-01-23 RX ORDER — HYDRALAZINE HYDROCHLORIDE 50 MG/1
50 TABLET, FILM COATED ORAL EVERY 8 HOURS SCHEDULED
Status: DISCONTINUED | OUTPATIENT
Start: 2025-01-23 | End: 2025-01-30 | Stop reason: HOSPADM

## 2025-01-23 RX ORDER — ASCORBIC ACID 500 MG
500 TABLET ORAL DAILY
Status: DISCONTINUED | OUTPATIENT
Start: 2025-01-23 | End: 2025-01-30 | Stop reason: HOSPADM

## 2025-01-23 RX ORDER — FOLIC ACID 1 MG/1
1 TABLET ORAL DAILY
Status: DISCONTINUED | OUTPATIENT
Start: 2025-01-23 | End: 2025-01-30 | Stop reason: HOSPADM

## 2025-01-23 RX ORDER — MAGNESIUM SULFATE IN WATER 40 MG/ML
2000 INJECTION, SOLUTION INTRAVENOUS PRN
Status: DISPENSED | OUTPATIENT
Start: 2025-01-23 | End: 2025-01-28

## 2025-01-23 RX ORDER — ONDANSETRON 2 MG/ML
4 INJECTION INTRAMUSCULAR; INTRAVENOUS EVERY 6 HOURS PRN
Status: DISCONTINUED | OUTPATIENT
Start: 2025-01-23 | End: 2025-01-30 | Stop reason: HOSPADM

## 2025-01-23 RX ORDER — GABAPENTIN 300 MG/1
300 CAPSULE ORAL 2 TIMES DAILY
Status: DISCONTINUED | OUTPATIENT
Start: 2025-01-23 | End: 2025-01-30 | Stop reason: HOSPADM

## 2025-01-23 RX ORDER — POTASSIUM CHLORIDE 1500 MG/1
20 TABLET, EXTENDED RELEASE ORAL DAILY
Status: DISCONTINUED | OUTPATIENT
Start: 2025-01-23 | End: 2025-01-26

## 2025-01-23 RX ORDER — LANOLIN ALCOHOL/MO/W.PET/CERES
1000 CREAM (GRAM) TOPICAL DAILY
Status: DISCONTINUED | OUTPATIENT
Start: 2025-01-23 | End: 2025-01-30 | Stop reason: HOSPADM

## 2025-01-23 RX ADMIN — HYDRALAZINE HYDROCHLORIDE 50 MG: 50 TABLET ORAL at 21:02

## 2025-01-23 RX ADMIN — SODIUM CHLORIDE, PRESERVATIVE FREE 10 ML: 5 INJECTION INTRAVENOUS at 08:53

## 2025-01-23 RX ADMIN — FOLIC ACID 1 MG: 1 TABLET ORAL at 08:52

## 2025-01-23 RX ADMIN — ATORVASTATIN CALCIUM 20 MG: 10 TABLET, FILM COATED ORAL at 21:02

## 2025-01-23 RX ADMIN — MEMANTINE 5 MG: 10 TABLET ORAL at 06:47

## 2025-01-23 RX ADMIN — FUROSEMIDE 40 MG: 10 INJECTION, SOLUTION INTRAMUSCULAR; INTRAVENOUS at 17:33

## 2025-01-23 RX ADMIN — SODIUM CHLORIDE, PRESERVATIVE FREE 10 ML: 5 INJECTION INTRAVENOUS at 21:03

## 2025-01-23 RX ADMIN — LISINOPRIL 40 MG: 20 TABLET ORAL at 08:51

## 2025-01-23 RX ADMIN — POTASSIUM CHLORIDE 20 MEQ: 1500 TABLET, EXTENDED RELEASE ORAL at 08:52

## 2025-01-23 RX ADMIN — AMLODIPINE BESYLATE 10 MG: 10 TABLET ORAL at 08:52

## 2025-01-23 RX ADMIN — OXYCODONE HYDROCHLORIDE AND ACETAMINOPHEN 500 MG: 500 TABLET ORAL at 08:52

## 2025-01-23 RX ADMIN — Medication 1000 MCG: at 08:52

## 2025-01-23 RX ADMIN — ACETAMINOPHEN 650 MG: 325 TABLET ORAL at 06:48

## 2025-01-23 RX ADMIN — ACETAMINOPHEN 650 MG: 325 TABLET ORAL at 14:00

## 2025-01-23 RX ADMIN — ASPIRIN 81 MG CHEWABLE TABLET 81 MG: 81 TABLET CHEWABLE at 08:52

## 2025-01-23 RX ADMIN — Medication 5000 UNITS: at 21:02

## 2025-01-23 RX ADMIN — ACETAMINOPHEN 650 MG: 325 TABLET ORAL at 21:15

## 2025-01-23 RX ADMIN — SODIUM CHLORIDE 5 MG/HR: 9 INJECTION, SOLUTION INTRAVENOUS at 09:43

## 2025-01-23 RX ADMIN — PANTOPRAZOLE SODIUM 40 MG: 40 TABLET, DELAYED RELEASE ORAL at 06:47

## 2025-01-23 RX ADMIN — ACETAMINOPHEN 650 MG: 325 TABLET ORAL at 00:36

## 2025-01-23 RX ADMIN — GABAPENTIN 300 MG: 300 CAPSULE ORAL at 21:01

## 2025-01-23 RX ADMIN — ENOXAPARIN SODIUM 40 MG: 100 INJECTION SUBCUTANEOUS at 17:33

## 2025-01-23 RX ADMIN — GABAPENTIN 300 MG: 300 CAPSULE ORAL at 06:47

## 2025-01-23 RX ADMIN — FUROSEMIDE 40 MG: 10 INJECTION, SOLUTION INTRAMUSCULAR; INTRAVENOUS at 08:53

## 2025-01-23 RX ADMIN — MEMANTINE 5 MG: 10 TABLET ORAL at 21:02

## 2025-01-23 ASSESSMENT — PAIN DESCRIPTION - FREQUENCY
FREQUENCY: CONTINUOUS
FREQUENCY: CONTINUOUS

## 2025-01-23 ASSESSMENT — PAIN SCALES - GENERAL
PAINLEVEL_OUTOF10: 4
PAINLEVEL_OUTOF10: 0
PAINLEVEL_OUTOF10: 2
PAINLEVEL_OUTOF10: 3
PAINLEVEL_OUTOF10: 8
PAINLEVEL_OUTOF10: 0
PAINLEVEL_OUTOF10: 0
PAINLEVEL_OUTOF10: 8

## 2025-01-23 ASSESSMENT — PAIN DESCRIPTION - LOCATION
LOCATION: HEAD
LOCATION: LEG
LOCATION: HEAD;LEG
LOCATION: HEAD;LEG

## 2025-01-23 ASSESSMENT — PAIN - FUNCTIONAL ASSESSMENT
PAIN_FUNCTIONAL_ASSESSMENT: PREVENTS OR INTERFERES SOME ACTIVE ACTIVITIES AND ADLS
PAIN_FUNCTIONAL_ASSESSMENT: ACTIVITIES ARE NOT PREVENTED
PAIN_FUNCTIONAL_ASSESSMENT: PREVENTS OR INTERFERES SOME ACTIVE ACTIVITIES AND ADLS
PAIN_FUNCTIONAL_ASSESSMENT: PREVENTS OR INTERFERES SOME ACTIVE ACTIVITIES AND ADLS

## 2025-01-23 ASSESSMENT — PAIN DESCRIPTION - ONSET
ONSET: GRADUAL
ONSET: GRADUAL

## 2025-01-23 ASSESSMENT — PAIN DESCRIPTION - PAIN TYPE
TYPE: ACUTE PAIN
TYPE: ACUTE PAIN
TYPE: CHRONIC PAIN

## 2025-01-23 ASSESSMENT — PAIN DESCRIPTION - DESCRIPTORS
DESCRIPTORS: ACHING

## 2025-01-23 ASSESSMENT — PAIN DESCRIPTION - ORIENTATION
ORIENTATION: RIGHT;LEFT
ORIENTATION: RIGHT;LEFT
ORIENTATION: MID
ORIENTATION: RIGHT;LEFT;LOWER

## 2025-01-23 NOTE — ED NOTES
ED TO INPATIENT SBAR HANDOFF    Patient Name: Carol Xiong   :  1933  89 y.o.   Preferred Name    Family/Caregiver Present no   Restraints no   C-SSRS:    Sitter no   Sepsis Risk Score        Situation  Chief Complaint   Patient presents with    Hypertension    Headache     Brief Description of Patient's Condition: Pt presents to the ED with generalized swelling realted to CHF exacerbation. Pt also presents with HTN. Pt is currently on 7.5 mg/hr of Cardene to maintain a systolic BP of 160 or lower. Pt is aaox4. No resp distress noted. Skin warm and dry. Equal rise and fall of the chest.   Mental Status: oriented, alert, coherent, logical, thought processes intact, and able to concentrate and follow conversation  Arrived from: home    Imaging:   XR CHEST PORTABLE   Final Result      CT HEAD WO CONTRAST   Final Result        Abnormal labs:   Abnormal Labs Reviewed   TROPONIN - Abnormal; Notable for the following components:       Result Value    Troponin, High Sensitivity 19 (*)     All other components within normal limits   TROPONIN - Abnormal; Notable for the following components:    Troponin, High Sensitivity 27 (*)     All other components within normal limits   CBC WITH AUTO DIFFERENTIAL - Abnormal; Notable for the following components:    RBC 3.63 (*)     Hemoglobin 11.0 (*)     Hematocrit 36.8 (*)     .4 (*)     MCHC 29.9 (*)     Neutrophils % 68 (*)     Lymphocytes % 21 (*)     Monocytes % 8 (*)     All other components within normal limits   BRAIN NATRIURETIC PEPTIDE - Abnormal; Notable for the following components:    NT Pro-BNP 1,506 (*)     All other components within normal limits   COMPREHENSIVE METABOLIC PANEL - Abnormal; Notable for the following components:    Total Protein 5.3 (*)     Albumin 3.1 (*)     All other components within normal limits        Background  History:   Past Medical History:   Diagnosis Date    Abnormal echocardiogram 2024    See Epic notes    GERD

## 2025-01-23 NOTE — H&P
History and Physical      Name:  Carol Xiong /Age/Sex: 1933  (89 y.o. female)   MRN & CSN:  [id number] & [id number] Encounter Date/Time: 2025 8:08 PM   Location:  ED16/ED-16 PCP: Kayden Gonzalez MD       Hospital Day: 1    Assessment and Plan:     Patient is a 89 y.o. female who presented with elevated BP.     # Hypertensive urgency  # Acute decompensated heart failure  - Reported worsening SOB, orthopnea and LE edema over past 1-2 weeks with headache and elevated BP at NH. No on diuretics. Last TTE in 2024 with LVEF of 60-65%.   - Clinically hypervolemic, initial BP >220/100 mmHg. Pro-BNP >1.5k. CXR with pulmonary congestion. CTH non-acute.   - Started on Cardene gtt in ED with IV diuretics, continue. Resume home Norvasc and lisinopril, wean off gtt as tolerated. Strict I/O's, daily weights, telemetry.      # Non-MI troponin elevation  - Denied any typical CP.  - Initial Tn mildly elevated. ECG without acute ischemic changes.  - Likely secondary to above, follow-up repeat Tn. Continue ASA and Lipitor.     # Dementia without behavioral disturbances  - A&Ox3 in ED, attentive.  - Continue Namenda with delirium precautions, avoid benzodiazepines and anticholinergic medications.    # Hx of TIA  - Continue ASA and Lipitor.     # GERD  - Continue PPI.    Checklist:  Advanced care planning: full  Diet: cardiac with fluid and salt restriction   VTE ppx: Lovenox    Disposition: admit to inpatient.  Estimated discharge: 4-5 day(s).  Current living situation: nursing home.  Expected disposition: nursing home.    Spoke with ED provider who recommended admission for the patient and I agree with that plan.  Personally reviewed lab studies and imaging.  EKG interpreted personally and results as stated above.  Imaging that was interpreted personally and results as stated above.    History of Present Illness:     Chief Complaint: high blood pressure    Patient is a 89 y.o. female with a PMHx as above who

## 2025-01-23 NOTE — CARE COORDINATION
CM consult per Dr Richardson for discharge planning for pt here form home due to leg weakness.  Review of pt chart pt has [employer] Medicare and PCP pt discharged to SNF rehab 12/13/24.    CM visited pt who is alert and oriented. Pt states she lives at home, granddaughter lives with her and is her care giver. Pt states she is unable to ambulate, states she sits on the side of the bed and can transfer over to her W/C with assistance but not today legs were to weak.   CM discussed needs at discharge HHC vs returning to SNF. Pt states she has HHC therapy 2 tx a week since discharge from SNF.    Pt also states she was discharged from SNF when her insurance would no longer pay. Pt was not sure if insurance stopped due to not progressing of if she had exhausted her days.    POC at discharge may have to be discharge home with HHC if no SNF days left.    Pt was unable to tell this CM who her HHC agency is, states her granddaughter has the information.    POC is for admission at this time. LUPE,RN/CM

## 2025-01-23 NOTE — CARE COORDINATION
01/23/25 1619   Service Assessment   Patient Orientation Alert and Oriented   Cognition Alert   History Provided By Patient;Medical Record;Child/Family   Primary Caregiver Other (Comment)  (granddaughter)   Support Systems Family Members   Patient's Healthcare Decision Maker is: Legal Next of Kin   PCP Verified by CM Yes   Last Visit to PCP Within last 6 months   Prior Functional Level Assistance with the following:;Bathing;Dressing;Toileting;Cooking;Housework;Shopping;Mobility   Current Functional Level Assistance with the following:;Bathing;Toileting;Mobility  (hospital policy)   Can patient return to prior living arrangement Yes   Ability to make needs known: Fair   Family able to assist with home care needs: Yes   Would you like for me to discuss the discharge plan with any other family members/significant others, and if so, who? Yes  (granddaughter, Coleen)   Financial Resources Medicare   Community Resources ECF/Home Care   Social/Functional History   Lives With Family   Type of Home House   Home Layout One level   Home Access Level entry   Prior Level of Assist for ADLs Needs assistance   Prior Level of Assist for Homemaking Needs assistance   Homemaking Responsibilities No   Ambulation Assistance Non-ambulatory  (uses wheelchair)   Prior Level of Assist for Transfers Needs assistance   Active  No   Patient's  Info Rides Plus or granddaughter   Mode of Transportation Car   Discharge Planning   Type of Residence Skilled Nursing Facility;House   Living Arrangements Family Members   Current Services Prior To Admission Home Care   Potential Assistance Needed Skilled Nursing Facility   Type of Home Care Services PT;Nursing Services   Patient expects to be discharged to: House   Services At/After Discharge   Services At/After Discharge Skilled Nursing Facility (SNF)     CM reviewed chart & discussed in IDR. Pt is from home and granddaughter lives with pt and is the caregiver. Pt has DME and does not

## 2025-01-23 NOTE — DISCHARGE INSTR - DIET
soy milk, or smoothies instead of low-calorie beverages such as diet drinks or water.  Cook with milk or soy milk instead of water when making dishes such as hot cereal, cocoa, or pudding.  Add jelly, jam, honey, butter or margarine to bread and crackers. Add jam or fruit to ice cream and as a topping over cake.  Mix dried fruit, nuts, granola, honey, or dry cereal with yogurt or hot cereals.  Enjoy snacks such as milkshakes, smoothies, pudding, ice cream, or custard.  Blend a fruit smoothie of a banana, frozen berries, milk or soy milk, and 1 tablespoon nonfat powdered milk or protein powder.  Add Protein to Your Meals and Snacks  Choose at least one protein food at each meal and snack to increase your daily intake.  Strategies  Add ¼ cup nonfat dry milk powder or protein powder to make a high-protein milk to drink or to use in recipes that call for milk. Vanilla or peppermint extract or unsweetened cocoa powder could help to boost the flavor.  Add hard-cooked eggs, leftover meat, grated cheese, canned beans or tofu to noodles, rice, salads, sandwiches, soups, casseroles, pasta, tuna and other mixed dishes.  Add powdered milk or protein powder to hot cereals, meatloaf, casseroles, scrambled eggs, sauces, cream soups, and shakes.  Add beans and lentils to salads, soups, casseroles, and vegetable dishes.  Eat cottage cheese or yogurt, especially Greek yogurt, with fruit as a snack or dessert.  Eat peanut or other nut butters on crackers, bread, toast, waffles, apples, bananas or celery sticks. Add it to milkshakes, smoothies, or desserts.  Consider a ready-made protein shake. Your RDN will make recommendations.  Add Fats to Your Meals and Snacks  Try adding fats to your meals and snacks. Fat provides more calories in fewer bites than carbohydrate or protein and adds flavors to your foods.  Strategies  Snack on nuts and seeds or add them to foods like salads, pasta, cereals, yogurt, and ice cream.   Sauté or stir-morillo

## 2025-01-24 LAB
ANION GAP SERPL CALCULATED.3IONS-SCNC: 8 MMOL/L (ref 9–17)
BUN SERPL-MCNC: 12 MG/DL (ref 7–20)
CALCIUM SERPL-MCNC: 8.3 MG/DL (ref 8.3–10.6)
CHLORIDE SERPL-SCNC: 100 MMOL/L (ref 99–110)
CO2 SERPL-SCNC: 30 MMOL/L (ref 21–32)
CREAT SERPL-MCNC: 0.6 MG/DL (ref 0.6–1.2)
ERYTHROCYTE [DISTWIDTH] IN BLOOD BY AUTOMATED COUNT: 13.8 % (ref 11.7–14.9)
GFR, ESTIMATED: 87 ML/MIN/1.73M2
GLUCOSE SERPL-MCNC: 81 MG/DL (ref 74–99)
HCT VFR BLD AUTO: 30.9 % (ref 37–47)
HGB BLD-MCNC: 9.8 G/DL (ref 12.5–16)
MAGNESIUM SERPL-MCNC: 1.5 MG/DL (ref 1.8–2.4)
MAGNESIUM SERPL-MCNC: 1.6 MG/DL (ref 1.8–2.4)
MCH RBC QN AUTO: 30.8 PG (ref 27–31)
MCHC RBC AUTO-ENTMCNC: 31.7 G/DL (ref 32–36)
MCV RBC AUTO: 97.2 FL (ref 78–100)
PLATELET # BLD AUTO: 275 K/UL (ref 140–440)
PMV BLD AUTO: 10.5 FL (ref 7.5–11.1)
POTASSIUM SERPL-SCNC: 3.4 MMOL/L (ref 3.5–5.1)
RBC # BLD AUTO: 3.18 M/UL (ref 4.2–5.4)
SODIUM SERPL-SCNC: 138 MMOL/L (ref 136–145)
WBC OTHER # BLD: 5.3 K/UL (ref 4–10.5)

## 2025-01-24 PROCEDURE — 99233 SBSQ HOSP IP/OBS HIGH 50: CPT | Performed by: INTERNAL MEDICINE

## 2025-01-24 PROCEDURE — 80048 BASIC METABOLIC PNL TOTAL CA: CPT

## 2025-01-24 PROCEDURE — 6370000000 HC RX 637 (ALT 250 FOR IP): Performed by: STUDENT IN AN ORGANIZED HEALTH CARE EDUCATION/TRAINING PROGRAM

## 2025-01-24 PROCEDURE — 85027 COMPLETE CBC AUTOMATED: CPT

## 2025-01-24 PROCEDURE — 6360000002 HC RX W HCPCS: Performed by: STUDENT IN AN ORGANIZED HEALTH CARE EDUCATION/TRAINING PROGRAM

## 2025-01-24 PROCEDURE — APPNB15 APP NON BILLABLE TIME 0-15 MINS

## 2025-01-24 PROCEDURE — 2500000003 HC RX 250 WO HCPCS: Performed by: STUDENT IN AN ORGANIZED HEALTH CARE EDUCATION/TRAINING PROGRAM

## 2025-01-24 PROCEDURE — 2060000000 HC ICU INTERMEDIATE R&B

## 2025-01-24 PROCEDURE — 36415 COLL VENOUS BLD VENIPUNCTURE: CPT

## 2025-01-24 PROCEDURE — 94761 N-INVAS EAR/PLS OXIMETRY MLT: CPT

## 2025-01-24 PROCEDURE — 83735 ASSAY OF MAGNESIUM: CPT

## 2025-01-24 PROCEDURE — 6370000000 HC RX 637 (ALT 250 FOR IP): Performed by: INTERNAL MEDICINE

## 2025-01-24 RX ADMIN — ATORVASTATIN CALCIUM 20 MG: 10 TABLET, FILM COATED ORAL at 20:43

## 2025-01-24 RX ADMIN — ASPIRIN 81 MG CHEWABLE TABLET 81 MG: 81 TABLET CHEWABLE at 09:41

## 2025-01-24 RX ADMIN — Medication 5000 UNITS: at 20:42

## 2025-01-24 RX ADMIN — HYDRALAZINE HYDROCHLORIDE 50 MG: 50 TABLET ORAL at 16:34

## 2025-01-24 RX ADMIN — GABAPENTIN 300 MG: 300 CAPSULE ORAL at 20:43

## 2025-01-24 RX ADMIN — ACETAMINOPHEN 650 MG: 325 TABLET ORAL at 20:43

## 2025-01-24 RX ADMIN — PANTOPRAZOLE SODIUM 40 MG: 40 TABLET, DELAYED RELEASE ORAL at 06:32

## 2025-01-24 RX ADMIN — Medication 1000 MCG: at 09:41

## 2025-01-24 RX ADMIN — FUROSEMIDE 40 MG: 10 INJECTION, SOLUTION INTRAMUSCULAR; INTRAVENOUS at 18:06

## 2025-01-24 RX ADMIN — OXYCODONE HYDROCHLORIDE AND ACETAMINOPHEN 500 MG: 500 TABLET ORAL at 09:40

## 2025-01-24 RX ADMIN — LISINOPRIL 40 MG: 20 TABLET ORAL at 09:39

## 2025-01-24 RX ADMIN — ENOXAPARIN SODIUM 40 MG: 100 INJECTION SUBCUTANEOUS at 18:06

## 2025-01-24 RX ADMIN — HYDRALAZINE HYDROCHLORIDE 50 MG: 50 TABLET ORAL at 06:32

## 2025-01-24 RX ADMIN — FOLIC ACID 1 MG: 1 TABLET ORAL at 09:40

## 2025-01-24 RX ADMIN — GABAPENTIN 300 MG: 300 CAPSULE ORAL at 09:41

## 2025-01-24 RX ADMIN — MEMANTINE 5 MG: 10 TABLET ORAL at 20:42

## 2025-01-24 RX ADMIN — HYDRALAZINE HYDROCHLORIDE 50 MG: 50 TABLET ORAL at 20:42

## 2025-01-24 RX ADMIN — SODIUM CHLORIDE, PRESERVATIVE FREE 10 ML: 5 INJECTION INTRAVENOUS at 09:41

## 2025-01-24 RX ADMIN — FUROSEMIDE 40 MG: 10 INJECTION, SOLUTION INTRAMUSCULAR; INTRAVENOUS at 09:41

## 2025-01-24 RX ADMIN — POTASSIUM CHLORIDE 20 MEQ: 1500 TABLET, EXTENDED RELEASE ORAL at 09:40

## 2025-01-24 RX ADMIN — MEMANTINE 5 MG: 10 TABLET ORAL at 09:40

## 2025-01-24 ASSESSMENT — PAIN - FUNCTIONAL ASSESSMENT: PAIN_FUNCTIONAL_ASSESSMENT: ACTIVITIES ARE NOT PREVENTED

## 2025-01-24 ASSESSMENT — PAIN DESCRIPTION - ORIENTATION: ORIENTATION: RIGHT;LEFT;LOWER

## 2025-01-24 ASSESSMENT — PAIN SCALES - GENERAL
PAINLEVEL_OUTOF10: 2
PAINLEVEL_OUTOF10: 4
PAINLEVEL_OUTOF10: 0

## 2025-01-24 ASSESSMENT — PAIN DESCRIPTION - PAIN TYPE: TYPE: CHRONIC PAIN

## 2025-01-24 ASSESSMENT — PAIN DESCRIPTION - LOCATION: LOCATION: LEG

## 2025-01-24 ASSESSMENT — PAIN DESCRIPTION - DESCRIPTORS: DESCRIPTORS: ACHING

## 2025-01-24 NOTE — CARE COORDINATION
Noted info in pts chart per Nitza. Notified [facility] if pt go home that pt uses a space heater, has a hard time ambulating & Coleen grand daughter has a hard time turning pt. .

## 2025-01-24 NOTE — DISCHARGE INSTR - COC
rhinitis J30.9    Anemia D64.9    Altered mental status R41.82    Bradycardia R00.1    Carotid stenosis, right I65.21    History of TIA (transient ischemic attack) Z86.73    Stage 3a chronic kidney disease (HCC) N18.31    Moderate late onset Alzheimer's dementia without behavioral disturbance, psychotic disturbance, mood disturbance, or anxiety (HCC) G30.1, F02.B0    Fear for personal safety F40.9    Severe malnutrition (HCC) E43    Vitamin D deficiency E55.9    Weight loss R63.4    Hyponatremia E87.1    Moderate malnutrition (HCC) E44.0    Pharyngoesophageal dysphagia R13.14    Esophageal polyp K22.81    Pain around PEG tube site T85.848A    TIA (transient ischemic attack) G45.9    Acute encephalopathy G93.40    Stroke-like symptoms R29.90    Hypertensive urgency I16.0       Isolation/Infection:   Isolation            No Isolation          Patient Infection Status       Infection Onset Added Last Indicated Last Indicated By Review Planned Expiration Resolved Resolved By    None active    Resolved    COVID-19 01/09/23 01/10/23 01/09/23 COVID-19   23 Infection                        Nurse Assessment:  Last Vital Signs: BP (!) 149/49   Pulse 72   Temp 98.4 °F (36.9 °C) (Oral)   Resp 19   Ht 1.753 m (5' 9\")   Wt 74 kg (163 lb 2.3 oz)   SpO2 100%   BMI 24.09 kg/m²     Last documented pain score (0-10 scale): Pain Level: 0  Last Weight:   Wt Readings from Last 1 Encounters:   25 74 kg (163 lb 2.3 oz)     Mental Status:  oriented, alert, coherent, logical, and thought processes intact    IV Access:  - None    Nursing Mobility/ADLs:  Walking   Dependent  Transfer  Dependent  Bathing  Dependent  Dressing  Dependent  Toileting  Dependent  Feeding  Independent  Med Admin  Assisted  Med Delivery   crushed and Applesauce    Wound Care Documentation and Therapy:  Wound 24 Toe (Comment  which one) Right nail from the 4th toe is growing sideways and rubbing against 3rd toe (Active)   Number of days:

## 2025-01-24 NOTE — CARE COORDINATION
SAPNA called Anu with [facility] to determine if pt has any skilled days remaining. Anu returned CM call and stated pt has days remaining but there is a c0-pay of $10. a day. Pt has a balance due at  for $170. likely from co-pays at last visit at . Anu spoke with pt and pt told Anu to speak with her granddaughter as elinor-angelita gets pts check and pays the bills. Anu called granddaughter to inform her of this and she hesitated, and said that she will probably need to take pt home. Pt is active with [facility].  12:30 SAPNA received a call from Coleen, pts granddaughter, who stated that pt wants to go to  and is willing to pay the co-pay. CM called Anu with [facility] and left a secure VM informing her that pt does want to go to .  13:20 Anu with [facility] to  desk stating that the business office needs pt to pay past due amount and some toward this stay. Anu spoke with Coleen who stated that she was unable to do that as pt does not receive her SS check until the first of the month. Coleen would like SAPNA to give referral to [facility]. Referral given to Arabella with [facility].  17:15 Arabella can accept pt at [facility]. SAPNA asked [facility], to start pre-cert.

## 2025-01-25 PROCEDURE — 6370000000 HC RX 637 (ALT 250 FOR IP): Performed by: STUDENT IN AN ORGANIZED HEALTH CARE EDUCATION/TRAINING PROGRAM

## 2025-01-25 PROCEDURE — 6370000000 HC RX 637 (ALT 250 FOR IP): Performed by: NURSE PRACTITIONER

## 2025-01-25 PROCEDURE — 97112 NEUROMUSCULAR REEDUCATION: CPT

## 2025-01-25 PROCEDURE — 2500000003 HC RX 250 WO HCPCS: Performed by: STUDENT IN AN ORGANIZED HEALTH CARE EDUCATION/TRAINING PROGRAM

## 2025-01-25 PROCEDURE — 2060000000 HC ICU INTERMEDIATE R&B

## 2025-01-25 PROCEDURE — APPNB60 APP NON BILLABLE TIME 46-60 MINS: Performed by: NURSE PRACTITIONER

## 2025-01-25 PROCEDURE — 6370000000 HC RX 637 (ALT 250 FOR IP): Performed by: INTERNAL MEDICINE

## 2025-01-25 PROCEDURE — 6360000002 HC RX W HCPCS: Performed by: STUDENT IN AN ORGANIZED HEALTH CARE EDUCATION/TRAINING PROGRAM

## 2025-01-25 PROCEDURE — 94761 N-INVAS EAR/PLS OXIMETRY MLT: CPT

## 2025-01-25 PROCEDURE — 51702 INSERT TEMP BLADDER CATH: CPT

## 2025-01-25 PROCEDURE — 99233 SBSQ HOSP IP/OBS HIGH 50: CPT | Performed by: INTERNAL MEDICINE

## 2025-01-25 PROCEDURE — 97530 THERAPEUTIC ACTIVITIES: CPT

## 2025-01-25 PROCEDURE — 6370000000 HC RX 637 (ALT 250 FOR IP)

## 2025-01-25 RX ADMIN — Medication 5000 UNITS: at 20:58

## 2025-01-25 RX ADMIN — ACETAMINOPHEN 650 MG: 325 TABLET ORAL at 20:57

## 2025-01-25 RX ADMIN — Medication 1000 MCG: at 08:55

## 2025-01-25 RX ADMIN — GABAPENTIN 300 MG: 300 CAPSULE ORAL at 20:58

## 2025-01-25 RX ADMIN — ENOXAPARIN SODIUM 40 MG: 100 INJECTION SUBCUTANEOUS at 17:34

## 2025-01-25 RX ADMIN — LISINOPRIL 40 MG: 20 TABLET ORAL at 08:55

## 2025-01-25 RX ADMIN — GABAPENTIN 300 MG: 300 CAPSULE ORAL at 08:56

## 2025-01-25 RX ADMIN — FUROSEMIDE 40 MG: 10 INJECTION, SOLUTION INTRAMUSCULAR; INTRAVENOUS at 17:34

## 2025-01-25 RX ADMIN — ASPIRIN 81 MG CHEWABLE TABLET 81 MG: 81 TABLET CHEWABLE at 08:56

## 2025-01-25 RX ADMIN — HYDRALAZINE HYDROCHLORIDE 50 MG: 50 TABLET ORAL at 06:11

## 2025-01-25 RX ADMIN — POTASSIUM CHLORIDE 20 MEQ: 1500 TABLET, EXTENDED RELEASE ORAL at 08:56

## 2025-01-25 RX ADMIN — PANTOPRAZOLE SODIUM 40 MG: 40 TABLET, DELAYED RELEASE ORAL at 06:11

## 2025-01-25 RX ADMIN — MEMANTINE 5 MG: 10 TABLET ORAL at 20:58

## 2025-01-25 RX ADMIN — HYDRALAZINE HYDROCHLORIDE 50 MG: 50 TABLET ORAL at 20:58

## 2025-01-25 RX ADMIN — MELATONIN TAB 3 MG 3 MG: 3 TAB at 20:57

## 2025-01-25 RX ADMIN — AMLODIPINE BESYLATE 10 MG: 10 TABLET ORAL at 08:56

## 2025-01-25 RX ADMIN — ATORVASTATIN CALCIUM 20 MG: 10 TABLET, FILM COATED ORAL at 20:58

## 2025-01-25 RX ADMIN — EMPAGLIFLOZIN 10 MG: 10 TABLET, FILM COATED ORAL at 12:18

## 2025-01-25 RX ADMIN — OXYCODONE HYDROCHLORIDE AND ACETAMINOPHEN 500 MG: 500 TABLET ORAL at 08:56

## 2025-01-25 RX ADMIN — MEMANTINE 5 MG: 10 TABLET ORAL at 08:56

## 2025-01-25 RX ADMIN — HYDRALAZINE HYDROCHLORIDE 50 MG: 50 TABLET ORAL at 13:42

## 2025-01-25 RX ADMIN — FUROSEMIDE 40 MG: 10 INJECTION, SOLUTION INTRAMUSCULAR; INTRAVENOUS at 09:29

## 2025-01-25 RX ADMIN — FOLIC ACID 1 MG: 1 TABLET ORAL at 08:56

## 2025-01-25 RX ADMIN — SODIUM CHLORIDE, PRESERVATIVE FREE 10 ML: 5 INJECTION INTRAVENOUS at 20:59

## 2025-01-25 RX ADMIN — SODIUM CHLORIDE, PRESERVATIVE FREE 10 ML: 5 INJECTION INTRAVENOUS at 08:56

## 2025-01-25 ASSESSMENT — PAIN SCALES - GENERAL
PAINLEVEL_OUTOF10: 0
PAINLEVEL_OUTOF10: 0
PAINLEVEL_OUTOF10: 3

## 2025-01-25 ASSESSMENT — PAIN DESCRIPTION - FREQUENCY: FREQUENCY: CONTINUOUS

## 2025-01-25 ASSESSMENT — PAIN DESCRIPTION - DESCRIPTORS
DESCRIPTORS: ACHING
DESCRIPTORS: ACHING

## 2025-01-25 ASSESSMENT — PAIN DESCRIPTION - PAIN TYPE: TYPE: CHRONIC PAIN

## 2025-01-25 ASSESSMENT — PAIN DESCRIPTION - LOCATION
LOCATION: KNEE;LEG
LOCATION: FOOT;LEG

## 2025-01-25 ASSESSMENT — PAIN DESCRIPTION - ORIENTATION: ORIENTATION: RIGHT;LEFT

## 2025-01-25 ASSESSMENT — PAIN - FUNCTIONAL ASSESSMENT: PAIN_FUNCTIONAL_ASSESSMENT: PREVENTS OR INTERFERES SOME ACTIVE ACTIVITIES AND ADLS

## 2025-01-25 NOTE — CARE COORDINATION
Msg left for RN CM Director, MICHELLE Huitron, inquiring about precert status.  CM following.  Electronically signed by KEVIN Hutchins on 1/25/2025 at 8:53 AM

## 2025-01-26 ENCOUNTER — APPOINTMENT (OUTPATIENT)
Dept: GENERAL RADIOLOGY | Age: 89
DRG: 291 | End: 2025-01-26
Attending: STUDENT IN AN ORGANIZED HEALTH CARE EDUCATION/TRAINING PROGRAM
Payer: MEDICARE

## 2025-01-26 ENCOUNTER — APPOINTMENT (OUTPATIENT)
Dept: ULTRASOUND IMAGING | Age: 89
DRG: 291 | End: 2025-01-26
Payer: MEDICARE

## 2025-01-26 ENCOUNTER — APPOINTMENT (OUTPATIENT)
Dept: CT IMAGING | Age: 89
DRG: 291 | End: 2025-01-26
Payer: MEDICARE

## 2025-01-26 LAB
AMMONIA PLAS-SCNC: 19 UMOL/L (ref 11–51)
ANION GAP SERPL CALCULATED.3IONS-SCNC: 8 MMOL/L (ref 9–17)
ANTI-XA UNFRAC HEPARIN: <0.1 IU/L
ARTERIAL PATENCY WRIST A: YES
BASOPHILS # BLD: 0.02 K/UL
BASOPHILS NFR BLD: 0 % (ref 0–1)
BDY SITE: ABNORMAL
BILIRUB UR QL STRIP: NEGATIVE
BODY TEMPERATURE: 37
BUN SERPL-MCNC: 14 MG/DL (ref 7–20)
CALCIUM SERPL-MCNC: 8.3 MG/DL (ref 8.3–10.6)
CHLORIDE SERPL-SCNC: 100 MMOL/L (ref 99–110)
CLARITY UR: CLEAR
CO2 SERPL-SCNC: 32 MMOL/L (ref 21–32)
COHGB MFR BLD: 0.8 % (ref 0.5–1.5)
COLOR UR: YELLOW
COMMENT: ABNORMAL
CREAT SERPL-MCNC: 0.6 MG/DL (ref 0.6–1.2)
EOSINOPHIL # BLD: 0.02 K/UL
EOSINOPHILS RELATIVE PERCENT: 0 % (ref 0–3)
ERYTHROCYTE [DISTWIDTH] IN BLOOD BY AUTOMATED COUNT: 13.6 % (ref 11.7–14.9)
GFR, ESTIMATED: 88 ML/MIN/1.73M2
GLUCOSE BLD-MCNC: 133 MG/DL (ref 74–99)
GLUCOSE BLD-MCNC: 141 MG/DL (ref 74–99)
GLUCOSE SERPL-MCNC: 97 MG/DL (ref 74–99)
GLUCOSE UR STRIP-MCNC: >=1000 MG/DL
HCO3 ARTERIAL: 29.1 MMOL/L (ref 21–28)
HCT VFR BLD AUTO: 33.6 % (ref 37–47)
HGB BLD-MCNC: 10.5 G/DL (ref 12.5–16)
HGB UR QL STRIP.AUTO: NEGATIVE
IMM GRANULOCYTES # BLD AUTO: 0.04 K/UL
IMM GRANULOCYTES NFR BLD: 0 %
INR PPP: 0.9
KETONES UR STRIP-MCNC: NEGATIVE MG/DL
LACTATE BLDV-SCNC: 4.7 MMOL/L (ref 0.4–2)
LEUKOCYTE ESTERASE UR QL STRIP: NEGATIVE
LYMPHOCYTES NFR BLD: 1.1 K/UL
LYMPHOCYTES RELATIVE PERCENT: 10 % (ref 24–44)
MCH RBC QN AUTO: 30.6 PG (ref 27–31)
MCHC RBC AUTO-ENTMCNC: 31.3 G/DL (ref 32–36)
MCV RBC AUTO: 98 FL (ref 78–100)
METHEMOGLOBIN: 0.3 % (ref 0.5–1.5)
MONOCYTES NFR BLD: 0.66 K/UL
MONOCYTES NFR BLD: 6 % (ref 0–4)
NEUTROPHILS NFR BLD: 84 % (ref 36–66)
NEUTS SEG NFR BLD: 9.49 K/UL
NITRITE UR QL STRIP: NEGATIVE
OXYHGB MFR BLD: 92.8 %
PARTIAL THROMBOPLASTIN TIME: 28.9 SEC (ref 25.1–37.1)
PCO2 ARTERIAL: 47.1 MMHG (ref 32–45)
PH ARTERIAL: 7.41 (ref 7.35–7.45)
PH UR STRIP: 7 [PH] (ref 5–8)
PLATELET # BLD AUTO: 324 K/UL (ref 140–440)
PMV BLD AUTO: 10.5 FL (ref 7.5–11.1)
PO2 ARTERIAL: 76.1 MMHG (ref 83–108)
POSITIVE BASE EXCESS, ART: 3.8 MMOL/L (ref 0–3)
POTASSIUM SERPL-SCNC: 3.4 MMOL/L (ref 3.5–5.1)
PROCALCITONIN SERPL-MCNC: 0.07 NG/ML
PROLACTIN SERPL-MCNC: 64.2 NG/ML
PROT UR STRIP-MCNC: NEGATIVE MG/DL
PROTHROMBIN TIME: 12.2 SEC (ref 11.7–14.5)
RBC # BLD AUTO: 3.43 M/UL (ref 4.2–5.4)
SODIUM SERPL-SCNC: 140 MMOL/L (ref 136–145)
SP GR UR STRIP: 1.01 (ref 1–1.03)
UROBILINOGEN UR STRIP-ACNC: 1 EU/DL (ref 0–1)
WBC OTHER # BLD: 11.3 K/UL (ref 4–10.5)

## 2025-01-26 PROCEDURE — 85384 FIBRINOGEN ACTIVITY: CPT

## 2025-01-26 PROCEDURE — 71045 X-RAY EXAM CHEST 1 VIEW: CPT

## 2025-01-26 PROCEDURE — XX20X89 MONITORING OF BRAIN ELECTRICAL ACTIVITY, COMPUTER-AIDED DETECTION AND NOTIFICATION, NEW TECHNOLOGY GROUP 9: ICD-10-PCS | Performed by: STUDENT IN AN ORGANIZED HEALTH CARE EDUCATION/TRAINING PROGRAM

## 2025-01-26 PROCEDURE — 2000000000 HC ICU R&B

## 2025-01-26 PROCEDURE — 6370000000 HC RX 637 (ALT 250 FOR IP): Performed by: STUDENT IN AN ORGANIZED HEALTH CARE EDUCATION/TRAINING PROGRAM

## 2025-01-26 PROCEDURE — 82330 ASSAY OF CALCIUM: CPT

## 2025-01-26 PROCEDURE — 6370000000 HC RX 637 (ALT 250 FOR IP): Performed by: INTERNAL MEDICINE

## 2025-01-26 PROCEDURE — 36600 WITHDRAWAL OF ARTERIAL BLOOD: CPT

## 2025-01-26 PROCEDURE — 2700000000 HC OXYGEN THERAPY PER DAY

## 2025-01-26 PROCEDURE — 6360000002 HC RX W HCPCS: Performed by: NURSE PRACTITIONER

## 2025-01-26 PROCEDURE — 83036 HEMOGLOBIN GLYCOSYLATED A1C: CPT

## 2025-01-26 PROCEDURE — 6360000002 HC RX W HCPCS: Performed by: STUDENT IN AN ORGANIZED HEALTH CARE EDUCATION/TRAINING PROGRAM

## 2025-01-26 PROCEDURE — 2500000003 HC RX 250 WO HCPCS: Performed by: STUDENT IN AN ORGANIZED HEALTH CARE EDUCATION/TRAINING PROGRAM

## 2025-01-26 PROCEDURE — 70498 CT ANGIOGRAPHY NECK: CPT

## 2025-01-26 PROCEDURE — 84443 ASSAY THYROID STIM HORMONE: CPT

## 2025-01-26 PROCEDURE — 84436 ASSAY OF TOTAL THYROXINE: CPT

## 2025-01-26 PROCEDURE — 6370000000 HC RX 637 (ALT 250 FOR IP)

## 2025-01-26 PROCEDURE — 99233 SBSQ HOSP IP/OBS HIGH 50: CPT | Performed by: INTERNAL MEDICINE

## 2025-01-26 PROCEDURE — APPNB60 APP NON BILLABLE TIME 46-60 MINS: Performed by: NURSE PRACTITIONER

## 2025-01-26 PROCEDURE — 93971 EXTREMITY STUDY: CPT

## 2025-01-26 PROCEDURE — 70450 CT HEAD/BRAIN W/O DYE: CPT

## 2025-01-26 PROCEDURE — 80061 LIPID PANEL: CPT

## 2025-01-26 PROCEDURE — 95705 EEG W/O VID 2-12 HR UNMNTR: CPT

## 2025-01-26 PROCEDURE — 82746 ASSAY OF FOLIC ACID SERUM: CPT

## 2025-01-26 PROCEDURE — 51798 US URINE CAPACITY MEASURE: CPT

## 2025-01-26 PROCEDURE — 6360000004 HC RX CONTRAST MEDICATION: Performed by: STUDENT IN AN ORGANIZED HEALTH CARE EDUCATION/TRAINING PROGRAM

## 2025-01-26 PROCEDURE — 83605 ASSAY OF LACTIC ACID: CPT

## 2025-01-26 PROCEDURE — 82248 BILIRUBIN DIRECT: CPT

## 2025-01-26 PROCEDURE — 82607 VITAMIN B-12: CPT

## 2025-01-26 PROCEDURE — 84100 ASSAY OF PHOSPHORUS: CPT

## 2025-01-26 PROCEDURE — 94761 N-INVAS EAR/PLS OXIMETRY MLT: CPT

## 2025-01-26 PROCEDURE — 2500000003 HC RX 250 WO HCPCS: Performed by: NURSE PRACTITIONER

## 2025-01-26 PROCEDURE — 93970 EXTREMITY STUDY: CPT

## 2025-01-26 PROCEDURE — 6370000000 HC RX 637 (ALT 250 FOR IP): Performed by: NURSE PRACTITIONER

## 2025-01-26 RX ORDER — LEVETIRACETAM 500 MG/5ML
2000 INJECTION, SOLUTION, CONCENTRATE INTRAVENOUS ONCE
Status: COMPLETED | OUTPATIENT
Start: 2025-01-26 | End: 2025-01-26

## 2025-01-26 RX ORDER — POTASSIUM CHLORIDE 1500 MG/1
20 TABLET, EXTENDED RELEASE ORAL
Status: DISCONTINUED | OUTPATIENT
Start: 2025-01-26 | End: 2025-01-26

## 2025-01-26 RX ORDER — LISINOPRIL 20 MG/1
20 TABLET ORAL DAILY
Status: DISCONTINUED | OUTPATIENT
Start: 2025-01-27 | End: 2025-01-29

## 2025-01-26 RX ORDER — LEVETIRACETAM 500 MG/5ML
500 INJECTION, SOLUTION, CONCENTRATE INTRAVENOUS 2 TIMES DAILY
Status: DISCONTINUED | OUTPATIENT
Start: 2025-01-26 | End: 2025-01-30 | Stop reason: HOSPADM

## 2025-01-26 RX ORDER — HEPARIN SODIUM 1000 [USP'U]/ML
40 INJECTION, SOLUTION INTRAVENOUS; SUBCUTANEOUS PRN
Status: DISCONTINUED | OUTPATIENT
Start: 2025-01-26 | End: 2025-01-30

## 2025-01-26 RX ORDER — LEVETIRACETAM 500 MG/1
500 TABLET ORAL 2 TIMES DAILY
Status: DISCONTINUED | OUTPATIENT
Start: 2025-01-26 | End: 2025-01-30 | Stop reason: HOSPADM

## 2025-01-26 RX ORDER — AMLODIPINE BESYLATE 5 MG/1
5 TABLET ORAL DAILY
Status: DISCONTINUED | OUTPATIENT
Start: 2025-01-27 | End: 2025-01-30 | Stop reason: HOSPADM

## 2025-01-26 RX ORDER — HYDRALAZINE HYDROCHLORIDE 20 MG/ML
5 INJECTION INTRAMUSCULAR; INTRAVENOUS EVERY 6 HOURS PRN
Status: DISCONTINUED | OUTPATIENT
Start: 2025-01-26 | End: 2025-01-30 | Stop reason: HOSPADM

## 2025-01-26 RX ORDER — IOPAMIDOL 755 MG/ML
75 INJECTION, SOLUTION INTRAVASCULAR
Status: COMPLETED | OUTPATIENT
Start: 2025-01-26 | End: 2025-01-26

## 2025-01-26 RX ORDER — HEPARIN SODIUM 1000 [USP'U]/ML
80 INJECTION, SOLUTION INTRAVENOUS; SUBCUTANEOUS ONCE
Status: COMPLETED | OUTPATIENT
Start: 2025-01-26 | End: 2025-01-26

## 2025-01-26 RX ORDER — M-VIT,TX,IRON,MINS/CALC/FOLIC 27MG-0.4MG
1 TABLET ORAL DAILY
Status: DISCONTINUED | OUTPATIENT
Start: 2025-01-27 | End: 2025-01-30 | Stop reason: HOSPADM

## 2025-01-26 RX ORDER — HEPARIN SODIUM 10000 [USP'U]/100ML
5-30 INJECTION, SOLUTION INTRAVENOUS CONTINUOUS
Status: DISCONTINUED | OUTPATIENT
Start: 2025-01-26 | End: 2025-01-29

## 2025-01-26 RX ORDER — HEPARIN SODIUM 1000 [USP'U]/ML
80 INJECTION, SOLUTION INTRAVENOUS; SUBCUTANEOUS PRN
Status: DISCONTINUED | OUTPATIENT
Start: 2025-01-26 | End: 2025-01-26

## 2025-01-26 RX ORDER — HEPARIN SODIUM 1000 [USP'U]/ML
80 INJECTION, SOLUTION INTRAVENOUS; SUBCUTANEOUS PRN
Status: DISCONTINUED | OUTPATIENT
Start: 2025-01-26 | End: 2025-01-30

## 2025-01-26 RX ORDER — HEPARIN SODIUM 1000 [USP'U]/ML
40 INJECTION, SOLUTION INTRAVENOUS; SUBCUTANEOUS PRN
Status: DISCONTINUED | OUTPATIENT
Start: 2025-01-26 | End: 2025-01-26

## 2025-01-26 RX ORDER — LANOLIN ALCOHOL/MO/W.PET/CERES
100 CREAM (GRAM) TOPICAL DAILY
Status: DISCONTINUED | OUTPATIENT
Start: 2025-01-27 | End: 2025-01-30 | Stop reason: HOSPADM

## 2025-01-26 RX ORDER — ZINC SULFATE 50(220)MG
50 CAPSULE ORAL DAILY
Status: DISCONTINUED | OUTPATIENT
Start: 2025-01-27 | End: 2025-01-30 | Stop reason: HOSPADM

## 2025-01-26 RX ADMIN — GABAPENTIN 300 MG: 300 CAPSULE ORAL at 09:07

## 2025-01-26 RX ADMIN — FUROSEMIDE 40 MG: 10 INJECTION, SOLUTION INTRAMUSCULAR; INTRAVENOUS at 17:24

## 2025-01-26 RX ADMIN — ASPIRIN 81 MG CHEWABLE TABLET 81 MG: 81 TABLET CHEWABLE at 09:07

## 2025-01-26 RX ADMIN — OXYCODONE HYDROCHLORIDE AND ACETAMINOPHEN 500 MG: 500 TABLET ORAL at 09:07

## 2025-01-26 RX ADMIN — LEVETIRACETAM 500 MG: 500 INJECTION INTRAVENOUS at 22:50

## 2025-01-26 RX ADMIN — MEMANTINE 5 MG: 10 TABLET ORAL at 09:07

## 2025-01-26 RX ADMIN — AMLODIPINE BESYLATE 10 MG: 10 TABLET ORAL at 09:07

## 2025-01-26 RX ADMIN — Medication 1000 MCG: at 09:07

## 2025-01-26 RX ADMIN — ACETAMINOPHEN 650 MG: 325 TABLET ORAL at 02:03

## 2025-01-26 RX ADMIN — FOLIC ACID 1 MG: 1 TABLET ORAL at 09:07

## 2025-01-26 RX ADMIN — ACETAMINOPHEN 650 MG: 325 TABLET ORAL at 09:07

## 2025-01-26 RX ADMIN — Medication 5000 UNITS: at 22:50

## 2025-01-26 RX ADMIN — EMPAGLIFLOZIN 10 MG: 10 TABLET, FILM COATED ORAL at 09:07

## 2025-01-26 RX ADMIN — HYDRALAZINE HYDROCHLORIDE 50 MG: 50 TABLET ORAL at 22:51

## 2025-01-26 RX ADMIN — FUROSEMIDE 40 MG: 10 INJECTION, SOLUTION INTRAMUSCULAR; INTRAVENOUS at 09:07

## 2025-01-26 RX ADMIN — MEMANTINE 5 MG: 10 TABLET ORAL at 22:51

## 2025-01-26 RX ADMIN — HYDRALAZINE HYDROCHLORIDE 50 MG: 50 TABLET ORAL at 06:56

## 2025-01-26 RX ADMIN — HEPARIN SODIUM 18 UNITS/KG/HR: 10000 INJECTION, SOLUTION INTRAVENOUS at 14:30

## 2025-01-26 RX ADMIN — IOPAMIDOL 75 ML: 755 INJECTION, SOLUTION INTRAVENOUS at 12:30

## 2025-01-26 RX ADMIN — LEVETIRACETAM 2000 MG: 500 INJECTION INTRAVENOUS at 17:24

## 2025-01-26 RX ADMIN — ATORVASTATIN CALCIUM 20 MG: 10 TABLET, FILM COATED ORAL at 22:50

## 2025-01-26 RX ADMIN — POTASSIUM BICARBONATE 20 MEQ: 782 TABLET, EFFERVESCENT ORAL at 09:49

## 2025-01-26 RX ADMIN — SODIUM CHLORIDE, PRESERVATIVE FREE 10 ML: 5 INJECTION INTRAVENOUS at 09:08

## 2025-01-26 RX ADMIN — PANTOPRAZOLE SODIUM 40 MG: 40 TABLET, DELAYED RELEASE ORAL at 06:56

## 2025-01-26 RX ADMIN — LISINOPRIL 40 MG: 20 TABLET ORAL at 09:07

## 2025-01-26 RX ADMIN — HEPARIN SODIUM 5500 UNITS: 1000 INJECTION INTRAVENOUS; SUBCUTANEOUS at 17:54

## 2025-01-26 RX ADMIN — HEPARIN SODIUM 22 UNITS/KG/HR: 10000 INJECTION, SOLUTION INTRAVENOUS at 17:55

## 2025-01-26 RX ADMIN — POTASSIUM BICARBONATE 20 MEQ: 782 TABLET, EFFERVESCENT ORAL at 22:50

## 2025-01-26 RX ADMIN — HEPARIN SODIUM 5500 UNITS: 1000 INJECTION INTRAVENOUS; SUBCUTANEOUS at 14:23

## 2025-01-26 ASSESSMENT — PAIN - FUNCTIONAL ASSESSMENT: PAIN_FUNCTIONAL_ASSESSMENT: ACTIVITIES ARE NOT PREVENTED

## 2025-01-26 ASSESSMENT — PAIN DESCRIPTION - LOCATION: LOCATION: HEAD

## 2025-01-26 ASSESSMENT — PAIN DESCRIPTION - DESCRIPTORS: DESCRIPTORS: ACHING

## 2025-01-26 ASSESSMENT — PAIN SCALES - GENERAL: PAINLEVEL_OUTOF10: 2

## 2025-01-26 NOTE — CODE DOCUMENTATION
Code Stroke    The nurse communicated to me that patient has a acute change of mental status.  Patient assessed at bedside right away.  Last known well 11:40 AM. Patient noted to be nonresponsive which was a departure from her baseline which is alert and oriented X4.  Code stroke activated.    Vitals checked stat with no acute change.  Blood sugar 133.  No recent change in medication identified.  Not on any anticoagulant.     Patient underwent CT head and CTA head and neck.  Notable only for chronic ICA stenosis.      ASSESSMENT/PLAN:    By the time patient came out of CT scan her mental status started improving.  Patient noted to have quick recovery of her mental status was almost at baseline as soon as she came out of CT suite.     [facility] telestroke consulted.  Evaluated the patient and deemed patient not a candidate for TNK.  Recommend MRI and workup for metabolic encephalopathy.    Of note patient had prior admission in December with similar episode.  Extensive neurological workup was negative.  MRI ordered.  Will order infectious workup although low probability based on current evidence.  Also ordered lactic acid and prolactin.     Patient with concern for metabolic encephalopathy versus absence seizures. Will consult neurology.     Patient is critical with high probability of clinical deterioration. 55 minutes of critical care time spent. This time includes time spent at bedside interviewing and examining patient, coordinating care, review of records, discussing with patient and  family over the phone.  I personally updated patient's granddaughter ciara day over the phone        Comment: Please note this report has been produced using speech recognition software and may contain errors related to that system including errors in grammar, punctuation, and spelling, as well as words and phrases that may be inappropriate. If there are any questions or concerns please feel free to contact the dictating provider

## 2025-01-26 NOTE — CODE DOCUMENTATION
Report of CTA head and neck showed Left upper and left lower lobe segmental pulmonary emboli. Dense vascular calcifications result in severe stenosis in the distal right common carotid artery and proximal right cervical internal carotid artery.    Patient currently hemodynamically stable.  Mental status improved significantly and almost at baseline.    As per chart review from most recent hospitalization in December 2024 patient has severe bilateral ICA stenosis (99% on right, 80% on left) from 8/2024.  Intervention was offered to the patient but was declined.    Findings also communicated to cardiology.  I will defer to cardiology to evaluate risk versus benefit regarding initiation of anticoagulation in this 89-year-old.

## 2025-01-26 NOTE — CODE DOCUMENTATION
SIGNIFICANT EVENT:  RAPID RESPONSE    RAPID RESPONSE was called for Carol Xiong for unresponsiveness    Patient seen and examined in 2018/2018-A.  Patient had similar episode earlier in the day and underwent stroke evaluation by [facility] telestroke which did not reveal anything remarkable on imaging.  Patient recovered spontaneously without any acute intervention.  Patient was transferred to floor with improving mental status and even passed a bedside swallow evaluation however patient's cognitive status again noted to change significantly around 1.55pm. Again noted to be unresponsive.  Unable to respond to verbal or tactile stimuli.  Minimal response to pain.  However patient's hemodynamics unchanged.  Saturating well on room air.    OBJECTIVE:      VITALS  Vitals:    01/26/25 0859 01/26/25 1011 01/26/25 1145 01/26/25 1146   BP: (!) 153/58 104/87 (!) 98/42 (!) 106/43   Pulse: 93 93  59   Resp: 14 15  15   Temp: 97.4 °F (36.3 °C)      TempSrc: Oral      SpO2: 98%   97%   Weight:       Height:              PHYSICAL EXAM   GEN no acute distress  RESP CTAB with reduced air entry on the right  CARDIO/VASC S1-S2  GI soft, ND  SKIN warm and moist  NEURO unresponsive, minimal response to painful stimuli    ASSESSMENT/PLAN:    At this point I have a high concern for seizure type etiology.  Procalcitonin and lactic acid pending at the time of documentation.  Neurology has been consulted after prior episode of unresponsiveness.    Emergency contact updated over the phone.  Patient's granddaughter ciara day to talk to other family members regarding patient's CODE STATUS and advanced care directives.  Patient has in the past refused aggressive and invasive interventions.  In my opinion patient would greatly benefit from palliative/hospice consult.  Awaiting on family to make a decision.    Follow-up MRI results.  Will order EEG monitoring as well.    Patient is critical with high probability of clinical

## 2025-01-27 ENCOUNTER — APPOINTMENT (OUTPATIENT)
Dept: NON INVASIVE DIAGNOSTICS | Age: 89
DRG: 291 | End: 2025-01-27
Attending: STUDENT IN AN ORGANIZED HEALTH CARE EDUCATION/TRAINING PROGRAM
Payer: MEDICARE

## 2025-01-27 PROBLEM — G40.89 OTHER SEIZURES (HCC): Status: ACTIVE | Noted: 2025-01-27

## 2025-01-27 LAB
ALBUMIN SERPL-MCNC: 2.7 G/DL (ref 3.4–5)
ALBUMIN SERPL-MCNC: 2.8 G/DL (ref 3.4–5)
ALBUMIN/GLOB SERPL: 1.1 {RATIO} (ref 1.1–2.2)
ALBUMIN/GLOB SERPL: 1.1 {RATIO} (ref 1.1–2.2)
ALP SERPL-CCNC: 88 U/L (ref 40–129)
ALP SERPL-CCNC: 94 U/L (ref 40–129)
ALT SERPL-CCNC: 6 U/L (ref 10–40)
ALT SERPL-CCNC: 6 U/L (ref 10–40)
ANION GAP SERPL CALCULATED.3IONS-SCNC: 11 MMOL/L (ref 9–17)
ANION GAP SERPL CALCULATED.3IONS-SCNC: 12 MMOL/L (ref 9–17)
ANTI-XA UNFRAC HEPARIN: 0.99 IU/L
ANTI-XA UNFRAC HEPARIN: 1.04 IU/L
ANTI-XA UNFRAC HEPARIN: >1.1 IU/L
AST SERPL-CCNC: 16 U/L (ref 15–37)
AST SERPL-CCNC: 16 U/L (ref 15–37)
B PARAP IS1001 DNA NPH QL NAA+NON-PROBE: NOT DETECTED
B PERT DNA SPEC QL NAA+PROBE: NOT DETECTED
BASOPHILS # BLD: 0.03 K/UL
BASOPHILS # BLD: 0.03 K/UL
BASOPHILS NFR BLD: 0 % (ref 0–1)
BASOPHILS NFR BLD: 0 % (ref 0–1)
BILIRUB DIRECT SERPL-MCNC: 0.2 MG/DL (ref 0–0.3)
BILIRUB DIRECT SERPL-MCNC: 0.3 MG/DL (ref 0–0.3)
BILIRUB INDIRECT SERPL-MCNC: 0.2 MG/DL (ref 0–0.7)
BILIRUB INDIRECT SERPL-MCNC: 0.2 MG/DL (ref 0–0.7)
BILIRUB SERPL-MCNC: 0.4 MG/DL (ref 0–1)
BILIRUB SERPL-MCNC: 0.4 MG/DL (ref 0–1)
BUN SERPL-MCNC: 15 MG/DL (ref 7–20)
BUN SERPL-MCNC: 16 MG/DL (ref 7–20)
C PNEUM DNA NPH QL NAA+NON-PROBE: NOT DETECTED
CA-I BLD-SCNC: 1.15 MMOL/L (ref 1.15–1.33)
CA-I BLD-SCNC: 1.18 MMOL/L (ref 1.15–1.33)
CALCIUM SERPL-MCNC: 8.5 MG/DL (ref 8.3–10.6)
CALCIUM SERPL-MCNC: 8.6 MG/DL (ref 8.3–10.6)
CHLORIDE SERPL-SCNC: 97 MMOL/L (ref 99–110)
CHLORIDE SERPL-SCNC: 99 MMOL/L (ref 99–110)
CHOLEST SERPL-MCNC: 117 MG/DL (ref 125–199)
CO2 SERPL-SCNC: 32 MMOL/L (ref 21–32)
CO2 SERPL-SCNC: 32 MMOL/L (ref 21–32)
CREAT SERPL-MCNC: 0.7 MG/DL (ref 0.6–1.2)
CREAT SERPL-MCNC: 0.7 MG/DL (ref 0.6–1.2)
D DIMER PPP FEU-MCNC: 0.95 UG/ML FEU (ref 0–0.46)
D DIMER PPP FEU-MCNC: 1.02 UG/ML FEU (ref 0–0.46)
ECHO AV AREA PEAK VELOCITY: 2.3 CM2
ECHO AV AREA VTI: 2.2 CM2
ECHO AV AREA/BSA PEAK VELOCITY: 1.3 CM2/M2
ECHO AV AREA/BSA VTI: 1.2 CM2/M2
ECHO AV MEAN GRADIENT: 10 MMHG
ECHO AV MEAN VELOCITY: 1.5 M/S
ECHO AV PEAK GRADIENT: 16 MMHG
ECHO AV PEAK VELOCITY: 2 M/S
ECHO AV VELOCITY RATIO: 0.9
ECHO AV VTI: 42.9 CM
ECHO BSA: 1.83 M2
ECHO EST RA PRESSURE: 3 MMHG
ECHO IVC PROX: 1.6 CM
ECHO LV EDV A4C: 44 ML
ECHO LV EDV INDEX A4C: 24 ML/M2
ECHO LV EF PHYSICIAN: 60 %
ECHO LV EJECTION FRACTION A4C: 67 %
ECHO LV ESV A4C: 14 ML
ECHO LV ESV INDEX A4C: 8 ML/M2
ECHO LV FRACTIONAL SHORTENING: 46 % (ref 28–44)
ECHO LV INTERNAL DIMENSION DIASTOLE INDEX: 2.02 CM/M2
ECHO LV INTERNAL DIMENSION DIASTOLIC: 3.7 CM (ref 3.9–5.3)
ECHO LV INTERNAL DIMENSION SYSTOLIC INDEX: 1.09 CM/M2
ECHO LV INTERNAL DIMENSION SYSTOLIC: 2 CM
ECHO LV IVSD: 0.8 CM (ref 0.6–0.9)
ECHO LV MASS 2D: 82.3 G (ref 67–162)
ECHO LV MASS INDEX 2D: 45 G/M2 (ref 43–95)
ECHO LV POSTERIOR WALL DIASTOLIC: 0.8 CM (ref 0.6–0.9)
ECHO LV RELATIVE WALL THICKNESS RATIO: 0.43
ECHO LVOT AREA: 2.5 CM2
ECHO LVOT AV VTI INDEX: 0.87
ECHO LVOT DIAM: 1.8 CM
ECHO LVOT MEAN GRADIENT: 8 MMHG
ECHO LVOT PEAK GRADIENT: 13 MMHG
ECHO LVOT PEAK VELOCITY: 1.8 M/S
ECHO LVOT STROKE VOLUME INDEX: 51.8 ML/M2
ECHO LVOT SV: 94.9 ML
ECHO LVOT VTI: 37.3 CM
ECHO MV AREA VTI: 3.2 CM2
ECHO MV LVOT VTI INDEX: 0.8
ECHO MV MAX VELOCITY: 1.2 M/S
ECHO MV MEAN GRADIENT: 3 MMHG
ECHO MV MEAN VELOCITY: 0.8 M/S
ECHO MV PEAK GRADIENT: 6 MMHG
ECHO MV VTI: 30 CM
ECHO RIGHT VENTRICULAR SYSTOLIC PRESSURE (RVSP): 42 MMHG
ECHO RV FREE WALL PEAK S': 13.3 CM/S
ECHO RV MID DIMENSION: 2.3 CM
ECHO RV TAPSE: 1.7 CM (ref 1.7–?)
ECHO TV REGURGITANT MAX VELOCITY: 3.12 M/S
ECHO TV REGURGITANT PEAK GRADIENT: 39 MMHG
EOSINOPHIL # BLD: 0.02 K/UL
EOSINOPHIL # BLD: 0.12 K/UL
EOSINOPHILS RELATIVE PERCENT: 0 % (ref 0–3)
EOSINOPHILS RELATIVE PERCENT: 1 % (ref 0–3)
ERYTHROCYTE [DISTWIDTH] IN BLOOD BY AUTOMATED COUNT: 13.4 % (ref 11.7–14.9)
ERYTHROCYTE [DISTWIDTH] IN BLOOD BY AUTOMATED COUNT: 13.4 % (ref 11.7–14.9)
EST. AVERAGE GLUCOSE BLD GHB EST-MCNC: 82 MG/DL
FIBRINOGEN PPP-MCNC: 431 MG/DL (ref 170–540)
FIBRINOGEN PPP-MCNC: 434 MG/DL (ref 170–540)
FLUAV RNA NPH QL NAA+NON-PROBE: NOT DETECTED
FLUBV RNA NPH QL NAA+NON-PROBE: NOT DETECTED
FOLATE SERPL-MCNC: 35.6 NG/ML (ref 4.8–24.2)
GFR, ESTIMATED: 75 ML/MIN/1.73M2
GFR, ESTIMATED: 84 ML/MIN/1.73M2
GLUCOSE SERPL-MCNC: 115 MG/DL (ref 74–99)
GLUCOSE SERPL-MCNC: 94 MG/DL (ref 74–99)
HADV DNA NPH QL NAA+NON-PROBE: NOT DETECTED
HBA1C MFR BLD: 4.5 % (ref 4.2–6.3)
HCOV 229E RNA NPH QL NAA+NON-PROBE: NOT DETECTED
HCOV HKU1 RNA NPH QL NAA+NON-PROBE: NOT DETECTED
HCOV NL63 RNA NPH QL NAA+NON-PROBE: NOT DETECTED
HCOV OC43 RNA NPH QL NAA+NON-PROBE: NOT DETECTED
HCT VFR BLD AUTO: 30.3 % (ref 37–47)
HCT VFR BLD AUTO: 31.6 % (ref 37–47)
HDLC SERPL-MCNC: 71 MG/DL
HGB BLD-MCNC: 9.7 G/DL (ref 12.5–16)
HGB BLD-MCNC: 9.9 G/DL (ref 12.5–16)
HMPV RNA NPH QL NAA+NON-PROBE: NOT DETECTED
HPIV1 RNA NPH QL NAA+NON-PROBE: NOT DETECTED
HPIV2 RNA NPH QL NAA+NON-PROBE: NOT DETECTED
HPIV3 RNA NPH QL NAA+NON-PROBE: NOT DETECTED
HPIV4 RNA NPH QL NAA+NON-PROBE: NOT DETECTED
IMM GRANULOCYTES # BLD AUTO: 0.03 K/UL
IMM GRANULOCYTES # BLD AUTO: 0.03 K/UL
IMM GRANULOCYTES NFR BLD: 0 %
IMM GRANULOCYTES NFR BLD: 0 %
INR PPP: 1
INR PPP: 1
L PNEUMO1 AG UR QL IA.RAPID: NEGATIVE
LACTATE BLDV-SCNC: 1.3 MMOL/L (ref 0.4–2)
LDLC SERPL CALC-MCNC: 39 MG/DL
LYMPHOCYTES NFR BLD: 1.4 K/UL
LYMPHOCYTES NFR BLD: 2.13 K/UL
LYMPHOCYTES RELATIVE PERCENT: 13 % (ref 24–44)
LYMPHOCYTES RELATIVE PERCENT: 23 % (ref 24–44)
M PNEUMO DNA NPH QL NAA+NON-PROBE: NOT DETECTED
MAGNESIUM SERPL-MCNC: 1.4 MG/DL (ref 1.8–2.4)
MAGNESIUM SERPL-MCNC: 1.5 MG/DL (ref 1.8–2.4)
MAGNESIUM SERPL-MCNC: 1.6 MG/DL (ref 1.8–2.4)
MCH RBC QN AUTO: 30.2 PG (ref 27–31)
MCH RBC QN AUTO: 30.6 PG (ref 27–31)
MCHC RBC AUTO-ENTMCNC: 31.3 G/DL (ref 32–36)
MCHC RBC AUTO-ENTMCNC: 32 G/DL (ref 32–36)
MCV RBC AUTO: 95.6 FL (ref 78–100)
MCV RBC AUTO: 96.3 FL (ref 78–100)
MONOCYTES NFR BLD: 0.46 K/UL
MONOCYTES NFR BLD: 0.64 K/UL
MONOCYTES NFR BLD: 4 % (ref 0–4)
MONOCYTES NFR BLD: 7 % (ref 0–4)
MRSA, DNA, NASAL: NOT DETECTED
NEUTROPHILS NFR BLD: 69 % (ref 36–66)
NEUTROPHILS NFR BLD: 81 % (ref 36–66)
NEUTS SEG NFR BLD: 6.46 K/UL
NEUTS SEG NFR BLD: 8.51 K/UL
PARTIAL THROMBOPLASTIN TIME: 206.3 SEC (ref 25.1–37.1)
PHOSPHATE SERPL-MCNC: 3.2 MG/DL (ref 2.5–4.9)
PHOSPHATE SERPL-MCNC: 3.3 MG/DL (ref 2.5–4.9)
PLATELET # BLD AUTO: 314 K/UL (ref 140–440)
PLATELET # BLD AUTO: 323 K/UL (ref 140–440)
PMV BLD AUTO: 11.1 FL (ref 7.5–11.1)
PMV BLD AUTO: 11.3 FL (ref 7.5–11.1)
POTASSIUM SERPL-SCNC: 3.5 MMOL/L (ref 3.5–5.1)
POTASSIUM SERPL-SCNC: 3.8 MMOL/L (ref 3.5–5.1)
PROT SERPL-MCNC: 5.2 G/DL (ref 6.4–8.2)
PROT SERPL-MCNC: 5.4 G/DL (ref 6.4–8.2)
PROTHROMBIN TIME: 13.6 SEC (ref 11.7–14.5)
PROTHROMBIN TIME: 13.6 SEC (ref 11.7–14.5)
RBC # BLD AUTO: 3.17 M/UL (ref 4.2–5.4)
RBC # BLD AUTO: 3.28 M/UL (ref 4.2–5.4)
RSV RNA NPH QL NAA+NON-PROBE: NOT DETECTED
RV+EV RNA NPH QL NAA+NON-PROBE: NOT DETECTED
S PNEUM AG SPEC QL: POSITIVE
SARS-COV-2 RNA NPH QL NAA+NON-PROBE: NOT DETECTED
SODIUM SERPL-SCNC: 141 MMOL/L (ref 136–145)
SODIUM SERPL-SCNC: 142 MMOL/L (ref 136–145)
SPECIMEN DESCRIPTION: NORMAL
SPECIMEN DESCRIPTION: NORMAL
SPECIMEN SOURCE: ABNORMAL
T4 SERPL-MCNC: 7.1 UG/DL (ref 4.5–10.9)
TRIGL SERPL-MCNC: 38 MG/DL
TSH SERPL DL<=0.05 MIU/L-ACNC: 2.69 UIU/ML (ref 0.27–4.2)
VIT B12 SERPL-MCNC: 639 PG/ML (ref 211–911)
WBC OTHER # BLD: 10.5 K/UL (ref 4–10.5)
WBC OTHER # BLD: 9.4 K/UL (ref 4–10.5)

## 2025-01-27 PROCEDURE — 93308 TTE F-UP OR LMTD: CPT | Performed by: INTERNAL MEDICINE

## 2025-01-27 PROCEDURE — 6360000002 HC RX W HCPCS: Performed by: STUDENT IN AN ORGANIZED HEALTH CARE EDUCATION/TRAINING PROGRAM

## 2025-01-27 PROCEDURE — 87899 AGENT NOS ASSAY W/OPTIC: CPT

## 2025-01-27 PROCEDURE — 99233 SBSQ HOSP IP/OBS HIGH 50: CPT | Performed by: INTERNAL MEDICINE

## 2025-01-27 PROCEDURE — 1200000000 HC SEMI PRIVATE

## 2025-01-27 PROCEDURE — 93325 DOPPLER ECHO COLOR FLOW MAPG: CPT

## 2025-01-27 PROCEDURE — 82330 ASSAY OF CALCIUM: CPT

## 2025-01-27 PROCEDURE — 95705 EEG W/O VID 2-12 HR UNMNTR: CPT

## 2025-01-27 PROCEDURE — 83735 ASSAY OF MAGNESIUM: CPT

## 2025-01-27 PROCEDURE — 95717 EEG PHYS/QHP 2-12 HR W/O VID: CPT | Performed by: STUDENT IN AN ORGANIZED HEALTH CARE EDUCATION/TRAINING PROGRAM

## 2025-01-27 PROCEDURE — 2500000003 HC RX 250 WO HCPCS: Performed by: STUDENT IN AN ORGANIZED HEALTH CARE EDUCATION/TRAINING PROGRAM

## 2025-01-27 PROCEDURE — 87449 NOS EACH ORGANISM AG IA: CPT

## 2025-01-27 PROCEDURE — 6370000000 HC RX 637 (ALT 250 FOR IP): Performed by: STUDENT IN AN ORGANIZED HEALTH CARE EDUCATION/TRAINING PROGRAM

## 2025-01-27 PROCEDURE — 94761 N-INVAS EAR/PLS OXIMETRY MLT: CPT

## 2025-01-27 PROCEDURE — 2500000003 HC RX 250 WO HCPCS: Performed by: NURSE PRACTITIONER

## 2025-01-27 PROCEDURE — 0DH67UZ INSERTION OF FEEDING DEVICE INTO STOMACH, VIA NATURAL OR ARTIFICIAL OPENING: ICD-10-PCS | Performed by: STUDENT IN AN ORGANIZED HEALTH CARE EDUCATION/TRAINING PROGRAM

## 2025-01-27 PROCEDURE — 93321 DOPPLER ECHO F-UP/LMTD STD: CPT | Performed by: INTERNAL MEDICINE

## 2025-01-27 PROCEDURE — 6370000000 HC RX 637 (ALT 250 FOR IP): Performed by: INTERNAL MEDICINE

## 2025-01-27 PROCEDURE — 36410 VNPNXR 3YR/> PHY/QHP DX/THER: CPT

## 2025-01-27 PROCEDURE — 85730 THROMBOPLASTIN TIME PARTIAL: CPT

## 2025-01-27 PROCEDURE — 05HD33Z INSERTION OF INFUSION DEVICE INTO RIGHT CEPHALIC VEIN, PERCUTANEOUS APPROACH: ICD-10-PCS | Performed by: STUDENT IN AN ORGANIZED HEALTH CARE EDUCATION/TRAINING PROGRAM

## 2025-01-27 PROCEDURE — 80053 COMPREHEN METABOLIC PANEL: CPT

## 2025-01-27 PROCEDURE — 84100 ASSAY OF PHOSPHORUS: CPT

## 2025-01-27 PROCEDURE — 76937 US GUIDE VASCULAR ACCESS: CPT

## 2025-01-27 PROCEDURE — 36415 COLL VENOUS BLD VENIPUNCTURE: CPT

## 2025-01-27 PROCEDURE — 87641 MR-STAPH DNA AMP PROBE: CPT

## 2025-01-27 PROCEDURE — 99223 1ST HOSP IP/OBS HIGH 75: CPT | Performed by: STUDENT IN AN ORGANIZED HEALTH CARE EDUCATION/TRAINING PROGRAM

## 2025-01-27 PROCEDURE — 95700 EEG CONT REC W/VID EEG TECH: CPT

## 2025-01-27 PROCEDURE — 85025 COMPLETE CBC W/AUTO DIFF WBC: CPT

## 2025-01-27 PROCEDURE — 95716 VEEG EA 12-26HR CONT MNTR: CPT

## 2025-01-27 PROCEDURE — 93325 DOPPLER ECHO COLOR FLOW MAPG: CPT | Performed by: INTERNAL MEDICINE

## 2025-01-27 PROCEDURE — 0202U NFCT DS 22 TRGT SARS-COV-2: CPT

## 2025-01-27 PROCEDURE — 2700000000 HC OXYGEN THERAPY PER DAY

## 2025-01-27 PROCEDURE — 6370000000 HC RX 637 (ALT 250 FOR IP): Performed by: NURSE PRACTITIONER

## 2025-01-27 PROCEDURE — 85610 PROTHROMBIN TIME: CPT

## 2025-01-27 PROCEDURE — 85384 FIBRINOGEN ACTIVITY: CPT

## 2025-01-27 PROCEDURE — 82248 BILIRUBIN DIRECT: CPT

## 2025-01-27 PROCEDURE — XX20X89 MONITORING OF BRAIN ELECTRICAL ACTIVITY, COMPUTER-AIDED DETECTION AND NOTIFICATION, NEW TECHNOLOGY GROUP 9: ICD-10-PCS | Performed by: STUDENT IN AN ORGANIZED HEALTH CARE EDUCATION/TRAINING PROGRAM

## 2025-01-27 PROCEDURE — 85520 HEPARIN ASSAY: CPT

## 2025-01-27 PROCEDURE — 85379 FIBRIN DEGRADATION QUANT: CPT

## 2025-01-27 RX ORDER — FUROSEMIDE 10 MG/ML
40 INJECTION INTRAMUSCULAR; INTRAVENOUS DAILY
Status: DISCONTINUED | OUTPATIENT
Start: 2025-01-28 | End: 2025-01-29

## 2025-01-27 RX ADMIN — OXYCODONE HYDROCHLORIDE AND ACETAMINOPHEN 500 MG: 500 TABLET ORAL at 08:27

## 2025-01-27 RX ADMIN — ATORVASTATIN CALCIUM 20 MG: 10 TABLET, FILM COATED ORAL at 21:12

## 2025-01-27 RX ADMIN — PANTOPRAZOLE SODIUM 40 MG: 40 TABLET, DELAYED RELEASE ORAL at 06:21

## 2025-01-27 RX ADMIN — LEVETIRACETAM 500 MG: 500 INJECTION INTRAVENOUS at 21:11

## 2025-01-27 RX ADMIN — ASPIRIN 81 MG CHEWABLE TABLET 81 MG: 81 TABLET CHEWABLE at 08:27

## 2025-01-27 RX ADMIN — MAGNESIUM SULFATE HEPTAHYDRATE 2000 MG: 40 INJECTION, SOLUTION INTRAVENOUS at 08:42

## 2025-01-27 RX ADMIN — SODIUM CHLORIDE, PRESERVATIVE FREE 10 ML: 5 INJECTION INTRAVENOUS at 21:13

## 2025-01-27 RX ADMIN — LISINOPRIL 20 MG: 20 TABLET ORAL at 08:26

## 2025-01-27 RX ADMIN — ZINC SULFATE 220 MG (50 MG) CAPSULE 50 MG: CAPSULE at 08:27

## 2025-01-27 RX ADMIN — Medication 1000 MCG: at 08:27

## 2025-01-27 RX ADMIN — SODIUM CHLORIDE, PRESERVATIVE FREE 10 ML: 5 INJECTION INTRAVENOUS at 08:27

## 2025-01-27 RX ADMIN — FUROSEMIDE 40 MG: 10 INJECTION, SOLUTION INTRAMUSCULAR; INTRAVENOUS at 08:26

## 2025-01-27 RX ADMIN — POTASSIUM BICARBONATE 20 MEQ: 782 TABLET, EFFERVESCENT ORAL at 12:30

## 2025-01-27 RX ADMIN — Medication 1 TABLET: at 08:27

## 2025-01-27 RX ADMIN — MEMANTINE 5 MG: 10 TABLET ORAL at 21:12

## 2025-01-27 RX ADMIN — LEVETIRACETAM 500 MG: 500 INJECTION INTRAVENOUS at 08:26

## 2025-01-27 RX ADMIN — MEMANTINE 5 MG: 10 TABLET ORAL at 08:26

## 2025-01-27 RX ADMIN — HYDRALAZINE HYDROCHLORIDE 50 MG: 50 TABLET ORAL at 21:12

## 2025-01-27 RX ADMIN — EMPAGLIFLOZIN 10 MG: 10 TABLET, FILM COATED ORAL at 08:27

## 2025-01-27 RX ADMIN — Medication 5000 UNITS: at 21:13

## 2025-01-27 RX ADMIN — FOLIC ACID 1 MG: 1 TABLET ORAL at 08:27

## 2025-01-27 RX ADMIN — Medication 100 MG: at 08:27

## 2025-01-27 RX ADMIN — POTASSIUM BICARBONATE 20 MEQ: 782 TABLET, EFFERVESCENT ORAL at 08:27

## 2025-01-27 RX ADMIN — HYDRALAZINE HYDROCHLORIDE 50 MG: 50 TABLET ORAL at 06:21

## 2025-01-27 RX ADMIN — HEPARIN SODIUM 17 UNITS/KG/HR: 10000 INJECTION, SOLUTION INTRAVENOUS at 12:24

## 2025-01-27 RX ADMIN — POTASSIUM BICARBONATE 20 MEQ: 782 TABLET, EFFERVESCENT ORAL at 21:11

## 2025-01-27 RX ADMIN — AMLODIPINE BESYLATE 5 MG: 5 TABLET ORAL at 08:26

## 2025-01-27 ASSESSMENT — ENCOUNTER SYMPTOMS
SORE THROAT: 0
CONSTIPATION: 0
BACK PAIN: 0
COUGH: 0
DIARRHEA: 0
EYE DISCHARGE: 0
WHEEZING: 0
SHORTNESS OF BREATH: 0
ABDOMINAL DISTENTION: 0

## 2025-01-27 ASSESSMENT — PAIN SCALES - GENERAL: PAINLEVEL_OUTOF10: 0

## 2025-01-27 NOTE — PROCEDURES
CONTINUOUS ELECTROENCEPHALOGRAM (cEEG) REPORT    Identifying Information:  Name: Carol Xiong  MRN: [id number]  : 1933  Interpreting Physician: Shanelle Mendez DO  Referring Provider: Niya Resendez MD      Clinical History:  Carol Xiong is an 89 y.o. female with concern for seizures.  Past Medical History:  Past Medical History:   Diagnosis Date    Abnormal echocardiogram 2024    See Epic notes    GERD (gastroesophageal reflux disease)     H/O echocardiogram 2017    EF 55% Left atrial enlargement. Normal LV systolic. Minimal aoritc stenosis.     H/O echocardiogram 2020    EF 55-60%, Grade I DD, Mild AS.  Mildly dilated left atrium.    Hyperlipidemia     Hypertension     Neuropathy         Current Medications:    [START ON 2025] furosemide  40 mg IntraVENous Daily    potassium bicarb-citric acid  20 mEq Oral TID    levETIRAcetam  500 mg Oral BID    Or    levETIRAcetam  500 mg IntraVENous BID    lisinopril  20 mg Oral Daily    amLODIPine  5 mg Oral Daily    multivitamin  1 tablet Oral Daily    thiamine  100 mg Oral Daily    zinc sulfate  50 mg Oral Daily    empagliflozin  10 mg Oral Daily    aspirin  81 mg Oral Daily    atorvastatin  20 mg Oral Nightly    Vitamin D  5,000 Units Oral Daily    folic acid  1 mg Oral Daily    pantoprazole  40 mg Oral QAM AC    vitamin C  500 mg Oral Daily    vitamin B-12  1,000 mcg Oral Daily    sodium chloride flush  5-40 mL IntraVENous 2 times per day    [Held by provider] gabapentin  300 mg Oral BID    memantine  5 mg Oral BID    hydrALAZINE  50 mg Oral 3 times per day        cEEG start date & time: 25 @1442  Break from 0439-5187  cEEG end date & time: 25 @0759     Indication:  cEEG was initiated for monitoring and localization of seizures as the etiology of the encephalopathy/altered mental status. It was available for review at the bedside as well as via remote access for the entire period of monitoring. No video was available

## 2025-01-27 NOTE — CARE COORDINATION
CM received call from Arabella with [facility], pt has been approved to discharge to [facility] when medically stable.

## 2025-01-28 LAB
ALBUMIN SERPL-MCNC: 2.8 G/DL (ref 3.4–5)
ALBUMIN/GLOB SERPL: 1.2 {RATIO} (ref 1.1–2.2)
ALP SERPL-CCNC: 89 U/L (ref 40–129)
ALT SERPL-CCNC: 6 U/L (ref 10–40)
ANION GAP SERPL CALCULATED.3IONS-SCNC: 9 MMOL/L (ref 9–17)
ANTI-XA UNFRAC HEPARIN: 0.37 IU/L
ANTI-XA UNFRAC HEPARIN: 0.5 IU/L
AST SERPL-CCNC: 19 U/L (ref 15–37)
BASOPHILS # BLD: 0.03 K/UL
BASOPHILS NFR BLD: 0 % (ref 0–1)
BILIRUB DIRECT SERPL-MCNC: 0.3 MG/DL (ref 0–0.3)
BILIRUB INDIRECT SERPL-MCNC: 0.2 MG/DL (ref 0–0.7)
BILIRUB SERPL-MCNC: 0.5 MG/DL (ref 0–1)
BUN SERPL-MCNC: 19 MG/DL (ref 7–20)
CA-I BLD-SCNC: 1.18 MMOL/L (ref 1.15–1.33)
CALCIUM SERPL-MCNC: 8.9 MG/DL (ref 8.3–10.6)
CHLORIDE SERPL-SCNC: 98 MMOL/L (ref 99–110)
CO2 SERPL-SCNC: 32 MMOL/L (ref 21–32)
CREAT SERPL-MCNC: 0.8 MG/DL (ref 0.6–1.2)
EOSINOPHIL # BLD: 0.22 K/UL
EOSINOPHILS RELATIVE PERCENT: 3 % (ref 0–3)
ERYTHROCYTE [DISTWIDTH] IN BLOOD BY AUTOMATED COUNT: 13.4 % (ref 11.7–14.9)
GFR, ESTIMATED: 71 ML/MIN/1.73M2
GLUCOSE SERPL-MCNC: 90 MG/DL (ref 74–99)
HCT VFR BLD AUTO: 27.1 % (ref 37–47)
HGB BLD-MCNC: 8.7 G/DL (ref 12.5–16)
IMM GRANULOCYTES # BLD AUTO: 0.03 K/UL
IMM GRANULOCYTES NFR BLD: 0 %
INR PPP: 1
LYMPHOCYTES NFR BLD: 1.65 K/UL
LYMPHOCYTES RELATIVE PERCENT: 22 % (ref 24–44)
MAGNESIUM SERPL-MCNC: 1.9 MG/DL (ref 1.8–2.4)
MCH RBC QN AUTO: 30.3 PG (ref 27–31)
MCHC RBC AUTO-ENTMCNC: 32.1 G/DL (ref 32–36)
MCV RBC AUTO: 94.4 FL (ref 78–100)
MONOCYTES NFR BLD: 0.65 K/UL
MONOCYTES NFR BLD: 9 % (ref 0–4)
NEUTROPHILS NFR BLD: 66 % (ref 36–66)
NEUTS SEG NFR BLD: 4.9 K/UL
PHOSPHATE SERPL-MCNC: 3.1 MG/DL (ref 2.5–4.9)
PLATELET # BLD AUTO: 301 K/UL (ref 140–440)
PMV BLD AUTO: 10.9 FL (ref 7.5–11.1)
POTASSIUM SERPL-SCNC: 3.8 MMOL/L (ref 3.5–5.1)
PROT SERPL-MCNC: 5.2 G/DL (ref 6.4–8.2)
PROTHROMBIN TIME: 13.3 SEC (ref 11.7–14.5)
RBC # BLD AUTO: 2.87 M/UL (ref 4.2–5.4)
SODIUM SERPL-SCNC: 139 MMOL/L (ref 136–145)
WBC OTHER # BLD: 7.5 K/UL (ref 4–10.5)

## 2025-01-28 PROCEDURE — 94761 N-INVAS EAR/PLS OXIMETRY MLT: CPT

## 2025-01-28 PROCEDURE — 6370000000 HC RX 637 (ALT 250 FOR IP): Performed by: STUDENT IN AN ORGANIZED HEALTH CARE EDUCATION/TRAINING PROGRAM

## 2025-01-28 PROCEDURE — 2500000003 HC RX 250 WO HCPCS: Performed by: NURSE PRACTITIONER

## 2025-01-28 PROCEDURE — 2500000003 HC RX 250 WO HCPCS: Performed by: STUDENT IN AN ORGANIZED HEALTH CARE EDUCATION/TRAINING PROGRAM

## 2025-01-28 PROCEDURE — 6370000000 HC RX 637 (ALT 250 FOR IP): Performed by: INTERNAL MEDICINE

## 2025-01-28 PROCEDURE — 84100 ASSAY OF PHOSPHORUS: CPT

## 2025-01-28 PROCEDURE — 6360000002 HC RX W HCPCS: Performed by: STUDENT IN AN ORGANIZED HEALTH CARE EDUCATION/TRAINING PROGRAM

## 2025-01-28 PROCEDURE — 97112 NEUROMUSCULAR REEDUCATION: CPT

## 2025-01-28 PROCEDURE — 1200000000 HC SEMI PRIVATE

## 2025-01-28 PROCEDURE — 82248 BILIRUBIN DIRECT: CPT

## 2025-01-28 PROCEDURE — 97530 THERAPEUTIC ACTIVITIES: CPT

## 2025-01-28 PROCEDURE — 80053 COMPREHEN METABOLIC PANEL: CPT

## 2025-01-28 PROCEDURE — 97110 THERAPEUTIC EXERCISES: CPT

## 2025-01-28 PROCEDURE — 82330 ASSAY OF CALCIUM: CPT

## 2025-01-28 PROCEDURE — 99233 SBSQ HOSP IP/OBS HIGH 50: CPT | Performed by: INTERNAL MEDICINE

## 2025-01-28 PROCEDURE — 95718 EEG PHYS/QHP 2-12 HR W/VEEG: CPT | Performed by: STUDENT IN AN ORGANIZED HEALTH CARE EDUCATION/TRAINING PROGRAM

## 2025-01-28 PROCEDURE — 85025 COMPLETE CBC W/AUTO DIFF WBC: CPT

## 2025-01-28 PROCEDURE — 83735 ASSAY OF MAGNESIUM: CPT

## 2025-01-28 PROCEDURE — 95713 VEEG 2-12 HR CONT MNTR: CPT

## 2025-01-28 PROCEDURE — 6360000002 HC RX W HCPCS: Performed by: INTERNAL MEDICINE

## 2025-01-28 PROCEDURE — 6370000000 HC RX 637 (ALT 250 FOR IP): Performed by: NURSE PRACTITIONER

## 2025-01-28 PROCEDURE — 85520 HEPARIN ASSAY: CPT

## 2025-01-28 PROCEDURE — 95720 EEG PHY/QHP EA INCR W/VEEG: CPT | Performed by: STUDENT IN AN ORGANIZED HEALTH CARE EDUCATION/TRAINING PROGRAM

## 2025-01-28 PROCEDURE — 85610 PROTHROMBIN TIME: CPT

## 2025-01-28 RX ORDER — HALOPERIDOL 5 MG/ML
2 INJECTION INTRAMUSCULAR ONCE
Status: COMPLETED | OUTPATIENT
Start: 2025-01-28 | End: 2025-01-28

## 2025-01-28 RX ADMIN — ATORVASTATIN CALCIUM 20 MG: 10 TABLET, FILM COATED ORAL at 20:54

## 2025-01-28 RX ADMIN — LEVETIRACETAM 500 MG: 500 INJECTION INTRAVENOUS at 20:53

## 2025-01-28 RX ADMIN — FUROSEMIDE 40 MG: 10 INJECTION, SOLUTION INTRAMUSCULAR; INTRAVENOUS at 08:28

## 2025-01-28 RX ADMIN — Medication 1 TABLET: at 08:27

## 2025-01-28 RX ADMIN — POTASSIUM BICARBONATE 20 MEQ: 782 TABLET, EFFERVESCENT ORAL at 08:28

## 2025-01-28 RX ADMIN — Medication 1000 MCG: at 08:28

## 2025-01-28 RX ADMIN — SODIUM CHLORIDE, PRESERVATIVE FREE 10 ML: 5 INJECTION INTRAVENOUS at 20:55

## 2025-01-28 RX ADMIN — MEMANTINE 5 MG: 10 TABLET ORAL at 08:27

## 2025-01-28 RX ADMIN — HYDRALAZINE HYDROCHLORIDE 50 MG: 50 TABLET ORAL at 08:33

## 2025-01-28 RX ADMIN — HALOPERIDOL LACTATE 2 MG: 5 INJECTION, SOLUTION INTRAMUSCULAR at 23:06

## 2025-01-28 RX ADMIN — ZINC SULFATE 220 MG (50 MG) CAPSULE 50 MG: CAPSULE at 08:28

## 2025-01-28 RX ADMIN — Medication 5000 UNITS: at 20:53

## 2025-01-28 RX ADMIN — LISINOPRIL 20 MG: 20 TABLET ORAL at 08:28

## 2025-01-28 RX ADMIN — Medication 100 MG: at 08:27

## 2025-01-28 RX ADMIN — OXYCODONE HYDROCHLORIDE AND ACETAMINOPHEN 500 MG: 500 TABLET ORAL at 08:27

## 2025-01-28 RX ADMIN — PANTOPRAZOLE SODIUM 40 MG: 40 TABLET, DELAYED RELEASE ORAL at 08:28

## 2025-01-28 RX ADMIN — HYDRALAZINE HYDROCHLORIDE 50 MG: 50 TABLET ORAL at 15:15

## 2025-01-28 RX ADMIN — POTASSIUM BICARBONATE 20 MEQ: 782 TABLET, EFFERVESCENT ORAL at 20:53

## 2025-01-28 RX ADMIN — HYDRALAZINE HYDROCHLORIDE 50 MG: 50 TABLET ORAL at 20:54

## 2025-01-28 RX ADMIN — FOLIC ACID 1 MG: 1 TABLET ORAL at 08:28

## 2025-01-28 RX ADMIN — MEMANTINE 5 MG: 10 TABLET ORAL at 20:53

## 2025-01-28 RX ADMIN — LEVETIRACETAM 500 MG: 500 TABLET, FILM COATED ORAL at 08:27

## 2025-01-28 RX ADMIN — HEPARIN SODIUM 14 UNITS/KG/HR: 10000 INJECTION, SOLUTION INTRAVENOUS at 17:53

## 2025-01-28 RX ADMIN — POTASSIUM BICARBONATE 20 MEQ: 782 TABLET, EFFERVESCENT ORAL at 15:16

## 2025-01-28 RX ADMIN — AMLODIPINE BESYLATE 5 MG: 5 TABLET ORAL at 08:27

## 2025-01-28 RX ADMIN — ACETAMINOPHEN 650 MG: 325 TABLET ORAL at 20:54

## 2025-01-28 RX ADMIN — ASPIRIN 81 MG CHEWABLE TABLET 81 MG: 81 TABLET CHEWABLE at 08:27

## 2025-01-28 RX ADMIN — EMPAGLIFLOZIN 10 MG: 10 TABLET, FILM COATED ORAL at 08:28

## 2025-01-28 RX ADMIN — SODIUM CHLORIDE, PRESERVATIVE FREE 10 ML: 5 INJECTION INTRAVENOUS at 08:28

## 2025-01-28 ASSESSMENT — ENCOUNTER SYMPTOMS
SHORTNESS OF BREATH: 0
ABDOMINAL DISTENTION: 0
COUGH: 0
CONSTIPATION: 0
WHEEZING: 0
SORE THROAT: 0
DIARRHEA: 0
EYE DISCHARGE: 0
BACK PAIN: 0

## 2025-01-28 ASSESSMENT — PAIN DESCRIPTION - ORIENTATION: ORIENTATION: RIGHT;LEFT

## 2025-01-28 ASSESSMENT — PAIN DESCRIPTION - LOCATION: LOCATION: GENERALIZED

## 2025-01-28 ASSESSMENT — PAIN SCALES - GENERAL: PAINLEVEL_OUTOF10: 6

## 2025-01-28 ASSESSMENT — PAIN DESCRIPTION - DESCRIPTORS: DESCRIPTORS: ACHING

## 2025-01-28 NOTE — PROCEDURES
CONTINUOUS VIDEO ELECTROENCEPHALOGRAM (cvEEG) REPORT    Identifying Information:  Name: Carol Xiong  MRN: [id number]  : 1933  Interpreting Physician: Shanelle Mendez DO  Reporting Physician: Shanelle Mendez DO  Referring Provider: ARABELLA Mcmahan      Clinical History:  Carol Xiong is an 89 y.o. female with concern for seizures  Past Medical History:  Past Medical History:   Diagnosis Date    Abnormal echocardiogram 2024    See Epic notes    GERD (gastroesophageal reflux disease)     H/O echocardiogram 2017    EF 55% Left atrial enlargement. Normal LV systolic. Minimal aoritc stenosis.     H/O echocardiogram 2020    EF 55-60%, Grade I DD, Mild AS.  Mildly dilated left atrium.    Hyperlipidemia     Hypertension     Neuropathy         Current Medications:    furosemide  40 mg IntraVENous Daily    potassium bicarb-citric acid  20 mEq Oral TID    levETIRAcetam  500 mg Oral BID    Or    levETIRAcetam  500 mg IntraVENous BID    lisinopril  20 mg Oral Daily    amLODIPine  5 mg Oral Daily    multivitamin  1 tablet Oral Daily    thiamine  100 mg Oral Daily    zinc sulfate  50 mg Oral Daily    empagliflozin  10 mg Oral Daily    aspirin  81 mg Oral Daily    atorvastatin  20 mg Oral Nightly    Vitamin D  5,000 Units Oral Daily    folic acid  1 mg Oral Daily    pantoprazole  40 mg Oral QAM AC    vitamin C  500 mg Oral Daily    vitamin B-12  1,000 mcg Oral Daily    sodium chloride flush  5-40 mL IntraVENous 2 times per day    [Held by provider] gabapentin  300 mg Oral BID    memantine  5 mg Oral BID    hydrALAZINE  50 mg Oral 3 times per day        cEEG start date & time: 25 @0757  cEEG end date & time: 25 @1015     Indication:  cEEG was initiated for monitoring and localization of seizures as the etiology of the encephalopathy/altered mental status. It was available for review at the bedside as well as via remote access for the entire period of monitoring. Video was

## 2025-01-28 NOTE — PROCEDURES
CONTINUOUS VIDEO ELECTROENCEPHALOGRAM (cvEEG) REPORT    Identifying Information:  Name: Carol Xiong  MRN: [id number]  : 1933  Interpreting Physician: Shanelle Mendez DO  Reporting Physician: Shanelle Mendez DO  Referring Provider: ARABELLA Mcmahan      Clinical History:  Carol Xiong is an 89 y.o. female with concern for seizures  Past Medical History:  Past Medical History:   Diagnosis Date    Abnormal echocardiogram 2024    See Epic notes    GERD (gastroesophageal reflux disease)     H/O echocardiogram 2017    EF 55% Left atrial enlargement. Normal LV systolic. Minimal aoritc stenosis.     H/O echocardiogram 2020    EF 55-60%, Grade I DD, Mild AS.  Mildly dilated left atrium.    Hyperlipidemia     Hypertension     Neuropathy         Current Medications:    furosemide  40 mg IntraVENous Daily    potassium bicarb-citric acid  20 mEq Oral TID    levETIRAcetam  500 mg Oral BID    Or    levETIRAcetam  500 mg IntraVENous BID    lisinopril  20 mg Oral Daily    amLODIPine  5 mg Oral Daily    multivitamin  1 tablet Oral Daily    thiamine  100 mg Oral Daily    zinc sulfate  50 mg Oral Daily    empagliflozin  10 mg Oral Daily    aspirin  81 mg Oral Daily    atorvastatin  20 mg Oral Nightly    Vitamin D  5,000 Units Oral Daily    folic acid  1 mg Oral Daily    pantoprazole  40 mg Oral QAM AC    vitamin C  500 mg Oral Daily    vitamin B-12  1,000 mcg Oral Daily    sodium chloride flush  5-40 mL IntraVENous 2 times per day    [Held by provider] gabapentin  300 mg Oral BID    memantine  5 mg Oral BID    hydrALAZINE  50 mg Oral 3 times per day        cEEG start date & time: 25 @0757  cEEG end date & time: 25 @0700     Indication:  cEEG was initiated for monitoring and localization of seizures as the etiology of the encephalopathy/altered mental status. It was available for review at the bedside as well as via remote access for the entire period of monitoring. Video was

## 2025-01-29 ENCOUNTER — APPOINTMENT (OUTPATIENT)
Dept: MRI IMAGING | Age: 89
DRG: 291 | End: 2025-01-29
Payer: MEDICARE

## 2025-01-29 LAB
ALBUMIN SERPL-MCNC: 3.1 G/DL (ref 3.4–5)
ALBUMIN/GLOB SERPL: 1.2 {RATIO} (ref 1.1–2.2)
ALP SERPL-CCNC: 94 U/L (ref 40–129)
ALT SERPL-CCNC: 8 U/L (ref 10–40)
ANION GAP SERPL CALCULATED.3IONS-SCNC: 12 MMOL/L (ref 9–17)
ANTI-XA UNFRAC HEPARIN: 0.42 IU/L
ANTI-XA UNFRAC HEPARIN: 0.54 IU/L
AST SERPL-CCNC: 21 U/L (ref 15–37)
BASOPHILS # BLD: 0.01 K/UL
BASOPHILS NFR BLD: 0 % (ref 0–1)
BILIRUB DIRECT SERPL-MCNC: 0.3 MG/DL (ref 0–0.3)
BILIRUB INDIRECT SERPL-MCNC: 0.2 MG/DL (ref 0–0.7)
BILIRUB SERPL-MCNC: 0.5 MG/DL (ref 0–1)
BUN SERPL-MCNC: 17 MG/DL (ref 7–20)
CA-I BLD-SCNC: 1.15 MMOL/L (ref 1.15–1.33)
CALCIUM SERPL-MCNC: 9 MG/DL (ref 8.3–10.6)
CHLORIDE SERPL-SCNC: 98 MMOL/L (ref 99–110)
CO2 SERPL-SCNC: 28 MMOL/L (ref 21–32)
CREAT SERPL-MCNC: 0.8 MG/DL (ref 0.6–1.2)
EOSINOPHIL # BLD: 0 K/UL
EOSINOPHILS RELATIVE PERCENT: 0 % (ref 0–3)
ERYTHROCYTE [DISTWIDTH] IN BLOOD BY AUTOMATED COUNT: 13.1 % (ref 11.7–14.9)
GFR, ESTIMATED: 64 ML/MIN/1.73M2
GLUCOSE SERPL-MCNC: 106 MG/DL (ref 74–99)
HCT VFR BLD AUTO: 26.9 % (ref 37–47)
HGB BLD-MCNC: 8.7 G/DL (ref 12.5–16)
IMM GRANULOCYTES # BLD AUTO: 0.02 K/UL
IMM GRANULOCYTES NFR BLD: 0 %
INR PPP: 0.9
LYMPHOCYTES NFR BLD: 0.84 K/UL
LYMPHOCYTES RELATIVE PERCENT: 17 % (ref 24–44)
MAGNESIUM SERPL-MCNC: 1.8 MG/DL (ref 1.8–2.4)
MCH RBC QN AUTO: 30.1 PG (ref 27–31)
MCHC RBC AUTO-ENTMCNC: 32.3 G/DL (ref 32–36)
MCV RBC AUTO: 93.1 FL (ref 78–100)
MONOCYTES NFR BLD: 0.35 K/UL
MONOCYTES NFR BLD: 7 % (ref 0–4)
NEUTROPHILS NFR BLD: 76 % (ref 36–66)
NEUTS SEG NFR BLD: 3.84 K/UL
PHOSPHATE SERPL-MCNC: 2.9 MG/DL (ref 2.5–4.9)
PLATELET # BLD AUTO: 279 K/UL (ref 140–440)
PMV BLD AUTO: 10.7 FL (ref 7.5–11.1)
POTASSIUM SERPL-SCNC: 3.7 MMOL/L (ref 3.5–5.1)
PROT SERPL-MCNC: 5.7 G/DL (ref 6.4–8.2)
PROTHROMBIN TIME: 12.8 SEC (ref 11.7–14.5)
RBC # BLD AUTO: 2.89 M/UL (ref 4.2–5.4)
SODIUM SERPL-SCNC: 138 MMOL/L (ref 136–145)
WBC OTHER # BLD: 5.1 K/UL (ref 4–10.5)

## 2025-01-29 PROCEDURE — 2500000003 HC RX 250 WO HCPCS: Performed by: STUDENT IN AN ORGANIZED HEALTH CARE EDUCATION/TRAINING PROGRAM

## 2025-01-29 PROCEDURE — 85520 HEPARIN ASSAY: CPT

## 2025-01-29 PROCEDURE — 85610 PROTHROMBIN TIME: CPT

## 2025-01-29 PROCEDURE — 99233 SBSQ HOSP IP/OBS HIGH 50: CPT | Performed by: INTERNAL MEDICINE

## 2025-01-29 PROCEDURE — 6370000000 HC RX 637 (ALT 250 FOR IP): Performed by: STUDENT IN AN ORGANIZED HEALTH CARE EDUCATION/TRAINING PROGRAM

## 2025-01-29 PROCEDURE — 83735 ASSAY OF MAGNESIUM: CPT

## 2025-01-29 PROCEDURE — 85025 COMPLETE CBC W/AUTO DIFF WBC: CPT

## 2025-01-29 PROCEDURE — 6360000002 HC RX W HCPCS: Performed by: INTERNAL MEDICINE

## 2025-01-29 PROCEDURE — 6370000000 HC RX 637 (ALT 250 FOR IP): Performed by: INTERNAL MEDICINE

## 2025-01-29 PROCEDURE — 82330 ASSAY OF CALCIUM: CPT

## 2025-01-29 PROCEDURE — 1200000000 HC SEMI PRIVATE

## 2025-01-29 PROCEDURE — 99233 SBSQ HOSP IP/OBS HIGH 50: CPT | Performed by: NURSE PRACTITIONER

## 2025-01-29 PROCEDURE — 84100 ASSAY OF PHOSPHORUS: CPT

## 2025-01-29 PROCEDURE — 82248 BILIRUBIN DIRECT: CPT

## 2025-01-29 PROCEDURE — 36415 COLL VENOUS BLD VENIPUNCTURE: CPT

## 2025-01-29 PROCEDURE — 6370000000 HC RX 637 (ALT 250 FOR IP): Performed by: NURSE PRACTITIONER

## 2025-01-29 PROCEDURE — 6360000002 HC RX W HCPCS: Performed by: STUDENT IN AN ORGANIZED HEALTH CARE EDUCATION/TRAINING PROGRAM

## 2025-01-29 PROCEDURE — 80053 COMPREHEN METABOLIC PANEL: CPT

## 2025-01-29 RX ORDER — FUROSEMIDE 40 MG/1
40 TABLET ORAL DAILY
Status: DISCONTINUED | OUTPATIENT
Start: 2025-01-29 | End: 2025-01-30 | Stop reason: HOSPADM

## 2025-01-29 RX ORDER — QUETIAPINE FUMARATE 25 MG/1
25 TABLET, FILM COATED ORAL
Status: DISCONTINUED | OUTPATIENT
Start: 2025-01-29 | End: 2025-01-30 | Stop reason: HOSPADM

## 2025-01-29 RX ADMIN — POTASSIUM BICARBONATE 20 MEQ: 782 TABLET, EFFERVESCENT ORAL at 22:07

## 2025-01-29 RX ADMIN — HYDRALAZINE HYDROCHLORIDE 50 MG: 50 TABLET ORAL at 22:07

## 2025-01-29 RX ADMIN — POTASSIUM BICARBONATE 20 MEQ: 782 TABLET, EFFERVESCENT ORAL at 08:38

## 2025-01-29 RX ADMIN — HYDRALAZINE HYDROCHLORIDE 5 MG: 20 INJECTION INTRAMUSCULAR; INTRAVENOUS at 17:21

## 2025-01-29 RX ADMIN — MEMANTINE 5 MG: 10 TABLET ORAL at 22:00

## 2025-01-29 RX ADMIN — FUROSEMIDE 40 MG: 10 INJECTION, SOLUTION INTRAMUSCULAR; INTRAVENOUS at 08:40

## 2025-01-29 RX ADMIN — HYDRALAZINE HYDROCHLORIDE 5 MG: 20 INJECTION INTRAMUSCULAR; INTRAVENOUS at 05:58

## 2025-01-29 RX ADMIN — SODIUM CHLORIDE, PRESERVATIVE FREE 10 ML: 5 INJECTION INTRAVENOUS at 22:19

## 2025-01-29 RX ADMIN — ACETAMINOPHEN 650 MG: 325 TABLET ORAL at 21:58

## 2025-01-29 RX ADMIN — APIXABAN 5 MG: 5 TABLET, FILM COATED ORAL at 22:00

## 2025-01-29 RX ADMIN — ATORVASTATIN CALCIUM 20 MG: 10 TABLET, FILM COATED ORAL at 21:59

## 2025-01-29 RX ADMIN — QUETIAPINE FUMARATE 25 MG: 25 TABLET ORAL at 21:59

## 2025-01-29 RX ADMIN — POTASSIUM BICARBONATE 20 MEQ: 782 TABLET, EFFERVESCENT ORAL at 17:22

## 2025-01-29 RX ADMIN — FUROSEMIDE 40 MG: 40 TABLET ORAL at 17:22

## 2025-01-29 RX ADMIN — LEVETIRACETAM 500 MG: 500 TABLET, FILM COATED ORAL at 22:06

## 2025-01-29 RX ADMIN — LEVETIRACETAM 500 MG: 500 INJECTION INTRAVENOUS at 08:38

## 2025-01-29 RX ADMIN — Medication 5000 UNITS: at 21:58

## 2025-01-29 RX ADMIN — APIXABAN 5 MG: 5 TABLET, FILM COATED ORAL at 17:22

## 2025-01-29 RX ADMIN — SODIUM CHLORIDE, PRESERVATIVE FREE 10 ML: 5 INJECTION INTRAVENOUS at 08:39

## 2025-01-29 ASSESSMENT — PAIN SCALES - GENERAL
PAINLEVEL_OUTOF10: 0
PAINLEVEL_OUTOF10: 3

## 2025-01-29 ASSESSMENT — PAIN DESCRIPTION - FREQUENCY: FREQUENCY: CONTINUOUS

## 2025-01-29 ASSESSMENT — ENCOUNTER SYMPTOMS
SHORTNESS OF BREATH: 0
WHEEZING: 0
DIARRHEA: 0
CONSTIPATION: 0
BACK PAIN: 0
EYE DISCHARGE: 0
ABDOMINAL DISTENTION: 0
COUGH: 0
SORE THROAT: 0

## 2025-01-29 ASSESSMENT — PAIN SCALES - WONG BAKER
WONGBAKER_NUMERICALRESPONSE: NO HURT
WONGBAKER_NUMERICALRESPONSE: NO HURT

## 2025-01-29 ASSESSMENT — PAIN DESCRIPTION - ORIENTATION: ORIENTATION: RIGHT;LEFT

## 2025-01-29 ASSESSMENT — PAIN DESCRIPTION - PAIN TYPE: TYPE: CHRONIC PAIN

## 2025-01-29 ASSESSMENT — PAIN DESCRIPTION - DESCRIPTORS: DESCRIPTORS: DISCOMFORT

## 2025-01-29 ASSESSMENT — PAIN DESCRIPTION - ONSET: ONSET: GRADUAL

## 2025-01-29 ASSESSMENT — PAIN DESCRIPTION - LOCATION: LOCATION: LEG

## 2025-01-29 NOTE — CARE COORDINATION
Plan remains [facility] when medically stable. Ludy Tejeda, RN     1115 Spoke to Arabella at [facility]. Anticipate discharge 1/30. Ludy Tejeda RN     WHEN MEDICALLY READY FOR DISCHARGE-  COMPLETE [facility]   RN/PHYSICIAN  COPY [facility] - FAX [phone]  ADD [facility] TO PACKET  CALL REPORT [phone]  CALL FAMILY   CALL TRANSPORT-  SUPERIOR  [phone]

## 2025-01-30 ENCOUNTER — APPOINTMENT (OUTPATIENT)
Dept: MRI IMAGING | Age: 89
DRG: 291 | End: 2025-01-30
Payer: MEDICARE

## 2025-01-30 VITALS
TEMPERATURE: 97.9 F | DIASTOLIC BLOOD PRESSURE: 59 MMHG | OXYGEN SATURATION: 99 % | BODY MASS INDEX: 22.36 KG/M2 | SYSTOLIC BLOOD PRESSURE: 137 MMHG | HEART RATE: 65 BPM | HEIGHT: 69 IN | WEIGHT: 151 LBS | RESPIRATION RATE: 16 BRPM

## 2025-01-30 LAB
ALBUMIN SERPL-MCNC: 2.6 G/DL (ref 3.4–5)
ALBUMIN/GLOB SERPL: 1.2 {RATIO} (ref 1.1–2.2)
ALP SERPL-CCNC: 77 U/L (ref 40–129)
ALT SERPL-CCNC: 6 U/L (ref 10–40)
ANION GAP SERPL CALCULATED.3IONS-SCNC: 8 MMOL/L (ref 9–17)
AST SERPL-CCNC: 20 U/L (ref 15–37)
BASOPHILS # BLD: 0.03 K/UL
BASOPHILS NFR BLD: 1 % (ref 0–1)
BILIRUB DIRECT SERPL-MCNC: <0.2 MG/DL (ref 0–0.3)
BILIRUB INDIRECT SERPL-MCNC: ABNORMAL MG/DL (ref 0–0.7)
BILIRUB SERPL-MCNC: 0.3 MG/DL (ref 0–1)
BUN SERPL-MCNC: 16 MG/DL (ref 7–20)
CA-I BLD-SCNC: 1.17 MMOL/L (ref 1.15–1.33)
CALCIUM SERPL-MCNC: 8.8 MG/DL (ref 8.3–10.6)
CHLORIDE SERPL-SCNC: 99 MMOL/L (ref 99–110)
CO2 SERPL-SCNC: 30 MMOL/L (ref 21–32)
CREAT SERPL-MCNC: 0.8 MG/DL (ref 0.6–1.2)
EOSINOPHIL # BLD: 0.07 K/UL
EOSINOPHILS RELATIVE PERCENT: 2 % (ref 0–3)
ERYTHROCYTE [DISTWIDTH] IN BLOOD BY AUTOMATED COUNT: 13.1 % (ref 11.7–14.9)
GFR, ESTIMATED: 66 ML/MIN/1.73M2
GLUCOSE SERPL-MCNC: 93 MG/DL (ref 74–99)
HCT VFR BLD AUTO: 24.3 % (ref 37–47)
HGB BLD-MCNC: 7.7 G/DL (ref 12.5–16)
IMM GRANULOCYTES # BLD AUTO: 0.01 K/UL
IMM GRANULOCYTES NFR BLD: 0 %
INR PPP: 1.1
LYMPHOCYTES NFR BLD: 1.48 K/UL
LYMPHOCYTES RELATIVE PERCENT: 34 % (ref 24–44)
MAGNESIUM SERPL-MCNC: 1.8 MG/DL (ref 1.8–2.4)
MCH RBC QN AUTO: 30.6 PG (ref 27–31)
MCHC RBC AUTO-ENTMCNC: 31.7 G/DL (ref 32–36)
MCV RBC AUTO: 96.4 FL (ref 78–100)
MONOCYTES NFR BLD: 0.51 K/UL
MONOCYTES NFR BLD: 12 % (ref 0–4)
NEUTROPHILS NFR BLD: 51 % (ref 36–66)
NEUTS SEG NFR BLD: 2.2 K/UL
PHOSPHATE SERPL-MCNC: 3 MG/DL (ref 2.5–4.9)
PLATELET # BLD AUTO: 241 K/UL (ref 140–440)
PMV BLD AUTO: 10.8 FL (ref 7.5–11.1)
POTASSIUM SERPL-SCNC: 3.8 MMOL/L (ref 3.5–5.1)
PROT SERPL-MCNC: 4.8 G/DL (ref 6.4–8.2)
PROTHROMBIN TIME: 14 SEC (ref 11.7–14.5)
RBC # BLD AUTO: 2.52 M/UL (ref 4.2–5.4)
SODIUM SERPL-SCNC: 137 MMOL/L (ref 136–145)
WBC OTHER # BLD: 4.3 K/UL (ref 4–10.5)

## 2025-01-30 PROCEDURE — 36415 COLL VENOUS BLD VENIPUNCTURE: CPT

## 2025-01-30 PROCEDURE — 6360000002 HC RX W HCPCS: Performed by: STUDENT IN AN ORGANIZED HEALTH CARE EDUCATION/TRAINING PROGRAM

## 2025-01-30 PROCEDURE — 97530 THERAPEUTIC ACTIVITIES: CPT

## 2025-01-30 PROCEDURE — 6370000000 HC RX 637 (ALT 250 FOR IP): Performed by: STUDENT IN AN ORGANIZED HEALTH CARE EDUCATION/TRAINING PROGRAM

## 2025-01-30 PROCEDURE — 85025 COMPLETE CBC W/AUTO DIFF WBC: CPT

## 2025-01-30 PROCEDURE — 97535 SELF CARE MNGMENT TRAINING: CPT

## 2025-01-30 PROCEDURE — 82330 ASSAY OF CALCIUM: CPT

## 2025-01-30 PROCEDURE — 99233 SBSQ HOSP IP/OBS HIGH 50: CPT | Performed by: NURSE PRACTITIONER

## 2025-01-30 PROCEDURE — 80053 COMPREHEN METABOLIC PANEL: CPT

## 2025-01-30 PROCEDURE — 70553 MRI BRAIN STEM W/O & W/DYE: CPT

## 2025-01-30 PROCEDURE — 2500000003 HC RX 250 WO HCPCS: Performed by: STUDENT IN AN ORGANIZED HEALTH CARE EDUCATION/TRAINING PROGRAM

## 2025-01-30 PROCEDURE — 84100 ASSAY OF PHOSPHORUS: CPT

## 2025-01-30 PROCEDURE — 85610 PROTHROMBIN TIME: CPT

## 2025-01-30 PROCEDURE — 83735 ASSAY OF MAGNESIUM: CPT

## 2025-01-30 PROCEDURE — 6360000004 HC RX CONTRAST MEDICATION: Performed by: NURSE PRACTITIONER

## 2025-01-30 PROCEDURE — 6370000000 HC RX 637 (ALT 250 FOR IP): Performed by: INTERNAL MEDICINE

## 2025-01-30 PROCEDURE — 82248 BILIRUBIN DIRECT: CPT

## 2025-01-30 PROCEDURE — 94761 N-INVAS EAR/PLS OXIMETRY MLT: CPT

## 2025-01-30 PROCEDURE — A9579 GAD-BASE MR CONTRAST NOS,1ML: HCPCS | Performed by: NURSE PRACTITIONER

## 2025-01-30 PROCEDURE — 6370000000 HC RX 637 (ALT 250 FOR IP): Performed by: NURSE PRACTITIONER

## 2025-01-30 RX ORDER — QUETIAPINE FUMARATE 25 MG/1
25 TABLET, FILM COATED ORAL
Qty: 60 TABLET | Refills: 3 | Status: SHIPPED | OUTPATIENT
Start: 2025-01-30

## 2025-01-30 RX ORDER — LEVETIRACETAM 500 MG/1
500 TABLET ORAL 2 TIMES DAILY
Qty: 60 TABLET | Refills: 3 | Status: SHIPPED | OUTPATIENT
Start: 2025-01-30

## 2025-01-30 RX ORDER — FUROSEMIDE 40 MG/1
40 TABLET ORAL DAILY
Qty: 60 TABLET | Refills: 3 | Status: SHIPPED | OUTPATIENT
Start: 2025-01-31

## 2025-01-30 RX ADMIN — HYDRALAZINE HYDROCHLORIDE 50 MG: 50 TABLET ORAL at 14:35

## 2025-01-30 RX ADMIN — EMPAGLIFLOZIN 10 MG: 10 TABLET, FILM COATED ORAL at 08:39

## 2025-01-30 RX ADMIN — PANTOPRAZOLE SODIUM 40 MG: 40 TABLET, DELAYED RELEASE ORAL at 06:53

## 2025-01-30 RX ADMIN — Medication 1000 MCG: at 08:38

## 2025-01-30 RX ADMIN — APIXABAN 5 MG: 5 TABLET, FILM COATED ORAL at 08:36

## 2025-01-30 RX ADMIN — OXYCODONE HYDROCHLORIDE AND ACETAMINOPHEN 500 MG: 500 TABLET ORAL at 08:38

## 2025-01-30 RX ADMIN — Medication 100 MG: at 08:38

## 2025-01-30 RX ADMIN — Medication 1 TABLET: at 08:38

## 2025-01-30 RX ADMIN — GADOTERIDOL 14 ML: 279.3 INJECTION, SOLUTION INTRAVENOUS at 10:33

## 2025-01-30 RX ADMIN — ASPIRIN 81 MG CHEWABLE TABLET 81 MG: 81 TABLET CHEWABLE at 08:36

## 2025-01-30 RX ADMIN — POTASSIUM BICARBONATE 20 MEQ: 782 TABLET, EFFERVESCENT ORAL at 08:36

## 2025-01-30 RX ADMIN — POTASSIUM BICARBONATE 20 MEQ: 782 TABLET, EFFERVESCENT ORAL at 14:35

## 2025-01-30 RX ADMIN — LISINOPRIL 30 MG: 20 TABLET ORAL at 08:36

## 2025-01-30 RX ADMIN — AMLODIPINE BESYLATE 5 MG: 5 TABLET ORAL at 08:39

## 2025-01-30 RX ADMIN — HYDRALAZINE HYDROCHLORIDE 50 MG: 50 TABLET ORAL at 06:53

## 2025-01-30 RX ADMIN — LEVETIRACETAM 500 MG: 500 INJECTION INTRAVENOUS at 08:36

## 2025-01-30 RX ADMIN — ZINC SULFATE 220 MG (50 MG) CAPSULE 50 MG: CAPSULE at 08:35

## 2025-01-30 RX ADMIN — FOLIC ACID 1 MG: 1 TABLET ORAL at 08:39

## 2025-01-30 RX ADMIN — SODIUM CHLORIDE, PRESERVATIVE FREE 10 ML: 5 INJECTION INTRAVENOUS at 08:39

## 2025-01-30 RX ADMIN — MEMANTINE 5 MG: 10 TABLET ORAL at 08:38

## 2025-01-30 RX ADMIN — FUROSEMIDE 40 MG: 40 TABLET ORAL at 08:39

## 2025-01-30 ASSESSMENT — PAIN SCALES - GENERAL: PAINLEVEL_OUTOF10: 0

## 2025-01-30 ASSESSMENT — PAIN SCALES - WONG BAKER: WONGBAKER_NUMERICALRESPONSE: NO HURT

## 2025-01-30 NOTE — CONSULTS
Critical care consult 25        NAME: Carol Xiong  : 1933  MRN: [id number]      Assessment/Plan:  Carol Xiong is a 89 y.o. female with a history of dementia, HTN, HLD, CHF HfpEf, TIA, GERD who presented to [facility] 2025 with 1-2 weeks with headache and elevated BP at NH found to have new onset AMS, concern for seizures.       Problem list  Encephalopathy  ICA stenosis - refused intervention  Seizure  HFpEF  Subsegmental Pulmonary embolism  Troponemia  Demetia without behavioral disturabances  Hx of TIA  GERD    Neuro: Patient known to have demential was reportedly alert, oriented on admission. Acute change in mental status in the early afternoon, code stroke was called, patient had a CT CTA head and neck which noted only for chronic ICA stenosis, post initial evaluation patient mental status had improved and was at baseline. Evaluated by Tele stroke who did not deem patient a candidate for TNK. MRI ordered and pending.  Repeat RRT called for change in mental status with concern for seizure. Ceribel ordered with noted focal to bilateral tonic clonic seizure with onset over the left hemisphere, neurology consulted, loaded with Keppra. Patient post-ictal and did not improve a quickly as prior. Now transferred to the ICU for airway watch in lieu of poor mental status  Cardio:  HDS was previously hypertensive requiring Cardene now off. Restart home medications. Concern for Acute decompensated CHF HFpEF. Cardiology following recs appretiated  Resp: on NC, titrate off as able. Noted to have bilateral subsegmental PE on CTA. Started on heparin drip. Continue inhalers, nebs and aggressive pulm toileting.   GI: NPO. NGT. TF. GI ppx  : Cr 0.7, monitor uop. Monitor electrolytes and replenish as needed. Previously noted metabolic acidosis, now cleared likley in setting of seizure.   Heme:  Hgb 10.5, plts 324. DVT ppx: heparin for treatment of PE, evaluation for DVTs  ID: WBC 11.3, no 
  INPATIENT CARDIOLOGY CONSULT NOTE         Reason for consultation: Essential hypertension, headaches    History of present illness:[place] is a 89 y.o.year old who is admitted with   Chief Complaint   Patient presents with    Hypertension    Headache        Patient is a pleasant 89-year-old -American female who is admitted to the hospital with chief complaint of headache and poorly controlled hypertension.  Next  Cardiology consulted to evaluate patient for congestive heart failure and hypertension.  Prior medical history significant for resistant hypertension hyperlipidemia and neuropathy.    Denies smoking alcohol drug abuse  Prior admission in December 2020 for poorly controlled hypertension and strokelike symptoms including altered mental status.    She mentioned that she has been compliant with her medications    Blood pressure on arrival to the hospital is 223/109 and has gradually improving  Patient is having breakfast, denies any active chest pain shortness of breath or headaches at this time      Pertinent Lab Personally Review     Recent Labs     01/23/25  0820   WBC 5.3   HGB 9.7*   HCT 30.2*         Recent Labs     01/23/25  0820      K 3.3*      CO2 29   BUN 13   CREATININE 0.6     Recent Labs     01/23/25  0820   AST 18   ALT 6*   BILITOT 0.3   ALKPHOS 107     Lab Results   Component Value Date    PROBNP 1,506 (H) 01/22/2025    PROBNP 1,899 (H) 12/10/2024    PROBNP 230.4 08/28/2024         Past medical history:    has a past medical history of Abnormal echocardiogram, GERD (gastroesophageal reflux disease), H/O echocardiogram, H/O echocardiogram, Hyperlipidemia, Hypertension, and Neuropathy.  Past surgical history:   has a past surgical history that includes Spine surgery (2013); Colon surgery; Hysterectomy, total abdominal; Appendectomy; and Upper gastrointestinal endoscopy (N/A, 9/6/2024).  Social History:   reports that she has never smoked. She has never used smokeless 
 [facility] Nurse  Consult Note       Carol Xiong  AGE: 89 y.o.   GENDER: female  : 1933  TODAY'S DATE:  2025    Subjective:     Reason for  Evaluation and Assessment: wound care eval.      Carol Xiong is a 89 y.o. female referred by:   [x] Physician  [] Nursing  [] Other:     Wound Identification:  Wound Type:  masd  Contributing Factors: chronic pressure, decreased mobility, malnutrition, and incontinence of stool        PAST MEDICAL HISTORY        Diagnosis Date    Abnormal echocardiogram 2024    See Epic notes    GERD (gastroesophageal reflux disease)     H/O echocardiogram 2017    EF 55% Left atrial enlargement. Normal LV systolic. Minimal aoritc stenosis.     H/O echocardiogram 2020    EF 55-60%, Grade I DD, Mild AS.  Mildly dilated left atrium.    Hyperlipidemia     Hypertension     Neuropathy        PAST SURGICAL HISTORY    Past Surgical History:   Procedure Laterality Date    APPENDECTOMY      COLON SURGERY      removed polops    EEG CONTINUOUS 24 HOUR W/ VIDEO  2025    HYSTERECTOMY, TOTAL ABDOMINAL (CERVIX REMOVED)      SPINE SURGERY      scrape calcium off spine, pressing on spine/nerves    UPPER GASTROINTESTINAL ENDOSCOPY N/A 2024    ESOPHAGOGASTRODUODENOSCOPY PERCUTANEOUS ENDOSCOPIC GASTROSTOMY TUBE PLACEMENT EGD BALLOON DILATION 12MM-15MM BALLOON DILATED TO 15MM UPPER AND LOWER ESOPHAGUS EGD BIOPSY performed by Lisa Turner MD at [facility] ENDOSCOPY       FAMILY HISTORY    Family History   Problem Relation Age of Onset    Hypertension Mother     Hypertension Father        SOCIAL HISTORY    Social History     Tobacco Use    Smoking status: Never    Smokeless tobacco: Never   Substance Use Topics    Alcohol use: Not Currently       ALLERGIES    Allergies   Allergen Reactions    Penicillins     Thiazide-Type Diuretics Other (See Comments)     Recurrent hyponatremia//sodium 113 on at 24//avoid thiazides please 
Consult completed. Kuona Safety® Deep Access 22g 2 ½\" IV catheter initiated into RUE cephalic vein x 1 attempt using ultrasound guided technique. Brisk blood return, flushes without resistance. Patient tolerated well. Primary nurse notified.      Consult the Vascular Access Team for questions, concerns, or change in patient's condition.           
Neurology Service Consult Note  [facility]   Patient Name: Carol Xiong  : 1933        Subjective:   Reason for consult: Seizure  89 y.o.  female with history of HLD, HTN, neuropathy presenting to [facility] initially 24 with HTN urgency an complaints of worsening weakness, leg swelling, headache. She was also found to have PE and is on heparin. On 25 patient was found minimally responsive. Stroke alert was called. Per documentation she was showing improvement after CT head ans [facility] 8. Intervention was not recommended due to improving exam. Patient had a second episode of unresponsiveness later in the day associated with bilateral UE jerking. Due to concern for seizure, Ceribell was placed and on initial review there was bilateral tonic clonic seizure with onset over the left hemisphere. Patient received Keppra 2000 mg x 1 followed by Keppra 500 mg BID. She was transferred to ICU and placed on cEEG monitoring this morning.     Chart was reviewed in detail, patient was seen and evaluated. She is awake and oriented to self. cEEG is in place during exam. Patient is asking when she can have lunch. Discussed reason for EEG monitoring. Has had no further episodes of unresponsiveness or seizure like activity. Denies any new weakness, sensory change, headache or vision change. Denies prior history of seizure.     Patient was recently evaluated by neuro intervention 2024 for right carotid stenosis and underwent diagnostic angiogram. Given advanced age carotid stenting was not recommended. Patient was continued on aspirin and atorvastatin.     Past Medical History:   Diagnosis Date    Abnormal echocardiogram 2024    See Epic notes    GERD (gastroesophageal reflux disease)     H/O echocardiogram 2017    EF 55% Left atrial enlargement. Normal LV systolic. Minimal aoritc stenosis.     H/O echocardiogram 2020    EF 55-60%, Grade I DD, Mild 
Needs assistance  Active : No  Patient's  Info: [employer] or granddaughter  Mode of Transportation: Car    Examination of body systems (includes body structures/functions, activity/participation limitations):  Observation:  Supine in bed upon arrival, agreeable to therapy. Noted edema in BLEs  Vision:  glasses  Hearing:  WFL  Cardiopulmonary:  on Room air, /67 seated upright in bed, RN aware  Cognition: AxO x4, see OT/SLP note for further evaluation.    Musculoskeletal  ROM R/L:  WFL BLEs.    Strength R/L:  grossly 2+ to 3/5 BLEs, maximal impairment in function and endurance.    Neuro:  WFL      Mobility:  Rolling L/R:  NT  Supine to sit:  modA x2 for BLE advancement to EOB and trunk uprighting. Verbal cues for sequencing  Sit to supine: maxA x2 for BLE advancement back onto bed and controlled trunk lowering. Verbal cues for sequencing  maxA x2 to scoot to HOB  Transfers:   Sit to stand: attempted from elevated EOB to standing at RW (1x) and to standing arm in arm with therapists (2x), maxA x2 for lift assist and bilateral knee blocking. Pt able to achieve partial stand and clear bottom, however unable to achieve upright posture. Pt resistant to transfer leaning posteriorly when attempting to stand. Verbal cues and demonstration for forward lean and UE placement  Stand to sit: from standing at RW to seated EOB (1x) and from standing arm in arm with therapists to seated EOB (2x), maxA x2 for controlled descent to surface.  Sitting balance:  Fair- static seated EOB, ranging from maxA progressing to CGA for stabilization throughout.    Standing balance:  Poor partial stand with BUE support, maxA x2 for lift assist.    Gait: NT due to weakness and safety concerns  Educated pt on PT POC, role of PT, importance of OOB mobility    [facility] 6 Clicks Inpatient Mobility:  AM-PAC Inpatient Mobility Raw Score : 11    Safety: patient left in bed with alarm, call light within reach, RN notified, gait belt

## 2025-01-30 NOTE — PLAN OF CARE
Problem: Discharge Planning  Goal: Discharge to home or other facility with appropriate resources  1/29/2025 1200 by Kallie Davis RN  Outcome: Progressing  1/28/2025 2251 by Hoda Cowart RN  Outcome: Progressing     Problem: Pain  Goal: Verbalizes/displays adequate comfort level or baseline comfort level  1/29/2025 1200 by Kallie Davis RN  Outcome: Progressing  1/28/2025 2251 by Hoda Cowart RN  Outcome: Progressing     Problem: Musculoskeletal - Adult  Goal: Return mobility to safest level of function  1/29/2025 1200 by Kallie Davis RN  Outcome: Progressing  1/28/2025 2251 by Hoda Cowart RN  Outcome: Progressing  Goal: Maintain proper alignment of affected body part  1/29/2025 1200 by Kallie Davis RN  Outcome: Progressing  1/28/2025 2251 by Hoda Cowart RN  Outcome: Progressing  Goal: Return ADL status to a safe level of function  1/29/2025 1200 by Kallie Davis RN  Outcome: Progressing  1/28/2025 2251 by Hoda Cowart RN  Outcome: Progressing     Problem: Safety - Adult  Goal: Free from fall injury  1/29/2025 1200 by Kallie Davis RN  Outcome: Progressing  1/28/2025 2251 by Hoda Cowart RN  Outcome: Progressing     Problem: Skin/Tissue Integrity  Goal: Absence of new skin breakdown  Description: 1.  Monitor for areas of redness and/or skin breakdown  2.  Assess vascular access sites hourly  3.  Every 4-6 hours minimum:  Change oxygen saturation probe site  4.  Every 4-6 hours:  If on nasal continuous positive airway pressure, respiratory therapy assess nares and determine need for appliance change or resting period.  1/29/2025 1200 by Kallie Davis RN  Outcome: Progressing  1/28/2025 2251 by Hoda Cowart RN  Outcome: Progressing     Problem: ABCDS Injury Assessment  Goal: Absence of physical injury  1/29/2025 1200 by Kallie Davis RN  Outcome: Progressing  1/28/2025 2251 by Hoda Cowart RN  Outcome: Progressing     Problem: Nutrition Deficit:  Goal: 
  Problem: Discharge Planning  Goal: Discharge to home or other facility with appropriate resources  Outcome: Adequate for Discharge     Problem: Pain  Goal: Verbalizes/displays adequate comfort level or baseline comfort level  Outcome: Adequate for Discharge     Problem: Musculoskeletal - Adult  Goal: Return mobility to safest level of function  Outcome: Adequate for Discharge  Goal: Maintain proper alignment of affected body part  Outcome: Adequate for Discharge  Goal: Return ADL status to a safe level of function  Outcome: Adequate for Discharge     Problem: Safety - Adult  Goal: Free from fall injury  Outcome: Adequate for Discharge     Problem: Skin/Tissue Integrity  Goal: Absence of new skin breakdown  Outcome: Adequate for Discharge     Problem: ABCDS Injury Assessment  Goal: Absence of physical injury  Outcome: Adequate for Discharge     Problem: Nutrition Deficit:  Goal: Optimize nutritional status  Outcome: Adequate for Discharge     
  Problem: Discharge Planning  Goal: Discharge to home or other facility with appropriate resources  Outcome: Progressing     Problem: Pain  Goal: Verbalizes/displays adequate comfort level or baseline comfort level  Outcome: Progressing     Problem: Musculoskeletal - Adult  Goal: Return mobility to safest level of function  Outcome: Progressing  Goal: Maintain proper alignment of affected body part  Outcome: Progressing  Goal: Return ADL status to a safe level of function  Outcome: Progressing     Problem: Safety - Adult  Goal: Free from fall injury  Outcome: Progressing     Problem: Skin/Tissue Integrity  Goal: Absence of new skin breakdown  Description: 1.  Monitor for areas of redness and/or skin breakdown  2.  Assess vascular access sites hourly  3.  Every 4-6 hours minimum:  Change oxygen saturation probe site  4.  Every 4-6 hours:  If on nasal continuous positive airway pressure, respiratory therapy assess nares and determine need for appliance change or resting period.  Outcome: Progressing     Problem: ABCDS Injury Assessment  Goal: Absence of physical injury  Outcome: Progressing     
  Problem: Discharge Planning  Goal: Discharge to home or other facility with appropriate resources  Outcome: Progressing     Problem: Pain  Goal: Verbalizes/displays adequate comfort level or baseline comfort level  Outcome: Progressing     Problem: Musculoskeletal - Adult  Goal: Return mobility to safest level of function  Outcome: Progressing  Goal: Maintain proper alignment of affected body part  Outcome: Progressing  Goal: Return ADL status to a safe level of function  Outcome: Progressing     Problem: Safety - Adult  Goal: Free from fall injury  Outcome: Progressing     Problem: Skin/Tissue Integrity  Goal: Absence of new skin breakdown  Description: 1.  Monitor for areas of redness and/or skin breakdown  2.  Assess vascular access sites hourly  3.  Every 4-6 hours minimum:  Change oxygen saturation probe site  4.  Every 4-6 hours:  If on nasal continuous positive airway pressure, respiratory therapy assess nares and determine need for appliance change or resting period.  Outcome: Progressing     Problem: ABCDS Injury Assessment  Goal: Absence of physical injury  Outcome: Progressing     Problem: Nutrition Deficit:  Goal: Optimize nutritional status  Outcome: Progressing     
Patient emergency contact Coleen Day (granddaughter) updated regarding seizure like activity on EEG and PE. Plan of care discussed in detail including starting on keppra and getting ECHO.     Unclear etiology of seizures. Awaiting neurology evaluation.     I also confirmed patient's code status from the granddaughter who states she had discussed it with her family members and has decided the patient should be full code.    Will follow  
Progressing  1/25/2025 0256 by Yue Bocanegra, ANGEL  Outcome: Progressing     Problem: Nutrition Deficit:  Goal: Optimize nutritional status  1/25/2025 0852 by Frances Anton, RN  Outcome: Progressing  1/25/2025 0256 by Yue Bocanegra, ANGEL  Outcome: Progressing

## 2025-01-30 NOTE — PROGRESS NOTES
Cardiology Progress Note     Admit Date:  1/22/2025    Consult reason/ Seen today for :       Subjective and  Overnight Events : Blood pressure is much improved  Breathing is somewhat better     Chief complain on admission : 89 y.o.year old who is admitted for  Chief Complaint   Patient presents with    Hypertension    Headache      Assessment / Plan:  Acute decompensated heart failure with preserved EF in the setting of uncontrolled hypertension taken off Cardene drip now started on oral medication tolerating lisinopril 40 mg daily hydralazine 50 3 times daily  Agree with Jardiance 10 mg daily if she can afford it  Reports compliance with medication at home however blood pressure has been running high restart amlodipine 5 mg daily  Continue current dual diuresis 40 mg Lasix IV twice daily monitor electrolytes closely watch intake and output  Patient extensively counseled echo reviewed interpreted  DVT Prophylaxis if no contraindication  Discussed with primary team, hospitalist service, bedside nursing staff and family  Past medical history:    has a past medical history of Abnormal echocardiogram, GERD (gastroesophageal reflux disease), H/O echocardiogram, H/O echocardiogram, Hyperlipidemia, Hypertension, and Neuropathy.  Past surgical history:   has a past surgical history that includes Spine surgery (2013); Colon surgery; Hysterectomy, total abdominal; Appendectomy; and Upper gastrointestinal endoscopy (N/A, 9/6/2024).  Social History:   reports that she has never smoked. She has never used smokeless tobacco. She reports that she does not currently use alcohol.  Family history:  family history includes Hypertension in her father and mother.    Allergies   Allergen Reactions    Penicillins     Thiazide-Type Diuretics Other (See Comments)     Recurrent hyponatremia//sodium 113 on at 8/29/24//avoid thiazides please       Review of 
                                                                               Cardiology Progress Note     Admit Date:  1/22/2025    Consult reason/ Seen today for :       Subjective and  Overnight Events : More awake and alert    Chief complain on admission : 89 y.o.year old who is admitted for  Chief Complaint   Patient presents with    Hypertension    Headache      Assessment / Plan:  Acute decompensated heart failure with preserved EF in the setting of uncontrolled hypertension much improved now  Allow systolic  blood pressure to run in 140s to 150  Seizure-like activity altered mental status I suspect is due to due to sudden drop in blood pressure as she was in 90s this morning when I saw her with her critical carotid stenosis could be a etiology for seizure-like 50 and strokelike symptoms?  Neurology to see patient  Stop heparin start Eliquis as per protocol  Acute subsegmental PE started on heparin but can be treated conservatively as a subsegmental .  Known to have bilateral lower extremity occlusive nonocclusive DVT  taken off Cardene drip now started on oral medication   Chief lisinopril 30 mg daily hydralazine 50 3 times daily  Agree with Jardiance 10 mg daily if she can afford it  Reports compliance with medication at home however blood pressure has been running high restart amlodipine 5 mg daily  Use IV hydralazine for blood pressure more than 160  Changed to p.o. Lasix stop IV Lasix  Patient extensively counseled echo reviewed interpreted  DVT Prophylaxis if no contraindication  Discussed with primary team, hospitalist service, bedside nursing staff and family  Past medical history:    has a past medical history of Abnormal echocardiogram, GERD (gastroesophageal reflux disease), H/O echocardiogram, H/O echocardiogram, Hyperlipidemia, Hypertension, and Neuropathy.  Past surgical history:   has a past surgical history that includes Spine surgery (2013); Colon surgery; Hysterectomy, total abdominal; 
                                                                               Cardiology Progress Note     Admit Date:  1/22/2025    Consult reason/ Seen today for :       Subjective and  Overnight Events : More awake and alert  Blood pressure is much improved    Chief complain on admission : 89 y.o.year old who is admitted for  Chief Complaint   Patient presents with    Hypertension    Headache      Assessment / Plan:  Acute decompensated heart failure with preserved EF in the setting of uncontrolled hypertension much improved now  Allow systolic  blood pressure to run in 140s to 150  Seizure-like activity altered mental status I suspect is due to due to sudden drop in blood pressure as she was in 90s this morning when I saw her with her critical carotid stenosis could be a etiology for seizure-like 50 and strokelike symptoms?  Neurology to see patient  Stop heparin start Eliquis as per protocol  Acute subsegmental PE started on heparin but can be treated conservatively as a subsegmental .  Known to have bilateral lower extremity occlusive nonocclusive DVT  taken off Cardene drip now started on oral medication   Chief lisinopril 30 mg daily hydralazine 50 3 times daily  Agree with Jardiance 10 mg daily if she can afford it  Reports compliance with medication at home however blood pressure has been running high restart amlodipine 5 mg daily  Use IV hydralazine for blood pressure more than 160  Changed to p.o. Lasix stop IV Lasix  Patient extensively counseled echo reviewed interpreted  DVT Prophylaxis if no contraindication  We will sign off call with question  Discussed with primary team, hospitalist service, bedside nursing staff and family  Past medical history:    has a past medical history of Abnormal echocardiogram, GERD (gastroesophageal reflux disease), H/O echocardiogram, H/O echocardiogram, Hyperlipidemia, Hypertension, and Neuropathy.  Past surgical history:   has a past surgical history that includes 
                                                                               Cardiology Progress Note     Admit Date:  1/22/2025    Consult reason/ Seen today for :       Subjective and  Overnight Events : She developed strokelike symptoms today underwent CT head and neck and was incidentally found to have pulmonary embolism as well as pleural effusion she has high-grade critical stenosis of the right distal carotid artery    Chief complain on admission : 89 y.o.year old who is admitted for  Chief Complaint   Patient presents with    Hypertension    Headache      Assessment / Plan:  Acute decompensated heart failure with preserved EF in the setting of uncontrolled hypertension   Seizure-like activity altered mental status I suspect is due to due to sudden drop in blood pressure as she was in 90s this morning when I saw her with her critical carotid stenosis could be a etiology for seizure-like 50 and strokelike symptoms?  Neurology to see patient  Acute subsegmental PE started on heparin but can be treated conservatively as a subsegmental echo is pending also getting ultrasound to rule out DVT  taken off Cardene drip now started on oral medication   t reduce lisinopril 20 mg daily hydralazine 50 3 times daily  Agree with Jardiance 10 mg daily if she can afford it  Reports compliance with medication at home however blood pressure has been running high restart amlodipine 5 mg daily  Use IV hydralazine for blood pressure more than 160  Continue current dual diuresis 40 mg Lasix IV twice daily monitor electrolytes closely watch intake and output  Patient extensively counseled echo reviewed interpreted  DVT Prophylaxis if no contraindication  Discussed with primary team, hospitalist service, bedside nursing staff and family  Past medical history:    has a past medical history of Abnormal echocardiogram, GERD (gastroesophageal reflux disease), H/O echocardiogram, H/O echocardiogram, Hyperlipidemia, Hypertension, and 
                                                                               Cardiology Progress Note     Admit Date:  1/22/2025    Consult reason/ Seen today for :       Subjective and  Overnight Events : She got moved to the ICU yesterday she is more awake and interactive today   she developed strokelike symptoms today underwent CT head and neck and was incidentally found to have pulmonary embolism as well as pleural effusion she has high-grade critical stenosis of the right distal carotid artery    Chief complain on admission : 89 y.o.year old who is admitted for  Chief Complaint   Patient presents with    Hypertension    Headache      Assessment / Plan:  Acute decompensated heart failure with preserved EF in the setting of uncontrolled hypertension much improved now  Seizure-like activity altered mental status I suspect is due to due to sudden drop in blood pressure as she was in 90s this morning when I saw her with her critical carotid stenosis could be a etiology for seizure-like 50 and strokelike symptoms?  Neurology to see patient  Acute subsegmental PE started on heparin but can be treated conservatively as a subsegmental echo is pending also getting ultrasound to rule out DVT  taken off Cardene drip now started on oral medication   t reduce lisinopril 20 mg daily hydralazine 50 3 times daily  Agree with Jardiance 10 mg daily if she can afford it  Reports compliance with medication at home however blood pressure has been running high restart amlodipine 5 mg daily  Use IV hydralazine for blood pressure more than 160  Reduce 40 mg Lasix IV tdaily monitor electrolytes closely watch intake and output  Patient extensively counseled echo reviewed interpreted  DVT Prophylaxis if no contraindication  Discussed with primary team, hospitalist service, bedside nursing staff and family  Past medical history:    has a past medical history of Abnormal echocardiogram, GERD (gastroesophageal reflux disease), H/O 
                                             [place] [address]  Phone: [phone]    Fax [phone]                  Enoc Elder MD, [facility]       Yandel Mclean MD, [facility]  Vinay Linares MD, [facility]    MD Janet Quiles MD Tariq Rizvi, MD Bilal Alam, MD Dr. Waseem Sajjad MD Melissa Kellis, APRKOLTON Obrien, APRKOLTON Lau, APRKOLTON Syed, APRN  Pedrito Gonsales PA-C    CARDIOLOGY  NOTE      Name:  Carol Xiong /Age/Sex: 1933  (89 y.o. female)   MRN & CSN:  [id number] & [id number] Admission Date/Time: 2025  4:00 PM   Location:  -A PCP: Kayden Gonzalez MD       Hospital Day: 3    - Cardiology consult is for:  Hypertension + headaches        CARDIOLOGY ATTENDING ADDENDUM    I have seen, spoken to and examined this patient personally, independent of the NP/PAC. I have reviewed the hospital care given to date and reviewed all pertinent labs and imaging. I have spoken with patient, nursing staff and provided written and verbal instructions .The above note has been reviewed. I have spent substantive (>50%) amount of time in formulating patient care.              Physical Exam:       Head: normal  Eye: normal  Chest:  Clear,  0 Basilar crackles   Cardiovascular:  S1S2 Abdomen: soft   Extremities:  +2  edema  Pulses :  palpable      MEDICAL DECISION MAKING :         Hypertensive urgency  Resistant essential hypertension  Hyperlipidemia  History of hypertensive encephalopathy  Acute on chronic diastolic heart failure secondary to hypertensive heart disease     Lower extremity edema improving.  No significant shortness of breath  Last echocardiogram in 2024 indicates preserved LV ejection fraction with normal wall motion and no significant valvular heart disease noted at that time.      Continue IV Lasix 40 mg twice daily    Blood pressure has significantly improved  Nicardipine drip 
    V2.0  [facility] [place]      Name:  Carol Xiong /Age/Sex: 1933  (89 y.o. female)   MRN & CSN:  [id number] & [id number] Encounter Date/Time: 2025 8:32 AM EST    Location:  -A PCP: Kayden Gonzalez MD       Hospital Day: 4    Assessment and Plan:   Carol Xiong is a 89 y.o. female who presents with Hypertensive urgency      Plan:  # Hypertensive urgency  # Acute decompensated heart failure  - Reported worsening SOB, orthopnea and LE edema over past 1-2 weeks with headache and elevated BP at NH. No on diuretics. Last TTE in 2024 with LVEF of 60-65%.   - Clinically hypervolemic, initial BP >220/100 mmHg. Pro-BNP >1.5k. CXR with pulmonary congestion. CTH non-acute.   - Started on Cardene gtt in ED with IV diuretics, continue. Resume home Norvasc and lisinopril, wean off gtt as tolerated. Strict I/O's, daily weights, telemetry.   -Cardio consulted.  Oral medications started, weaned off Cardene drip.   -Patient currently on IV diuretics.  Management as per cardiology  -PT OT evaluated patient, recommend SNF.  Patient agreeable.     # Non-MI troponin elevation  - Denied any typical CP.  - Initial Tn mildly elevated. ECG without acute ischemic changes.  - Likely secondary to above, follow-up repeat Tn. Continue ASA and Lipitor.      # Dementia without behavioral disturbances  - A&Ox3 in ED, attentive.  - Continue Namenda with delirium precautions, avoid benzodiazepines and anticholinergic medications.     # Hx of TIA  - Continue ASA and Lipitor.      # GERD  - Continue PPI.    Diet ADULT ORAL NUTRITION SUPPLEMENT; Dinner; Plant Based Oral Supplement  ADULT DIET; Dysphagia - Minced and Moist; Low Fat/Low Chol/High Fiber/2 gm Na; 1500 ml   DVT Prophylaxis [] Lovenox, []  Heparin, [] SCDs, [] Ambulation,  [] Eliquis, [] Xarelto  [] Coumadin   Code Status Full Code   Disposition From: home  Expected Disposition: SNF  Estimated Date of Discharge: 1-2 days  Patient requires 
    V2.0  [facility] [place]      Name:  Carol Xiong /Age/Sex: 1933  (89 y.o. female)   MRN & CSN:  [id number] & [id number] Encounter Date/Time: 2025 8:32 AM EST    Location:  -A PCP: Kayden Gonzalez MD       Hospital Day: 5    Assessment and Plan:   Carol Xiong is a 89 y.o. female who presents with Hypertensive urgency      Plan:  # Hypertensive urgency  # Acute decompensated heart failure  - Reported worsening SOB, orthopnea and LE edema over past 1-2 weeks with headache and elevated BP at NH. No on diuretics. Last TTE in 2024 with LVEF of 60-65%.   - Clinically hypervolemic, initial BP >220/100 mmHg. Pro-BNP >1.5k. CXR with pulmonary congestion. CTH non-acute.   - Started on Cardene gtt in ED with IV diuretics, continue. Resume home Norvasc and lisinopril, wean off gtt as tolerated. Strict I/O's, daily weights, telemetry.   -Cardio consulted.  Oral medications started, weaned off Cardene drip.   -Patient currently on IV diuretics.  Management as per cardiology  -PT OT evaluated patient, recommend SNF.  Patient agreeable.     # Non-MI troponin elevation  - Denied any typical CP.  - Initial Tn mildly elevated. ECG without acute ischemic changes.  - Likely secondary to above, follow-up repeat Tn. Continue ASA and Lipitor.      # Dementia without behavioral disturbances  - A&Ox3 in ED, attentive.  - Continue Namenda with delirium precautions, avoid benzodiazepines and anticholinergic medications.     # Hx of TIA  - Continue ASA and Lipitor.      # GERD  - Continue PPI.    Diet ADULT ORAL NUTRITION SUPPLEMENT; Dinner; Plant Based Oral Supplement  ADULT DIET; Dysphagia - Minced and Moist; Low Fat/Low Chol/High Fiber/2 gm Na; 1500 ml   DVT Prophylaxis [] Lovenox, []  Heparin, [] SCDs, [] Ambulation,  [] Eliquis, [] Xarelto  [] Coumadin   Code Status Full Code   Disposition From: home  Expected Disposition: SNF  Estimated Date of Discharge: 1-2 days  Patient requires 
    V2.0  [facility] Hospitalist Consult Progress Note      Name:  Carol Xiong /Age/Sex: 1933  (89 y.o. female)   MRN & CSN:  [id number] & [id number] Encounter Date/Time: 2025 8:32 AM EST    [facility]  [facility]: Kayden Gonzalez MD       Hospital Day: 8    Assessment and Plan:   Carol Xiong is a 89 y.o. female who presents with Hypertensive urgency      Plan:  # New onset tonic clonic Seizure  -Patient had 2 episodes of unresponsiveness on .  Code stroke activated on first event.  Imaging and workup nonacute.  After second event concern for seizure etiology.  EEG placed which was positive for epileptic activity.  Patient loaded with Keppra and started on maintenance dose.  Transferred to ICU for close monitoring as patient's mental status did not improve to baseline even after a few hours.  -Neurology consulted, appreciate recs  -MRI with and without contrast ordered pending  -Patient has history of significant carotid stenosis.  Possible contributory rule to patient's seizure once her blood pressure was lowered with medications.  Patient may require higher blood pressure to maintain adequate perfusion pressure.  -Patient's mental status back to baseline now but has sitter in place plan to remove sitter today      # Acute subsegmental PE  -Seen incidentally during stroke evaluation imaging  -Started on heparin drip  -Ultrasound shows bilateral lower extremity DVT which are both occlusive in the bilateral tibial and peroneal veins and nonocclusive in the left common femoral and femoral vein  -Echo ordered with normal EF 70-75%  -Cardiology following and assisting management.  Transition to oral anticoagulant once we have final recommendations.    # Hypertensive urgency-resolved  # Acute decompensated heart failure  - Reported worsening SOB, orthopnea and LE edema over past 1-2 weeks with headache and elevated BP at NH. No on diuretics. Last TTE in 2024 with LVEF of 60-65%.   - 
    V2.0  [facility] Hospitalist Consult Progress Note      Name:  Carol Xiong /Age/Sex: 1933  (89 y.o. female)   MRN & CSN:  [id number] & [id number] Encounter Date/Time: 2025 8:32 AM EST    Location:  -A PCP: Kayden Gonzalez MD       Hospital Day: 7    Assessment and Plan:   Carol Xiong is a 89 y.o. female who presents with Hypertensive urgency      Plan:  # Seizures  -Patient had 2 episodes of unresponsiveness on .  Code stroke activated on first event.  Imaging and workup nonacute.  After second event concern for seizure etiology.  EEG placed which was positive for epileptic activity.  Patient loaded with Keppra and started on maintenance dose.  Transferred to ICU for close monitoring as patient's mental status did not improve to baseline even after a few hours.  -Neurology consulted, appreciate recs  -MRI with and without contrast ordered  -Patient on continuous EEG  -Patient has history of significant carotid stenosis.  Possible contributory rule to patient's seizure once her blood pressure was lowered with medications.  Patient may require higher blood pressure to maintain adequate perfusion pressure.  -Patient's mental status back to baseline now but has sitter in place due to intermittent confusion.  Will trial without sitter.  Antiepileptics as per neurology.      # Acute subsegmental PE  -Seen incidentally during stroke evaluation imaging  -Started on heparin drip  -Ultrasound shows bilateral lower extremity DVT which are both occlusive in the bilateral tibial and peroneal veins and nonocclusive in the left common femoral and femoral vein  -Echo ordered  -Cardiology following and assisting management.  Transition to oral anticoagulant once we have final recommendations.    # Hypertensive urgency-resolved  # Acute decompensated heart failure  - Reported worsening SOB, orthopnea and LE edema over past 1-2 weeks with headache and elevated BP at NH. No on diuretics. Last TTE 
  Physician Progress Note      PATIENT:               NYA CHIN  [facility] #:                  [id number]  :                       1933  ADMIT DATE:       2025 4:00 PM  DISCH DATE:  RESPONDING  PROVIDER #:        Yunier Bruce MD          QUERY TEXT:    Patient admitted with Acute on chronic diastolic CHF. Noted to have   documentation of weight loss, dietician assessment with malnutrition   diagnosis, etc.in 25 nutrition consult notes. If possible, please   document in progress notes and discharge summary if you are evaluating and /or   treating any of the following:    The medical record reflects the following:  Risk Factors: CKD, GERD, HLD, malnutrition, Alzheimer's  Clinical Indicators: Malnutrition Status:  Moderate malnutrition (25   0935) Context:  Social/Environmental Circumstances,Findings of the 6 clinical   characteristics of malnutrition:Energy Intake:  Mild decrease in energy intake   Weight Loss:Unable to assess Body Fat Loss: Severe body fat loss Orbital,   Buccal regionMuscle Mass Loss:  Mild muscle mass loss (mild-moderate) Temples   (temporalis), Clavicles (pectoralis & deltoids), Current BMI (kg/m2): 19.2  Treatment: Nutrition consult, Continue minced & moist diet per pt request,   Liberalize other restrictions to maximize po intake, Offer plant-based oral   nutrition supplement daily, Monitor weights, po intakes, labs, POC    ASPEN Criteria:    https://aspenjournals.onlinelibrary.jimenez.com/doi/full/10.1177/748068433077895  5  Options provided:  -- Protein calorie malnutrition moderate  -- Other - I will add my own diagnosis  -- Disagree - Not applicable / Not valid  -- Disagree - Clinically unable to determine / Unknown  -- Refer to Clinical Documentation Reviewer    PROVIDER RESPONSE TEXT:    This patient has moderate protein calorie malnutrition.    Query created by: Christie Luna on 2025 3:10 PM      Electronically signed by:  Yunier Bruce MD 2025 5:58 
 attempted to get patient down RN too busy to travel with patient. Will attempt MRI tomorrow  
2mg of haldol given IM for agitation. Pt has not slept for 2 nights.  Pt thinks she's standing outside in the rain and needs to go into the house. Pt redirected by staff. Pt insists she's going to fall out of bed. 3./4 side rails up. Sitter remains at bedside     
4 Eyes Skin Assessment     NAME:  Carol Xiong  YOB: 1933  MEDICAL RECORD NUMBER:  [id number]    The patient is being assessed for  Transfer to New Unit    I agree that at least one RN has performed a thorough Head to Toe Skin Assessment on the patient. ALL assessment sites listed below have been assessed.      Areas assessed by both nurses:    Head, Face, Ears, Shoulders, Back, Chest, Arms, Elbows, Hands, Sacrum. Buttock, Coccyx, Ischium, Legs. Feet and Heels, and Under Medical Devices         Does the Patient have a Wound? Yes wound(s) were present on assessment. LDA wound assessment was Initiated and completed by RN  See epic charting for details. No new wounds assessed at this time.     Daron Prevention initiated by RN: No  Wound Care Orders initiated by RN: No    Pressure Injury (Stage 3,4, Unstageable, DTI, NWPT, and Complex wounds) if present, place Wound referral order by RN under : No    New Ostomies, if present place, Ostomy referral order under : No     Nurse 1 eSignature: Electronically signed by Krystal Sheriff RN on 1/26/25 at 7:01 PM EST    **SHARE this note so that the co-signing nurse can place an eSignature**    Nurse 2 eSignature: Electronically signed by Florence Lopez RN on 1/27/25 at 6:17 AM EST   
4 Eyes Skin Assessment     NAME:  Carol Xiong  YOB: 1933  MEDICAL RECORD NUMBER:  [id number]    The patient is being assessed for  Admission    I agree that at least one RN has performed a thorough Head to Toe Skin Assessment on the patient. ALL assessment sites listed below have been assessed.      Areas assessed by both nurses:    Head, Face, Ears, Shoulders, Back, Chest, Arms, Elbows, Hands, Sacrum. Buttock, Coccyx, Ischium, Legs. Feet and Heels, and Under Medical Devices         Does the Patient have a Wound? Yes wound(s) were present on assessment. LDA wound assessment was Initiated and completed by RN       Daron Prevention initiated by RN: Yes  Wound Care Orders initiated by RN: Yes    Pressure Injury (Stage 3,4, Unstageable, DTI, NWPT, and Complex wounds) if present, place Wound referral order by RN under : No    New Ostomies, if present place, Ostomy referral order under : No     Nurse 1 eSignature: Electronically signed by Josette Rodriguez RN on 1/30/25 at 12:22 AM EST    **SHARE this note so that the co-signing nurse can place an eSignature**    Nurse 2 eSignature: Electronically signed by Rosemarie Roberson RN on 1/30/25 at 12:26 AM EST   
4 Eyes Skin Assessment     NAME:  Carol Xiong  YOB: 1933  MEDICAL RECORD NUMBER:  [id number]    The patient is being assessed for  Admission    I agree that at least one RN has performed a thorough Head to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 reports using sponge baths for hygine)  Bathroom Toilet: Standard  Bathroom Equipment: None  Bathroom Accessibility: Accessible  Home Equipment: Walker - 4-Wheeled, Wheelchair - Manual  Has the patient had two or more falls in the past year or any fall with injury in the past year?: Yes  Receives Help From: Family (Granddaughter)  Prior Level of Assist for ADLs: Needs assistance  Toileting: Needs 
Pt agitated and combative with staff, hitting and swinging at sitter and RN.  Finally fell asleep at 3am  
Pt agitated with staff this morning during check and change, swinging and grabbing at staff.  Refused po meds.  IV prn hydralazine given in lieu of PO hydralazine     
Pt unable to come to MRI, pt is on continuous heprin drip and RN is unable to travel to MRI at this time.  MRI scan will check back tomorrow to possibly schedule pts scan.    
Pt's granddaughter Coleen took pt's rings x4 off pt and put them in pt's purse per pt's request.   
Report called to Lexy at [facility].  
[facility] Rapid EEG complete as of 01/26/2025 @ 1219 & 01/27/2025 @0759. Epileptologist on call, Dr. Mendez, has been notified via [employer].    Electronically signed by Char Cannon on 1/27/2025 at 9:46 AM   
Spiritual Health History and Assessment/Progress Note  [facility]    Spiritual/Emotional Needs,  ,  ,      Name: Carol Xiong MRN: [id number]    Age: 89 y.o.     Sex: female   Language: English   Judaism: Zoroastrianism   Hypertensive urgency     Date: 1/23/2025            Total Time Calculated: 15 min              Spiritual Assessment began in [facility] [place] ICU STEPDOWN        Referral/Consult From: Rounding   Encounter Overview/Reason: Spiritual/Emotional Needs  Service Provided For: Patient    Melita, Belief, Meaning:   Patient identifies as spiritual, is connected with a melita tradition or spiritual practice, and has beliefs or practices that help with coping during difficult times  Family/Friends No family/friends present      Importance and Influence:  Patient has spiritual/personal beliefs that influence decisions regarding their health  Family/Friends No family/friends present    Community:  Patient is connected with a spiritual community and feels well-supported. Support system includes: Spouse/Partner, Children, Melita Community, Friends, and Extended family  Family/Friends No family/friends present    Assessment and Plan of Care:     Patient Interventions include: Facilitated expression of thoughts and feelings. Prayer. Life Review. Tenriism Melita is very important to her.   Family/Friends Interventions include: No family/friends present    Patient Plan of Care: Spiritual Care available upon further referral  Family/Friends Plan of Care: No family/friends present    Electronically signed by Chaplain Umesh on 1/23/2025 at 3:07 PM   
This RN received call from EEG Arabella stating it appeared from video camera that pt had pulled out her IV and pulling EEG wires. This RN came to pt's room. Pt had pulled out her IV and appeared more confused. Pt re-directive at this time.   
cEEG initiated.  Set-up completed utilizing [employer] MRI conditional / CT compatible leads and [employer] paste.  Hand-off to remote [employer] monitoring technologist,Jennifer, completed.  Epileptologist, Dr. Mendez, notified via [employer].     Electronically signed by Char Cannon on 1/27/2025 at 9:28 AM   
Well  Adverse Reaction: None  Significant change in status and assessment: Improved from initial evaluation, pt continues to demonstrate progress towards therapy goals   Barriers to improvement: None noted    Plan for Next Session:    Cont per OT POC     Time in:  1339   Time out:  1403  Timed treatment minutes:  24  Total treatment time:  24 minutes     Electronically signed by:    Esequiel Camara, S/OT     \"I, the qualified professional, was present and in the room for the entire session. The student participates in the delivery of services when the qualified practitioner is directing the service, making the skilled judgment, and is responsible for the assessment and treatment.\" Flavia Jean-Baptiste OTR/L [id number]   
  Lymphatic:  No lymphadenopathy noted.   Neurologic:  Alert & oriented x 3, Normal motor function, Normal sensory function, No focal deficits noted.   Psychiatric:  Affect  and  Mood :no change    Medications:    furosemide  40 mg IntraVENous Daily    potassium bicarb-citric acid  20 mEq Oral TID    levETIRAcetam  500 mg Oral BID    Or    levETIRAcetam  500 mg IntraVENous BID    lisinopril  20 mg Oral Daily    amLODIPine  5 mg Oral Daily    multivitamin  1 tablet Oral Daily    thiamine  100 mg Oral Daily    zinc sulfate  50 mg Oral Daily    empagliflozin  10 mg Oral Daily    aspirin  81 mg Oral Daily    atorvastatin  20 mg Oral Nightly    Vitamin D  5,000 Units Oral Daily    folic acid  1 mg Oral Daily    pantoprazole  40 mg Oral QAM AC    vitamin C  500 mg Oral Daily    vitamin B-12  1,000 mcg Oral Daily    sodium chloride flush  5-40 mL IntraVENous 2 times per day    [Held by provider] gabapentin  300 mg Oral BID    memantine  5 mg Oral BID    hydrALAZINE  50 mg Oral 3 times per day      heparin (PORCINE) Infusion 14 Units/kg/hr (01/27/25 2208)    sodium chloride       hydrALAZINE, heparin (porcine), heparin (porcine), sodium chloride flush, sodium chloride, ondansetron **OR** ondansetron, melatonin, polyethylene glycol, acetaminophen **OR** acetaminophen, acetaminophen    Lab Data:  CBC:   Recent Labs     01/26/25  2325 01/27/25  0453 01/28/25  0400   WBC 10.5 9.4 7.5   HGB 9.9* 9.7* 8.7*   HCT 31.6* 30.3* 27.1*   MCV 96.3 95.6 94.4    314 301     BMP:   Recent Labs     01/26/25  2325 01/27/25  0453 01/28/25  0400    142 139   K 3.8 3.5 3.8   CL 97* 99 98*   CO2 32 32 32   PHOS 3.3 3.2 3.1   BUN 15 16 19   CREATININE 0.7 0.7 0.8     PT/INR:   Recent Labs     01/27/25  0215 01/27/25  0453 01/28/25  0400   PROTIME 13.6 13.6 13.3   INR 1.0 1.0 1.0     BNP:  No results for input(s): \"PROBNP\" in the last 72 hours.  TROPONIN: No results for input(s): \"TROPONINT\" in the last 72 hours.     ECHO : 
Negative for arthralgias, back pain and gait problem.   Neurological:  Negative for dizziness, syncope and speech difficulty.   Hematological:  Negative for adenopathy.   Psychiatric/Behavioral:  Negative for agitation and confusion.    All other systems reviewed and are negative.        Objective:     Intake/Output Summary (Last 24 hours) at 1/23/2025 0832  Last data filed at 1/23/2025 0615  Gross per 24 hour   Intake --   Output 1800 ml   Net -1800 ml        Vitals:   Vitals:    01/23/25 0515   BP: (!) 146/65   Pulse: 67   Resp: 13   Temp: 98 °F (36.7 °C)   SpO2: 96%       Physical Exam:   Physical Exam  Vitals and nursing note reviewed.   Constitutional:       General: She is not in acute distress.     Appearance: Normal appearance. She is not ill-appearing.   HENT:      Head: Normocephalic and atraumatic.      Nose: Nose normal.      Mouth/Throat:      Mouth: Mucous membranes are moist.   Eyes:      Extraocular Movements: Extraocular movements intact.      Pupils: Pupils are equal, round, and reactive to light.   Cardiovascular:      Rate and Rhythm: Normal rate and regular rhythm.      Pulses: Normal pulses.      Heart sounds: Normal heart sounds.   Pulmonary:      Effort: Pulmonary effort is normal. No respiratory distress.      Breath sounds: Normal breath sounds. No wheezing or rhonchi.   Abdominal:      Palpations: Abdomen is soft.   Musculoskeletal:         General: Normal range of motion.      Cervical back: Normal range of motion.      Right lower leg: Edema present.      Left lower leg: Edema present.   Skin:     General: Skin is warm.      Capillary Refill: Capillary refill takes less than 2 seconds.   Neurological:      General: No focal deficit present.      Mental Status: She is alert and oriented to person, place, and time.   Psychiatric:         Mood and Affect: Mood normal.         Behavior: Behavior normal.            Medications:   Medications:    amLODIPine  10 mg Oral Daily    aspirin  81 mg 
     Behavior: Behavior normal.            Medications:   Medications:    [Held by provider] amLODIPine  10 mg Oral Daily    aspirin  81 mg Oral Daily    atorvastatin  20 mg Oral Nightly    Vitamin D  5,000 Units Oral Daily    folic acid  1 mg Oral Daily    lisinopril  40 mg Oral Daily    pantoprazole  40 mg Oral QAM AC    vitamin C  500 mg Oral Daily    vitamin B-12  1,000 mcg Oral Daily    potassium chloride  20 mEq Oral Daily    sodium chloride flush  5-40 mL IntraVENous 2 times per day    enoxaparin  40 mg SubCUTAneous QPM    furosemide  40 mg IntraVENous BID    gabapentin  300 mg Oral BID    memantine  5 mg Oral BID    hydrALAZINE  50 mg Oral 3 times per day      Infusions:    sodium chloride      niCARdipine Stopped (01/23/25 1409)     PRN Meds: sodium chloride flush, 5-40 mL, PRN  sodium chloride, , PRN  potassium chloride, 10 mEq, PRN  magnesium sulfate, 2,000 mg, PRN  ondansetron, 4 mg, Q8H PRN   Or  ondansetron, 4 mg, Q6H PRN  melatonin, 3 mg, Nightly PRN  polyethylene glycol, 17 g, Daily PRN  acetaminophen, 650 mg, Q6H PRN   Or  acetaminophen, 650 mg, Q6H PRN  acetaminophen, 650 mg, Q4H PRN        Labs      No results found for this or any previous visit (from the past 24 hour(s)).       Imaging/Diagnostics Last 24 Hours   CT HEAD WO CONTRAST    Result Date: 1/22/2025  CT OF THE HEAD WITHOUT CONTRAST DATE OF SERVICE: 1/22/2025 19:51 DEMOGRAPHICS: 89 years old Female INDICATION: LLE numbness, ambulatory dysfunction, HTN COMPARISON:  None. TECHNIQUE: Axial images were obtained of the brain without the administration of  intravenous contrast. Coronal reconstructions were obtained. Coronal reconstructions were obtained. CT radiation dose optimization techniques (automated exposure control, and use of iterative reconstruction techniques, or adjustment of the mA or kV according to patient size) were used to limit patient  radiation dose. FINDINGS: There is no soft tissue swelling. The osseous structures are 
  MRSA by PCR    Collection Time: 01/27/25  2:15 AM    Specimen: Nasal   Result Value Ref Range    Specimen Description .NASAL SWAB     MRSA, DNA, Nasal Not Detected Not Detected   Resp Viral Panel    Collection Time: 01/27/25  2:15 AM    Specimen: Nasopharyngeal Swab   Result Value Ref Range    Specimen Description .NASOPHARYNGEAL SWAB     Adenovirus PCR Not Detected Not Detected    Coronavirus 229E PCR Not Detected Not Detected    Coronavirus HKU1 PCR Not Detected Not Detected    Coronavirus NL63 PCR Not Detected Not Detected    Coronavirus OC43 PCR Not Detected Not Detected    SARS-CoV-2, PCR Not Detected Not Detected    Human Metapneumovirus PCR Not Detected Not Detected    Rhino/Enterovirus PCR Not Detected Not Detected    Influenza A by PCR Not Detected Not Detected    Influenza B by PCR Not Detected Not Detected    Parainfluenza 1 PCR Not Detected Not Detected    Parainfluenza 2 PCR Not Detected Not Detected    Parainfluenza 3 PCR Not Detected Not Detected    Parainfluenza 4 PCR Not Detected Not Detected    Resp Syncytial Virus PCR Not Detected Not Detected    Bordetella parapertussis by PCR Not Detected Not Detected    B Pertussis by PCR Not Detected Not Detected    Chlamydia pneumoniae By PCR Not Detected Not Detected    Mycoplasma pneumo by PCR Not Detected Not Detected   D-Dimer, Quantitative    Collection Time: 01/27/25  2:15 AM   Result Value Ref Range    D-Dimer, Quant 1.02 (H) 0.00 - 0.46 ug/mL FEU   Fibrinogen    Collection Time: 01/27/25  2:15 AM   Result Value Ref Range    Fibrinogen 431 170 - 540 mg/dL   Anti-XA, Heparin    Collection Time: 01/27/25  2:15 AM   Result Value Ref Range    Anti-XA Unfrac Heparin 1.04 (HH) IU/L   Protime-INR    Collection Time: 01/27/25  2:15 AM   Result Value Ref Range    Protime 13.6 11.7 - 14.5 sec    INR 1.0    APTT    Collection Time: 01/27/25  2:15 AM   Result Value Ref Range    APTT 206.3 (H) 25.1 - 37.1 sec   Basic Metabolic Panel    Collection Time: 01/27/25  
1 PCR Not Detected Not Detected    Parainfluenza 2 PCR Not Detected Not Detected    Parainfluenza 3 PCR Not Detected Not Detected    Parainfluenza 4 PCR Not Detected Not Detected    Resp Syncytial Virus PCR Not Detected Not Detected    Bordetella parapertussis by PCR Not Detected Not Detected    B Pertussis by PCR Not Detected Not Detected    Chlamydia pneumoniae By PCR Not Detected Not Detected    Mycoplasma pneumo by PCR Not Detected Not Detected   D-Dimer, Quantitative    Collection Time: 01/27/25  2:15 AM   Result Value Ref Range    D-Dimer, Quant 1.02 (H) 0.00 - 0.46 ug/mL FEU   Fibrinogen    Collection Time: 01/27/25  2:15 AM   Result Value Ref Range    Fibrinogen 431 170 - 540 mg/dL   Anti-XA, Heparin    Collection Time: 01/27/25  2:15 AM   Result Value Ref Range    Anti-XA Unfrac Heparin 1.04 (HH) IU/L   Protime-INR    Collection Time: 01/27/25  2:15 AM   Result Value Ref Range    Protime 13.6 11.7 - 14.5 sec    INR 1.0    APTT    Collection Time: 01/27/25  2:15 AM   Result Value Ref Range    APTT 206.3 (H) 25.1 - 37.1 sec   Basic Metabolic Panel    Collection Time: 01/27/25  4:53 AM   Result Value Ref Range    Sodium 142 136 - 145 mmol/L    Potassium 3.5 3.5 - 5.1 mmol/L    Chloride 99 99 - 110 mmol/L    CO2 32 21 - 32 mmol/L    Anion Gap 11 9 - 17 mmol/L    Glucose 94 74 - 99 mg/dL    BUN 16 7 - 20 mg/dL    Creatinine 0.7 0.6 - 1.2 mg/dL    Est, Glom Filt Rate 84 >60 mL/min/1.73m2    Calcium 8.6 8.3 - 10.6 mg/dL   Calcium, Ionized    Collection Time: 01/27/25  4:53 AM   Result Value Ref Range    Calcium, Ionized 1.18 1.15 - 1.33 mmol/L   CBC with Auto Differential    Collection Time: 01/27/25  4:53 AM   Result Value Ref Range    WBC 9.4 4.0 - 10.5 k/uL    RBC 3.17 (L) 4.20 - 5.40 m/uL    Hemoglobin 9.7 (L) 12.5 - 16.0 g/dL    Hematocrit 30.3 (L) 37.0 - 47.0 %    MCV 95.6 78.0 - 100.0 fL    MCH 30.6 27.0 - 31.0 pg    MCHC 32.0 32.0 - 36.0 g/dL    RDW 13.4 11.7 - 14.9 %    Platelets 314 140 - 440 k/uL    MPV

## 2025-01-30 NOTE — CARE COORDINATION
Transport scheduled for 3pm. Nursing home and nurse aware of transport time. Packet ready at this CM's desk.

## 2025-01-31 ENCOUNTER — CARE COORDINATION (OUTPATIENT)
Dept: CARE COORDINATION | Age: 89
End: 2025-01-31

## 2025-01-31 ENCOUNTER — HOSPITAL ENCOUNTER (OUTPATIENT)
Age: 89
Setting detail: SPECIMEN
Discharge: HOME | End: 2025-01-31
Payer: MEDICARE

## 2025-01-31 LAB
ALBUMIN SERPL-MCNC: 3 G/DL (ref 3.4–5)
ALBUMIN/GLOB SERPL: 1.5 {RATIO} (ref 1.1–2.2)
ALP SERPL-CCNC: 87 U/L (ref 40–129)
ALT SERPL-CCNC: 13 U/L (ref 10–40)
ANION GAP SERPL CALCULATED.3IONS-SCNC: 9 MMOL/L (ref 9–17)
AST SERPL-CCNC: 26 U/L (ref 15–37)
BASOPHILS # BLD: 0.02 K/UL
BASOPHILS NFR BLD: 0 % (ref 0–1)
BILIRUB SERPL-MCNC: 0.4 MG/DL (ref 0–1)
BUN SERPL-MCNC: 15 MG/DL (ref 7–20)
CALCIUM SERPL-MCNC: 8.9 MG/DL (ref 8.3–10.6)
CHLORIDE SERPL-SCNC: 98 MMOL/L (ref 99–110)
CO2 SERPL-SCNC: 30 MMOL/L (ref 21–32)
CREAT SERPL-MCNC: 1 MG/DL (ref 0.6–1.2)
EOSINOPHIL # BLD: 0.07 K/UL
EOSINOPHILS RELATIVE PERCENT: 2 % (ref 0–3)
ERYTHROCYTE [DISTWIDTH] IN BLOOD BY AUTOMATED COUNT: 13.2 % (ref 11.7–14.9)
GFR, ESTIMATED: 54 ML/MIN/1.73M2
GLUCOSE SERPL-MCNC: 112 MG/DL (ref 74–99)
HCT VFR BLD AUTO: 28 % (ref 37–47)
HGB BLD-MCNC: 8.9 G/DL (ref 12.5–16)
IMM GRANULOCYTES # BLD AUTO: 0.02 K/UL
IMM GRANULOCYTES NFR BLD: 0 %
LYMPHOCYTES NFR BLD: 1.66 K/UL
LYMPHOCYTES RELATIVE PERCENT: 37 % (ref 24–44)
MCH RBC QN AUTO: 31 PG (ref 27–31)
MCHC RBC AUTO-ENTMCNC: 31.8 G/DL (ref 32–36)
MCV RBC AUTO: 97.6 FL (ref 78–100)
MONOCYTES NFR BLD: 0.47 K/UL
MONOCYTES NFR BLD: 11 % (ref 0–4)
NEUTROPHILS NFR BLD: 50 % (ref 36–66)
NEUTS SEG NFR BLD: 2.22 K/UL
PLATELET # BLD AUTO: 320 K/UL (ref 140–440)
PMV BLD AUTO: 10.9 FL (ref 7.5–11.1)
POTASSIUM SERPL-SCNC: 4.1 MMOL/L (ref 3.5–5.1)
PROT SERPL-MCNC: 4.9 G/DL (ref 6.4–8.2)
RBC # BLD AUTO: 2.87 M/UL (ref 4.2–5.4)
SODIUM SERPL-SCNC: 138 MMOL/L (ref 136–145)
WBC OTHER # BLD: 4.5 K/UL (ref 4–10.5)

## 2025-01-31 PROCEDURE — 80053 COMPREHEN METABOLIC PANEL: CPT

## 2025-01-31 PROCEDURE — 85025 COMPLETE CBC W/AUTO DIFF WBC: CPT

## 2025-01-31 NOTE — CARE COORDINATION
ACM outreach deferred at this time. Patient was admitted at [facility] from 1/22/25-1/30/25 and discharged to [facility].     ACM will outreach patient once PAC handoff is received.

## 2025-02-11 ENCOUNTER — HOSPITAL ENCOUNTER (OUTPATIENT)
Age: 89
Setting detail: SPECIMEN
Discharge: HOME | End: 2025-02-11

## 2025-02-11 LAB
ALBUMIN SERPL-MCNC: 3 G/DL (ref 3.4–5)
ALBUMIN/GLOB SERPL: 1.2 {RATIO} (ref 1.1–2.2)
ALP SERPL-CCNC: 88 U/L (ref 40–129)
ALT SERPL-CCNC: 7 U/L (ref 10–40)
ANION GAP SERPL CALCULATED.3IONS-SCNC: 15 MMOL/L (ref 9–17)
AST SERPL-CCNC: 10 U/L (ref 15–37)
BASOPHILS # BLD: 0.01 K/UL
BASOPHILS NFR BLD: 0 % (ref 0–1)
BILIRUB SERPL-MCNC: 0.6 MG/DL (ref 0–1)
BUN SERPL-MCNC: 60 MG/DL (ref 7–20)
CALCIUM SERPL-MCNC: 9.6 MG/DL (ref 8.3–10.6)
CHLORIDE SERPL-SCNC: 105 MMOL/L (ref 99–110)
CO2 SERPL-SCNC: 25 MMOL/L (ref 21–32)
CREAT SERPL-MCNC: 1.3 MG/DL (ref 0.6–1.2)
EOSINOPHIL # BLD: 0.01 K/UL
EOSINOPHILS RELATIVE PERCENT: 0 % (ref 0–3)
ERYTHROCYTE [DISTWIDTH] IN BLOOD BY AUTOMATED COUNT: 13.6 % (ref 11.7–14.9)
GFR, ESTIMATED: 39 ML/MIN/1.73M2
GLUCOSE SERPL-MCNC: 109 MG/DL (ref 74–99)
HCT VFR BLD AUTO: 30.6 % (ref 37–47)
HGB BLD-MCNC: 9.6 G/DL (ref 12.5–16)
IMM GRANULOCYTES # BLD AUTO: 0.03 K/UL
IMM GRANULOCYTES NFR BLD: 0 %
LYMPHOCYTES NFR BLD: 1.01 K/UL
LYMPHOCYTES RELATIVE PERCENT: 11 % (ref 24–44)
MCH RBC QN AUTO: 30.3 PG (ref 27–31)
MCHC RBC AUTO-ENTMCNC: 31.4 G/DL (ref 32–36)
MCV RBC AUTO: 96.5 FL (ref 78–100)
MONOCYTES NFR BLD: 0.69 K/UL
MONOCYTES NFR BLD: 7 % (ref 0–4)
NEUTROPHILS NFR BLD: 81 % (ref 36–66)
NEUTS SEG NFR BLD: 7.55 K/UL
PLATELET # BLD AUTO: 572 K/UL (ref 140–440)
PMV BLD AUTO: 10.1 FL (ref 7.5–11.1)
POTASSIUM SERPL-SCNC: 4.9 MMOL/L (ref 3.5–5.1)
PROCALCITONIN SERPL-MCNC: 1.17 NG/ML
PROT SERPL-MCNC: 5.6 G/DL (ref 6.4–8.2)
RBC # BLD AUTO: 3.17 M/UL (ref 4.2–5.4)
SODIUM SERPL-SCNC: 145 MMOL/L (ref 136–145)
WBC OTHER # BLD: 9.3 K/UL (ref 4–10.5)

## 2025-02-11 PROCEDURE — 84145 PROCALCITONIN (PCT): CPT

## 2025-02-11 PROCEDURE — 85025 COMPLETE CBC W/AUTO DIFF WBC: CPT

## 2025-02-11 PROCEDURE — 80053 COMPREHEN METABOLIC PANEL: CPT

## 2025-02-12 ENCOUNTER — HOSPITAL ENCOUNTER (OUTPATIENT)
Age: 89
Setting detail: SPECIMEN
Discharge: HOME | End: 2025-02-12

## 2025-02-12 LAB
ALBUMIN SERPL-MCNC: 3 G/DL (ref 3.4–5)
ALBUMIN/GLOB SERPL: 1.2 {RATIO} (ref 1.1–2.2)
ALP SERPL-CCNC: 82 U/L (ref 40–129)
ALT SERPL-CCNC: 6 U/L (ref 10–40)
ANION GAP SERPL CALCULATED.3IONS-SCNC: 15 MMOL/L (ref 9–17)
AST SERPL-CCNC: 10 U/L (ref 15–37)
B PARAP IS1001 DNA NPH QL NAA+NON-PROBE: NOT DETECTED
B PERT DNA SPEC QL NAA+PROBE: NOT DETECTED
BASOPHILS # BLD: 0.01 K/UL
BASOPHILS NFR BLD: 0 % (ref 0–1)
BILIRUB SERPL-MCNC: 0.9 MG/DL (ref 0–1)
BUN SERPL-MCNC: 78 MG/DL (ref 7–20)
C PNEUM DNA NPH QL NAA+NON-PROBE: NOT DETECTED
CALCIUM SERPL-MCNC: 9.6 MG/DL (ref 8.3–10.6)
CHLORIDE SERPL-SCNC: 101 MMOL/L (ref 99–110)
CO2 SERPL-SCNC: 26 MMOL/L (ref 21–32)
CREAT SERPL-MCNC: 1.7 MG/DL (ref 0.6–1.2)
EOSINOPHIL # BLD: 0.01 K/UL
EOSINOPHILS RELATIVE PERCENT: 0 % (ref 0–3)
ERYTHROCYTE [DISTWIDTH] IN BLOOD BY AUTOMATED COUNT: 14.1 % (ref 11.7–14.9)
FLUAV RNA NPH QL NAA+NON-PROBE: NOT DETECTED
FLUBV RNA NPH QL NAA+NON-PROBE: NOT DETECTED
GFR, ESTIMATED: 29 ML/MIN/1.73M2
GLUCOSE SERPL-MCNC: 119 MG/DL (ref 74–99)
HADV DNA NPH QL NAA+NON-PROBE: NOT DETECTED
HCOV 229E RNA NPH QL NAA+NON-PROBE: NOT DETECTED
HCOV HKU1 RNA NPH QL NAA+NON-PROBE: NOT DETECTED
HCOV NL63 RNA NPH QL NAA+NON-PROBE: NOT DETECTED
HCOV OC43 RNA NPH QL NAA+NON-PROBE: NOT DETECTED
HCT VFR BLD AUTO: 30.7 % (ref 37–47)
HGB BLD-MCNC: 9.2 G/DL (ref 12.5–16)
HMPV RNA NPH QL NAA+NON-PROBE: NOT DETECTED
HPIV1 RNA NPH QL NAA+NON-PROBE: NOT DETECTED
HPIV2 RNA NPH QL NAA+NON-PROBE: NOT DETECTED
HPIV3 RNA NPH QL NAA+NON-PROBE: NOT DETECTED
HPIV4 RNA NPH QL NAA+NON-PROBE: NOT DETECTED
IMM GRANULOCYTES # BLD AUTO: 0.03 K/UL
IMM GRANULOCYTES NFR BLD: 0 %
LYMPHOCYTES NFR BLD: 0.94 K/UL
LYMPHOCYTES RELATIVE PERCENT: 11 % (ref 24–44)
M PNEUMO DNA NPH QL NAA+NON-PROBE: NOT DETECTED
MCH RBC QN AUTO: 29.5 PG (ref 27–31)
MCHC RBC AUTO-ENTMCNC: 30 G/DL (ref 32–36)
MCV RBC AUTO: 98.4 FL (ref 78–100)
MONOCYTES NFR BLD: 0.66 K/UL
MONOCYTES NFR BLD: 8 % (ref 0–4)
NEUTROPHILS NFR BLD: 81 % (ref 36–66)
NEUTS SEG NFR BLD: 7.19 K/UL
PLATELET # BLD AUTO: 571 K/UL (ref 140–440)
PMV BLD AUTO: 10 FL (ref 7.5–11.1)
POTASSIUM SERPL-SCNC: 4.7 MMOL/L (ref 3.5–5.1)
PROCALCITONIN SERPL-MCNC: 0.84 NG/ML
PROT SERPL-MCNC: 5.5 G/DL (ref 6.4–8.2)
RBC # BLD AUTO: 3.12 M/UL (ref 4.2–5.4)
RSV RNA NPH QL NAA+NON-PROBE: NOT DETECTED
RV+EV RNA NPH QL NAA+NON-PROBE: NOT DETECTED
SARS-COV-2 RNA NPH QL NAA+NON-PROBE: NOT DETECTED
SODIUM SERPL-SCNC: 142 MMOL/L (ref 136–145)
SPECIMEN DESCRIPTION: NORMAL
WBC OTHER # BLD: 8.8 K/UL (ref 4–10.5)

## 2025-02-12 PROCEDURE — 80053 COMPREHEN METABOLIC PANEL: CPT

## 2025-02-12 PROCEDURE — 85025 COMPLETE CBC W/AUTO DIFF WBC: CPT

## 2025-02-12 PROCEDURE — 87086 URINE CULTURE/COLONY COUNT: CPT

## 2025-02-12 PROCEDURE — 84145 PROCALCITONIN (PCT): CPT

## 2025-02-12 PROCEDURE — 81001 URINALYSIS AUTO W/SCOPE: CPT

## 2025-02-13 LAB
BACTERIA URNS QL MICRO: ABNORMAL
BILIRUB UR QL STRIP: ABNORMAL
CLARITY UR: ABNORMAL
COLOR UR: YELLOW
GLUCOSE UR STRIP-MCNC: NEGATIVE MG/DL
HGB UR QL STRIP.AUTO: ABNORMAL
KETONES UR STRIP-MCNC: NEGATIVE MG/DL
LEUKOCYTE ESTERASE UR QL STRIP: ABNORMAL
MUCOUS THREADS URNS QL MICRO: PRESENT
NITRITE UR QL STRIP: NEGATIVE
PH UR STRIP: 8 [PH] (ref 5–8)
PROT UR STRIP-MCNC: 100 MG/DL
RBC #/AREA URNS HPF: ABNORMAL /HPF
SP GR UR STRIP: 1.02 (ref 1–1.03)
UROBILINOGEN UR STRIP-ACNC: 0.2 EU/DL (ref 0.2–1)
WBC #/AREA URNS HPF: ABNORMAL /HPF

## 2025-02-14 ENCOUNTER — CARE COORDINATION (OUTPATIENT)
Dept: CARE COORDINATION | Age: 89
End: 2025-02-14

## 2025-02-14 LAB
MICROORGANISM SPEC CULT: NORMAL
SPECIMEN DESCRIPTION: NORMAL

## 2025-02-14 NOTE — CARE COORDINATION
Per chart review, patient passed away on 2/13/25 at [facility] ED after cardiac arrest. Care Management episode closed.

## (undated) DEVICE — FORCEPS BX L240CM JAW DIA2.8MM L CAP W/ NDL MIC MESH TOOTH

## (undated) DEVICE — CATHETER DIL 6FR L180CM BLLN INFLATED 36-40.5-45FR L8CM ES

## (undated) DEVICE — ENDOSCOPY KIT: Brand: MEDLINE INDUSTRIES, INC.

## (undated) DEVICE — QUICK SWITCH VALVE, NON-STERILE: Brand: MEDLINE